# Patient Record
Sex: MALE | Race: WHITE | NOT HISPANIC OR LATINO | Employment: OTHER | URBAN - METROPOLITAN AREA
[De-identification: names, ages, dates, MRNs, and addresses within clinical notes are randomized per-mention and may not be internally consistent; named-entity substitution may affect disease eponyms.]

---

## 2017-01-09 DIAGNOSIS — N32.81 OAB (OVERACTIVE BLADDER): Primary | ICD-10-CM

## 2017-01-09 DIAGNOSIS — N20.0 KIDNEY STONE: ICD-10-CM

## 2017-01-09 DIAGNOSIS — F41.1 GAD (GENERALIZED ANXIETY DISORDER): Primary | ICD-10-CM

## 2017-01-09 RX ORDER — ESZOPICLONE 3 MG/1
3 TABLET, FILM COATED ORAL NIGHTLY
Qty: 30 TABLET | Refills: 4 | Status: SHIPPED | OUTPATIENT
Start: 2017-01-09 | End: 2017-06-05

## 2017-01-09 RX ORDER — TROSPIUM CHLORIDE 20 MG/1
20 TABLET, FILM COATED ORAL 2 TIMES DAILY
Qty: 180 TABLET | Refills: 3 | Status: SHIPPED | OUTPATIENT
Start: 2017-01-09 | End: 2017-04-12

## 2017-01-09 RX ORDER — MIRTAZAPINE 15 MG/1
15 TABLET, FILM COATED ORAL NIGHTLY
Qty: 90 TABLET | Refills: 1 | Status: SHIPPED | OUTPATIENT
Start: 2017-01-09 | End: 2017-04-12 | Stop reason: SDUPTHER

## 2017-01-09 RX ORDER — ALLOPURINOL 300 MG/1
300 TABLET ORAL DAILY
Qty: 90 TABLET | Refills: 1 | Status: SHIPPED | OUTPATIENT
Start: 2017-01-09 | End: 2017-01-13

## 2017-01-09 RX ORDER — FLUOXETINE HYDROCHLORIDE 20 MG/1
20 CAPSULE ORAL DAILY
Qty: 30 CAPSULE | Refills: 11 | Status: SHIPPED | OUTPATIENT
Start: 2017-01-09 | End: 2017-04-12 | Stop reason: SDUPTHER

## 2017-01-09 NOTE — PROGRESS NOTES
OAB (overactive bladder)  -     trospium (SANCTURA) 20 mg Tab tablet; Take 1 tablet (20 mg total) by mouth 2 (two) times daily. Take it in an empty stomach  Dispense: 180 tablet; Refill: 3    Kidney stone  -     allopurinol (ZYLOPRIM) 300 MG tablet; Take 1 tablet (300 mg total) by mouth once daily.  Dispense: 90 tablet; Refill: 1    refills given as requested.  He needs a followup with me within 2 months.  Other refills requested should be done by his PCP.

## 2017-01-13 ENCOUNTER — OFFICE VISIT (OUTPATIENT)
Dept: RHEUMATOLOGY | Facility: CLINIC | Age: 22
End: 2017-01-13
Payer: COMMERCIAL

## 2017-01-13 ENCOUNTER — LAB VISIT (OUTPATIENT)
Dept: LAB | Facility: HOSPITAL | Age: 22
End: 2017-01-13
Attending: INTERNAL MEDICINE
Payer: COMMERCIAL

## 2017-01-13 VITALS
SYSTOLIC BLOOD PRESSURE: 132 MMHG | WEIGHT: 238.13 LBS | DIASTOLIC BLOOD PRESSURE: 92 MMHG | HEART RATE: 86 BPM | BODY MASS INDEX: 32.29 KG/M2

## 2017-01-13 DIAGNOSIS — L40.9 PSORIASIS: ICD-10-CM

## 2017-01-13 DIAGNOSIS — M02.30 REACTIVE ARTHRITIS: Primary | ICD-10-CM

## 2017-01-13 DIAGNOSIS — M02.30 REACTIVE ARTHRITIS: ICD-10-CM

## 2017-01-13 DIAGNOSIS — K58.9 IRRITABLE BOWEL SYNDROME, UNSPECIFIED TYPE: ICD-10-CM

## 2017-01-13 LAB
ALBUMIN SERPL BCP-MCNC: 4.2 G/DL
ALP SERPL-CCNC: 80 U/L
ALT SERPL W/O P-5'-P-CCNC: 86 U/L
ANION GAP SERPL CALC-SCNC: 10 MMOL/L
AST SERPL-CCNC: 49 U/L
BASOPHILS # BLD AUTO: 0.04 K/UL
BASOPHILS NFR BLD: 0.5 %
BILIRUB SERPL-MCNC: 0.3 MG/DL
BUN SERPL-MCNC: 13 MG/DL
CALCIUM SERPL-MCNC: 9.8 MG/DL
CHLORIDE SERPL-SCNC: 104 MMOL/L
CO2 SERPL-SCNC: 26 MMOL/L
CREAT SERPL-MCNC: 1 MG/DL
CRP SERPL-MCNC: 5.2 MG/L
DIFFERENTIAL METHOD: ABNORMAL
EOSINOPHIL # BLD AUTO: 0.3 K/UL
EOSINOPHIL NFR BLD: 3.7 %
ERYTHROCYTE [DISTWIDTH] IN BLOOD BY AUTOMATED COUNT: 13.3 %
ERYTHROCYTE [SEDIMENTATION RATE] IN BLOOD BY WESTERGREN METHOD: 7 MM/HR
EST. GFR  (AFRICAN AMERICAN): >60 ML/MIN/1.73 M^2
EST. GFR  (NON AFRICAN AMERICAN): >60 ML/MIN/1.73 M^2
GLUCOSE SERPL-MCNC: 78 MG/DL
HCT VFR BLD AUTO: 45.7 %
HGB BLD-MCNC: 15.4 G/DL
IGA SERPL-MCNC: 140 MG/DL
IGG SERPL-MCNC: 693 MG/DL
IGM SERPL-MCNC: 133 MG/DL
LYMPHOCYTES # BLD AUTO: 2.8 K/UL
LYMPHOCYTES NFR BLD: 36.2 %
MCH RBC QN AUTO: 32 PG
MCHC RBC AUTO-ENTMCNC: 33.7 %
MCV RBC AUTO: 95 FL
MONOCYTES # BLD AUTO: 0.5 K/UL
MONOCYTES NFR BLD: 6.5 %
NEUTROPHILS # BLD AUTO: 4.1 K/UL
NEUTROPHILS NFR BLD: 52.7 %
PLATELET # BLD AUTO: 254 K/UL
PMV BLD AUTO: 11.7 FL
POTASSIUM SERPL-SCNC: 4.3 MMOL/L
PROT SERPL-MCNC: 7.6 G/DL
RBC # BLD AUTO: 4.82 M/UL
SODIUM SERPL-SCNC: 140 MMOL/L
WBC # BLD AUTO: 7.79 K/UL

## 2017-01-13 PROCEDURE — 86747 PARVOVIRUS ANTIBODY: CPT | Mod: 91

## 2017-01-13 PROCEDURE — 81374 HLA I TYPING 1 ANTIGEN LR: CPT | Mod: PO

## 2017-01-13 PROCEDURE — 80053 COMPREHEN METABOLIC PANEL: CPT

## 2017-01-13 PROCEDURE — 86225 DNA ANTIBODY NATIVE: CPT

## 2017-01-13 PROCEDURE — 86235 NUCLEAR ANTIGEN ANTIBODY: CPT

## 2017-01-13 PROCEDURE — 86235 NUCLEAR ANTIGEN ANTIBODY: CPT | Mod: 59

## 2017-01-13 PROCEDURE — 83516 IMMUNOASSAY NONANTIBODY: CPT | Mod: 59

## 2017-01-13 PROCEDURE — 86038 ANTINUCLEAR ANTIBODIES: CPT

## 2017-01-13 PROCEDURE — 86644 CMV ANTIBODY: CPT

## 2017-01-13 PROCEDURE — 1159F MED LIST DOCD IN RCRD: CPT | Mod: S$GLB,,, | Performed by: INTERNAL MEDICINE

## 2017-01-13 PROCEDURE — 36415 COLL VENOUS BLD VENIPUNCTURE: CPT | Mod: PO

## 2017-01-13 PROCEDURE — 86618 LYME DISEASE ANTIBODY: CPT

## 2017-01-13 PROCEDURE — 82784 ASSAY IGA/IGD/IGG/IGM EACH: CPT | Mod: 59

## 2017-01-13 PROCEDURE — 82784 ASSAY IGA/IGD/IGG/IGM EACH: CPT

## 2017-01-13 PROCEDURE — 85651 RBC SED RATE NONAUTOMATED: CPT | Mod: PO

## 2017-01-13 PROCEDURE — 86665 EPSTEIN-BARR CAPSID VCA: CPT

## 2017-01-13 PROCEDURE — 86140 C-REACTIVE PROTEIN: CPT

## 2017-01-13 PROCEDURE — 82787 IGG 1 2 3 OR 4 EACH: CPT | Mod: 59

## 2017-01-13 PROCEDURE — 99205 OFFICE O/P NEW HI 60 MIN: CPT | Mod: S$GLB,,, | Performed by: INTERNAL MEDICINE

## 2017-01-13 PROCEDURE — 83516 IMMUNOASSAY NONANTIBODY: CPT

## 2017-01-13 PROCEDURE — 86645 CMV ANTIBODY IGM: CPT

## 2017-01-13 PROCEDURE — 85025 COMPLETE CBC W/AUTO DIFF WBC: CPT

## 2017-01-13 PROCEDURE — 99999 PR PBB SHADOW E&M-EST. PATIENT-LVL III: CPT | Mod: PBBFAC,,, | Performed by: INTERNAL MEDICINE

## 2017-01-13 RX ORDER — NEOMYCIN SULFATE, POLYMYXIN B SULFATE AND HYDROCORTISONE 10; 3.5; 1 MG/ML; MG/ML; [USP'U]/ML
SUSPENSION/ DROPS AURICULAR (OTIC)
COMMUNITY
Start: 2017-01-05 | End: 2017-04-12

## 2017-01-13 RX ORDER — FLUCONAZOLE 150 MG/1
150 TABLET ORAL DAILY
Qty: 7 TABLET | Refills: 3 | Status: SHIPPED | OUTPATIENT
Start: 2017-01-13 | End: 2017-01-20

## 2017-01-13 RX ORDER — METOCLOPRAMIDE 10 MG/1
10 TABLET ORAL 4 TIMES DAILY
Qty: 90 TABLET | Refills: 1 | Status: SHIPPED | OUTPATIENT
Start: 2017-01-13 | End: 2017-04-12

## 2017-01-13 ASSESSMENT — ROUTINE ASSESSMENT OF PATIENT INDEX DATA (RAPID3)
FATIGUE SCORE: 6
MDHAQ FUNCTION SCORE: 1.8
PATIENT GLOBAL ASSESSMENT SCORE: 5
TOTAL RAPID3 SCORE: 7
PAIN SCORE: 10
AM STIFFNESS SCORE: 0, NO
PSYCHOLOGICAL DISTRESS SCORE: 5.5

## 2017-01-13 NOTE — MR AVS SNAPSHOT
[place] [facility]  [address] [facility] [address]  [facility] [address]  Phone: [phone]  Fax: [phone]                  Jm Chaparro   2017 9:00 AM   Office Visit    Description:  Male : 1995   Provider:  Dexter Dobbs MD   Department:  [facility]           Reason for Visit     Consult           Diagnoses this Visit        Comments    Reactive arthritis    -  Primary     Irritable bowel syndrome, unspecified type         Psoriasis                To Do List           Goals (5 Years of Data)     None      Follow-Up and Disposition     Return in about 3 months (around 2017).       These Medications        Disp Refills Start End    fluconazole (DIFLUCAN) 150 MG Tab 7 tablet 3 2017    Take 1 tablet (150 mg total) by mouth once daily. - Oral    Pharmacy: [address] Ph #: [phone]       metoclopramide HCl (REGLAN) 10 MG tablet 90 tablet 1 2017     Take 1 tablet (10 mg total) by mouth 4 (four) times daily. Gastric motility - Oral    Pharmacy: [address] [place] Ph #: [phone]         [facility]     [facility] -  Assistance  Registered nurses in the [facility] provide clinical advisement, health education, appointment booking, and other advisory services.  Call for this free service at [phone].             Medications           Message regarding Medications     Verify the changes and/or additions to your medication regime listed below are the same as discussed with your clinician today.  If any of these changes or additions are incorrect, please notify your healthcare provider.        START taking these NEW medications        Refills    fluconazole (DIFLUCAN) 150 MG Tab 3    Sig: Take 1 tablet (150 mg total) by mouth once daily.    Class: Normal    Route: Oral     metoclopramide HCl (REGLAN) 10 MG tablet 1    Sig: Take 1 tablet (10 mg total) by mouth 4 (four) times daily. Gastric motility    Class: Normal    Route: Oral      STOP taking these medications     allopurinol (ZYLOPRIM) 300 MG tablet Take 1 tablet (300 mg total) by mouth once daily.           Verify that the below list of medications is an accurate representation of the medications you are currently taking.  If none reported, the list may be blank. If incorrect, please contact your healthcare provider. Carry this list with you in case of emergency.           Current Medications     amitriptyline (ELAVIL) 10 MG tablet Take 1 tablet (10 mg total) by mouth nightly as needed for Insomnia. Take 1 to 2 tablets at night    calcium glycerophosphate (PRELIEF) 65 mg Tab Take 2 tablets by mouth before meals and at bedtime as needed.    cholecalciferol, vitamin D3, (VITAMIN D3) 5,000 unit Tab Take 5,000 Units by mouth once daily.    cyanocobalamin (VITAMIN B-12) 1000 MCG tablet Take 100 mcg by mouth once daily.    famotidine (PEPCID) 40 MG tablet Take 1 tablet (40 mg total) by mouth nightly as needed for Heartburn.    fluoxetine (PROZAC) 20 MG capsule Take 1 capsule (20 mg total) by mouth once daily.    fluticasone (FLONASE) 50 mcg/actuation nasal spray 2 sprays by Each Nare route once daily.    hydrOXYzine HCl (ATARAX) 25 MG tablet TK 1 T PO  QHS    mirtazapine (REMERON) 15 MG tablet Take 1 tablet (15 mg total) by mouth every evening.    neomycin-polymyxin-hydrocortisone (CORTISPORIN) 3.5-10,000-1 mg/mL-unit/mL-% otic suspension     potassium citrate (UROCIT-K 10) 10 mEq (1,080 mg) TbSR Take 1 tablet (10 mEq total) by mouth 3 (three) times daily with meals.    trospium (SANCTURA) 20 mg Tab tablet Take 1 tablet (20 mg total) by mouth 2 (two) times daily. Take it in an empty stomach    ELMIRON 100 mg Cap TK 2 CS PO BID BEFORE MEALS    eszopiclone 3 mg Tab Take 1 tablet (3 mg total) by mouth every evening.    fluconazole  (DIFLUCAN) 150 MG Tab Take 1 tablet (150 mg total) by mouth once daily.    metoclopramide HCl (REGLAN) 10 MG tablet Take 1 tablet (10 mg total) by mouth 4 (four) times daily. Gastric motility    sumatriptan (IMITREX) 100 MG tablet Take 100 mg by mouth every 2 (two) hours as needed.            Clinical Reference Information           Vital Signs - Last Recorded  Most recent update: 1/13/2017  9:21 AM by Maddy Poole LPN    BP Pulse Wt BMI       (!) 132/92 (BP Location: Right arm, Patient Position: Sitting, BP Method: Automatic) 86 108 kg (238 lb 1.6 oz) 32.29 kg/m2       Blood Pressure          Most Recent Value    BP  (!)  132/92      Allergies as of 1/13/2017     Caffeine      Immunizations Administered on Date of Encounter - 1/13/2017     None      Orders Placed During Today's Visit     Future Labs/Procedures Expected by Expires    RUKHSANA  1/13/2017 3/14/2018    Anti Sm/RNP Antibody  1/13/2017 3/14/2018    Anti-DNA antibody, double-stranded  1/13/2017 3/14/2018    Anti-Histone Antibody  1/13/2017 3/14/2018    Anti-scleroderma antibody  1/13/2017 3/14/2018    Anti-Smith antibody  1/13/2017 3/14/2018    B. burgdorferi Abs (Lyme Disease)  1/13/2017 3/14/2018    C-reactive protein  1/13/2017 3/14/2018    CBC auto differential  1/13/2017 3/14/2018    Celiac Disease Panel  1/13/2017 3/14/2018    Comprehensive metabolic panel  1/13/2017 3/14/2018    Cytomegalovirus (Cmv) Ab, Igm  1/13/2017 3/14/2018    CYTOMEGALOVIRUS ANTIBODY, IGG  1/13/2017 3/14/2018    VERONICA-BARR VIRUS ANTIBODY PANEL  1/13/2017 3/14/2018    HLA B27 Antigen  1/13/2017 3/14/2018    IgA  1/13/2017 3/14/2018    IgG 1, 2, 3, and 4  1/13/2017 3/14/2018    IgG  1/13/2017 3/14/2018    IgM  1/13/2017 3/14/2018    Parvovirus B19 antibody, IgG and IgM  1/13/2017 3/14/2018    Sedimentation rate, manual  1/13/2017 3/14/2018    Sjogrens syndrome-A extractable nuclear antibody  1/13/2017 3/14/2018    Sjogrens syndrome-B extractable nuclear antibody  1/13/2017  3/14/2018      Instructions    Stop allopurinol  Diflucan 150 for yeast add probiotics   reglan for severe distention

## 2017-01-13 NOTE — PROGRESS NOTES
Subjective:       Patient ID: Jm Chaparro is a 21 y.o. male.    Chief Complaint: Consult          Arthritis    The disease course has been fluctuating.     He complains of pain, stiffness, joint swelling and joint warmth. The symptoms have been worsening. Affected locations include the neck, right shoulder, right elbow, right wrist, right MCP, right PIP, right DIP, right hip, right knee, right foot, right toes, left shoulder, left wrist, left MCP, left PIP, left hip, left knee, left ankle and left toes. Associated symptoms include fatigue, pain at night, pain while resting, trouble swallowing, myalgias and headaches. Pertinent negatives include no dysuria, fever, Raynaud's syndrome, uveitis, dry eyes, pleurisy, stress or weight loss. He complains of morning stiffness lasting between 30 and 60 minutes after awakening.The neck, left shoulder, right shoulder, left elbow, right elbow, left wrist, right wrist, left hand, right hand, left hip, right hip, left knee and right knee presents with Arthralgia.    Past treatments include NSAIDs. Factors aggravating his arthritis include activity.     Review of Systems   Constitutional: Positive for fatigue. Negative for activity change, appetite change, chills, diaphoresis, fever, unexpected weight change and weight loss.   HENT: Positive for trouble swallowing. Negative for congestion, ear pain, facial swelling, mouth sores, nosebleeds, postnasal drip, rhinorrhea, sinus pressure, sneezing, sore throat, tinnitus and voice change.    Eyes: Negative for pain, discharge, redness, itching and visual disturbance.   Respiratory: Negative for apnea, cough, chest tightness, shortness of breath and wheezing.    Cardiovascular: Negative for chest pain, palpitations and leg swelling.   Gastrointestinal: Positive for abdominal distention and abdominal pain. Negative for constipation, diarrhea, nausea and vomiting.   Endocrine: Negative for cold intolerance, heat intolerance, polydipsia  and polyuria.   Genitourinary: Negative for decreased urine volume, difficulty urinating, dysuria, flank pain, frequency, hematuria and urgency.   Musculoskeletal: Positive for arthralgias, back pain, joint swelling, myalgias, neck pain, neck stiffness and stiffness. Negative for gait problem.   Skin: Positive for rash. Negative for pallor and wound.   Allergic/Immunologic: Negative for immunocompromised state.   Neurological: Positive for headaches. Negative for dizziness, tremors, seizures, syncope, weakness and numbness.   Hematological: Negative for adenopathy. Does not bruise/bleed easily.   Psychiatric/Behavioral: Negative for sleep disturbance and suicidal ideas. The patient is not nervous/anxious.          Objective:     Visit Vitals    BP (!) 132/92 (BP Location: Right arm, Patient Position: Sitting, BP Method: Automatic)    Pulse 86    Wt 108 kg (238 lb 1.6 oz)    BMI 32.29 kg/m2        Physical Exam   Vitals reviewed.  Constitutional: He is oriented to person, place, and time. He appears distressed.   HENT:   Head: Normocephalic and atraumatic.   Mouth/Throat: Oropharynx is clear and moist.   Eyes: EOM are normal. Pupils are equal, round, and reactive to light.   Neck: Neck supple. No thyromegaly present.   Cardiovascular: Normal rate, regular rhythm and normal heart sounds.  Exam reveals no gallop and no friction rub.    No murmur heard.  Pulmonary/Chest: Breath sounds normal. He has no wheezes. He has no rales. He exhibits no tenderness.   Abdominal: There is no tenderness. There is no rebound and no guarding.       Right Side Rheumatological Exam     Examination finds the 2nd MCP, 3rd MCP, 4th MCP and 5th MCP normal.    The patient is tender to palpation of the shoulder and elbow    He has swelling of the knee    The patient has an enlarged wrist, 1st PIP, 2nd PIP, 3rd PIP, 4th PIP and 5th PIP    Shoulder Exam   Tenderness Location: acromioclavicular joint and posterior shoulder    Range of Motion    Active Abduction: abnormal   Adduction: abnormal  Effusion: negative  Sensation: normal    Knee Exam     Range of Motion   Extension: normal   Flexion: normal   Patellofemoral Crepitus: positive  Effusion: positive  Range of Motion: normal range of motion  Sensation: normal    Hip Exam   Tenderness Location: anterior and posterior    Range of Motion   Extension: abnormal   Flexion: abnormal   Sensation: normal    Elbow/Wrist Exam   Tenderness Location: no tenderness    Range of Motion   Elbow   Flexion: normal   Sensation: normal    Muscle Strength (0-5 scale):  Neck Flexion:  3  Neck Extension: 3  Deltoid:  3  Biceps: 3/5   Triceps:  3  Quadriceps:  3   Distal Lower Extremity: 3    Left Side Rheumatological Exam     Examination finds the elbow, wrist, 1st MCP, 2nd MCP, 3rd MCP, 4th MCP and 5th MCP normal.    The patient is tender to palpation of the shoulder.    He has swelling of the knee    The patient has an enlarged 1st PIP, 2nd PIP, 3rd PIP, 4th PIP and 5th PIP.    Shoulder Exam   Tenderness Location: acromioclavicular joint and posterior shoulder    Range of Motion   Active Abduction: abnormal   Extension: abnormal   Sensation: normal    Knee Exam     Range of Motion   Extension: normal   Flexion: normal     Patellofemoral Crepitus: positive  Effusion: positive  Range of Motion: decreased range of motion  Sensation: normal    Hip Exam   Tenderness Location: anterior and posterior    Range of Motion   Extension: abnormal   Flexion: abnormal   Sensation: normal    Elbow/Wrist Exam     Range of Motion   Elbow   Flexion: normal   Sensation: normal    Muscle Strength (0-5 scale):  Neck Flexion:  3  Neck Extension: 3  Deltoid:  3  Biceps: 3/5   Triceps:  3  Quadriceps:  3   Distal Lower Extremity: 3      Back/Neck Exam   General Inspection   Gait: normal       Tenderness Right paramedian tenderness of the Lower C-Spine, Lower L-Spine, Upper C-Spine, Upper L-Spine and SI Joint.Left paramedian tenderness of the  Lower C-Spine, Lower L-Spine, Upper C-Spine, Upper L-Spine and SI Joint.    Back Range of Motion   Extension: abnormal  Flexion: abnormal  Lateral Bend Right: abnormal  Lateral Bend Left: abnormal  Rotation Right: abnormal  Rotation Left: abnormal    Neck Range of Motion   Flexion: Limited and moderate  Extension: Limited and moderate  Right Lateral Bend: abnormal  Left Lateral Bend: abnormal  Right Rotation: abnormal  Left Rotation: abnormal  Lymphadenopathy:     He has no cervical adenopathy.   Neurological: He is alert and oriented to person, place, and time. He displays weakness. He exhibits abnormal muscle tone.   Reflex Scores:       Patellar reflexes are 2+ on the right side and 2+ on the left side.  Skin: No rash noted. No erythema. No pallor.     Psychiatric: Mood and affect normal.   Musculoskeletal: He exhibits edema and tenderness. He exhibits no deformity.           Results for orders placed or performed in visit on 09/21/16   2D Echo w/ Color Flow Doppler   Result Value Ref Range    EF 55 55 - 65    Diastolic Dysfunction No     Est. PA Systolic Pressure 30     Tricuspid Valve Regurgitation MILD        Assessment:       1. Reactive arthritis    2. Irritable bowel syndrome, unspecified type    3. Psoriasis            Plan:       Jm was seen today for consult.    Diagnoses and all orders for this visit:    Reactive arthritis  -     RUKHSANA; Future  -     Anti Sm/RNP Antibody; Future  -     Anti-DNA antibody, double-stranded; Future  -     Anti-Histone Antibody; Future  -     Anti-scleroderma antibody; Future  -     Anti-Smith antibody; Future  -     Sjogrens syndrome-A extractable nuclear antibody; Future  -     Sjogrens syndrome-B extractable nuclear antibody; Future  -     IgA; Future  -     IgG; Future  -     IgG 1, 2, 3, and 4; Future  -     IgM; Future  -     C-reactive protein; Future  -     Sedimentation rate, manual; Future  -     B. burgdorferi Abs (Lyme Disease); Future  -     HLA B27 Antigen;  Future  -     Parvovirus B19 antibody, IgG and IgM; Future  -     Cytomegalovirus (Cmv) Ab, Igm; Future  -     Celiac Disease Panel; Future  -     VERONICA-BARR VIRUS ANTIBODY PANEL; Future  -     CYTOMEGALOVIRUS ANTIBODY, IGG; Future  -     fluconazole (DIFLUCAN) 150 MG Tab; Take 1 tablet (150 mg total) by mouth once daily.  -     metoclopramide HCl (REGLAN) 10 MG tablet; Take 1 tablet (10 mg total) by mouth 4 (four) times daily. Gastric motility  -     CBC auto differential; Future  -     Comprehensive metabolic panel; Future    Irritable bowel syndrome, unspecified type  -     RUKHSANA; Future  -     Anti Sm/RNP Antibody; Future  -     Anti-DNA antibody, double-stranded; Future  -     Anti-Histone Antibody; Future  -     Anti-scleroderma antibody; Future  -     Anti-Smith antibody; Future  -     Sjogrens syndrome-A extractable nuclear antibody; Future  -     Sjogrens syndrome-B extractable nuclear antibody; Future  -     IgA; Future  -     IgG; Future  -     IgG 1, 2, 3, and 4; Future  -     IgM; Future  -     C-reactive protein; Future  -     Sedimentation rate, manual; Future  -     B. burgdorferi Abs (Lyme Disease); Future  -     HLA B27 Antigen; Future  -     Parvovirus B19 antibody, IgG and IgM; Future  -     Cytomegalovirus (Cmv) Ab, Igm; Future  -     Celiac Disease Panel; Future  -     VERONICA-BARR VIRUS ANTIBODY PANEL; Future  -     CYTOMEGALOVIRUS ANTIBODY, IGG; Future  -     fluconazole (DIFLUCAN) 150 MG Tab; Take 1 tablet (150 mg total) by mouth once daily.  -     metoclopramide HCl (REGLAN) 10 MG tablet; Take 1 tablet (10 mg total) by mouth 4 (four) times daily. Gastric motility  -     CBC auto differential; Future  -     Comprehensive metabolic panel; Future    Psoriasis  -     fluconazole (DIFLUCAN) 150 MG Tab; Take 1 tablet (150 mg total) by mouth once daily.  -     metoclopramide HCl (REGLAN) 10 MG tablet; Take 1 tablet (10 mg total) by mouth 4 (four) times daily. Gastric motility  -     CBC auto  differential; Future  -     Comprehensive metabolic panel; Future

## 2017-01-14 LAB
ANTI SM ANTIBODY: 1.08 EU
ANTI SM/RNP ANTIBODY: 1.44 EU
ANTI-SM INTERPRETATION: NEGATIVE
ANTI-SM/RNP INTERPRETATION: NEGATIVE
ANTI-SSA ANTIBODY: 0.45 EU
ANTI-SSA INTERPRETATION: NEGATIVE
ANTI-SSB ANTIBODY: 0.54 EU
ANTI-SSB INTERPRETATION: NEGATIVE

## 2017-01-16 LAB
ANA SER QL IF: NORMAL
DSDNA AB SER-ACNC: NORMAL [IU]/ML
ENA SCL70 AB SER-ACNC: 2 UNITS
GLIADIN PEPTIDE IGA SER-ACNC: 4 UNITS
GLIADIN PEPTIDE IGG SER-ACNC: 2 UNITS
IGG1 SER-MCNC: 428 MG/DL
IGG2 SER-MCNC: 219 MG/DL
IGG3 SER-MCNC: 21 MG/DL
IGG4 SER-MCNC: 45 MG/DL
TTG IGA SER IA-ACNC: 3 UNITS
TTG IGG SER IA-ACNC: 2 UNITS

## 2017-01-17 LAB
B BURGDOR AB SER QL: 0.09 INDEX VALUE
CMV IGG SERPL QL IA: NORMAL
CMV IGM TITR SERPL: <8 U/ML
PARVOVIRUS B19 ABS IGG & IGM: ABNORMAL
PARVOVIRUS B19 IGG ANTIBODY: 6.17 INDEX
PARVOVIRUS B19 IGM ANTIBODY: 0.12 INDEX

## 2017-01-19 LAB — HISTONE IGG SER IA-ACNC: 0.4 UNITS (ref 0–0.9)

## 2017-01-20 LAB
EBV EA IGG SER QL IF: NORMAL TITER
EBV NA IGG SER IA-ACNC: NORMAL TITER
EBV VCA IGG SER IA-ACNC: NORMAL TITER
EBV VCA IGM SER IA-ACNC: NORMAL TITER

## 2017-02-03 ENCOUNTER — TELEPHONE (OUTPATIENT)
Dept: RHEUMATOLOGY | Facility: CLINIC | Age: 22
End: 2017-02-03

## 2017-02-03 NOTE — TELEPHONE ENCOUNTER
----- Message from Dexter Dobbs MD sent at 1/19/2017  8:10 AM CST -----  Lyme test are negative, a past infection for parvovirus is noted and your immune system is suppressed, the rest of the labs are pendingplease call

## 2017-02-12 LAB
HLA-B27 RELATED AG QL: NEGATIVE
HLA-B27 RELATED AG QL: NORMAL

## 2017-04-12 ENCOUNTER — OFFICE VISIT (OUTPATIENT)
Dept: PSYCHIATRY | Facility: CLINIC | Age: 22
End: 2017-04-12
Payer: COMMERCIAL

## 2017-04-12 VITALS
WEIGHT: 243.5 LBS | DIASTOLIC BLOOD PRESSURE: 86 MMHG | HEART RATE: 80 BPM | HEIGHT: 72 IN | SYSTOLIC BLOOD PRESSURE: 136 MMHG | BODY MASS INDEX: 32.98 KG/M2

## 2017-04-12 DIAGNOSIS — F41.1 GAD (GENERALIZED ANXIETY DISORDER): Primary | ICD-10-CM

## 2017-04-12 DIAGNOSIS — N30.10 IC (INTERSTITIAL CYSTITIS): ICD-10-CM

## 2017-04-12 PROCEDURE — 99999 PR PBB SHADOW E&M-EST. PATIENT-LVL II: CPT | Mod: PBBFAC,,, | Performed by: PSYCHIATRY & NEUROLOGY

## 2017-04-12 PROCEDURE — 99214 OFFICE O/P EST MOD 30 MIN: CPT | Mod: S$GLB,,, | Performed by: PSYCHIATRY & NEUROLOGY

## 2017-04-12 PROCEDURE — 1160F RVW MEDS BY RX/DR IN RCRD: CPT | Mod: S$GLB,,, | Performed by: PSYCHIATRY & NEUROLOGY

## 2017-04-12 PROCEDURE — 90833 PSYTX W PT W E/M 30 MIN: CPT | Mod: S$GLB,,, | Performed by: PSYCHIATRY & NEUROLOGY

## 2017-04-12 RX ORDER — MIRTAZAPINE 7.5 MG/1
7.5 TABLET, FILM COATED ORAL NIGHTLY
Qty: 30 TABLET | Refills: 0 | Status: SHIPPED | OUTPATIENT
Start: 2017-04-12 | End: 2017-05-18

## 2017-04-12 RX ORDER — AMITRIPTYLINE HYDROCHLORIDE 25 MG/1
25 TABLET, FILM COATED ORAL NIGHTLY PRN
Qty: 30 TABLET | Refills: 0 | Status: SHIPPED | OUTPATIENT
Start: 2017-04-12 | End: 2017-05-18 | Stop reason: SDUPTHER

## 2017-04-12 RX ORDER — FLUOXETINE HYDROCHLORIDE 20 MG/1
20 CAPSULE ORAL DAILY
Qty: 30 CAPSULE | Refills: 11 | Status: SHIPPED | OUTPATIENT
Start: 2017-04-12 | End: 2017-05-18 | Stop reason: SDUPTHER

## 2017-04-12 RX ORDER — METHYLPHENIDATE HYDROCHLORIDE 20 MG/1
20 TABLET ORAL 2 TIMES DAILY
Qty: 60 TABLET | Refills: 0 | Status: SHIPPED | OUTPATIENT
Start: 2017-04-12 | End: 2017-05-18

## 2017-04-12 RX ORDER — HYDROXYZINE HYDROCHLORIDE 25 MG/1
25 TABLET, FILM COATED ORAL 3 TIMES DAILY
Qty: 30 TABLET | Refills: 0 | Status: SHIPPED | OUTPATIENT
Start: 2017-04-12 | End: 2017-05-18 | Stop reason: SDUPTHER

## 2017-04-12 RX ORDER — HYDROCODONE BITARTRATE AND ACETAMINOPHEN 5; 325 MG/1; MG/1
TABLET ORAL
Refills: 0 | COMMUNITY
Start: 2017-03-31 | End: 2017-04-12

## 2017-04-16 ENCOUNTER — PATIENT MESSAGE (OUTPATIENT)
Dept: PSYCHIATRY | Facility: CLINIC | Age: 22
End: 2017-04-16

## 2017-04-16 ENCOUNTER — PATIENT MESSAGE (OUTPATIENT)
Dept: FAMILY MEDICINE | Facility: CLINIC | Age: 22
End: 2017-04-16

## 2017-04-17 DIAGNOSIS — F40.10 SOCIAL ANXIETY DISORDER: Primary | ICD-10-CM

## 2017-04-17 DIAGNOSIS — F41.1 GAD (GENERALIZED ANXIETY DISORDER): ICD-10-CM

## 2017-04-17 NOTE — TELEPHONE ENCOUNTER
Spoke with Jm on phone, mother in back ground. Patient stated that yesterday he took ritalin and by mid- afternoon he felt his heart flutter, dizzy, hard to concentrate and sweaty.     Mother checked his BP- 155/125. Pulse racing  Mother gave him HER  lisinopril 10-12.5mg. And rechecked it to monitor. By early evening he was 122/70.    This morning they held medication- he did not take ritalin. Checked BP which was 112/68.     Advised patient to continue holding medication for today   Please advise

## 2017-04-18 ENCOUNTER — PATIENT MESSAGE (OUTPATIENT)
Dept: FAMILY MEDICINE | Facility: CLINIC | Age: 22
End: 2017-04-18

## 2017-04-19 ENCOUNTER — OFFICE VISIT (OUTPATIENT)
Dept: FAMILY MEDICINE | Facility: CLINIC | Age: 22
End: 2017-04-19
Payer: COMMERCIAL

## 2017-04-19 ENCOUNTER — TELEPHONE (OUTPATIENT)
Dept: FAMILY MEDICINE | Facility: CLINIC | Age: 22
End: 2017-04-19

## 2017-04-19 VITALS
SYSTOLIC BLOOD PRESSURE: 130 MMHG | HEART RATE: 84 BPM | TEMPERATURE: 99 F | RESPIRATION RATE: 18 BRPM | OXYGEN SATURATION: 98 % | WEIGHT: 241.88 LBS | DIASTOLIC BLOOD PRESSURE: 84 MMHG | HEIGHT: 73 IN | BODY MASS INDEX: 32.06 KG/M2

## 2017-04-19 DIAGNOSIS — R35.0 URINARY FREQUENCY: ICD-10-CM

## 2017-04-19 DIAGNOSIS — R53.83 FATIGUE, UNSPECIFIED TYPE: ICD-10-CM

## 2017-04-19 DIAGNOSIS — I10 BENIGN ESSENTIAL HTN: Primary | ICD-10-CM

## 2017-04-19 DIAGNOSIS — F84.5 ASPERGER'S DISORDER: ICD-10-CM

## 2017-04-19 DIAGNOSIS — R30.0 DYSURIA: ICD-10-CM

## 2017-04-19 LAB
BILIRUB SERPL-MCNC: ABNORMAL MG/DL
BLOOD URINE, POC: 50
COLOR, POC UA: ABNORMAL
FERRITIN SERPL-MCNC: 211 NG/ML
GLUCOSE UR QL STRIP: ABNORMAL
IRON SERPL-MCNC: 96 UG/DL
IRON SERPL-MCNC: 96 UG/DL
KETONES UR QL STRIP: ABNORMAL
LEUKOCYTE ESTERASE URINE, POC: ABNORMAL
NITRITE, POC UA: ABNORMAL
PH, POC UA: 6
PROTEIN, POC: ABNORMAL
SATURATED IRON: 24 %
SPECIFIC GRAVITY, POC UA: 1.01
T4 FREE SERPL-MCNC: 1.02 NG/DL
TOTAL IRON BINDING CAPACITY: 392 UG/DL
TRANSFERRIN SERPL-MCNC: 265 MG/DL
TSH SERPL DL<=0.005 MIU/L-ACNC: 0.29 UIU/ML
UROBILINOGEN, POC UA: 1
VIT B12 SERPL-MCNC: 584 PG/ML

## 2017-04-19 PROCEDURE — 83036 HEMOGLOBIN GLYCOSYLATED A1C: CPT

## 2017-04-19 PROCEDURE — 3075F SYST BP GE 130 - 139MM HG: CPT | Mod: S$GLB,,, | Performed by: NURSE PRACTITIONER

## 2017-04-19 PROCEDURE — 84443 ASSAY THYROID STIM HORMONE: CPT

## 2017-04-19 PROCEDURE — 82728 ASSAY OF FERRITIN: CPT

## 2017-04-19 PROCEDURE — 82607 VITAMIN B-12: CPT

## 2017-04-19 PROCEDURE — 99214 OFFICE O/P EST MOD 30 MIN: CPT | Mod: S$GLB,,, | Performed by: NURSE PRACTITIONER

## 2017-04-19 PROCEDURE — 1160F RVW MEDS BY RX/DR IN RCRD: CPT | Mod: S$GLB,,, | Performed by: NURSE PRACTITIONER

## 2017-04-19 PROCEDURE — 81002 URINALYSIS NONAUTO W/O SCOPE: CPT | Mod: S$GLB,,, | Performed by: NURSE PRACTITIONER

## 2017-04-19 PROCEDURE — 83540 ASSAY OF IRON: CPT

## 2017-04-19 PROCEDURE — 36415 COLL VENOUS BLD VENIPUNCTURE: CPT | Mod: S$GLB,,, | Performed by: NURSE PRACTITIONER

## 2017-04-19 PROCEDURE — 3079F DIAST BP 80-89 MM HG: CPT | Mod: S$GLB,,, | Performed by: NURSE PRACTITIONER

## 2017-04-19 PROCEDURE — 84439 ASSAY OF FREE THYROXINE: CPT

## 2017-04-19 RX ORDER — LISINOPRIL 5 MG/1
5 TABLET ORAL DAILY
Qty: 30 TABLET | Refills: 0 | Status: SHIPPED | OUTPATIENT
Start: 2017-04-19 | End: 2017-05-15 | Stop reason: SDUPTHER

## 2017-04-19 NOTE — TELEPHONE ENCOUNTER
Pt here today for hospital f/u and high blood pressure.   Recently stayed at [facility] and had procedure performed for kidney stones.   Faxed [facility] request for records of admission.  Maryann notified.

## 2017-04-19 NOTE — PROGRESS NOTES
Venipuncture performed with 23 gauge butterfly, x's 1 attempt,  to L Antecubital vein.  Specimens collected per orders.      Pressure dressing applied to site, instructed patient to remove dressing in 10-15 minutes, OK to re-adjust dressing if pressure causing any discomfort, to observe closely for numbness and/or discoloration to hand or fingers, and to notify provider if bleeding persists after applying constant pressure lasting 30 minutes.

## 2017-04-20 ENCOUNTER — TELEPHONE (OUTPATIENT)
Dept: FAMILY MEDICINE | Facility: CLINIC | Age: 22
End: 2017-04-20

## 2017-04-20 DIAGNOSIS — R79.89 LOW TSH LEVEL: Primary | ICD-10-CM

## 2017-04-20 LAB
ESTIMATED AVG GLUCOSE: 111 MG/DL
HBA1C MFR BLD HPLC: 5.5 %

## 2017-04-20 NOTE — TELEPHONE ENCOUNTER
His labs look good except his thyroid level is a little on the low side. This can make him have palpitations, anxiety, muscles pain. I want to get a thyroid US done. Please schedule this. I also want him to see Endocrinology. I will put in referral. The rest of his labs are good.

## 2017-04-21 NOTE — PROGRESS NOTES
"Subjective:       Patient ID: Jm Chaparro is a 21 y.o. male.    Chief Complaint: Hypertension    HPI Here with his  Mother. They are concerned about his BP. She has his home readings and his pressure runs 140-150/high 80-90's. Most days.  Today reading is adequate. Home BP cuff is comparable to our manual reading. He states that he feels "heavy" at times. Says his muscles hurt and he is tired a lot. He says he is having to go to the bathroom to pee a lot. Sometimes it burns. He says that he drinks soda and eats a lot of salty foods. We talked about proper diet. Avoiding salt. Hydrating well with water. He needs to exercise. He denies any fever or night sweats. No other concerns. See ROS.    The following portion of the patients history was reviewed and updated as appropriate: allergies, current medications, past medical and surgical history. Past social history and problem list reviewed. Family PMH and Past social history reviewed. Tobacco, Illicit drug use reviewed.     Review of Systems  Constitutional:  fatigue   no fever    HENT: no sore throat or nasal congestion. No voice changes    Eyes: No vision changes, blurred vision  Skin: no rashes or lesions  Respiratory:   No SOB, Wheezing, cough  Cardiovascular:   No CP, Palpitations  Gastrointestinal:   No N/V/D. No abdominal pain, weight stable. Appetite good.   Genitourinary:    Dysuria and frequency. No change in bowels.     Musculoskeletal:   No joint pain  No change in gait or coordination. States his body feels "heavy". See HPI.  Neurological:   No dizziness. No headaches  Hematological: No abnormal bruising or bleeding    Psychiatric/Behavioral Negative for suicidal ideas.  Denies feelings of depression. No thoughts of wanting to harm self or others.     Objective:     /84 (BP Location: Left arm, Patient Position: Sitting)  Pulse 84  Temp 98.6 °F (37 °C)  Resp 18  Ht 6' 1" (1.854 m)  Wt 109.7 kg (241 lb 13.5 oz)  SpO2 98%  BMI " 31.91 kg/m2     Physical Exam     Constitutional: oriented to person, place, and time. well-developed and well-nourished.   HENT:  Throat clear. TM with normal light reflex.   Head: Normocephalic.   Eyes: Conjunctivae are normal. Pupils are equal, round, and reactive to light.   Neck: Normal range of motion. Neck supple. No tracheal deviation present. No thyromegaly present.   Cardiovascular: Normal rate, regular rhythm and normal heart sounds.    Pulmonary/Chest: Effort normal and breath sounds normal. No respiratory distress. No wheezes.   Abdominal: Soft. Bowel sounds are normal. No distension. There is no tenderness.   Musculoskeletal: Normal range of motion. Gait and coordination normal.   Neurological: oriented to person, place, and time.   Skin: Skin is warm and dry. No rashes or lesions  Psychiatric: Normal mood and affect.Behavior is normal. Judgment and thought content normal. he answers my questions appropriately. He is cooperative.  Assessment:       1. Benign essential HTN    2. Fatigue, unspecified type    3. Dysuria    4. Urinary frequency    5. Asperger's disorder        Plan:         Jm was seen today for hypertension.    Diagnoses and all orders for this visit:    Benign essential HTN: need to start on low dose BP medication. Take as directed. Continue to monitor BP at home and bring list of readings to follow up in two weeks.    Fatigue, unspecified type: will check labs.   -     Ferritin  -     Iron  -     Iron and TIBC  -     TSH  -     Vitamin B12    Dysuria: no indication of infection.   -     POCT urine dipstick without microscope    Urinary frequency: strong family history of diabetes. Will check labs.  -        Hemoglobin A1c    Asperger's disorder: doing well.     Other orders  -     lisinopril (PRINIVIL,ZESTRIL) 5 MG tablet; Take 1 tablet (5 mg total) by mouth once daily.  -     T4, free    Continue current medication  Take medications only as prescribed  Healthy diet,  exercise  Adequate rest  Adequate hydration  Avoid allergens  Avoid excessive caffeine

## 2017-04-24 ENCOUNTER — HOSPITAL ENCOUNTER (OUTPATIENT)
Dept: RADIOLOGY | Facility: HOSPITAL | Age: 22
Discharge: HOME | End: 2017-04-24
Attending: NURSE PRACTITIONER
Payer: COMMERCIAL

## 2017-04-24 ENCOUNTER — TELEPHONE (OUTPATIENT)
Dept: FAMILY MEDICINE | Facility: CLINIC | Age: 22
End: 2017-04-24

## 2017-04-24 DIAGNOSIS — R79.89 LOW TSH LEVEL: ICD-10-CM

## 2017-04-24 PROCEDURE — 76536 US EXAM OF HEAD AND NECK: CPT | Mod: 26,,, | Performed by: RADIOLOGY

## 2017-04-24 PROCEDURE — 76536 US EXAM OF HEAD AND NECK: CPT | Mod: TC,PO

## 2017-04-25 ENCOUNTER — PATIENT MESSAGE (OUTPATIENT)
Dept: FAMILY MEDICINE | Facility: CLINIC | Age: 22
End: 2017-04-25

## 2017-04-25 DIAGNOSIS — R10.9 FLANK PAIN: Primary | ICD-10-CM

## 2017-04-25 NOTE — TELEPHONE ENCOUNTER
Who cancelled his appointment with Endocrinology, I didn't request that to be done?  His US might have been normal but his TSH is still low. Please find out why it was cancelled. I put in order for CT with renal stone protocal. Please schedule.

## 2017-04-28 ENCOUNTER — OFFICE VISIT (OUTPATIENT)
Dept: RHEUMATOLOGY | Facility: CLINIC | Age: 22
End: 2017-04-28
Payer: COMMERCIAL

## 2017-04-28 ENCOUNTER — LAB VISIT (OUTPATIENT)
Dept: LAB | Facility: HOSPITAL | Age: 22
End: 2017-04-28
Attending: INTERNAL MEDICINE
Payer: COMMERCIAL

## 2017-04-28 VITALS
BODY MASS INDEX: 31.91 KG/M2 | SYSTOLIC BLOOD PRESSURE: 118 MMHG | WEIGHT: 240.75 LBS | HEIGHT: 73 IN | DIASTOLIC BLOOD PRESSURE: 90 MMHG | HEART RATE: 68 BPM

## 2017-04-28 DIAGNOSIS — L40.9 PSORIASIS: ICD-10-CM

## 2017-04-28 DIAGNOSIS — M25.50 ARTHRALGIA, UNSPECIFIED JOINT: ICD-10-CM

## 2017-04-28 DIAGNOSIS — R53.83 FATIGUE, UNSPECIFIED TYPE: ICD-10-CM

## 2017-04-28 DIAGNOSIS — K58.9 IRRITABLE BOWEL SYNDROME, UNSPECIFIED TYPE: ICD-10-CM

## 2017-04-28 DIAGNOSIS — R53.83 FATIGUE, UNSPECIFIED TYPE: Primary | ICD-10-CM

## 2017-04-28 LAB
CRP SERPL-MCNC: 2.2 MG/L
ERYTHROCYTE [SEDIMENTATION RATE] IN BLOOD BY WESTERGREN METHOD: 5 MM/HR
THYROGLOB AB SERPL IA-ACNC: <4 IU/ML
THYROPEROXIDASE IGG SERPL-ACNC: <6 IU/ML

## 2017-04-28 PROCEDURE — 86800 THYROGLOBULIN ANTIBODY: CPT

## 2017-04-28 PROCEDURE — 1160F RVW MEDS BY RX/DR IN RCRD: CPT | Mod: S$GLB,,, | Performed by: INTERNAL MEDICINE

## 2017-04-28 PROCEDURE — 99214 OFFICE O/P EST MOD 30 MIN: CPT | Mod: S$GLB,,, | Performed by: INTERNAL MEDICINE

## 2017-04-28 PROCEDURE — 3080F DIAST BP >= 90 MM HG: CPT | Mod: S$GLB,,, | Performed by: INTERNAL MEDICINE

## 2017-04-28 PROCEDURE — 85651 RBC SED RATE NONAUTOMATED: CPT | Mod: PO

## 2017-04-28 PROCEDURE — 86140 C-REACTIVE PROTEIN: CPT

## 2017-04-28 PROCEDURE — 82530 CORTISOL FREE: CPT

## 2017-04-28 PROCEDURE — 3074F SYST BP LT 130 MM HG: CPT | Mod: S$GLB,,, | Performed by: INTERNAL MEDICINE

## 2017-04-28 PROCEDURE — 86376 MICROSOMAL ANTIBODY EACH: CPT

## 2017-04-28 PROCEDURE — 99999 PR PBB SHADOW E&M-EST. PATIENT-LVL III: CPT | Mod: PBBFAC,,, | Performed by: INTERNAL MEDICINE

## 2017-04-28 RX ORDER — CLOBETASOL PROPIONATE 0.5 MG/G
OINTMENT TOPICAL 2 TIMES DAILY
Qty: 60 G | Refills: 5 | Status: SHIPPED | OUTPATIENT
Start: 2017-04-28 | End: 2019-01-31

## 2017-04-28 RX ORDER — MELOXICAM 15 MG/1
15 TABLET ORAL DAILY PRN
Qty: 30 TABLET | Refills: 3 | Status: SHIPPED | OUTPATIENT
Start: 2017-04-28 | End: 2017-05-26 | Stop reason: SDUPTHER

## 2017-04-28 NOTE — MR AVS SNAPSHOT
[place] [facility]  [address] [facility] [address]  [facility] [address]  Phone: [phone]  Fax: [phone]                  Jm Chaparro   2017 8:00 AM   Office Visit    Description:  Male : 1995   Provider:  Dexter Dobbs MD   Department:  [place] [facility]           Reason for Visit     Disease Management           Diagnoses this Visit        Comments    Fatigue, unspecified type    -  Primary     Arthralgia, unspecified joint         Psoriasis         Irritable bowel syndrome, unspecified type                To Do List           Goals (5 Years of Data)     None      Follow-Up and Disposition     Return in about 4 months (around 2017).       These Medications        Disp Refills Start End    meloxicam (MOBIC) 15 MG tablet 30 tablet 3 2017    Take 1 tablet (15 mg total) by mouth daily as needed for Pain. In am for joint pain - Oral    Pharmacy: [address] [address] Ph #: [phone]       clobetasol 0.05% (TEMOVATE) 0.05 % Oint 60 g 5 2017    Apply topically 2 (two) times daily. scalp - Topical    Pharmacy: [address] [place] - [address] Ph #: [phone]         [facility]     [facility] Nurse Care [facility] - 24/ Assistance  Unless otherwise directed by your provider, please contact [facility], our nurse care line that is available for 24/ assistance.     Registered nurses in the [facility] provide: appointment scheduling, clinical advisement, health education, and other advisory services.  Call: [phone] (toll free)               Medications           Message regarding Medications     Verify the changes and/or additions to your medication regime listed below are the same as discussed with your clinician today.  If any of these changes or additions are incorrect, please notify your healthcare  provider.        START taking these NEW medications        Refills    meloxicam (MOBIC) 15 MG tablet 3    Sig: Take 1 tablet (15 mg total) by mouth daily as needed for Pain. In am for joint pain    Class: Normal    Route: Oral    clobetasol 0.05% (TEMOVATE) 0.05 % Oint 5    Sig: Apply topically 2 (two) times daily. scalp    Class: Normal    Route: Topical           Verify that the below list of medications is an accurate representation of the medications you are currently taking.  If none reported, the list may be blank. If incorrect, please contact your healthcare provider. Carry this list with you in case of emergency.           Current Medications     amitriptyline (ELAVIL) 25 MG tablet Take 1 tablet (25 mg total) by mouth nightly as needed for Insomnia.    calcium glycerophosphate (PRELIEF) 65 mg Tab Take 2 tablets by mouth before meals and at bedtime as needed.    cholecalciferol, vitamin D3, (VITAMIN D3) 5,000 unit Tab Take 5,000 Units by mouth once daily.    cyanocobalamin (VITAMIN B-12) 1000 MCG tablet Take 100 mcg by mouth once daily.    eszopiclone 3 mg Tab Take 1 tablet (3 mg total) by mouth every evening.    famotidine (PEPCID) 40 MG tablet Take 1 tablet (40 mg total) by mouth nightly as needed for Heartburn.    fluoxetine (PROZAC) 20 MG capsule Take 1 capsule (20 mg total) by mouth once daily.    fluticasone (FLONASE) 50 mcg/actuation nasal spray 2 sprays by Each Nare route once daily.    hydrOXYzine HCl (ATARAX) 25 MG tablet Take 1 tablet (25 mg total) by mouth 3 (three) times daily.    lisinopril (PRINIVIL,ZESTRIL) 5 MG tablet Take 1 tablet (5 mg total) by mouth once daily.    methylphenidate (RITALIN) 20 MG tablet Take 1 tablet (20 mg total) by mouth 2 (two) times daily.    mirtazapine (REMERON) 7.5 MG Tab Take 1 tablet (7.5 mg total) by mouth every evening.    potassium citrate (UROCIT-K 10) 10 mEq (1,080 mg) TbSR Take 1 tablet (10 mEq total) by mouth 3 (three) times daily with meals.    clobetasol  "0.05% (TEMOVATE) 0.05 % Oint Apply topically 2 (two) times daily. scalp    meloxicam (MOBIC) 15 MG tablet Take 1 tablet (15 mg total) by mouth daily as needed for Pain. In am for joint pain           Clinical Reference Information           Your Vitals Were     BP Pulse Height Weight BMI    118/90 68 6' 1" (1.854 m) 109.2 kg (240 lb 11.9 oz) 31.76 kg/m2      Blood Pressure          Most Recent Value    BP  (!)  118/90      Allergies as of 4/28/2017     Caffeine      Immunizations Administered on Date of Encounter - 4/28/2017     None      Orders Placed During Today's Visit     Future Labs/Procedures Expected by Expires    Anti-thyroglobulin antibody  4/28/2017 6/27/2018    C-reactive protein  4/28/2017 6/27/2018    Cortisol, free, serum  4/28/2017 6/27/2018    Sedimentation rate, manual  4/28/2017 6/27/2018    Thyroid peroxidase antibody  4/28/2017 6/27/2018      Instructions      Avoid taking atarax causes fatigue   ritalin add 1/2 a tap in am to keep him awake so that  He can work on his sleep patterns   remeron and elavil do help with sleep but will cause weight gain and am fatigue   try to not take day time naps   add probiotics every day to diet " good belly"   we will recheck  Thyroid again       Language Assistance Services     ATTENTION: Language assistance services are available, free of charge. Please call [phone].      ATENCIÓN: Si habla español, tiene a osorio disposición servicios gratuitos de asistencia lingüística. Llame al [phone].     CHÚ Ý: N?u b?n nói Ti?ng Vi?t, có các d?ch v? h? tr? ngôn ng? mi?n phí dành cho b?n. G?i s? [phone].         [facility] complies with applicable Federal civil rights laws and does not discriminate on the basis of race, color, national origin, age, disability, or sex.        "

## 2017-04-28 NOTE — PROGRESS NOTES
Subjective:       Patient ID: Jm Chaparro is a 21 y.o. male.    Chief Complaint: Disease Management    HPI Comments: Follow up: he is fatigue and  Had kidney stones. Patient complains of arthralgias and myalgias for which has been present for a few years. Pain is located in multiple joints, both shoulder(s), both elbow(s), both wrist(s), both MCP(s): 1st, 2nd, 3rd, 4th and 5th, both PIP(s): 1st, 2nd, 3rd, 4th and 5th, both DIP(s): 1st and 2nd, both hip(s), both knee(s) and both MTP(s): 1st, 2nd, 3rd, 4th and 5th, is described as aching, pulsating, shooting and throbbing, and is constant, moderate .  Associated symptoms include: crepitation, decreased range of motion, edema, effusion, tenderness and warmth.              He complains of lasting between 30 and 60 minutes after awakening.    Past treatments include NSAIDs.     Review of Systems   Constitutional: Positive for activity change. Negative for appetite change, chills, diaphoresis and unexpected weight change.   HENT: Negative for congestion, ear pain, facial swelling, mouth sores, nosebleeds, postnasal drip, rhinorrhea, sinus pressure, sneezing, sore throat, tinnitus and voice change.    Eyes: Negative for pain, discharge, redness, itching and visual disturbance.   Respiratory: Negative for apnea, cough, chest tightness, shortness of breath and wheezing.    Cardiovascular: Negative for chest pain, palpitations and leg swelling.   Gastrointestinal: Positive for abdominal distention and abdominal pain. Negative for constipation, diarrhea, nausea and vomiting.   Endocrine: Negative for cold intolerance, heat intolerance, polydipsia and polyuria.   Genitourinary: Negative for decreased urine volume, difficulty urinating, flank pain, frequency, hematuria and urgency.   Musculoskeletal: Positive for arthralgias, back pain, neck pain and neck stiffness. Negative for gait problem.   Skin: Positive for rash. Negative for pallor and wound.  "  Allergic/Immunologic: Negative for immunocompromised state.   Neurological: Negative for dizziness, tremors, seizures, syncope, weakness and numbness.   Hematological: Negative for adenopathy. Does not bruise/bleed easily.   Psychiatric/Behavioral: Negative for sleep disturbance and suicidal ideas. The patient is not nervous/anxious.          Objective:     BP (!) 118/90  Pulse 68  Ht 6' 1" (1.854 m)  Wt 109.2 kg (240 lb 11.9 oz)  BMI 31.76 kg/m2     Physical Exam   Vitals reviewed.  Constitutional: He is oriented to person, place, and time. He appears distressed.   HENT:   Head: Normocephalic and atraumatic.   Mouth/Throat: Oropharynx is clear and moist.   Eyes: EOM are normal. Pupils are equal, round, and reactive to light.   Neck: Neck supple. No thyromegaly present.   Cardiovascular: Normal rate, regular rhythm and normal heart sounds.  Exam reveals no gallop and no friction rub.    No murmur heard.  Pulmonary/Chest: Breath sounds normal. He has no wheezes. He has no rales. He exhibits no tenderness.   Abdominal: There is no tenderness. There is no rebound and no guarding.       Right Side Rheumatological Exam     Examination finds the 2nd MCP, 3rd MCP, 4th MCP and 5th MCP normal.    The patient is tender to palpation of the shoulder and elbow    He has swelling of the knee    The patient has an enlarged wrist, 1st PIP, 2nd PIP, 3rd PIP, 4th PIP and 5th PIP    Shoulder Exam   Tenderness Location: acromioclavicular joint and posterior shoulder    Range of Motion   Active Abduction: abnormal   Adduction: abnormal  Sensation: normal    Knee Exam     Range of Motion   Extension: normal   Flexion: normal   Patellofemoral Crepitus: positive  Effusion: positive  Sensation: normal    Hip Exam   Tenderness Location: anterior and posterior    Range of Motion   Extension: abnormal   Flexion: abnormal   Sensation: normal    Elbow/Wrist Exam   Tenderness Location: no tenderness    Range of Motion   Elbow   Flexion: " normal   Sensation: normal    Muscle Strength (0-5 scale):  Neck Flexion:  3  Neck Extension: 3  Deltoid:  3  Biceps: 3/5   Triceps:  3  Quadriceps:  3   Distal Lower Extremity: 3    Left Side Rheumatological Exam     Examination finds the elbow, wrist, 1st MCP, 2nd MCP, 3rd MCP, 4th MCP and 5th MCP normal.    The patient is tender to palpation of the shoulder.    He has swelling of the knee    The patient has an enlarged 1st PIP, 2nd PIP, 3rd PIP, 4th PIP and 5th PIP.    Shoulder Exam   Tenderness Location: acromioclavicular joint and posterior shoulder    Range of Motion   Active Abduction: abnormal   Extension: abnormal   Sensation: normal    Knee Exam     Range of Motion   Extension: normal   Flexion: normal     Patellofemoral Crepitus: positive  Effusion: positive  Sensation: normal    Hip Exam   Tenderness Location: anterior and posterior    Range of Motion   Extension: abnormal   Flexion: abnormal   Sensation: normal    Elbow/Wrist Exam     Range of Motion   Elbow   Flexion: normal   Sensation: normal    Muscle Strength (0-5 scale):  Neck Flexion:  3  Neck Extension: 3  Deltoid:  3  Biceps: 3/5   Triceps:  3  Quadriceps:  3   Distal Lower Extremity: 3      Back/Neck Exam   General Inspection   Gait: normal       Tenderness Right paramedian tenderness of the Lower C-Spine, Lower L-Spine, Upper C-Spine, Upper L-Spine and SI Joint.Left paramedian tenderness of the Lower C-Spine, Lower L-Spine, Upper C-Spine, Upper L-Spine and SI Joint.    Back Range of Motion   Extension: abnormal  Flexion: abnormal  Lateral Bend Right: abnormal  Lateral Bend Left: abnormal  Rotation Right: abnormal  Rotation Left: abnormal    Neck Range of Motion   Flexion: Limited and moderate  Extension: Limited and moderate  Right Lateral Bend: abnormal  Left Lateral Bend: abnormal  Right Rotation: abnormal  Left Rotation: abnormal  Lymphadenopathy:     He has no cervical adenopathy.   Neurological: He is alert and oriented to person, place,  and time. He displays weakness. He exhibits abnormal muscle tone.   Reflex Scores:       Patellar reflexes are 2+ on the right side and 2+ on the left side.  Skin: No rash noted. No erythema. No pallor.     Psychiatric: Mood and affect normal.   Musculoskeletal: He exhibits edema, tenderness and deformity.           Results for orders placed or performed in visit on 04/19/17   Ferritin   Result Value Ref Range    Ferritin 211 20.0 - 300.0 ng/mL   Iron   Result Value Ref Range    Iron 96 45 - 160 ug/dL   Iron and TIBC   Result Value Ref Range    Iron 96 45 - 160 ug/dL    Transferrin 265 200 - 375 mg/dL    TIBC 392 250 - 450 ug/dL    Saturated Iron 24 20 - 50 %   TSH   Result Value Ref Range    TSH 0.294 (L) 0.400 - 4.000 uIU/mL   Vitamin B12   Result Value Ref Range    Vitamin B-12 584 210 - 950 pg/mL   Hemoglobin A1c   Result Value Ref Range    Hemoglobin A1C 5.5 4.5 - 6.2 %    Estimated Avg Glucose 111 68 - 131 mg/dL   T4, free   Result Value Ref Range    Free T4 1.02 0.71 - 1.51 ng/dL   POCT urine dipstick without microscope   Result Value Ref Range    Color, UA dark yellow     Spec Grav UA 1.010     pH, UA 6     WBC, UA neg     Nitrite, UA neg     Protein trace     Glucose, UA neg     Ketones, UA +     Urobilinogen, UA 1     Bilirubin neg     Blood, UA 50        Assessment:       1. Fatigue, unspecified type    2. Arthralgia, unspecified joint    3. Psoriasis    4. Irritable bowel syndrome, unspecified type            Plan:       Jm was seen today for disease management.    Diagnoses and all orders for this visit:    Fatigue, unspecified type  -     Sedimentation rate, manual; Future  -     C-reactive protein; Future  -     Thyroid peroxidase antibody; Future  -     Anti-thyroglobulin antibody; Future  -     Cortisol, free, serum; Future  -     meloxicam (MOBIC) 15 MG tablet; Take 1 tablet (15 mg total) by mouth daily as needed for Pain. In am for joint pain    Arthralgia, unspecified joint  -      "Sedimentation rate, manual; Future  -     C-reactive protein; Future  -     Thyroid peroxidase antibody; Future  -     Anti-thyroglobulin antibody; Future  -     Cortisol, free, serum; Future  -     meloxicam (MOBIC) 15 MG tablet; Take 1 tablet (15 mg total) by mouth daily as needed for Pain. In am for joint pain    Psoriasis  -     Sedimentation rate, manual; Future  -     C-reactive protein; Future  -     Thyroid peroxidase antibody; Future  -     Anti-thyroglobulin antibody; Future  -     Cortisol, free, serum; Future  -     meloxicam (MOBIC) 15 MG tablet; Take 1 tablet (15 mg total) by mouth daily as needed for Pain. In am for joint pain    Irritable bowel syndrome, unspecified type    Other orders  -     clobetasol 0.05% (TEMOVATE) 0.05 % Oint; Apply topically 2 (two) times daily. scalp    Avoid taking atarax causes fatigue   ritalin add 1/2 a tap in am to keep him awake so that  He can work on his sleep patterns   remeron and elavil do help with sleep but will cause weight gain and am fatigue   try to not take day time naps   add probiotics every day to diet " good belly"   we will recheck  Thyroid again      "

## 2017-04-28 NOTE — PATIENT INSTRUCTIONS
"  Avoid taking atarax causes fatigue   ritalin add 1/2 a tap in am to keep him awake so that  He can work on his sleep patterns   remeron and elavil do help with sleep but will cause weight gain and am fatigue   try to not take day time naps   add probiotics every day to diet " good belly"   we will recheck  Thyroid again  "

## 2017-05-03 ENCOUNTER — TELEPHONE (OUTPATIENT)
Dept: FAMILY MEDICINE | Facility: CLINIC | Age: 22
End: 2017-05-03

## 2017-05-03 NOTE — TELEPHONE ENCOUNTER
----- Message from Skip Blas sent at 4/25/2017 11:17 AM CDT -----  Marisol Rainey,  This patient is Thyroid and needs to be booked that way with either Dr. Higgins or Dr. Davis. I have cancelled her appt. With the Nurse Practitioner who see Diabetes only. Can you please call Mrs. Chaparro back to reschedule an appt.?    Thanks,     Osmar

## 2017-05-08 LAB — CORTIS F SERPL-MCNC: 0.1 MCG/DL

## 2017-05-13 ENCOUNTER — PATIENT MESSAGE (OUTPATIENT)
Dept: FAMILY MEDICINE | Facility: CLINIC | Age: 22
End: 2017-05-13

## 2017-05-13 ENCOUNTER — PATIENT MESSAGE (OUTPATIENT)
Dept: PSYCHIATRY | Facility: CLINIC | Age: 22
End: 2017-05-13

## 2017-05-15 RX ORDER — LISINOPRIL 5 MG/1
5 TABLET ORAL DAILY
Qty: 30 TABLET | Refills: 6 | Status: SHIPPED | OUTPATIENT
Start: 2017-05-15 | End: 2018-02-06 | Stop reason: SDUPTHER

## 2017-05-16 DIAGNOSIS — F41.1 GAD (GENERALIZED ANXIETY DISORDER): ICD-10-CM

## 2017-05-16 RX ORDER — MIRTAZAPINE 15 MG/1
TABLET, FILM COATED ORAL
Qty: 90 TABLET | OUTPATIENT
Start: 2017-05-16

## 2017-05-18 ENCOUNTER — OFFICE VISIT (OUTPATIENT)
Dept: PSYCHIATRY | Facility: CLINIC | Age: 22
End: 2017-05-18
Payer: COMMERCIAL

## 2017-05-18 VITALS
HEART RATE: 93 BPM | BODY MASS INDEX: 31.09 KG/M2 | SYSTOLIC BLOOD PRESSURE: 140 MMHG | DIASTOLIC BLOOD PRESSURE: 85 MMHG | WEIGHT: 235.69 LBS

## 2017-05-18 DIAGNOSIS — F84.5 ASPERGER'S DISORDER: ICD-10-CM

## 2017-05-18 DIAGNOSIS — N30.10 IC (INTERSTITIAL CYSTITIS): ICD-10-CM

## 2017-05-18 DIAGNOSIS — F40.10 SOCIAL ANXIETY DISORDER: Primary | ICD-10-CM

## 2017-05-18 DIAGNOSIS — F41.1 GAD (GENERALIZED ANXIETY DISORDER): ICD-10-CM

## 2017-05-18 PROCEDURE — 90833 PSYTX W PT W E/M 30 MIN: CPT | Mod: S$GLB,,, | Performed by: PSYCHIATRY & NEUROLOGY

## 2017-05-18 PROCEDURE — 1160F RVW MEDS BY RX/DR IN RCRD: CPT | Mod: S$GLB,,, | Performed by: PSYCHIATRY & NEUROLOGY

## 2017-05-18 PROCEDURE — 3077F SYST BP >= 140 MM HG: CPT | Mod: S$GLB,,, | Performed by: PSYCHIATRY & NEUROLOGY

## 2017-05-18 PROCEDURE — 99999 PR PBB SHADOW E&M-EST. PATIENT-LVL III: CPT | Mod: PBBFAC,,, | Performed by: PSYCHIATRY & NEUROLOGY

## 2017-05-18 PROCEDURE — 3079F DIAST BP 80-89 MM HG: CPT | Mod: S$GLB,,, | Performed by: PSYCHIATRY & NEUROLOGY

## 2017-05-18 PROCEDURE — 99214 OFFICE O/P EST MOD 30 MIN: CPT | Mod: S$GLB,,, | Performed by: PSYCHIATRY & NEUROLOGY

## 2017-05-18 RX ORDER — DEXTROAMPHETAMINE SACCHARATE, AMPHETAMINE ASPARTATE, DEXTROAMPHETAMINE SULFATE AND AMPHETAMINE SULFATE 1.25; 1.25; 1.25; 1.25 MG/1; MG/1; MG/1; MG/1
5 TABLET ORAL 2 TIMES DAILY
Qty: 60 TABLET | Refills: 0 | Status: SHIPPED | OUTPATIENT
Start: 2017-05-18 | End: 2017-05-26 | Stop reason: SDUPTHER

## 2017-05-18 RX ORDER — FLUOXETINE HYDROCHLORIDE 20 MG/1
20 CAPSULE ORAL DAILY
Qty: 30 CAPSULE | Refills: 11 | Status: SHIPPED | OUTPATIENT
Start: 2017-05-18 | End: 2017-06-22 | Stop reason: SDUPTHER

## 2017-05-18 RX ORDER — HYDROXYZINE HYDROCHLORIDE 25 MG/1
25 TABLET, FILM COATED ORAL 3 TIMES DAILY
Qty: 30 TABLET | Refills: 0 | Status: SHIPPED | OUTPATIENT
Start: 2017-05-18 | End: 2017-08-22 | Stop reason: ALTCHOICE

## 2017-05-18 RX ORDER — AMITRIPTYLINE HYDROCHLORIDE 25 MG/1
25 TABLET, FILM COATED ORAL NIGHTLY PRN
Qty: 30 TABLET | Refills: 0 | Status: CANCELLED | OUTPATIENT
Start: 2017-05-18 | End: 2018-05-18

## 2017-05-18 RX ORDER — AMITRIPTYLINE HYDROCHLORIDE 25 MG/1
25 TABLET, FILM COATED ORAL NIGHTLY PRN
Qty: 30 TABLET | Refills: 0 | Status: SHIPPED | OUTPATIENT
Start: 2017-05-18 | End: 2017-08-22

## 2017-05-18 NOTE — TELEPHONE ENCOUNTER
Amitriptyline was sent over to pharmacy on file this morning. Patient had an appointment with .  Eufemia

## 2017-05-18 NOTE — PROGRESS NOTES
"ID: 19yo AAF with no prior psych evals in [facility].  Per chart review was referred by PCP  and is currently on atarax and Remeron. We started celexa at initial appt which led to oversedation. Then transitioned to prozac- in the interim the pt reported that he did not believe he has anxiety and wanted to stop all meds. Respected autonomy but now here for f/u?     CC: mood    Interim Hx:  Presents on time with his grandmother- mom out of town for work and notified me she would not be here. We call mom during appt.     Ritalin has affect blood pressure- pt now taking only Ritalin 10mg po qam. Per mom it seemed initially helpful, but now less so. Per pt "it makes me feel drowsy"- he has taken it today and does not appear drowsy. Also reports, "sometimes it makes my heart race." difficult, b/c we can't inc dose in this context and the current dose is not enough to be effective. If "drowsiness" complaint is accurate, alternative class of stimulant may be helpful. Will transition to adderall 5mg po BID and monitor response and vitals.    Taper of remeron to 7.5mg has been well tolerated and without issue. Will now discontinue.     Atarax, remeron and elavil were all ordered by a pain dr. We have started tapering off of Remeron as stated, but I would like to taper/discontinue all of them as per pt and parent there has been no effect. Sleep remains poor. Attention/focus remains poor. Will cont trying to stabilize stimulant treatment.    Regarding neuropsych testing- [facility] dept does not have someone that can do full testing- we have someone to do IQ but not autism spectrum. Discussed with pt's mom and she has found someone to do full testing at [facility]- unclear when appt is, but I cont to really see significant Asperger's D/O in appts.     Pt's last psych testing was in 2012 with a "r/o asperger's" on the diagnosis. Without a clear diagnosis the pt can't get any resources, but he clearly cannot " "manage his own life. Mom increasingly concerned about "what are we going to do?"     On Psychiatric ROS:    Endorses cont'd difficulty with sleep, improving anhedonia- continues to isolate from friends but is engaged with family- mainly plays video games all day, denies feeling helpless/hopeless, dec energy (chronic), dec concentration (chronic), dec appetite- w/ 5lb wgt loss, dec PMA (chronic)    Denies thoughts of SI/intent/plan.     Endorses feeling +easily overwhelmed ("just people"), +ruminative thinking- chronic, but did report some improvement on ssri  Denies feeling tense/"on edge"    PSYCHOTHERAPY ADD-ON   20 (16-37*) minutes    Time: 25 minutes  Participants: Met with patient and grandmother    Therapeutic Intervention Type: insight oriented psychotherapy, behavior modifying psychotherapy, supportive psychotherapy  Why chosen therapy is appropriate versus another modality: relevant to diagnosis    Target symptoms: distractability, lack of focus, anxiety   Primary focus: goals for establishing a baseline for this patient and then working towards some life skills and gains of independence  Psychotherapeutic techniques: clarification, reframing, education    Outcome monitoring methods: self-report, observation, psychological tests, feedback from family    Patient's response to intervention:  The patient's response to intervention is reluctant, minimal understanding of ongoing issue.-- mom is very motivated and expressed understanding    Progress toward goals:  The patient's progress toward goals is not progressing, poor.    PPHx: Denies h/o self injury, inpt psych hospitalization, denies h/o suicide attempt     Current Psych Meds: remeron 7.5mg po qhs and atarax 25mg po qhs, elavil 10mg po qhs started by  ("for pain") - Prozac 20mg po qam, ritalin 10mg po qam  Past Psych Meds: adderall ("robot child"), focalin ("zombie"), strattera (angry), zoloft (as a child- ineffective), lexapro " "(ineffective)  topomax ("migraines"), celexa (overly sedating but effective)    PMHx: IBS-combined type, IC    SubstHx:   T- none  E- none  D- none  Caffeine- 2 sodas/d    FamPHx: none    Musculoskeletal:  Muscle strength/Tone: no dyskinesia/ no tremor  Gait/Station- antalgic, holds onto mom's shoulder as he walks    MSE: appears stated age, casual grooming- oversized tshirt and shorts, engages with examiner, but is a bit awkward. Here with grandmother. Warms through course of appt, but appears blank for most of appt. Good e/c when asked to engage. Speech reg rate and vol, nonpressured. Mood is "kindof dull" Affect congruent, blunted. Smiles at times. Sensorium fully intact. Oriented to date/day/location, current events. Narrative memory intact. Intellectual function is below avg based on vocab and basic fund of knowledge and previous testing. Thought process is at times illogical- tangential. No FOI/ISAURA. Denies SI/HI. Denies A/VH. No evidence of delusions. Insight lacking and Judgment c/w insight.     Suicide Risk Assessment:   Protective- no prior attempts, no prior hospitalizations, no family h/o attempts, no ongoing substance abuse, no psychosis, denies SI/intent/plan, seeking treatment, access to treatment, future oriented, good primary support, no access to firearms  Risk- age, gender, race, ongoing Axis I sxs    **Pt is at LOW imminent and long term risk of suicide given current risk factors.    Assessment: 20yo AAF with no prior psych evals in [facility]. Per chart review was referred by PCP  and presented on atarax and Remeron. On eval the pt seems to have some evidence of aspergers disorder. Social anxiety since childhood c/w this diag. Anxiety now affecting IBS sxs- likely worsening. Pt also with h/o adhd but w/o successful mgmt as a child. No stimulant mgmt in many years. Pt living at home with mother, no college, not working. Mom concerned about "where does this go. We have to do " "something." will try to treat anxiety prior to treating adhd sxs. Start celexa 10>20mg po qhs to minimize s/e profile- on f/u he reported some sx improvement but was overly sedated- transitioned to prozac 10mg po qam- via phone pt reported he no longer wanted to be on medicine and did not believe he had anxiety. At f/u appt he reported anxiety was worse and then agreed to restart prozac. Now on 20mg po qam. Today no closer to goals as stated at first appt. I am concerned that the pt has not had a definitive diagnosis as w/o one he can't get government assistance and resources that he clearly needs. He would likely benefit from OT, life skills training, etc, but without a clear diagnosis he doesn't qualify. Mom increasingly concerned and for good reason. His remeron, atarax and elavil were all started by pain dr- no positive effect per pt/mom so I"d like to taper off of those and start addressing sleep, anxiety and adhd sxs- Remeron taper to 7.5mg has been well tolerated without issue- will now discontine, cont atarax and elavil with plans to change later. Started trial of ritalin 10mg> 20mg po BID- it affected blood pressure so he is now only taking 10mg daily with limited benefit (if any) and we can't inc further due to pt statement of "inc'd heart rate" and some mild elevation of bld press. Will transition to adderall 5mg po BID and reeval. Pt will have an appt at [facility] for full psychological eval (iq testing. Autism spectrum). Without a formal diag pt cannot get resources and i continue to feel his is autism spectrum.     Axis I: Social anxiety disorder, YUAN, ADHD, r/o Autism Spectrum (aspergers)  Axis II: none at this time   Axis III: IBS-combined type; IC  Axis IV: chronic mental health concerns  Axis V: GAF 48    Plan:   1. Cont prozac 20mg po qam   2. discont remeron  3. Cont atarax 25mg po qhs  4. Cont Elavil 25mg po qhs for sleep  5. D/c ritalin>> start trial of adderall 5mg po BID  6. RTC " 4wks    -Spent 30min face to face with the pt; >50% time spent in counseling   -Supportive therapy and psychoeducation provided  -R/B/SE's of medications discussed with the pt who expresses understanding and chooses to take medications as prescribed.   -Pt instructed to call clinic, 911 or go to nearest emergency room if sxs worsen or pt is in   crisis. The pt expresses understanding.

## 2017-05-26 ENCOUNTER — PATIENT MESSAGE (OUTPATIENT)
Dept: PSYCHIATRY | Facility: CLINIC | Age: 22
End: 2017-05-26

## 2017-05-26 DIAGNOSIS — F90.0 ADHD (ATTENTION DEFICIT HYPERACTIVITY DISORDER), INATTENTIVE TYPE: Primary | ICD-10-CM

## 2017-05-26 DIAGNOSIS — L40.9 PSORIASIS: ICD-10-CM

## 2017-05-26 DIAGNOSIS — R53.83 FATIGUE, UNSPECIFIED TYPE: ICD-10-CM

## 2017-05-26 DIAGNOSIS — M25.50 ARTHRALGIA, UNSPECIFIED JOINT: ICD-10-CM

## 2017-05-26 RX ORDER — MELOXICAM 15 MG/1
15 TABLET ORAL DAILY PRN
Qty: 30 TABLET | Refills: 3 | Status: SHIPPED | OUTPATIENT
Start: 2017-05-26 | End: 2017-08-27 | Stop reason: SDUPTHER

## 2017-05-26 RX ORDER — DEXTROAMPHETAMINE SACCHARATE, AMPHETAMINE ASPARTATE, DEXTROAMPHETAMINE SULFATE AND AMPHETAMINE SULFATE 1.25; 1.25; 1.25; 1.25 MG/1; MG/1; MG/1; MG/1
5 TABLET ORAL 2 TIMES DAILY
Qty: 60 TABLET | Refills: 0 | Status: SHIPPED | OUTPATIENT
Start: 2017-05-26 | End: 2017-07-05 | Stop reason: DRUGHIGH

## 2017-06-05 ENCOUNTER — OFFICE VISIT (OUTPATIENT)
Dept: ENDOCRINOLOGY | Facility: CLINIC | Age: 22
End: 2017-06-05
Payer: COMMERCIAL

## 2017-06-05 VITALS
BODY MASS INDEX: 31.36 KG/M2 | HEART RATE: 62 BPM | WEIGHT: 236.63 LBS | DIASTOLIC BLOOD PRESSURE: 88 MMHG | SYSTOLIC BLOOD PRESSURE: 128 MMHG | HEIGHT: 73 IN

## 2017-06-05 DIAGNOSIS — R94.6 ABNORMAL THYROID FUNCTION TEST: Primary | ICD-10-CM

## 2017-06-05 PROCEDURE — 99204 OFFICE O/P NEW MOD 45 MIN: CPT | Mod: S$GLB,,, | Performed by: INTERNAL MEDICINE

## 2017-06-05 PROCEDURE — 99999 PR PBB SHADOW E&M-EST. PATIENT-LVL III: CPT | Mod: PBBFAC,,, | Performed by: INTERNAL MEDICINE

## 2017-06-05 NOTE — PROGRESS NOTES
Subjective:      Patient ID: Jm Flores is a 21 y.o. male.    Chief Complaint:  Hyperthyroidism      History of Present Illness    Mr.Macyn Fuentes Flores is referred to me for abnormal thyroid fucntion test     He has extensive PMH     accompanied by his mother     Denies unintentional weight loss,   Denies palpitations     Has IBS   Has interstitial cystitis       Review of Systems   Constitutional: Positive for fatigue. Negative for unexpected weight change.   Eyes: Negative for visual disturbance (follows with ophthal ).   Cardiovascular: Negative for palpitations.   Gastrointestinal: Positive for diarrhea. Negative for nausea.   Neurological: Positive for headaches.   Psychiatric/Behavioral: Positive for sleep disturbance.       Objective:   Physical Exam   Constitutional: He appears well-developed.   HENT:   Right Ear: External ear normal.   Left Ear: External ear normal.   Nose: Nose normal.   Hearing normal    Neck: No tracheal deviation present. No thyromegaly present.   Cardiovascular: Normal rate.    No murmur heard.  Pulmonary/Chest: Effort normal and breath sounds normal.   Abdominal: Soft. He exhibits no mass.   No hernia noted   Musculoskeletal: He exhibits no edema.   Neurological: He is alert. No cranial nerve deficit or sensory deficit. Coordination and gait normal.        Skin: No rash noted.   No nodules   Psychiatric: He has a normal mood and affect. Judgment normal.   Vitals reviewed.      Lab Review:   Results for JM FLORES (MRN [id number]) as of 6/5/2017 11:59   Ref. Range 8/29/2006 07:54 1/19/2015 08:52 7/2/2015 12:44 7/2/2015 14:59 7/9/2016 09:59 4/19/2017 11:08 4/28/2017 09:20   TSH Latest Ref Range: 0.400 - 4.000 uIU/mL 0.58 0.450 0.464  0.718 0.294 (L)    T3, Free Latest Ref Range: 2.3 - 4.2 pg/mL   2.7       Free T4 Latest Ref Range: 0.71 - 1.51 ng/dL   1.10   1.02    Thyroperoxidase Antibodies Latest Ref Range: <6.0 IU/mL       <6.0     Echo  thyroid    The right lobe of thyroid gland measures 5.2 x 1.2 x 2.3 cm in size.  The left lobe the thyroid gland measures 4.6 x 1.0 x 1.8cm in size.  This gives an approximate volume of on the right of 7.6cc and an approximate volume on the left of 4.1 cc for a total approximate volume of 11.7 cc.    No worrisome nodules are noted   Impression         1.No worrisome thyroid nodules are noted      Electronically signed by: Brian Muse MD  Date: 04/24/17  Time: 10:17     Encounter     View Encounter          Reviewed By     Sandhya Jon NP on 4/24/2017 12:06         Assessment:     1. Abnormal thyroid function test  TSH    T4, free        # abnormal thyroid function test     - no s/s of hyperthyroidism   - we will monitor for now   - RTC in 3 months with labs     Plan:     - RTC in 3 months with labs

## 2017-06-05 NOTE — LETTER
June 5, 2017      Sandhya Jon, OZIEL  [address]  [facility] [address]           [facility]  [address] [facility] [address] [place] [address]  Phone: [phone]  Fax: [phone]          Patient: Jm Chaparro   MR Number: [id number]   YOB: 1995   Date of Visit: 6/5/2017       Dear Sandhya Jon:    Thank you for referring Jm Chaparro to me for evaluation. Attached you will find relevant portions of my assessment and plan of care.    If you have questions, please do not hesitate to call me. I look forward to following Jm Chaparro along with you.    Sincerely,    Braydon Davis MD    Enclosure  CC:  No Recipients    If you would like to receive this communication electronically, please contact [email or web address] or [phone] to request more information on [employer] access.    For providers and/or their staff who would like to refer a patient to [facility], please contact us through our one-stop-shop provider referral line, [facility], at [phone].    If you feel you have received this communication in error or would no longer like to receive these types of communications, please e-mail [email or web address]

## 2017-06-22 ENCOUNTER — OFFICE VISIT (OUTPATIENT)
Dept: PSYCHIATRY | Facility: CLINIC | Age: 22
End: 2017-06-22
Payer: COMMERCIAL

## 2017-06-22 VITALS
HEIGHT: 73 IN | WEIGHT: 235.25 LBS | DIASTOLIC BLOOD PRESSURE: 82 MMHG | SYSTOLIC BLOOD PRESSURE: 137 MMHG | HEART RATE: 75 BPM | BODY MASS INDEX: 31.18 KG/M2

## 2017-06-22 DIAGNOSIS — F41.1 GAD (GENERALIZED ANXIETY DISORDER): ICD-10-CM

## 2017-06-22 DIAGNOSIS — F40.10 SOCIAL ANXIETY DISORDER: Primary | ICD-10-CM

## 2017-06-22 DIAGNOSIS — F84.5 ASPERGER'S DISORDER: ICD-10-CM

## 2017-06-22 PROCEDURE — 99214 OFFICE O/P EST MOD 30 MIN: CPT | Mod: S$GLB,,, | Performed by: PSYCHIATRY & NEUROLOGY

## 2017-06-22 PROCEDURE — 90833 PSYTX W PT W E/M 30 MIN: CPT | Mod: S$GLB,,, | Performed by: PSYCHIATRY & NEUROLOGY

## 2017-06-22 PROCEDURE — 99999 PR PBB SHADOW E&M-EST. PATIENT-LVL II: CPT | Mod: PBBFAC,,, | Performed by: PSYCHIATRY & NEUROLOGY

## 2017-06-22 RX ORDER — FLUOXETINE HYDROCHLORIDE 20 MG/1
20 CAPSULE ORAL DAILY
Qty: 30 CAPSULE | Refills: 2 | Status: SHIPPED | OUTPATIENT
Start: 2017-06-22 | End: 2017-08-22 | Stop reason: SDUPTHER

## 2017-06-22 NOTE — PROGRESS NOTES
"ID: 21yo AAF with no prior psych evals in [facility] system.  Per chart review was referred by PCP  and is currently on atarax and Remeron. We started celexa at initial appt which led to oversedation. Then transitioned to prozac- in the interim the pt reported that he did not believe he has anxiety and wanted to stop all meds. Respected autonomy but now here for f/u?     CC: mood    Interim Hx:  Presents on time with his mom    Pt has been tolerating the adderall trial much better than the ritalin. No blood pressure concerns per mom and per pt no s/e's. "my focus is better but not for long"- only taking 5mg po BID currently.     D/c of remeron has not affected mood/anxiety. Sleep mildly impaired however pt with poor physiologic sleep drive due to lmtd activity throughout the day. Pt's mom agrees that they will work on sleep hygiene throughout the coming month and next appt we will discuss.     Atarax, remeron and elavil were all ordered by a pain dr. We have started tapering off of Remeron as stated, but I would like to taper/discontinue all of them as per pt and parent there has been no effect. Sleep remains poor. Attention/focus remains poor although improved on the adderall. Will cont trying to stabilize stimulant treatment.    Regarding neuropsych testing- [facility] dept does not have someone that can do full testing- we have someone to do IQ but not autism spectrum. Discussed with pt's mom and she has found someone to do full testing- Dr.Bruce Teague- appt scheduled for 7/31.     Pt's last psych testing was in 2012 with a "r/o asperger's" on the diagnosis. Without a clear diagnosis the pt can't get any resources, but he clearly cannot manage his own life. Mom increasingly concerned about "what are we going to do?" cautiously optimistic regarding upcoming appt with [facility].     On Psychiatric ROS:    Endorses cont'd difficulty with sleep- not worse without the remeron, improving anhedonia- continues to " "isolate from friends but is engaged with family- mainly plays video games all day- expresses "excited" about an upcoming trip to the beach, denies feeling helpless/hopeless, dec energy (chronic), dec concentration (chronic), dec appetite- w/ 5lb wgt loss, dec PMA (chronic)    Denies thoughts of SI/intent/plan.     Endorses feeling +easily overwhelmed ("just people" or closed spaces), +ruminative thinking- chronic, but did report some improvement on ssri  Denies feeling tense/"on edge"    PSYCHOTHERAPY ADD-ON   20 (16-37*) minutes    Time: 25 minutes  Participants: Met with patient and mother    Therapeutic Intervention Type: insight oriented psychotherapy, behavior modifying psychotherapy, supportive psychotherapy  Why chosen therapy is appropriate versus another modality: relevant to diagnosis    Target symptoms: distractability, lack of focus, anxiety , sleep hygiene  Primary focus: daily productivity as it affects sleep hygiene  Psychotherapeutic techniques: clarification, reframing, education    Outcome monitoring methods: self-report, observation, psychological tests, feedback from family    Patient's response to intervention:  The patient's response to intervention is reluctant, minimal understanding of ongoing issue.-- mom is very motivated and expressed understanding    Progress toward goals:  The patient's progress toward goals is not progressing, poor.    PPHx: Denies h/o self injury, inpt psych hospitalization, denies h/o suicide attempt     Current Psych Meds:atarax 25mg po qhs, elavil 10mg po qhs started by  ("for pain") - Prozac 20mg po qam, ritalin 10mg po qam  Past Psych Meds: adderall ("robot child"), focalin ("zombie"), strattera (angry), zoloft (as a child- ineffective), lexapro (ineffective)  topomax ("migraines"), celexa (overly sedating but effective), remeron (ineffective- wgt gain)    PMHx: IBS-combined type, IC    SubstHx:   T- none  E- none  D- none  Caffeine- 2 sodas/d    FamPHx: " "none    Musculoskeletal:  Muscle strength/Tone: no dyskinesia/ no tremor  Gait/Station- antalgic, reports kidney stone this am    MSE: appears stated age, casual grooming- oversized tshirt and shorts, engages with examiner, but is a bit awkward. Here with mother. Warms through course of appt, but appears blank for most of appt. Good e/c when asked to engage. Speech reg rate and vol, nonpressured. Mood is "i don't feel good because of the kidney stones" Affect congruent, blunted. Smiles at times. Sensorium fully intact. Oriented to date/day/location, current events. Narrative memory intact. Intellectual function is below avg based on vocab and basic fund of knowledge and previous testing. Thought process is at times illogical- tangential. No FOI/ISAURA. Denies SI/HI. Denies A/VH. No evidence of delusions. Insight lacking and Judgment c/w insight.     Blood pressure 137/82, pulse 75, height 6' 1" (1.854 m), weight 106.7 kg (235 lb 3.7 oz).    Suicide Risk Assessment:   Protective- no prior attempts, no prior hospitalizations, no family h/o attempts, no ongoing substance abuse, no psychosis, denies SI/intent/plan, seeking treatment, access to treatment, future oriented, good primary support, no access to firearms  Risk- age, gender, race, ongoing Axis I sxs    **Pt is at LOW imminent and long term risk of suicide given current risk factors.    Assessment: 20yo AAF with no prior psych evals in [facility] system. Per chart review was referred by PCP  and presented on atarax and Remeron. On eval the pt seems to have some evidence of aspergers disorder. Social anxiety since childhood c/w this diag. Anxiety now affecting IBS sxs- likely worsening. Pt also with h/o adhd but w/o successful mgmt as a child. No stimulant mgmt in many years. Pt living at home with mother, no college, not working. Mom concerned about "where does this go. We have to do something." will try to treat anxiety prior to treating adhd sxs. Start " "celexa 10>20mg po qhs to minimize s/e profile- on f/u he reported some sx improvement but was overly sedated- transitioned to prozac 10mg po qam- via phone pt reported he no longer wanted to be on medicine and did not believe he had anxiety. At f/u appt he reported anxiety was worse and then agreed to restart prozac. Now on 20mg po qam. Today no closer to goals as stated at first appt. I am concerned that the pt has not had a definitive diagnosis as w/o one he can't get government assistance and resources that he clearly needs. He would likely benefit from OT, life skills training, etc, but without a clear diagnosis he doesn't qualify. Mom increasingly concerned and for good reason. His remeron, atarax and elavil were all started by pain dr- no positive effect per pt/mom so I"d like to taper off of those and start addressing sleep, anxiety and adhd sxs- Remeron taper to 7.5mg has been well tolerated without issue- will now discontine, cont atarax and elavil with plans to change later. Started trial of ritalin 10mg> 20mg po BID- it affected blood pressure so he is now only taking 10mg daily with limited benefit (if any) and we can't inc further due to pt statement of "inc'd heart rate" and some mild elevation of bld press. Transitioned to adderall 5mg po BID and today reporting better tolerability and improved focus- will inc dose to 10mg po bid and then likely transition to vyvanse. Has an appt for full psychological eval (iq testing. Autism spectrum) 7/31 with a Dr.Bruce Teague. Without a formal diag pt cannot get resources and i continue to feel he is autism spectrum- cautiously optimistic regarding upcoming appt.     Axis I: Social anxiety disorder, YUAN, ADHD, r/o Autism Spectrum (aspergers)  Axis II: none at this time   Axis III: IBS-combined type; IC  Axis IV: chronic mental health concerns  Axis V: GAF 50    Plan:   1. Cont prozac 20mg po qam   2. Cont atarax 25mg po qhs  3. Cont Elavil 25mg po qhs for " sleep- also taking melatonin 5mg po qhs   5. Try an inc in adderall to 10mg po BID  6. RTC 4wks    -Spent 30min face to face with the pt; >50% time spent in counseling   -Supportive therapy and psychoeducation provided  -R/B/SE's of medications discussed with the pt who expresses understanding and chooses to take medications as prescribed.   -Pt instructed to call clinic, 911 or go to nearest emergency room if sxs worsen or pt is in   crisis. The pt expresses understanding.

## 2017-07-03 ENCOUNTER — PATIENT MESSAGE (OUTPATIENT)
Dept: PSYCHIATRY | Facility: CLINIC | Age: 22
End: 2017-07-03

## 2017-07-05 RX ORDER — DEXTROAMPHETAMINE SACCHARATE, AMPHETAMINE ASPARTATE, DEXTROAMPHETAMINE SULFATE AND AMPHETAMINE SULFATE 1.25; 1.25; 1.25; 1.25 MG/1; MG/1; MG/1; MG/1
5 TABLET ORAL 2 TIMES DAILY
Qty: 60 TABLET | Refills: 0 | OUTPATIENT
Start: 2017-07-05

## 2017-07-05 RX ORDER — DEXTROAMPHETAMINE SACCHARATE, AMPHETAMINE ASPARTATE, DEXTROAMPHETAMINE SULFATE AND AMPHETAMINE SULFATE 2.5; 2.5; 2.5; 2.5 MG/1; MG/1; MG/1; MG/1
10 TABLET ORAL 2 TIMES DAILY
Qty: 60 TABLET | Refills: 0 | Status: SHIPPED | OUTPATIENT
Start: 2017-07-05 | End: 2017-08-22

## 2017-07-05 NOTE — TELEPHONE ENCOUNTER
Called and discussed with the pt's mother-     Will order the 10mg po BID, but if the pt's sleep becomes an issue, she will decrease the afternoon dose to 1/2 tab (ie: 5mg)

## 2017-08-22 ENCOUNTER — OFFICE VISIT (OUTPATIENT)
Dept: PSYCHIATRY | Facility: CLINIC | Age: 22
End: 2017-08-22
Payer: COMMERCIAL

## 2017-08-22 VITALS — DIASTOLIC BLOOD PRESSURE: 80 MMHG | HEIGHT: 73 IN | SYSTOLIC BLOOD PRESSURE: 132 MMHG | HEART RATE: 97 BPM

## 2017-08-22 DIAGNOSIS — F40.10 SOCIAL ANXIETY DISORDER: ICD-10-CM

## 2017-08-22 DIAGNOSIS — F84.5 ASPERGER'S DISORDER: ICD-10-CM

## 2017-08-22 DIAGNOSIS — F41.1 GAD (GENERALIZED ANXIETY DISORDER): Primary | ICD-10-CM

## 2017-08-22 PROCEDURE — 3079F DIAST BP 80-89 MM HG: CPT | Mod: S$GLB,,, | Performed by: PSYCHIATRY & NEUROLOGY

## 2017-08-22 PROCEDURE — 3008F BODY MASS INDEX DOCD: CPT | Mod: S$GLB,,, | Performed by: PSYCHIATRY & NEUROLOGY

## 2017-08-22 PROCEDURE — 99214 OFFICE O/P EST MOD 30 MIN: CPT | Mod: S$GLB,,, | Performed by: PSYCHIATRY & NEUROLOGY

## 2017-08-22 PROCEDURE — 3075F SYST BP GE 130 - 139MM HG: CPT | Mod: S$GLB,,, | Performed by: PSYCHIATRY & NEUROLOGY

## 2017-08-22 PROCEDURE — 99999 PR PBB SHADOW E&M-EST. PATIENT-LVL II: CPT | Mod: PBBFAC,,, | Performed by: PSYCHIATRY & NEUROLOGY

## 2017-08-22 PROCEDURE — 90833 PSYTX W PT W E/M 30 MIN: CPT | Mod: S$GLB,,, | Performed by: PSYCHIATRY & NEUROLOGY

## 2017-08-22 RX ORDER — DEXTROAMPHETAMINE SACCHARATE, AMPHETAMINE ASPARTATE MONOHYDRATE, DEXTROAMPHETAMINE SULFATE AND AMPHETAMINE SULFATE 5; 5; 5; 5 MG/1; MG/1; MG/1; MG/1
20 CAPSULE, EXTENDED RELEASE ORAL EVERY MORNING
Qty: 30 CAPSULE | Refills: 0 | Status: SHIPPED | OUTPATIENT
Start: 2017-08-22 | End: 2020-01-15 | Stop reason: SDUPTHER

## 2017-08-22 RX ORDER — FLUOXETINE HYDROCHLORIDE 20 MG/1
20 CAPSULE ORAL DAILY
Qty: 30 CAPSULE | Refills: 2 | Status: SHIPPED | OUTPATIENT
Start: 2017-08-22 | End: 2020-01-15

## 2017-08-22 NOTE — PROGRESS NOTES
"ID: 21yo AAF with no prior psych evals in [facility] system.  Per chart review was referred by PCP  and is currently on atarax and Remeron. We started celexa at initial appt which led to oversedation. Then transitioned to prozac- in the interim the pt reported that he did not believe he has anxiety and wanted to stop all meds. Respected autonomy but now here for f/u?     CC: mood    Interim Hx:  Presents on time with his mom. They have seen , PhD on the [place] and have now oficially gotten a diag of "Autism Spectrum"- now getting assistance with accessing resources. Huge relief for pt's mother. I have also recommended the asperger's group therapy at [facility] which pt's mother can contact.     Pt has been tolerating the adderall trial much better than the ritalin. No blood pressure concerns per mom and per pt no s/e's. Focus better. Forgetting to take afternoon dose and med "wears off" quickly- will transition to long acting and inc dose to 20mg po qam.    Atarax, remeron and elavil were all ordered by a pain dr. I wanted to taper/discontinue all of them as per pt and parent there has been no effect. Today pt/parent report that Jm has now discontinued all of them. Melatonin 10mg continued. Sleep "kindof hit or miss", but very little physiologic sleep drive with sedentary lifestyle and daytime napping. Per pt/parent, sleep is not worse following d/c of remeron, elavil, vistaril. GI complaints also no worse off of these meds which is why they were started.     Now only on prozac 20mg (which they find helpful and jm recently requested to attend a social function which is a change).     On Psychiatric ROS:    Endorses cont'd variable sleep- not worse without the remeron/elavil, improving anhedonia- continues to isolate from friends but is engaged with family- mainly plays video games all day, denies feeling helpless/hopeless, dec energy (chronic), improved concentration, dec " "appetite- w/ some intended wgt loss, dec PMA (chronic)    Denies thoughts of SI/intent/plan.     Endorses feeling  LESS easily overwhelmed in social or closed spaces, +ruminative thinking- chronic, but did report some improvement on ssri  Denies feeling tense/"on edge"    PSYCHOTHERAPY ADD-ON   20 (16-37*) minutes    Time: 20 minutes  Participants: Met with patient and mother    Therapeutic Intervention Type: insight oriented psychotherapy, behavior modifying psychotherapy, supportive psychotherapy  Why chosen therapy is appropriate versus another modality: relevant to diagnosis    Target symptoms: distractability, lack of focus, anxiety , sleep hygiene  Primary focus: daily productivity as it affects sleep hygiene  Psychotherapeutic techniques: clarification, reframing, education    Outcome monitoring methods: self-report, observation, psychological tests, feedback from family    Patient's response to intervention:  The patient's response to intervention is reluctant, minimal understanding of ongoing issue.-- mom is very motivated and expressed understanding    Progress toward goals:  The patient's progress toward goals is not progressing, poor.    PPHx: Denies h/o self injury, inpt psych hospitalization, denies h/o suicide attempt     Current Psych Meds: Prozac 20mg po qam, ritalin 10mg po bid  Past Psych Meds: adderall ("robot child"), focalin ("zombie"), strattera (angry), zoloft (as a child- ineffective), lexapro (ineffective)  topomax ("migraines"), celexa (overly sedating but effective), remeron (ineffective- wgt gain)    PMHx: IBS-combined type, IC    SubstHx:   T- none  E- none  D- none  Caffeine- 2 sodas/d    FamPHx: none    Musculoskeletal:  Muscle strength/Tone: no dyskinesia/ no tremor  Gait/Station- antalgic, reports kidney stone this am    MSE: appears stated age, casual grooming- oversized tshirt and shorts, engages with examiner, but is a bit awkward. Here with mother. Warms through course of appt, " "more spontaneous speech, more engaged. Good e/c. Speech reg rate and vol, nonpressured. Mood is "I feel ok" Affect congruent, blunted. Smiles at times. Sensorium fully intact. Oriented to date/day/location, current events. Narrative memory intact. Intellectual function is below avg based on vocab and basic fund of knowledge and previous testing. Thought process is at times illogical- tangential. No FOI/ISAURA. Denies SI/HI. Denies A/VH. No evidence of delusions. Insight lacking and Judgment c/w insight.     Blood pressure 132/80, pulse 97, height 6' 1" (1.854 m).    Suicide Risk Assessment:   Protective- no prior attempts, no prior hospitalizations, no family h/o attempts, no ongoing substance abuse, no psychosis, denies SI/intent/plan, seeking treatment, access to treatment, future oriented, good primary support, no access to firearms  Risk- age, gender, race, ongoing Axis I sxs    **Pt is at LOW imminent and long term risk of suicide given current risk factors.    Assessment: 23yo AAF with no prior psych evals in [facility] system. Per chart review was referred by PCP  and presented on atarax and Remeron. On eval the pt seems to have some evidence of aspergers disorder. Social anxiety since childhood c/w this diag. Anxiety now affecting IBS sxs- likely worsening. Pt also with h/o adhd but w/o successful mgmt as a child. No stimulant mgmt in many years. Pt living at home with mother, no college, not working. Mom concerned about "where does this go. We have to do something." will try to treat anxiety prior to treating adhd sxs. Start celexa 10>20mg po qhs to minimize s/e profile- on f/u he reported some sx improvement but was overly sedated- transitioned to prozac 10mg po qam- via phone pt reported he no longer wanted to be on medicine and did not believe he had anxiety. At f/u appt he reported anxiety was worse and then agreed to restart prozac. Now on 20mg po qam. Today no closer to goals as stated at first " "appt. I am concerned that the pt has not had a definitive diagnosis as w/o one he can't get government assistance and resources that he clearly needs. He would likely benefit from OT, life skills training, etc, but without a clear diagnosis he doesn't qualify. Mom increasingly concerned and for good reason. His remeron, atarax and elavil were all started by pain dr- no positive effect per pt/mom so I"d like to taper off of those and start addressing sleep, anxiety and adhd sxs- Remeron taper to 7.5mg has been well tolerated without issue- will now discontine, cont atarax and elavil with plans to change later. Started trial of ritalin 10mg> 20mg po BID- it affected blood pressure so he is now only taking 10mg daily with limited benefit (if any) and we can't inc further due to pt statement of "inc'd heart rate" and some mild elevation of bld press. Transitioned to adderall 5mg po BID and today reporting better tolerability and improved focus- will inc dose to 10mg po bid and then likely transition to vyvanse. Has an appt for full psychological eval (iq testing. Autism spectrum) 7/31 with a Dr.Bruce Teague. Without a formal diag pt cannot get resources and i continue to feel he is autism spectrum- cautiously optimistic regarding upcoming appt. Pt now with formal diag of Aspergers. Parent getting assistance with accessing resources. Huge relief. They have successfully d/c'd remeron, vistaril and elavil. Only taking prozac 20mg po qam which they believe is helpful and adderall 10mg also helpful but wears off within 2-3 hrs and cannot remember 2nd dose. Will transition to higher dose, long acting adderall xr 20mg po qam. Very pleased with progress. RTC 4wks.     Axis I: Social anxiety disorder, YUAN, ADHD, Autism Spectrum (aspergers)  Axis II: none at this time   Axis III: IBS-combined type; IC  Axis IV: chronic mental health concerns  Axis V: GAF 50    Plan:   1. Cont prozac 20mg po qam   2. Taking melatonin 10mg po qhs "   3. Transition to long acting- adderall xr 20mg po qam  4. RTC 4wks    -Spent 30min face to face with the pt; >50% time spent in counseling   -Supportive therapy and psychoeducation provided  -R/B/SE's of medications discussed with the pt who expresses understanding and chooses to take medications as prescribed.   -Pt instructed to call clinic, 911 or go to nearest emergency room if sxs worsen or pt is in   crisis. The pt expresses understanding.

## 2017-08-27 DIAGNOSIS — M25.50 ARTHRALGIA, UNSPECIFIED JOINT: ICD-10-CM

## 2017-08-27 DIAGNOSIS — R53.83 FATIGUE, UNSPECIFIED TYPE: ICD-10-CM

## 2017-08-27 DIAGNOSIS — L40.9 PSORIASIS: ICD-10-CM

## 2017-08-28 ENCOUNTER — PATIENT MESSAGE (OUTPATIENT)
Dept: FAMILY MEDICINE | Facility: CLINIC | Age: 22
End: 2017-08-28

## 2017-08-28 RX ORDER — MELOXICAM 15 MG/1
TABLET ORAL
Qty: 30 TABLET | Refills: 3 | Status: SHIPPED | OUTPATIENT
Start: 2017-08-28 | End: 2018-04-24 | Stop reason: SDUPTHER

## 2017-08-29 RX ORDER — ALLOPURINOL 300 MG/1
TABLET ORAL
COMMUNITY
Start: 2017-07-06 | End: 2017-10-02 | Stop reason: SDUPTHER

## 2017-09-06 ENCOUNTER — LAB VISIT (OUTPATIENT)
Dept: LAB | Facility: HOSPITAL | Age: 22
End: 2017-09-06
Attending: INTERNAL MEDICINE
Payer: COMMERCIAL

## 2017-09-06 DIAGNOSIS — R94.6 ABNORMAL THYROID FUNCTION TEST: ICD-10-CM

## 2017-09-06 LAB
T4 FREE SERPL-MCNC: 1.03 NG/DL
TSH SERPL DL<=0.005 MIU/L-ACNC: 0.48 UIU/ML

## 2017-09-06 PROCEDURE — 84439 ASSAY OF FREE THYROXINE: CPT

## 2017-09-06 PROCEDURE — 36415 COLL VENOUS BLD VENIPUNCTURE: CPT | Mod: PO

## 2017-09-06 PROCEDURE — 84443 ASSAY THYROID STIM HORMONE: CPT

## 2017-09-11 ENCOUNTER — HOSPITAL ENCOUNTER (OUTPATIENT)
Dept: RADIOLOGY | Facility: HOSPITAL | Age: 22
Discharge: HOME | End: 2017-09-11
Attending: UROLOGY
Payer: COMMERCIAL

## 2017-09-11 ENCOUNTER — OFFICE VISIT (OUTPATIENT)
Dept: ENDOCRINOLOGY | Facility: CLINIC | Age: 22
End: 2017-09-11
Payer: COMMERCIAL

## 2017-09-11 VITALS
DIASTOLIC BLOOD PRESSURE: 72 MMHG | WEIGHT: 224.19 LBS | BODY MASS INDEX: 29.71 KG/M2 | SYSTOLIC BLOOD PRESSURE: 108 MMHG | HEART RATE: 110 BPM | HEIGHT: 73 IN

## 2017-09-11 DIAGNOSIS — N20.0 CALCULUS OF KIDNEY: ICD-10-CM

## 2017-09-11 DIAGNOSIS — R94.6 ABNORMAL THYROID FUNCTION TEST: ICD-10-CM

## 2017-09-11 DIAGNOSIS — N20.1 URETER, CALCULUS: ICD-10-CM

## 2017-09-11 PROCEDURE — 3008F BODY MASS INDEX DOCD: CPT | Mod: S$GLB,,, | Performed by: INTERNAL MEDICINE

## 2017-09-11 PROCEDURE — 74000 XR KUB: CPT | Mod: TC,PO

## 2017-09-11 PROCEDURE — 3078F DIAST BP <80 MM HG: CPT | Mod: S$GLB,,, | Performed by: INTERNAL MEDICINE

## 2017-09-11 PROCEDURE — 99999 PR PBB SHADOW E&M-EST. PATIENT-LVL III: CPT | Mod: PBBFAC,,, | Performed by: INTERNAL MEDICINE

## 2017-09-11 PROCEDURE — 74000 XR KUB: CPT | Mod: 26,,, | Performed by: RADIOLOGY

## 2017-09-11 PROCEDURE — 3074F SYST BP LT 130 MM HG: CPT | Mod: S$GLB,,, | Performed by: INTERNAL MEDICINE

## 2017-09-11 PROCEDURE — 99213 OFFICE O/P EST LOW 20 MIN: CPT | Mod: S$GLB,,, | Performed by: INTERNAL MEDICINE

## 2017-09-24 DIAGNOSIS — N20.0 KIDNEY STONE: ICD-10-CM

## 2017-09-25 ENCOUNTER — OFFICE VISIT (OUTPATIENT)
Dept: FAMILY MEDICINE | Facility: CLINIC | Age: 22
End: 2017-09-25
Payer: COMMERCIAL

## 2017-09-25 VITALS
TEMPERATURE: 98 F | WEIGHT: 221.81 LBS | BODY MASS INDEX: 29.4 KG/M2 | DIASTOLIC BLOOD PRESSURE: 80 MMHG | RESPIRATION RATE: 12 BRPM | HEART RATE: 80 BPM | SYSTOLIC BLOOD PRESSURE: 120 MMHG | HEIGHT: 73 IN

## 2017-09-25 DIAGNOSIS — N62 GYNECOMASTIA: Primary | ICD-10-CM

## 2017-09-25 PROCEDURE — 3074F SYST BP LT 130 MM HG: CPT | Mod: S$GLB,,, | Performed by: FAMILY MEDICINE

## 2017-09-25 PROCEDURE — 99213 OFFICE O/P EST LOW 20 MIN: CPT | Mod: 25,S$GLB,, | Performed by: FAMILY MEDICINE

## 2017-09-25 PROCEDURE — 90471 IMMUNIZATION ADMIN: CPT | Mod: S$GLB,,, | Performed by: FAMILY MEDICINE

## 2017-09-25 PROCEDURE — 3008F BODY MASS INDEX DOCD: CPT | Mod: S$GLB,,, | Performed by: FAMILY MEDICINE

## 2017-09-25 PROCEDURE — 3079F DIAST BP 80-89 MM HG: CPT | Mod: S$GLB,,, | Performed by: FAMILY MEDICINE

## 2017-09-25 PROCEDURE — 90686 IIV4 VACC NO PRSV 0.5 ML IM: CPT | Mod: S$GLB,,, | Performed by: FAMILY MEDICINE

## 2017-09-25 RX ORDER — AMITRIPTYLINE HYDROCHLORIDE 25 MG/1
25 TABLET, FILM COATED ORAL DAILY
COMMUNITY
Start: 2017-09-16 | End: 2017-10-23

## 2017-09-25 RX ORDER — OMEPRAZOLE 10 MG/1
10 CAPSULE, DELAYED RELEASE ORAL DAILY
COMMUNITY
End: 2018-04-24

## 2017-09-25 RX ORDER — ALLOPURINOL 300 MG/1
TABLET ORAL
Qty: 90 TABLET | Refills: 1 | Status: SHIPPED | OUTPATIENT
Start: 2017-09-25 | End: 2018-03-09 | Stop reason: SDUPTHER

## 2017-09-25 NOTE — PROGRESS NOTES
Subjective:       Patient ID: Jm Chaparro is a 22 y.o. male.    Chief Complaint: lump in upper righ chest, tender, painful with movement    HPI   The patient is a 22-year-old who is here today with a lump under his right nipple.  This lump initially was small but has gotten bigger and bigger.  This area is tender to the touch.  He denies any nipple discharge.  He denies any involvement on the left breast.  We did review his medications.  We did discuss the small chance that allopurinol, Prilosec, Prozac and Elavil can cause this    Since I've seen him last, he has been dealing with recurrent kidney stones.  He passed 3 stones in one day.  He was hospitalized at [facility] and treated with Dr. Cruz.  He was able to pass 2 stones on his own and had 1 removed by the urologist.  He he did have hydronephrosis and did have a stent which is now removed.  He is scheduled to see Dr. Cruz very soon for follow-up.      Review of Systems   Constitutional: Negative for appetite change, chills, diaphoresis, fatigue, fever and unexpected weight change.   HENT: Negative for congestion, ear pain, postnasal drip, rhinorrhea, sinus pressure, sneezing, sore throat and trouble swallowing.    Eyes: Negative for pain, discharge and visual disturbance.   Respiratory: Negative for cough, chest tightness, shortness of breath and wheezing.    Cardiovascular: Negative for chest pain, palpitations and leg swelling.   Gastrointestinal: Negative for abdominal distention, abdominal pain, blood in stool, constipation, diarrhea, nausea and vomiting.   Skin: Negative for rash.       Objective:      Physical Exam   Constitutional: He is oriented to person, place, and time. He appears well-developed and well-nourished. No distress.   HENT:   Head: Normocephalic and atraumatic.   Right Ear: Hearing, tympanic membrane, external ear and ear canal normal.   Left Ear: Hearing, tympanic membrane, external ear and ear canal normal.   Nose:  "Nose normal.   Mouth/Throat: Oropharynx is clear and moist and mucous membranes are normal. No oral lesions. No oropharyngeal exudate, posterior oropharyngeal edema or posterior oropharyngeal erythema.   Eyes: Conjunctivae, EOM and lids are normal. Pupils are equal, round, and reactive to light. No scleral icterus.   Neck: Normal range of motion. Neck supple. Carotid bruit is not present. No thyroid mass and no thyromegaly present.   Cardiovascular: Normal rate, regular rhythm and normal heart sounds.   No extrasystoles are present. PMI is not displaced.  Exam reveals no gallop.    No murmur heard.  Pulmonary/Chest: Effort normal and breath sounds normal. No accessory muscle usage. No respiratory distress.   Clear to auscultation bilaterally.   Abdominal: Soft. Normal appearance and bowel sounds are normal. He exhibits no abdominal bruit. There is no hepatosplenomegaly. There is no tenderness. There is no rebound.   Lymphadenopathy:        Head (right side): No submental and no submandibular adenopathy present.        Head (left side): No submental and no submandibular adenopathy present.        Right cervical: No superficial cervical, no deep cervical and no posterior cervical adenopathy present.       Left cervical: No superficial cervical, no deep cervical and no posterior cervical adenopathy present.        Right: No supraclavicular adenopathy present.        Left: No supraclavicular adenopathy present.   Neurological: He is alert and oriented to person, place, and time.   Skin: Skin is warm, dry and intact.   Psychiatric: He has a normal mood and affect.   Right chest wall with soft tissue mass below the area looked consistent with gynecomastia.  Left chest wall is normal with no gynecomastia.    Blood pressure 120/80, pulse 80, temperature 98 °F (36.7 °C), temperature source Oral, resp. rate 12, height 6' 1" (1.854 m), weight 100.6 kg (221 lb 12.5 oz).Body mass index is 29.26 kg/m².        A/P:  1)  " right-sided gynecomastia.  New to me.  We will check imaging of the right breast.  We will also check labs.  If these tests are unremarkable, we will need to consider medication elimination

## 2017-09-27 ENCOUNTER — PATIENT MESSAGE (OUTPATIENT)
Dept: FAMILY MEDICINE | Facility: CLINIC | Age: 22
End: 2017-09-27

## 2017-09-27 ENCOUNTER — HOSPITAL ENCOUNTER (OUTPATIENT)
Dept: RADIOLOGY | Facility: HOSPITAL | Age: 22
Discharge: HOME | End: 2017-09-27
Attending: FAMILY MEDICINE
Payer: COMMERCIAL

## 2017-09-27 DIAGNOSIS — N62 GYNECOMASTIA: ICD-10-CM

## 2017-09-27 PROCEDURE — 76642 ULTRASOUND BREAST LIMITED: CPT | Mod: TC,PO,RT

## 2017-09-27 PROCEDURE — 76642 ULTRASOUND BREAST LIMITED: CPT | Mod: 26,RT,, | Performed by: RADIOLOGY

## 2017-09-28 ENCOUNTER — TELEPHONE (OUTPATIENT)
Dept: FAMILY MEDICINE | Facility: CLINIC | Age: 22
End: 2017-09-28

## 2017-09-28 DIAGNOSIS — N62 GYNECOMASTIA: Primary | ICD-10-CM

## 2017-09-29 NOTE — TELEPHONE ENCOUNTER
Spoke with pt. Pt stated to speak to mother about scheduling labs. Mother stated she will be leaving out and won't be able to bring pt until 10/10. Lab scheduled. Time and date confirmed.

## 2017-10-02 ENCOUNTER — OFFICE VISIT (OUTPATIENT)
Dept: RHEUMATOLOGY | Facility: CLINIC | Age: 22
End: 2017-10-02
Payer: COMMERCIAL

## 2017-10-02 VITALS
HEART RATE: 83 BPM | WEIGHT: 221.13 LBS | BODY MASS INDEX: 29.17 KG/M2 | SYSTOLIC BLOOD PRESSURE: 125 MMHG | DIASTOLIC BLOOD PRESSURE: 87 MMHG

## 2017-10-02 DIAGNOSIS — L40.9 PSORIASIS: Primary | ICD-10-CM

## 2017-10-02 DIAGNOSIS — N32.89 BLADDER SPASM: ICD-10-CM

## 2017-10-02 DIAGNOSIS — N30.10 IC (INTERSTITIAL CYSTITIS): ICD-10-CM

## 2017-10-02 DIAGNOSIS — N20.0 KIDNEY STONE: ICD-10-CM

## 2017-10-02 DIAGNOSIS — L40.50 PSORIATIC ARTHRITIS: ICD-10-CM

## 2017-10-02 PROCEDURE — 99214 OFFICE O/P EST MOD 30 MIN: CPT | Mod: S$GLB,,, | Performed by: INTERNAL MEDICINE

## 2017-10-02 PROCEDURE — 99999 PR PBB SHADOW E&M-EST. PATIENT-LVL III: CPT | Mod: PBBFAC,,, | Performed by: INTERNAL MEDICINE

## 2017-10-02 RX ORDER — POTASSIUM CITRATE 10 MEQ/1
10 TABLET, EXTENDED RELEASE ORAL 2 TIMES DAILY WITH MEALS
Qty: 60 TABLET | Refills: 5 | Status: SHIPPED | OUTPATIENT
Start: 2017-10-02 | End: 2018-04-24

## 2017-10-02 RX ORDER — POTASSIUM CITRATE 10 MEQ/1
10 TABLET, EXTENDED RELEASE ORAL 2 TIMES DAILY WITH MEALS
Qty: 60 TABLET | Refills: 5 | Status: SHIPPED | OUTPATIENT
Start: 2017-10-02 | End: 2017-10-02 | Stop reason: SDUPTHER

## 2017-10-02 RX ORDER — CIPROFLOXACIN 500 MG/1
TABLET ORAL
COMMUNITY
Start: 2017-09-28 | End: 2017-10-23 | Stop reason: ALTCHOICE

## 2017-10-02 ASSESSMENT — ROUTINE ASSESSMENT OF PATIENT INDEX DATA (RAPID3)
FATIGUE SCORE: 4
PATIENT GLOBAL ASSESSMENT SCORE: 3
PAIN SCORE: 8
PSYCHOLOGICAL DISTRESS SCORE: 3.3
AM STIFFNESS SCORE: 1, YES
MDHAQ FUNCTION SCORE: 1.5
WHEN YOU AWAKENED IN THE MORNING OVER THE LAST WEEK, PLEASE INDICATE THE AMOUNT OF TIME IT TAKES UNTIL YOU ARE AS LIMBER AS YOU WILL BE FOR THE DAY: ONE HOUR AFTER WAKING
TOTAL RAPID3 SCORE: 5.33

## 2017-10-02 NOTE — PROGRESS NOTES
Subjective:       Patient ID: Jm Chaparro is a 22 y.o. male.    Chief Complaint: Follow-up    Follow up: He had kidney stones, he is fatigue and  Had kidney stones also had psoriasis. Patient complains of arthralgias and myalgias for which has been present for a few years. Pain is located in multiple joints, both shoulder(s), both elbow(s), both wrist(s), both MCP(s): 1st, 2nd, 3rd, 4th and 5th, both PIP(s): 1st, 2nd, 3rd, 4th and 5th, both DIP(s): 1st and 2nd, both hip(s), both knee(s) and both MTP(s): 1st, 2nd, 3rd, 4th and 5th, is described as aching, pulsating, shooting and throbbing, and is constant, moderate .  Associated symptoms include: crepitation, decreased range of motion, edema, effusion, tenderness and warmth.        Review of Systems   Constitutional: Positive for activity change. Negative for appetite change, chills, diaphoresis and unexpected weight change.   HENT: Negative for congestion, ear pain, facial swelling, mouth sores, nosebleeds, postnasal drip, rhinorrhea, sinus pressure, sneezing, sore throat, tinnitus and voice change.    Eyes: Negative for pain, discharge, redness, itching and visual disturbance.   Respiratory: Negative for apnea, cough, chest tightness, shortness of breath and wheezing.    Cardiovascular: Negative for chest pain, palpitations and leg swelling.   Gastrointestinal: Positive for abdominal distention and abdominal pain. Negative for constipation, diarrhea, nausea and vomiting.   Endocrine: Negative for cold intolerance, heat intolerance, polydipsia and polyuria.   Genitourinary: Negative for decreased urine volume, difficulty urinating, flank pain, frequency, hematuria and urgency.   Musculoskeletal: Positive for arthralgias, back pain, neck pain and neck stiffness. Negative for gait problem.   Skin: Positive for rash. Negative for pallor and wound.   Allergic/Immunologic: Negative for immunocompromised state.   Neurological: Negative for dizziness,  tremors, seizures, syncope, weakness and numbness.   Hematological: Negative for adenopathy. Does not bruise/bleed easily.   Psychiatric/Behavioral: Negative for sleep disturbance and suicidal ideas. The patient is not nervous/anxious.          Objective:     /87 (BP Location: Right arm, Patient Position: Sitting, BP Method: Large (Automatic))   Pulse 83   Wt 100.3 kg (221 lb 1.9 oz)   BMI 29.17 kg/m²      Physical Exam   Vitals reviewed.  Constitutional: He is oriented to person, place, and time. No distress.   HENT:   Head: Normocephalic and atraumatic.   Mouth/Throat: Oropharynx is clear and moist.   Eyes: EOM are normal. Pupils are equal, round, and reactive to light.   Neck: Neck supple. No thyromegaly present.   Cardiovascular: Normal rate, regular rhythm and normal heart sounds.  Exam reveals no gallop and no friction rub.    No murmur heard.  Pulmonary/Chest: Breath sounds normal. He has no wheezes. He has no rales. He exhibits no tenderness.   Abdominal: There is no tenderness. There is no rebound and no guarding.       Right Side Rheumatological Exam     Examination finds the 2nd MCP, 3rd MCP, 4th MCP and 5th MCP normal.    The patient is tender to palpation of the shoulder and elbow    He has swelling of the knee    The patient has an enlarged wrist, 1st PIP, 2nd PIP, 3rd PIP, 4th PIP and 5th PIP    Shoulder Exam   Tenderness Location: acromioclavicular joint and posterior shoulder    Range of Motion   Active Abduction: abnormal   Adduction: abnormal  Sensation: normal    Knee Exam     Range of Motion   Extension: normal   Flexion: normal   Patellofemoral Crepitus: positive  Effusion: positive  Sensation: normal    Hip Exam   Tenderness Location: anterior and posterior    Range of Motion   Extension: abnormal   Flexion: abnormal   Sensation: normal    Elbow/Wrist Exam   Tenderness Location: no tenderness    Range of Motion   Elbow   Flexion: normal   Sensation: normal    Muscle Strength (0-5  scale):  Neck Flexion:  3  Neck Extension: 3  Deltoid:  3  Biceps: 3/5   Triceps:  3  Quadriceps:  3   Distal Lower Extremity: 3    Left Side Rheumatological Exam     Examination finds the elbow, wrist, 1st MCP, 2nd MCP, 3rd MCP, 4th MCP and 5th MCP normal.    The patient is tender to palpation of the shoulder.    He has swelling of the knee    The patient has an enlarged 1st PIP, 2nd PIP, 3rd PIP, 4th PIP and 5th PIP.    Shoulder Exam   Tenderness Location: acromioclavicular joint and posterior shoulder    Range of Motion   Active Abduction: abnormal   Extension: abnormal   Sensation: normal    Knee Exam     Range of Motion   Extension: normal   Flexion: normal     Patellofemoral Crepitus: positive  Effusion: positive  Sensation: normal    Hip Exam   Tenderness Location: anterior and posterior    Range of Motion   Extension: abnormal   Flexion: abnormal   Sensation: normal    Elbow/Wrist Exam     Range of Motion   Elbow   Flexion: normal   Sensation: normal    Muscle Strength (0-5 scale):  Neck Flexion:  3  Neck Extension: 3  Deltoid:  3  Biceps: 3/5   Triceps:  3  Quadriceps:  3   Distal Lower Extremity: 3      Back/Neck Exam   General Inspection   Gait: normal       Tenderness Right paramedian tenderness of the Lower C-Spine, Lower L-Spine, Upper C-Spine, Upper L-Spine and SI Joint.Left paramedian tenderness of the Lower C-Spine, Lower L-Spine, Upper C-Spine, Upper L-Spine and SI Joint.    Back Range of Motion   Extension: abnormal  Flexion: abnormal  Lateral Bend Right: abnormal  Lateral Bend Left: abnormal  Rotation Right: abnormal  Rotation Left: abnormal    Neck Range of Motion   Flexion: Limited and moderate  Extension: Limited and moderate  Right Lateral Bend: abnormal  Left Lateral Bend: abnormal  Right Rotation: abnormal  Left Rotation: abnormal  Lymphadenopathy:     He has no cervical adenopathy.   Neurological: He is alert and oriented to person, place, and time. He displays weakness. He exhibits  abnormal muscle tone.   Reflex Scores:       Patellar reflexes are 2+ on the right side and 2+ on the left side.  Skin: No rash noted. No erythema. No pallor.     Psychiatric: Mood and affect normal.   Musculoskeletal: He exhibits edema, tenderness and deformity.           Results for orders placed or performed in visit on 09/06/17   TSH   Result Value Ref Range    TSH 0.477 0.400 - 4.000 uIU/mL   T4, free   Result Value Ref Range    Free T4 1.03 0.71 - 1.51 ng/dL       Assessment:       1. Psoriasis    2. Kidney stone    3. Bladder spasm    4. IC (interstitial cystitis)    5. Psoriatic arthritis            Plan:       Jm was seen today for follow-up.    Diagnoses and all orders for this visit:    Psoriasis  -     Urinalysis; Future  -     Uric acid; Future  -     Comprehensive metabolic panel; Future  -     C-reactive protein; Future  -     Sedimentation rate, manual; Future  -     CBC auto differential; Future  -     potassium citrate (UROCIT-K) 10 mEq (1,080 mg) TbSR; Take 1 tablet (10 mEq total) by mouth 2 (two) times daily with meals.  -     Hepatitis B core antibody, IgM; Future  -     Hepatitis C antibody; Future  -     Quantiferon Gold TB; Future    Kidney stone  -     Discontinue: potassium citrate (UROCIT-K) 10 mEq (1,080 mg) TbSR; Take 1 tablet (10 mEq total) by mouth 2 (two) times daily with meals.  -     Urinalysis; Future  -     Uric acid; Future  -     Comprehensive metabolic panel; Future  -     C-reactive protein; Future  -     Sedimentation rate, manual; Future  -     CBC auto differential; Future  -     potassium citrate (UROCIT-K) 10 mEq (1,080 mg) TbSR; Take 1 tablet (10 mEq total) by mouth 2 (two) times daily with meals.  -     Hepatitis B core antibody, IgM; Future  -     Hepatitis C antibody; Future  -     Quantiferon Gold TB; Future    Bladder spasm  -     Discontinue: potassium citrate (UROCIT-K) 10 mEq (1,080 mg) TbSR; Take 1 tablet (10 mEq total) by mouth 2 (two) times daily with  meals.  -     Urinalysis; Future  -     Uric acid; Future  -     Comprehensive metabolic panel; Future  -     C-reactive protein; Future  -     Sedimentation rate, manual; Future  -     CBC auto differential; Future  -     potassium citrate (UROCIT-K) 10 mEq (1,080 mg) TbSR; Take 1 tablet (10 mEq total) by mouth 2 (two) times daily with meals.  -     Hepatitis B core antibody, IgM; Future  -     Hepatitis C antibody; Future  -     Quantiferon Gold TB; Future    IC (interstitial cystitis)  -     Discontinue: potassium citrate (UROCIT-K) 10 mEq (1,080 mg) TbSR; Take 1 tablet (10 mEq total) by mouth 2 (two) times daily with meals.  -     Urinalysis; Future  -     Uric acid; Future  -     Comprehensive metabolic panel; Future  -     C-reactive protein; Future  -     Sedimentation rate, manual; Future  -     CBC auto differential; Future  -     potassium citrate (UROCIT-K) 10 mEq (1,080 mg) TbSR; Take 1 tablet (10 mEq total) by mouth 2 (two) times daily with meals.  -     Hepatitis B core antibody, IgM; Future  -     Hepatitis C antibody; Future  -     Quantiferon Gold TB; Future    Psoriatic arthritis  -     Urinalysis; Future  -     Uric acid; Future  -     Comprehensive metabolic panel; Future  -     C-reactive protein; Future  -     Sedimentation rate, manual; Future  -     CBC auto differential; Future  -     potassium citrate (UROCIT-K) 10 mEq (1,080 mg) TbSR; Take 1 tablet (10 mEq total) by mouth 2 (two) times daily with meals.  -     Hepatitis B core antibody, IgM; Future  -     Hepatitis C antibody; Future  -     Quantiferon Gold TB; Future    consider TNF inhibitor

## 2017-10-10 ENCOUNTER — LAB VISIT (OUTPATIENT)
Dept: LAB | Facility: HOSPITAL | Age: 22
End: 2017-10-10
Attending: FAMILY MEDICINE
Payer: COMMERCIAL

## 2017-10-10 DIAGNOSIS — N62 GYNECOMASTIA: ICD-10-CM

## 2017-10-10 DIAGNOSIS — L40.9 PSORIASIS: ICD-10-CM

## 2017-10-10 DIAGNOSIS — L40.50 PSORIATIC ARTHRITIS: ICD-10-CM

## 2017-10-10 DIAGNOSIS — N30.10 IC (INTERSTITIAL CYSTITIS): ICD-10-CM

## 2017-10-10 DIAGNOSIS — N32.89 BLADDER SPASM: ICD-10-CM

## 2017-10-10 DIAGNOSIS — N20.0 KIDNEY STONE: ICD-10-CM

## 2017-10-10 LAB
ALBUMIN SERPL BCP-MCNC: 3.9 G/DL
ALP SERPL-CCNC: 84 U/L
ALT SERPL W/O P-5'-P-CCNC: 73 U/L
ANION GAP SERPL CALC-SCNC: 8 MMOL/L
AST SERPL-CCNC: 31 U/L
BASOPHILS # BLD AUTO: 0.04 K/UL
BASOPHILS NFR BLD: 0.5 %
BILIRUB SERPL-MCNC: 0.3 MG/DL
BUN SERPL-MCNC: 9 MG/DL
CALCIUM SERPL-MCNC: 9.7 MG/DL
CHLORIDE SERPL-SCNC: 105 MMOL/L
CO2 SERPL-SCNC: 28 MMOL/L
CREAT SERPL-MCNC: 0.9 MG/DL
CRP SERPL-MCNC: 1.7 MG/L
DIFFERENTIAL METHOD: ABNORMAL
EOSINOPHIL # BLD AUTO: 0.2 K/UL
EOSINOPHIL NFR BLD: 2.4 %
ERYTHROCYTE [DISTWIDTH] IN BLOOD BY AUTOMATED COUNT: 13.5 %
ERYTHROCYTE [SEDIMENTATION RATE] IN BLOOD BY WESTERGREN METHOD: 11 MM/HR
EST. GFR  (AFRICAN AMERICAN): >60 ML/MIN/1.73 M^2
EST. GFR  (NON AFRICAN AMERICAN): >60 ML/MIN/1.73 M^2
ESTRADIOL SERPL-MCNC: 25 PG/ML
GLUCOSE SERPL-MCNC: 99 MG/DL
HCG INTACT+B SERPL-ACNC: <1.2 MIU/ML
HCT VFR BLD AUTO: 45 %
HGB BLD-MCNC: 15.2 G/DL
LH SERPL-ACNC: 6.1 MIU/ML
LYMPHOCYTES # BLD AUTO: 3.3 K/UL
LYMPHOCYTES NFR BLD: 40.8 %
MCH RBC QN AUTO: 31.6 PG
MCHC RBC AUTO-ENTMCNC: 33.8 G/DL
MCV RBC AUTO: 94 FL
MONOCYTES # BLD AUTO: 0.5 K/UL
MONOCYTES NFR BLD: 5.7 %
NEUTROPHILS # BLD AUTO: 4.1 K/UL
NEUTROPHILS NFR BLD: 50.4 %
PLATELET # BLD AUTO: 285 K/UL
PMV BLD AUTO: 12.3 FL
POTASSIUM SERPL-SCNC: 3.8 MMOL/L
PROLACTIN SERPL IA-MCNC: 6.9 NG/ML
PROT SERPL-MCNC: 7.6 G/DL
RBC # BLD AUTO: 4.81 M/UL
SODIUM SERPL-SCNC: 141 MMOL/L
TESTOST SERPL-MCNC: 545 NG/DL
URATE SERPL-MCNC: 5.8 MG/DL
WBC # BLD AUTO: 8.18 K/UL

## 2017-10-10 PROCEDURE — 80053 COMPREHEN METABOLIC PANEL: CPT

## 2017-10-10 PROCEDURE — 85651 RBC SED RATE NONAUTOMATED: CPT | Mod: PO

## 2017-10-10 PROCEDURE — 84550 ASSAY OF BLOOD/URIC ACID: CPT

## 2017-10-10 PROCEDURE — 86803 HEPATITIS C AB TEST: CPT

## 2017-10-10 PROCEDURE — 85025 COMPLETE CBC W/AUTO DIFF WBC: CPT

## 2017-10-10 PROCEDURE — 86140 C-REACTIVE PROTEIN: CPT

## 2017-10-10 PROCEDURE — 84146 ASSAY OF PROLACTIN: CPT

## 2017-10-10 PROCEDURE — 82670 ASSAY OF TOTAL ESTRADIOL: CPT

## 2017-10-10 PROCEDURE — 84403 ASSAY OF TOTAL TESTOSTERONE: CPT

## 2017-10-10 PROCEDURE — 83002 ASSAY OF GONADOTROPIN (LH): CPT

## 2017-10-10 PROCEDURE — 84702 CHORIONIC GONADOTROPIN TEST: CPT

## 2017-10-10 PROCEDURE — 86705 HEP B CORE ANTIBODY IGM: CPT

## 2017-10-11 LAB
HBV CORE IGM SERPL QL IA: NEGATIVE
HCV AB SERPL QL IA: NEGATIVE

## 2017-10-12 ENCOUNTER — PATIENT MESSAGE (OUTPATIENT)
Dept: FAMILY MEDICINE | Facility: CLINIC | Age: 22
End: 2017-10-12

## 2017-10-12 DIAGNOSIS — N62 GYNECOMASTIA: Primary | ICD-10-CM

## 2017-10-13 ENCOUNTER — PATIENT MESSAGE (OUTPATIENT)
Dept: RHEUMATOLOGY | Facility: CLINIC | Age: 22
End: 2017-10-13

## 2017-10-13 DIAGNOSIS — R39.89 BLADDER PAIN: Primary | ICD-10-CM

## 2017-10-13 DIAGNOSIS — R80.9 PROTEINURIA, UNSPECIFIED TYPE: ICD-10-CM

## 2017-10-13 DIAGNOSIS — N30.10 IC (INTERSTITIAL CYSTITIS): ICD-10-CM

## 2017-10-13 DIAGNOSIS — R30.0 DYSURIA: ICD-10-CM

## 2017-10-13 DIAGNOSIS — N36.8 OTHER SPECIFIED DISORDERS OF URETHRA: ICD-10-CM

## 2017-10-13 DIAGNOSIS — N32.89 BLADDER SPASM: ICD-10-CM

## 2017-10-16 NOTE — TELEPHONE ENCOUNTER
Per Bam Lino LPN, Dr. Marte can see this, but if you would like to speak with him, we can arrange this. Let me know.

## 2017-10-17 RX ORDER — ESZOPICLONE 2 MG/1
2 TABLET, FILM COATED ORAL NIGHTLY
Qty: 90 TABLET | Refills: 0 | Status: SHIPPED | OUTPATIENT
Start: 2017-10-17 | End: 2017-12-14 | Stop reason: SDUPTHER

## 2017-10-17 NOTE — TELEPHONE ENCOUNTER
Discuss lft and labs, Jm needs a nephrologist to evalualte the proteinuria and a urologist for cystitis and prostatitis, he has developed breast tissue and his prolactin level was normal. amitriptline may be the cause. His mother will graduately wean it and use lunesta prn, also discussed tnf inhibitors but Jm is not a candidate until his uro issues are corrected.  rec daily milk thistle and recheck lft to see if theyt corrected

## 2017-10-19 ENCOUNTER — TELEPHONE (OUTPATIENT)
Dept: RHEUMATOLOGY | Facility: CLINIC | Age: 22
End: 2017-10-19

## 2017-10-19 NOTE — TELEPHONE ENCOUNTER
Patient has a scheduled appointment with Dr Dickson in November. A message was sent to Dr Enriquez staff regarding referral.

## 2017-10-23 ENCOUNTER — OFFICE VISIT (OUTPATIENT)
Dept: NEPHROLOGY | Facility: CLINIC | Age: 22
End: 2017-10-23
Payer: COMMERCIAL

## 2017-10-23 VITALS
OXYGEN SATURATION: 96 % | HEART RATE: 104 BPM | BODY MASS INDEX: 28.11 KG/M2 | HEIGHT: 73 IN | WEIGHT: 212.06 LBS | SYSTOLIC BLOOD PRESSURE: 122 MMHG | DIASTOLIC BLOOD PRESSURE: 70 MMHG

## 2017-10-23 DIAGNOSIS — N20.0 KIDNEY STONE: Primary | ICD-10-CM

## 2017-10-23 PROCEDURE — 99999 PR PBB SHADOW E&M-EST. PATIENT-LVL II: CPT | Mod: PBBFAC,,, | Performed by: INTERNAL MEDICINE

## 2017-10-23 PROCEDURE — 99244 OFF/OP CNSLTJ NEW/EST MOD 40: CPT | Mod: S$GLB,,, | Performed by: INTERNAL MEDICINE

## 2017-10-23 NOTE — LETTER
October 23, 2017      Dexter Dobbs MD  [address] [facility] [address] [place] [address]           [facility]  [address] [facility] [address] [place] [address]  Phone: [phone]          Patient: Jm Chaparro   MR Number: [id number]   YOB: 1995   Date of Visit: 10/23/2017       Dear Dr. Dexter Dobbs:    Thank you for referring Jm Chaparro to me for evaluation. Attached you will find relevant portions of my assessment and plan of care.    If you have questions, please do not hesitate to call me. I look forward to following Jm Chaparro along with you.    Sincerely,    Jairo Enriquez MD    Enclosure  CC:  No Recipients    If you would like to receive this communication electronically, please contact [email or web address] or [phone] to request more information on [facility] access.    For providers and/or their staff who would like to refer a patient to [facility], please contact us through our one-stop-shop provider referral line, [facility], at [phone].    If you feel you have received this communication in error or would no longer like to receive these types of communications, please e-mail [email or web address]

## 2017-10-23 NOTE — PROGRESS NOTES
Subjective:       Patient ID: Jm Chaparro is a 22 y.o. White male who presents for new patient evaluation for nephrolithiasis.    Jm Chaparro is referred by China Pierre MD to be evaluated for nephrolithiasis.  He has a history of kidney stones and interstitial cystitis since 2004.  He has recently seen Dr. Cruz and had cystoscopy in the past year.  His last urine was collected when he was taking pyridium.  He has also been seen at the [facility] to have his IBS evaluated.  He frequently has bladder pain and has dysuria when he voids.  He also has been found to have proteinuria in the past as well.  He also has abdominal discomfort and has pain when he defecates at times.  He is scheduled to see Dr. Dickson on November 10th.  He reportedly drinks plenty fluids but has had concentrated urine specimens when he voids.      Review of Systems   Constitutional: Negative for appetite change, chills and fever.   Eyes: Negative for visual disturbance.   Respiratory: Negative for cough and shortness of breath.    Cardiovascular: Negative for chest pain and leg swelling.   Gastrointestinal: Positive for abdominal pain and rectal pain. Negative for diarrhea, nausea and vomiting.   Genitourinary: Positive for dysuria and flank pain. Negative for difficulty urinating and hematuria.   Musculoskeletal: Positive for arthralgias, back pain and neck pain. Negative for myalgias.   Skin: Negative for rash.   Neurological: Negative for headaches.   Psychiatric/Behavioral: Negative for sleep disturbance.       The past medical, family and social histories were reviewed for this encounter.     Past Medical History:   Diagnosis Date    Abnormal thyroid function test 9/11/2017    ADD (attention deficit disorder)     Anxiety disorder     Asperger's disorder     Deformity of both feet     Dyscalculia     Dysgraphia     Dyslexia     Eczema     GERD (gastroesophageal reflux disease)     History of  "migraine headaches     Interstitial cystitis (chronic)     Nephrolithiasis     2014    Psoriasis     Refusal of blood transfusion for reasons of conscience     Seasonal allergies      Past Surgical History:   Procedure Laterality Date    ESOPHAGOGASTRODUODENOSCOPY       Social History     Social History    Marital status: Single     Spouse name: N/A    Number of children: N/A    Years of education: N/A     Occupational History    Not on file.     Social History Main Topics    Smoking status: Never Smoker    Smokeless tobacco: Never Used    Alcohol use No    Drug use: No    Sexual activity: No     Other Topics Concern    Not on file     Social History Narrative    No narrative on file     Current Outpatient Prescriptions   Medication Sig    allopurinol (ZYLOPRIM) 300 MG tablet TAKE 1 TABLET BY MOUTH ONCE DAILY    cholecalciferol, vitamin D3, (VITAMIN D3) 5,000 unit Tab Take 5,000 Units by mouth once daily.    clobetasol 0.05% (TEMOVATE) 0.05 % Oint Apply topically 2 (two) times daily. scalp    dextroamphetamine-amphetamine (ADDERALL XR) 20 MG 24 hr capsule Take 1 capsule (20 mg total) by mouth every morning.    eszopiclone (LUNESTA) 2 MG Tab Take 1 tablet (2 mg total) by mouth every evening.    fluoxetine (PROZAC) 20 MG capsule Take 1 capsule (20 mg total) by mouth once daily.    lisinopril (PRINIVIL,ZESTRIL) 5 MG tablet Take 1 tablet (5 mg total) by mouth once daily.    meloxicam (MOBIC) 15 MG tablet TAKE 1 TABLET BY MOUTH  DAILY AS NEEDED FOR JOINT  PAIN IN THE MORNING    omeprazole (PRILOSEC) 10 MG capsule Take 10 mg by mouth once daily. OTC    potassium citrate (UROCIT-K) 10 mEq (1,080 mg) TbSR Take 1 tablet (10 mEq total) by mouth 2 (two) times daily with meals.     No current facility-administered medications for this visit.        Pulse 104   Ht 6' 1" (1.854 m)   Wt 96.2 kg (212 lb 1.3 oz)   SpO2 96%   BMI 27.98 kg/m²     Objective:      Physical Exam   Constitutional: He is " oriented to person, place, and time. He appears well-developed and well-nourished. No distress.   HENT:   Head: Normocephalic and atraumatic.   Eyes: Conjunctivae are normal.   Neck: Neck supple. No JVD present.   Cardiovascular: Normal rate, regular rhythm and normal heart sounds.  Exam reveals no gallop and no friction rub.    No murmur heard.  Pulmonary/Chest: Effort normal and breath sounds normal. No respiratory distress. He has no wheezes. He has no rales.   Abdominal: Soft. Bowel sounds are normal. He exhibits no distension. There is no tenderness.   Musculoskeletal: He exhibits no edema.   Neurological: He is alert and oriented to person, place, and time.   Skin: Skin is warm and dry. No rash noted.   Psychiatric: He has a normal mood and affect.   Vitals reviewed.      Assessment:       1. Kidney stone        Plan:   Return to clinic in 6 months.  Labs for next visit include rp, ua, upc, pth, 24 hour urine of stone monitoring.  Baseline creatinine is 0.9.  Please try to drink more than two liters of water a day to reduce your risk of stone formation.  Uric acid is better controlled now.  I will send this note to Dr. Dickson.  I would like for him to get a renal ultrasound but will not order until I let Dr. Dickson check to see what workup he wishes.    His renal function is preserved and he has no electrolyte derangements.  He does need his IC managed by Urology.

## 2017-10-24 ENCOUNTER — TELEPHONE (OUTPATIENT)
Dept: NEPHROLOGY | Facility: CLINIC | Age: 22
End: 2017-10-24

## 2017-10-24 DIAGNOSIS — N20.0 KIDNEY STONE: Primary | ICD-10-CM

## 2017-10-27 ENCOUNTER — PATIENT MESSAGE (OUTPATIENT)
Dept: NEPHROLOGY | Facility: CLINIC | Age: 22
End: 2017-10-27

## 2017-10-30 ENCOUNTER — OFFICE VISIT (OUTPATIENT)
Dept: SURGERY | Facility: CLINIC | Age: 22
End: 2017-10-30
Payer: COMMERCIAL

## 2017-10-30 VITALS
WEIGHT: 218.06 LBS | BODY MASS INDEX: 28.9 KG/M2 | DIASTOLIC BLOOD PRESSURE: 90 MMHG | SYSTOLIC BLOOD PRESSURE: 131 MMHG | HEIGHT: 73 IN | TEMPERATURE: 98 F | HEART RATE: 83 BPM

## 2017-10-30 DIAGNOSIS — N62 GYNECOMASTIA, MALE: Primary | ICD-10-CM

## 2017-10-30 PROCEDURE — 99999 PR PBB SHADOW E&M-EST. PATIENT-LVL III: CPT | Mod: PBBFAC,,, | Performed by: SURGERY

## 2017-10-30 PROCEDURE — 99244 OFF/OP CNSLTJ NEW/EST MOD 40: CPT | Mod: S$GLB,,, | Performed by: SURGERY

## 2017-10-30 NOTE — PROGRESS NOTES
Subjective:       Patient ID: Jm Chaparro is a 22 y.o. male.    Chief Complaint: Consult (Gynecomastia)      HPI is a 22-year-old male presents for evaluation of right gynecomastia.  Patient states he first noticed this about 4 months ago.  He is on multiple medications.  It is not take any nutritional supplements or other over-the-counter medications.  He has a diagnosis of Asperger's.  He takes hypertension medication including ACE inhibitor.  He says the mass has gotten slightly larger.  It is somewhat tender on manipulation or when his dog brushes up against it.  He has never seen any nipple discharge.  There is no axillary adenopathy.  Family history is negative for breast or ovarian cancer.  Thyroid function is reportedly normal.  He does have recurring kidney stones and interstitial cystitis.  It is mostly his skin color has lightened over the last years.  He is been worked up at [facility] but no definitive diagnosis has been made.  He has had some significant weight gain as well.  Left breast is unremarkable.  He had a right breast ultrasound which showed only finding is consistent with gynecomastia.    Past Medical History:   Diagnosis Date    Abnormal thyroid function test 9/11/2017    ADD (attention deficit disorder)     Anxiety disorder     Asperger's disorder     Deformity of both feet     Dyscalculia     Dysgraphia     Dyslexia     Eczema     GERD (gastroesophageal reflux disease)     History of migraine headaches     Interstitial cystitis (chronic)     Nephrolithiasis     2014    Psoriasis     Refusal of blood transfusion for reasons of conscience     Seasonal allergies      Past Surgical History:   Procedure Laterality Date    ESOPHAGOGASTRODUODENOSCOPY           Current Outpatient Prescriptions:     allopurinol (ZYLOPRIM) 300 MG tablet, TAKE 1 TABLET BY MOUTH ONCE DAILY, Disp: 90 tablet, Rfl: 1    cholecalciferol, vitamin D3, (VITAMIN D3) 5,000 unit Tab, Take 5,000 Units  by mouth once daily., Disp: , Rfl:     dextroamphetamine-amphetamine (ADDERALL XR) 20 MG 24 hr capsule, Take 1 capsule (20 mg total) by mouth every morning., Disp: 30 capsule, Rfl: 0    eszopiclone (LUNESTA) 2 MG Tab, Take 1 tablet (2 mg total) by mouth every evening., Disp: 90 tablet, Rfl: 0    fluoxetine (PROZAC) 20 MG capsule, Take 1 capsule (20 mg total) by mouth once daily., Disp: 30 capsule, Rfl: 2    lisinopril (PRINIVIL,ZESTRIL) 5 MG tablet, Take 1 tablet (5 mg total) by mouth once daily., Disp: 30 tablet, Rfl: 6    meloxicam (MOBIC) 15 MG tablet, TAKE 1 TABLET BY MOUTH  DAILY AS NEEDED FOR JOINT  PAIN IN THE MORNING, Disp: 30 tablet, Rfl: 3    omeprazole (PRILOSEC) 10 MG capsule, Take 10 mg by mouth once daily. OTC, Disp: , Rfl:     potassium citrate (UROCIT-K) 10 mEq (1,080 mg) TbSR, Take 1 tablet (10 mEq total) by mouth 2 (two) times daily with meals., Disp: 60 tablet, Rfl: 5    clobetasol 0.05% (TEMOVATE) 0.05 % Oint, Apply topically 2 (two) times daily. scalp, Disp: 60 g, Rfl: 5    Review of patient's allergies indicates:   Allergen Reactions    Caffeine        Family History   Problem Relation Age of Onset    Lupus Mother     Diabetes Maternal Grandmother     Lupus Maternal Grandmother     Clotting disorder Maternal Grandmother     Diabetes Maternal Grandfather      Social History     Social History    Marital status: Single     Spouse name: N/A    Number of children: N/A    Years of education: N/A     Occupational History    Not on file.     Social History Main Topics    Smoking status: Never Smoker    Smokeless tobacco: Never Used    Alcohol use No    Drug use: No    Sexual activity: No     Other Topics Concern    Not on file     Social History Narrative    No narrative on file       Review of Systems   Constitutional: Positive for activity change, fatigue and unexpected weight change. Negative for chills and fever.   HENT: Negative for congestion, sore throat, trouble  swallowing and voice change.    Eyes: Negative for redness and visual disturbance.   Respiratory: Negative for cough, shortness of breath and wheezing.    Cardiovascular: Negative for chest pain and palpitations.   Gastrointestinal: Positive for abdominal pain. Negative for blood in stool, nausea and vomiting.   Genitourinary: Negative for dysuria, frequency, hematuria and urgency.   Musculoskeletal: Negative for arthralgias, myalgias and neck pain.   Skin: Negative for rash and wound.   Allergic/Immunologic: Negative.    Neurological: Negative for tremors, seizures, weakness and headaches.   Hematological: Does not bruise/bleed easily.   Psychiatric/Behavioral: Negative for confusion and dysphoric mood. The patient is not nervous/anxious.      Objective:     Physical Exam   Constitutional: He is oriented to person, place, and time. He appears well-developed and well-nourished. No distress.   HENT:   Head: Normocephalic and atraumatic.   Mouth/Throat: Oropharynx is clear and moist. No oropharyngeal exudate.   Eyes: Conjunctivae and EOM are normal. Pupils are equal, round, and reactive to light. No scleral icterus.   Neck: Normal range of motion. Neck supple. No thyromegaly present.   Cardiovascular: Normal rate, regular rhythm, normal heart sounds and intact distal pulses.    No murmur heard.  Pulmonary/Chest: Effort normal and breath sounds normal. No respiratory distress. He has no wheezes. He has no rales.   Right Breast Exam:  There is some nodularity to the breast in the periareolar area.  There are no overlying skin changes.  There is no contour change of the breast.  There is no nipple discharge.  There is slight breast tenderness.  There is no palpable axillary or supraclavicular adenopathy.    There is no abnormality detected on physical exam of the contralateral breast.   Abdominal: Soft. Bowel sounds are normal. He exhibits no distension. There is no tenderness. No hernia.   Musculoskeletal: Normal range  of motion. He exhibits no edema or tenderness.   Lymphadenopathy:     He has no cervical adenopathy.   Neurological: He is alert and oriented to person, place, and time. No cranial nerve deficit.   Skin: Skin is warm and dry. No rash noted. No erythema.   Psychiatric: His behavior is normal. Judgment normal.   Affect is abnormal consistent with his Asperger's.     Assessment:     Encounter Diagnosis   Name Primary?    Gynecomastia, male Yes       Plan:      1.  Plan bilateral mammogram.  Ultrasound was unremarkable  2.  If mammogram is not suspicious for malignancy we will continue observation.  3.  Patient will return in 6 months or sooner if this continues to grow.

## 2017-10-30 NOTE — LETTER
October 30, 2017      China Pierre MD  [address] [address] [address]           [facility]  [address] [facility] [address] [place] [address]  Phone: [phone]          Patient: Jm Chaparro   MR Number: [id number]   YOB: 1995   Date of Visit: 10/30/2017       Dear Dr. China Pierre:    Thank you for referring Jm Chaparro to me for evaluation. Attached you will find relevant portions of my assessment and plan of care.    If you have questions, please do not hesitate to call me. I look forward to following Jm Chaparro along with you.    Sincerely,    Pio Marte MD    Enclosure  CC:  No Recipients    If you would like to receive this communication electronically, please contact [email or web address] or [phone] to request more information on [employer] [facility] access.    For providers and/or their staff who would like to refer a patient to [facility], please contact us through our one-stop-shop provider referral line, [facility], at [phone].    If you feel you have received this communication in error or would no longer like to receive these types of communications, please e-mail [email or web address]

## 2017-10-31 NOTE — TELEPHONE ENCOUNTER
----- Message from Margaret Larry LPN sent at 10/23/2017  4:28 PM CDT -----  24 hour urine quest?    uro risk container for stone study.

## 2017-11-03 ENCOUNTER — HOSPITAL ENCOUNTER (OUTPATIENT)
Dept: RADIOLOGY | Facility: HOSPITAL | Age: 22
Discharge: HOME | End: 2017-11-03
Attending: SURGERY
Payer: COMMERCIAL

## 2017-11-03 DIAGNOSIS — N62 GYNECOMASTIA, MALE: ICD-10-CM

## 2017-11-03 PROCEDURE — 77062 BREAST TOMOSYNTHESIS BI: CPT | Mod: TC

## 2017-11-03 PROCEDURE — 77066 DX MAMMO INCL CAD BI: CPT | Mod: 26,,, | Performed by: RADIOLOGY

## 2017-11-03 PROCEDURE — 77062 BREAST TOMOSYNTHESIS BI: CPT | Mod: 26,,, | Performed by: RADIOLOGY

## 2017-11-03 PROCEDURE — 76642 ULTRASOUND BREAST LIMITED: CPT | Mod: 26,RT,, | Performed by: RADIOLOGY

## 2017-11-03 PROCEDURE — 76642 ULTRASOUND BREAST LIMITED: CPT | Mod: TC,PO,RT

## 2017-11-10 ENCOUNTER — OFFICE VISIT (OUTPATIENT)
Dept: UROLOGY | Facility: CLINIC | Age: 22
End: 2017-11-10
Payer: COMMERCIAL

## 2017-11-10 VITALS
BODY MASS INDEX: 28.81 KG/M2 | DIASTOLIC BLOOD PRESSURE: 86 MMHG | WEIGHT: 217.38 LBS | SYSTOLIC BLOOD PRESSURE: 131 MMHG | HEART RATE: 93 BPM | HEIGHT: 73 IN

## 2017-11-10 DIAGNOSIS — R30.0 DYSURIA: Primary | ICD-10-CM

## 2017-11-10 DIAGNOSIS — R35.0 URINARY FREQUENCY: ICD-10-CM

## 2017-11-10 DIAGNOSIS — R39.15 URINARY URGENCY: ICD-10-CM

## 2017-11-10 DIAGNOSIS — N20.0 NEPHROLITHIASIS: ICD-10-CM

## 2017-11-10 LAB
BILIRUB SERPL-MCNC: NORMAL MG/DL
BLOOD URINE, POC: NORMAL
COLOR, POC UA: NORMAL
GLUCOSE UR QL STRIP: NORMAL
KETONES UR QL STRIP: NORMAL
LEUKOCYTE ESTERASE URINE, POC: NORMAL
NITRITE, POC UA: NORMAL
PH, POC UA: 8
PROTEIN, POC: NORMAL
SPECIFIC GRAVITY, POC UA: 1.01
UROBILINOGEN, POC UA: NORMAL

## 2017-11-10 PROCEDURE — 99214 OFFICE O/P EST MOD 30 MIN: CPT | Mod: 25,S$GLB,, | Performed by: UROLOGY

## 2017-11-10 PROCEDURE — 81002 URINALYSIS NONAUTO W/O SCOPE: CPT | Mod: S$GLB,,, | Performed by: UROLOGY

## 2017-11-10 PROCEDURE — 99999 PR PBB SHADOW E&M-EST. PATIENT-LVL III: CPT | Mod: PBBFAC,,, | Performed by: UROLOGY

## 2017-11-10 NOTE — PROGRESS NOTES
UROLOGY [place]  11 10 17    Urinalysis: col yellow, sg 10, pH 8, leuco -, nitrites +, prot -, glucose -, bili +, blood -    C-C bladder problems, hx of stones    Age 22, accompanied by mother. Has had bladder problems, such as urinary frequency and nocturia x 2-4. Was diagnosed interstitial cystitis, took elmiron daily for 3 months and then stopped it.     He also gives a hx of kidney stones, having had a ureteral stone extraction by dr timur callaway, and pt claims he feels very thin sand on his hands when he feels his urine.     Pt has asperger's syndrome. Has also intermittent diarrhea and constipation and was taken to [facility] in [place], and they categorized it as IBS.     He has frequent back pain.    PMH    Surgical:  has a past surgical history that includes Esophagogastroduodenoscopy and ureteral stone extraction.    Medical:  has a past medical history of Abnormal thyroid function test (9/11/2017); ADD (attention deficit disorder); Anxiety disorder; Asperger's disorder; Deformity of both feet; Dyscalculia; Dysgraphia; Dyslexia; Eczema; GERD (gastroesophageal reflux disease); History of migraine headaches; Interstitial cystitis (chronic); Nephrolithiasis; Psoriasis; Refusal of blood transfusion for reasons of conscience; and Seasonal allergies.    Familial: mother with interstitial cystitis    Social: single, lives in [place]    Current Outpatient Prescriptions on File Prior to Visit   Medication Sig Dispense Refill    allopurinol (ZYLOPRIM) 300 MG tablet TAKE 1 TABLET 90 tablet 1    cholecalciferol, vitamin D3, (VITAMIN D3) 5,000 unit Tab Take 5,000 Units by mouth once daily.      clobetasol 0.05% (TEMOVATE) 0.05 % Oint Apply topically 2 (two) times daily. scalp 60 g 5    dextroamphetamine-amphetamine (ADDERALL XR) 20 MG 24 hr capsule Take 1 capsule (20 mg total) by mouth e 30 capsule 0    eszopiclone (LUNESTA) 2 MG Tab Take 1 tablet (2 mg total) b 90 tablet 0    fluoxetine (PROZAC) 20 MG  capsule Take 1 capsule (20 mg total) by mouth once daily. 30 capsule 2    lisinopril (PRINIVIL,ZESTRIL) 5 MG tablet Take 1 tablet (5 mg total) by mouth once daily. 30 tablet 6    meloxicam (MOBIC) 15 MG tablet TAKE 1 TABLET BY 30 tablet 3    omeprazole (PRILOSEC) 10 MG capsule Take 10 mg by mouth once daily. OTC      potassium citrate (UROCIT-K) 10 mEq (1,080 mg) TbSR Take 1 tablet (10 mEq total) by mouth 2 (two) times daily with meals. 60 tablet 5   Pt alert, oriented, no distress  HEENT: wnl.  Neck: supple, no JVD, no lymphadenopathy  Chest: CV NSR  Lungs: normal auscultation  Abdomen flat, nontender, no organomegaly, no masses.  No hernias  Penis nl, meatus nl  Testes nl, epi nl, scrotum nl  JAYASHREE: anus nl, sphincter nl tone, mucosa without lesions, prostate 15 gm, symmetric, no nodules or indurations  Extremities: no edema, peripheral pulses nl  Neuro: preserved    IMP  Autism  Urinary frequency and nocturia, possible interstitial cystitis, currently doing relatively well but not on any medicine. I recommend myrbetriq 50 qd  Hx of nephrolithiasis    Had discussion with pt and mother about findings. His back pain is probably musculoskeletal.

## 2017-11-10 NOTE — LETTER
November 10, 2017      Dexter Dobbs MD  [address] [facility] [address] [place] [address]           [facility]  [address] [facility] [address] [place] [address]  Phone: [phone]          Patient: Jm Chaparro   MR Number: [id number]   YOB: 1995   Date of Visit: 11/10/2017       Dear Dr. Dexter Dobbs:    Thank you for referring Jm Chaparro to me for evaluation. Attached you will find relevant portions of my assessment and plan of care.    If you have questions, please do not hesitate to call me. I look forward to following Jm Chaparro along with you.    Sincerely,    Derek Dickson MD    Enclosure  CC:  No Recipients    If you would like to receive this communication electronically, please contact [email or web address] or [phone] to request more information on [employer] [facility] access.    For providers and/or their staff who would like to refer a patient to [facility], please contact us through our one-stop-shop provider referral line, [facility], at [phone].    If you feel you have received this communication in error or would no longer like to receive these types of communications, please e-mail [email or web address]

## 2017-11-15 ENCOUNTER — PATIENT MESSAGE (OUTPATIENT)
Dept: UROLOGY | Facility: CLINIC | Age: 22
End: 2017-11-15

## 2017-11-16 DIAGNOSIS — R35.0 URINARY FREQUENCY: ICD-10-CM

## 2017-11-16 DIAGNOSIS — R30.0 DYSURIA: ICD-10-CM

## 2017-11-16 DIAGNOSIS — R39.15 URINARY URGENCY: Primary | ICD-10-CM

## 2017-11-16 RX ORDER — OXYBUTYNIN CHLORIDE 5 MG/1
5 TABLET ORAL 3 TIMES DAILY
Qty: 90 TABLET | Refills: 11 | Status: CANCELLED | OUTPATIENT
Start: 2017-11-16 | End: 2018-11-16

## 2017-11-17 ENCOUNTER — PATIENT MESSAGE (OUTPATIENT)
Dept: NEPHROLOGY | Facility: CLINIC | Age: 22
End: 2017-11-17

## 2017-12-19 RX ORDER — ESZOPICLONE 2 MG/1
TABLET, FILM COATED ORAL
Qty: 90 TABLET | Refills: 0 | Status: SHIPPED | OUTPATIENT
Start: 2017-12-19 | End: 2018-04-24 | Stop reason: SDUPTHER

## 2018-02-06 RX ORDER — LISINOPRIL 5 MG/1
5 TABLET ORAL DAILY
Qty: 30 TABLET | Refills: 1 | Status: SHIPPED | OUTPATIENT
Start: 2018-02-06 | End: 2018-04-24 | Stop reason: SDUPTHER

## 2018-02-27 ENCOUNTER — PATIENT MESSAGE (OUTPATIENT)
Dept: FAMILY MEDICINE | Facility: CLINIC | Age: 23
End: 2018-02-27

## 2018-03-09 DIAGNOSIS — N20.0 KIDNEY STONE: ICD-10-CM

## 2018-03-10 RX ORDER — ALLOPURINOL 300 MG/1
TABLET ORAL
Qty: 90 TABLET | Refills: 0 | Status: SHIPPED | OUTPATIENT
Start: 2018-03-10 | End: 2018-04-24

## 2018-04-17 ENCOUNTER — LAB VISIT (OUTPATIENT)
Dept: LAB | Facility: HOSPITAL | Age: 23
End: 2018-04-17
Attending: INTERNAL MEDICINE
Payer: COMMERCIAL

## 2018-04-17 DIAGNOSIS — N20.0 KIDNEY STONE: ICD-10-CM

## 2018-04-17 LAB
ALBUMIN SERPL BCP-MCNC: 4.1 G/DL
ANION GAP SERPL CALC-SCNC: 9 MMOL/L
BUN SERPL-MCNC: 14 MG/DL
CALCIUM SERPL-MCNC: 10 MG/DL
CHLORIDE SERPL-SCNC: 105 MMOL/L
CO2 SERPL-SCNC: 28 MMOL/L
CREAT SERPL-MCNC: 0.9 MG/DL
EST. GFR  (AFRICAN AMERICAN): >60 ML/MIN/1.73 M^2
EST. GFR  (NON AFRICAN AMERICAN): >60 ML/MIN/1.73 M^2
GLUCOSE SERPL-MCNC: 86 MG/DL
PHOSPHATE SERPL-MCNC: 3.5 MG/DL
POTASSIUM SERPL-SCNC: 3.9 MMOL/L
PTH-INTACT SERPL-MCNC: 71 PG/ML
SODIUM SERPL-SCNC: 142 MMOL/L

## 2018-04-17 PROCEDURE — 83970 ASSAY OF PARATHORMONE: CPT

## 2018-04-17 PROCEDURE — 82507 ASSAY OF CITRATE: CPT

## 2018-04-17 PROCEDURE — 36415 COLL VENOUS BLD VENIPUNCTURE: CPT | Mod: PO

## 2018-04-17 PROCEDURE — 80069 RENAL FUNCTION PANEL: CPT

## 2018-04-24 ENCOUNTER — OFFICE VISIT (OUTPATIENT)
Dept: NEPHROLOGY | Facility: CLINIC | Age: 23
End: 2018-04-24
Payer: COMMERCIAL

## 2018-04-24 ENCOUNTER — OFFICE VISIT (OUTPATIENT)
Dept: UROLOGY | Facility: CLINIC | Age: 23
End: 2018-04-24
Payer: COMMERCIAL

## 2018-04-24 VITALS
DIASTOLIC BLOOD PRESSURE: 78 MMHG | SYSTOLIC BLOOD PRESSURE: 116 MMHG | HEIGHT: 73 IN | HEART RATE: 80 BPM | WEIGHT: 209.88 LBS | BODY MASS INDEX: 27.82 KG/M2

## 2018-04-24 VITALS
BODY MASS INDEX: 28.75 KG/M2 | HEIGHT: 73 IN | WEIGHT: 216.88 LBS | DIASTOLIC BLOOD PRESSURE: 70 MMHG | SYSTOLIC BLOOD PRESSURE: 124 MMHG | OXYGEN SATURATION: 97 % | HEART RATE: 88 BPM

## 2018-04-24 DIAGNOSIS — R39.9 LOWER URINARY TRACT SYMPTOMS: ICD-10-CM

## 2018-04-24 DIAGNOSIS — N20.0 KIDNEY STONE: Primary | ICD-10-CM

## 2018-04-24 DIAGNOSIS — M25.50 ARTHRALGIA, UNSPECIFIED JOINT: ICD-10-CM

## 2018-04-24 DIAGNOSIS — N30.10 IC (INTERSTITIAL CYSTITIS): ICD-10-CM

## 2018-04-24 DIAGNOSIS — L40.9 PSORIASIS: ICD-10-CM

## 2018-04-24 DIAGNOSIS — R53.83 FATIGUE, UNSPECIFIED TYPE: ICD-10-CM

## 2018-04-24 LAB
BILIRUB SERPL-MCNC: ABNORMAL MG/DL
BLOOD URINE, POC: ABNORMAL
COLOR, POC UA: YELLOW
GLUCOSE UR QL STRIP: ABNORMAL
KETONES UR QL STRIP: ABNORMAL
LEUKOCYTE ESTERASE URINE, POC: ABNORMAL
NITRITE, POC UA: ABNORMAL
PH, POC UA: 5
PROTEIN, POC: ABNORMAL
SPECIFIC GRAVITY, POC UA: 1.02
UROBILINOGEN, POC UA: ABNORMAL

## 2018-04-24 PROCEDURE — 81002 URINALYSIS NONAUTO W/O SCOPE: CPT | Mod: S$GLB,,, | Performed by: UROLOGY

## 2018-04-24 PROCEDURE — 3078F DIAST BP <80 MM HG: CPT | Mod: CPTII,S$GLB,, | Performed by: INTERNAL MEDICINE

## 2018-04-24 PROCEDURE — 3074F SYST BP LT 130 MM HG: CPT | Mod: CPTII,S$GLB,, | Performed by: INTERNAL MEDICINE

## 2018-04-24 PROCEDURE — 3078F DIAST BP <80 MM HG: CPT | Mod: CPTII,S$GLB,, | Performed by: UROLOGY

## 2018-04-24 PROCEDURE — 3074F SYST BP LT 130 MM HG: CPT | Mod: CPTII,S$GLB,, | Performed by: UROLOGY

## 2018-04-24 PROCEDURE — 99999 PR PBB SHADOW E&M-EST. PATIENT-LVL III: CPT | Mod: PBBFAC,,, | Performed by: INTERNAL MEDICINE

## 2018-04-24 PROCEDURE — 99214 OFFICE O/P EST MOD 30 MIN: CPT | Mod: S$GLB,,, | Performed by: INTERNAL MEDICINE

## 2018-04-24 PROCEDURE — 99999 PR PBB SHADOW E&M-EST. PATIENT-LVL III: CPT | Mod: PBBFAC,,, | Performed by: UROLOGY

## 2018-04-24 PROCEDURE — 99214 OFFICE O/P EST MOD 30 MIN: CPT | Mod: 25,S$GLB,, | Performed by: UROLOGY

## 2018-04-24 RX ORDER — HYDROXYZINE HYDROCHLORIDE 25 MG/1
TABLET, FILM COATED ORAL
Qty: 30 TABLET | Refills: 11 | Status: SHIPPED | OUTPATIENT
Start: 2018-04-24 | End: 2018-04-26 | Stop reason: CLARIF

## 2018-04-24 RX ORDER — HYDROXYZINE HYDROCHLORIDE 50 MG/1
50 TABLET, FILM COATED ORAL 2 TIMES DAILY
Qty: 180 TABLET | Refills: 3 | Status: SHIPPED | OUTPATIENT
Start: 2018-04-24 | End: 2019-06-24 | Stop reason: SDUPTHER

## 2018-04-24 RX ORDER — DOXYCYCLINE 100 MG/1
CAPSULE ORAL
COMMUNITY
Start: 2018-02-12 | End: 2018-04-24

## 2018-04-24 RX ORDER — ESZOPICLONE 2 MG/1
TABLET, FILM COATED ORAL
Qty: 90 TABLET | Refills: 3 | Status: SHIPPED | OUTPATIENT
Start: 2018-04-24 | End: 2019-01-16 | Stop reason: SDUPTHER

## 2018-04-24 RX ORDER — DOXYCYCLINE HYCLATE 100 MG
TABLET ORAL
Refills: 0 | COMMUNITY
Start: 2018-01-24 | End: 2018-04-24

## 2018-04-24 RX ORDER — MELOXICAM 15 MG/1
TABLET ORAL
Qty: 30 TABLET | Refills: 3 | Status: SHIPPED | OUTPATIENT
Start: 2018-04-24 | End: 2018-10-22 | Stop reason: SDUPTHER

## 2018-04-24 RX ORDER — CLARITHROMYCIN 500 MG/1
TABLET, FILM COATED ORAL
COMMUNITY
Start: 2018-02-23 | End: 2018-04-24

## 2018-04-24 RX ORDER — PROMETHAZINE HYDROCHLORIDE AND CODEINE PHOSPHATE 6.25; 1 MG/5ML; MG/5ML
SOLUTION ORAL
COMMUNITY
Start: 2018-03-30 | End: 2018-04-24

## 2018-04-24 RX ORDER — AMOXICILLIN AND CLAVULANATE POTASSIUM 875; 125 MG/1; MG/1
TABLET, FILM COATED ORAL
COMMUNITY
Start: 2018-02-20 | End: 2018-04-24

## 2018-04-24 RX ORDER — AMITRIPTYLINE HYDROCHLORIDE 50 MG/1
50 TABLET, FILM COATED ORAL NIGHTLY
Qty: 90 TABLET | Refills: 3 | Status: SHIPPED | OUTPATIENT
Start: 2018-04-24 | End: 2018-12-21 | Stop reason: SDUPTHER

## 2018-04-24 RX ORDER — LISINOPRIL 5 MG/1
TABLET ORAL
Qty: 90 TABLET | Refills: 0 | Status: SHIPPED | OUTPATIENT
Start: 2018-04-24 | End: 2018-06-04 | Stop reason: SDUPTHER

## 2018-04-24 RX ORDER — METHYLPREDNISOLONE 4 MG/1
TABLET ORAL
Refills: 0 | COMMUNITY
Start: 2018-01-24 | End: 2018-04-24

## 2018-04-24 RX ORDER — HYDROGEN PEROXIDE 3 %
20 SOLUTION, NON-ORAL MISCELLANEOUS DAILY PRN
COMMUNITY
End: 2019-11-25

## 2018-04-24 NOTE — PROGRESS NOTES
"Subjective:       Patient ID: Jm Chaparro is a 22 y.o. White male who presents for return patient evaluation for chronic renal failure.    He reportedly is still having B flank pain at times along with frequency and dysuria.  He is unable to move around well due to his symptoms and cannot sit through a movie.  The myrbetreq prescribed in November had no effect.      Review of Systems   Constitutional: Negative for appetite change, chills and fever.   Eyes: Negative for visual disturbance.   Respiratory: Negative for cough and shortness of breath.    Cardiovascular: Negative for chest pain and leg swelling.   Gastrointestinal: Positive for abdominal pain. Negative for diarrhea, nausea and vomiting.   Genitourinary: Positive for dysuria, flank pain and frequency. Negative for difficulty urinating and hematuria.   Musculoskeletal: Positive for arthralgias, back pain, gait problem and neck pain. Negative for myalgias.   Skin: Negative for rash.   Neurological: Negative for headaches.   Psychiatric/Behavioral: Negative for sleep disturbance.       The past medical, family and social histories were reviewed for this encounter.     /70 (BP Location: Right arm, Patient Position: Sitting)   Pulse 88   Ht 6' 1" (1.854 m)   Wt 98.4 kg (216 lb 14.4 oz)   SpO2 97%   BMI 28.62 kg/m²     Objective:      Physical Exam   Constitutional: He is oriented to person, place, and time. He appears well-developed and well-nourished. No distress.   HENT:   Head: Normocephalic and atraumatic.   Eyes: Conjunctivae are normal.   Neck: Neck supple. No JVD present.   Cardiovascular: Normal rate, regular rhythm and normal heart sounds.  Exam reveals no gallop and no friction rub.    No murmur heard.  Pulmonary/Chest: Effort normal and breath sounds normal. No respiratory distress. He has no wheezes. He has no rales.   Abdominal: Soft. Bowel sounds are normal. He exhibits no distension. There is no tenderness. "   Musculoskeletal: He exhibits no edema.   Neurological: He is alert and oriented to person, place, and time.   Skin: Skin is warm and dry. No rash noted.   Psychiatric: He has a normal mood and affect.   Vitals reviewed.      Assessment:       1. Kidney stone        Plan:   Return to clinic in 6 months.  Renal US today.  Baseline creatinine is 0.9.  Please try to drink more than two liters of water a day to reduce your risk of stone formation.  Uric acid is controlled.  I sent a note to Dr. Dickson.  I would like for him to see him as I do not see any renal pathology but suspect his symptoms are all related to his bladder..    His renal function is preserved and he has no electrolyte derangements.

## 2018-04-24 NOTE — TELEPHONE ENCOUNTER
He has not been seen by me in over a year. Will give one refill of his medication to hold him until he can come in for visit.

## 2018-04-24 NOTE — PROGRESS NOTES
UROLOGY [place]  4 24 18        C-C bladder problems, hx of stones     Age 22, here with mother. Pt has asperger syndrome (autism) and is not easily communicative. Has had bladder problems, especially bladder pain (ie, suprapubic pain of an intermittent, colicky nature; pt needs to stop what he is doing when it happens. It occurs several times a day). The bladder pain has been happening x 3 years. Mother says his quality of life has gone way down since the onset of this chronic pain.     Also had urinary urgency, but typically no incontinence. Has nocturia x 4-5. Says his voiding is difficult, and has to push to get the urine out.     Pt says he has been diagnosed with interstitial cystitis, and mother agrees. Mother says that she herself has the disease, as well as a sister of hers and their mother.     When pt was diagnosed interstitial cystitis, he took elmiron daily for 3 months and then stopped it.      He also gives a hx of kidney stones, having had a ureteral stone extraction by dr timur callaway     In the past he has had intermittent diarrhea and constipation and was taken to [facility] in [place], and they categorized it as IBS.      Also has frequent back pain.     PMH     Surgical:  has a past surgical history that includes Esophagogastroduodenoscopy and ureteral stone extraction.     Medical:  has a past medical history of Abnormal thyroid function test (9/11/2017); ADD (attention deficit disorder); Anxiety disorder; Asperger's disorder; Deformity of both feet; Dyscalculia; Dysgraphia; Dyslexia; Eczema; GERD (gastroesophageal reflux disease); History of migraine headaches; Interstitial cystitis (chronic); Nephrolithiasis; Psoriasis; Refusal of blood transfusion for reasons of conscience; and Seasonal allergies.     Familial: mother with interstitial cystitis     Social: single, lives in [place]            Current Outpatient Prescriptions on File Prior to Visit   Medication Sig Dispense Refill     allopurinol (ZYLOPRIM) 300 MG tablet TAKE 1 TABLET 90 tablet 1    cholecalciferol, vitamin D3, (VITAMIN D3) 5,000 unit Tab Take 5,000 Units by mouth once daily.        clobetasol 0.05% (TEMOVATE) 0.05 % Oint Apply topically 2 (t 60 g 5    dextroamphetamine-amphetamine (ADDERALL XR) 20 MG 2 Take 1 capsule (20 mg total) by mouth e 30 capsule 0    eszopiclone (LUNESTA) 2 MG Tab Take 1 tablet (2  90 tablet 0    fluoxetine (PROZAC) 20 MG capsule Take 1 capsule (20 mg total) by  30 capsule 2    lisinopril (PRINIVIL,ZESTRIL) 5 MG tablet Take 1 tablet (5 mg total) by mouth  30 tablet 6    meloxicam (MOBIC) 15 MG tablet TAKE 1 TABLET BY 30 tablet 3    omeprazole (PRILOSEC) 10 MG capsule Take 10 mg by mouth once da        potassium citrate (UROCIT-K) 10 mEq (1,080 mg) TbSR Take 1 tablet (10 mEq total) by mouth 2 (two) times d 60 tablet 5   Pt alert, no distress  HEENT: wnl.  Neck: supple, no JVD, no lymphadenopathy  Chest: CV NSR  Lungs: normal chest expansion  Abdomen prominent, obese, nontender, no organomegaly, no masses.  Penis nl, meatus nl  Testes nl, epi nl, scrotum nl  Extremities: no edema, peripheral pulses nl  Neuro: preserved     IMP  Autism  Urinary frequency and nocturia, recurrent bladder pain. Possible interstitial cystitis, currently with frequent bladder pain. In the past he used myrbetriq but it has not helped.     Hx of nephrolithiasis     Had discussion with pt and mother about findings.     I recommend starting pt on the classic triad of elmiron 100 mg po tid, antihistaminic atarax 50 bid, and antidepressant elavil 50 qhs.   If this plan does not help him I will be doing a cystoscopy under anesthesia to make sure we are missing anything. We can also do hydrodistension of the bladder at that time.   Eventually pt may need referral

## 2018-04-25 ENCOUNTER — PATIENT MESSAGE (OUTPATIENT)
Dept: NEPHROLOGY | Facility: CLINIC | Age: 23
End: 2018-04-25

## 2018-04-26 ENCOUNTER — TELEPHONE (OUTPATIENT)
Dept: UROLOGY | Facility: CLINIC | Age: 23
End: 2018-04-26

## 2018-04-26 ENCOUNTER — PATIENT MESSAGE (OUTPATIENT)
Dept: NEPHROLOGY | Facility: CLINIC | Age: 23
End: 2018-04-26

## 2018-04-26 NOTE — TELEPHONE ENCOUNTER
Spoke with patient's mother and explained to her that patient does NOT have a stone on U/S.  I reviewed the report with her and explained that no stone is visible.  Mother will start patient on the medications recommended by Dr. Dickson and f/u in a month if symptoms persist.

## 2018-04-26 NOTE — TELEPHONE ENCOUNTER
----- Message from Marina Poole sent at 4/26/2018  1:56 PM CDT -----  Contact: mother-  Type:  Patient Returning Call    Who Called:  motherWho Left Message for Patient:  Nurse Does the patient know what this is regarding?:  Yes, message sent earlier regarding the patient  Best Call Back Number:[phone]- Nancie   Additional Information:  The mother returning the nurse phone call regarding the patient.

## 2018-04-26 NOTE — TELEPHONE ENCOUNTER
----- Message from Jairo Enriquez MD sent at 4/25/2018  5:21 PM CDT -----  Please contact his mother as she seems confused as to what to do from a Urologic standpoint

## 2018-04-26 NOTE — TELEPHONE ENCOUNTER
Patient last seen per you 8.5.2016, an rx for hyroxyzine 25mg hs was approved on 4.24.2018,however patient is currently seeing dr. mcintyre and is on 50mg hs. [facility] is questioning the dosage of both prescriptions. Do you want to cancel your refill?  I edited it in the med card, but not sure if you needed to do anything

## 2018-05-01 ENCOUNTER — TELEPHONE (OUTPATIENT)
Dept: FAMILY MEDICINE | Facility: CLINIC | Age: 23
End: 2018-05-01

## 2018-05-02 ENCOUNTER — OFFICE VISIT (OUTPATIENT)
Dept: FAMILY MEDICINE | Facility: CLINIC | Age: 23
End: 2018-05-02
Payer: COMMERCIAL

## 2018-05-02 VITALS
SYSTOLIC BLOOD PRESSURE: 110 MMHG | TEMPERATURE: 99 F | WEIGHT: 198.44 LBS | BODY MASS INDEX: 26.3 KG/M2 | HEIGHT: 73 IN | HEART RATE: 64 BPM | DIASTOLIC BLOOD PRESSURE: 70 MMHG

## 2018-05-02 DIAGNOSIS — L25.9 CONTACT DERMATITIS, UNSPECIFIED CONTACT DERMATITIS TYPE, UNSPECIFIED TRIGGER: Primary | ICD-10-CM

## 2018-05-02 LAB — STONE ANALYSIS-IMP: NORMAL

## 2018-05-02 PROCEDURE — 96372 THER/PROPH/DIAG INJ SC/IM: CPT | Mod: S$GLB,,, | Performed by: FAMILY MEDICINE

## 2018-05-02 PROCEDURE — 99214 OFFICE O/P EST MOD 30 MIN: CPT | Mod: SA,25,S$GLB, | Performed by: NURSE PRACTITIONER

## 2018-05-02 PROCEDURE — 3078F DIAST BP <80 MM HG: CPT | Mod: CPTII,S$GLB,, | Performed by: NURSE PRACTITIONER

## 2018-05-02 PROCEDURE — 3008F BODY MASS INDEX DOCD: CPT | Mod: CPTII,S$GLB,, | Performed by: NURSE PRACTITIONER

## 2018-05-02 PROCEDURE — 3074F SYST BP LT 130 MM HG: CPT | Mod: CPTII,S$GLB,, | Performed by: NURSE PRACTITIONER

## 2018-05-02 RX ORDER — TRIAMCINOLONE ACETONIDE 5 MG/G
CREAM TOPICAL 2 TIMES DAILY
Qty: 15 G | Refills: 0 | Status: SHIPPED | OUTPATIENT
Start: 2018-05-02 | End: 2018-10-11

## 2018-05-02 RX ORDER — DEXAMETHASONE SODIUM PHOSPHATE 4 MG/ML
8 INJECTION, SOLUTION INTRA-ARTICULAR; INTRALESIONAL; INTRAMUSCULAR; INTRAVENOUS; SOFT TISSUE
Status: COMPLETED | OUTPATIENT
Start: 2018-05-02 | End: 2018-05-02

## 2018-05-02 RX ADMIN — DEXAMETHASONE SODIUM PHOSPHATE 8 MG: 4 INJECTION, SOLUTION INTRA-ARTICULAR; INTRALESIONAL; INTRAMUSCULAR; INTRAVENOUS; SOFT TISSUE at 04:05

## 2018-05-03 NOTE — PROGRESS NOTES
"Subjective:       Patient ID: Jm Chaparro is a 22 y.o. male.    Chief Complaint: Rash/bumps (On arms and feet, started yesterday. They burn and itch)    Rash   This is a new problem. The current episode started yesterday. The affected locations include the right arm, left arm, left lower leg and right lower leg. The rash is characterized by blistering, dryness, itchiness and redness. It is unknown if there was an exposure to a precipitant. Pertinent negatives include no anorexia, congestion, cough, diarrhea, eye pain, facial edema, fatigue, fever, joint pain, nail changes, rhinorrhea, shortness of breath, sore throat or vomiting. Past treatments include anti-itch cream. The treatment provided mild relief.     Review of Systems   Constitutional: Negative for fatigue and fever.   HENT: Negative for congestion, rhinorrhea and sore throat.    Eyes: Negative for pain.   Respiratory: Negative for cough and shortness of breath.    Gastrointestinal: Negative for anorexia, diarrhea and vomiting.   Musculoskeletal: Negative for joint pain.   Skin: Positive for rash. Negative for nail changes.       Objective:       Vitals:    05/02/18 1549   BP: 110/70   Pulse: 64   Temp: 99 °F (37.2 °C)   TempSrc: Oral   Weight: 90 kg (198 lb 6.6 oz)   Height: 6' 1" (1.854 m)   PainSc: 0-No pain       Physical Exam   Constitutional: He is oriented to person, place, and time. He appears well-developed and well-nourished.   HENT:   Head: Normocephalic and atraumatic.   Right Ear: External ear normal.   Left Ear: External ear normal.   Nose: Nose normal.   Mouth/Throat: Oropharynx is clear and moist.   Eyes: Conjunctivae are normal. Right eye exhibits no discharge. Left eye exhibits no discharge. No scleral icterus.   Neck: Normal range of motion. Neck supple. No tracheal deviation present.   Cardiovascular: Normal rate, regular rhythm and normal heart sounds.  Exam reveals no friction rub.    No murmur heard.  Pulmonary/Chest: " Effort normal and breath sounds normal. No stridor. No respiratory distress. He has no wheezes. He has no rales. He exhibits no tenderness.   Musculoskeletal: Normal range of motion.   Lymphadenopathy:     He has no cervical adenopathy.   Neurological: He is alert and oriented to person, place, and time.   Skin: Skin is warm and dry.   There are multiple scattered papules to the left arms as well as the lower extremities bilaterally. There is some urticaria surrounding the papules on his left arm.   Psychiatric: He has a normal mood and affect.       Assessment:       1. Contact dermatitis, unspecified contact dermatitis type, unspecified trigger        Plan:       Jm was seen today for rash/bumps.    Diagnoses and all orders for this visit:    Contact dermatitis, unspecified contact dermatitis type, unspecified trigger  -     dexamethasone injection 8 mg; Inject 2 mLs (8 mg total) into the muscle one time.    Other orders  -     triamcinolone acetonide 0.5% (KENALOG) 0.5 % Crea; Apply topically 2 (two) times daily.

## 2018-05-16 ENCOUNTER — OFFICE VISIT (OUTPATIENT)
Dept: CARDIOLOGY | Facility: CLINIC | Age: 23
End: 2018-05-16
Payer: COMMERCIAL

## 2018-05-16 VITALS
WEIGHT: 217.63 LBS | SYSTOLIC BLOOD PRESSURE: 123 MMHG | HEART RATE: 79 BPM | HEIGHT: 73 IN | BODY MASS INDEX: 28.84 KG/M2 | DIASTOLIC BLOOD PRESSURE: 82 MMHG

## 2018-05-16 DIAGNOSIS — R00.2 PALPITATIONS: ICD-10-CM

## 2018-05-16 DIAGNOSIS — R07.9 CHEST PAIN SYNDROME: Primary | ICD-10-CM

## 2018-05-16 PROCEDURE — 3079F DIAST BP 80-89 MM HG: CPT | Mod: CPTII,S$GLB,, | Performed by: INTERNAL MEDICINE

## 2018-05-16 PROCEDURE — 3074F SYST BP LT 130 MM HG: CPT | Mod: CPTII,S$GLB,, | Performed by: INTERNAL MEDICINE

## 2018-05-16 PROCEDURE — 3008F BODY MASS INDEX DOCD: CPT | Mod: CPTII,S$GLB,, | Performed by: INTERNAL MEDICINE

## 2018-05-16 PROCEDURE — 99999 PR PBB SHADOW E&M-EST. PATIENT-LVL III: CPT | Mod: PBBFAC,,, | Performed by: INTERNAL MEDICINE

## 2018-05-16 PROCEDURE — 99214 OFFICE O/P EST MOD 30 MIN: CPT | Mod: S$GLB,,, | Performed by: INTERNAL MEDICINE

## 2018-05-16 RX ORDER — OXYBUTYNIN CHLORIDE 5 MG/1
TABLET ORAL
COMMUNITY
End: 2018-10-11

## 2018-05-16 RX ORDER — LORAZEPAM 0.5 MG/1
TABLET ORAL
COMMUNITY
End: 2018-10-11

## 2018-05-16 NOTE — LETTER
May 16, 2018      China Pierre MD  [id number] [address] [address]           [facility]  [address] [facility] [address] [place] [address]  Phone: [phone]          Patient: Jm Chaparro   MR Number: [id number]   YOB: 1995   Date of Visit: 5/16/2018       Dear Dr. China Pierre:    Thank you for referring Jm Chaparro to me for evaluation. Attached you will find relevant portions of my assessment and plan of care.    If you have questions, please do not hesitate to call me. I look forward to following Jm Chaparro along with you.    Sincerely,    Matthew Ortiz Jr., MD    Enclosure  CC:  No Recipients    If you would like to receive this communication electronically, please contact [email or web address] or [phone] to request more information on [employer] access.    For providers and/or their staff who would like to refer a patient to [facility], please contact us through our one-stop-shop provider referral line, Dominic Tate, at [phone].    If you feel you have received this communication in error or would no longer like to receive these types of communications, please e-mail [email or web address]

## 2018-05-16 NOTE — PROGRESS NOTES
Subjective:    Patient ID:  Jm Chaparro is a 22 y.o. male who presents for evaluation of new pt (new pt); Dizziness; and Shortness of Breath      HPI22 yo male with multiple subjective complaints of palpitations, CP, syncope , SOB and fatigue. This has been going on for several years and mother states they have gone to the [facility] and have not received and answer for his problems. Was seen here in 2016 and had sana stress test, echo and holter.     Review of Systems   Constitution: Positive for malaise/fatigue. Negative for decreased appetite, fever, weakness, weight gain and weight loss.   HENT: Negative for hearing loss and nosebleeds.    Eyes: Negative for visual disturbance.   Cardiovascular: Positive for chest pain, dyspnea on exertion and near-syncope. Negative for claudication, cyanosis, irregular heartbeat, leg swelling, orthopnea, palpitations, paroxysmal nocturnal dyspnea and syncope.   Respiratory: Positive for shortness of breath. Negative for cough, hemoptysis, sleep disturbances due to breathing, snoring and wheezing.    Endocrine: Negative for cold intolerance, heat intolerance, polydipsia and polyuria.   Hematologic/Lymphatic: Negative for adenopathy and bleeding problem. Does not bruise/bleed easily.   Skin: Negative for color change, itching, poor wound healing, rash and suspicious lesions.   Musculoskeletal: Negative for arthritis, back pain, falls, joint pain, joint swelling, muscle cramps, muscle weakness and myalgias.   Gastrointestinal: Negative for bloating, abdominal pain, change in bowel habit, constipation, flatus, heartburn, hematemesis, hematochezia, hemorrhoids, jaundice, melena, nausea and vomiting.   Genitourinary: Negative for bladder incontinence, decreased libido, frequency, hematuria, hesitancy and urgency.   Neurological: Positive for numbness. Negative for brief paralysis, difficulty with concentration, excessive daytime sleepiness, dizziness, focal weakness,  "headaches, light-headedness, loss of balance and vertigo.   Psychiatric/Behavioral: Negative for altered mental status, depression and memory loss. The patient does not have insomnia and is not nervous/anxious.    Allergic/Immunologic: Negative for environmental allergies, hives and persistent infections.        Objective:    Physical Exam   Constitutional: He is oriented to person, place, and time. He appears well-developed and well-nourished. No distress.   /82 (BP Location: Right arm, Patient Position: Sitting, BP Method: Large (Automatic))   Pulse 79   Ht 6' 1" (1.854 m)   Wt 98.7 kg (217 lb 9.5 oz)   BMI 28.71 kg/m²      HENT:   Head: Normocephalic and atraumatic.   Eyes: Conjunctivae and lids are normal. Pupils are equal, round, and reactive to light. Right eye exhibits no discharge. No scleral icterus.   Neck: Normal range of motion. Neck supple. No JVD present. No tracheal deviation present. No thyromegaly present.   Cardiovascular: Normal rate, regular rhythm, S1 normal, S2 normal, normal heart sounds and intact distal pulses.  Exam reveals no gallop and no friction rub.    No murmur heard.  Pulses:       Carotid pulses are 2+ on the right side, and 2+ on the left side.       Radial pulses are 2+ on the right side, and 2+ on the left side.        Femoral pulses are 2+ on the right side, and 2+ on the left side.       Popliteal pulses are 2+ on the right side, and 2+ on the left side.        Dorsalis pedis pulses are 2+ on the right side, and 2+ on the left side.        Posterior tibial pulses are 2+ on the right side, and 2+ on the left side.   Pulmonary/Chest: Effort normal and breath sounds normal. No respiratory distress. He has no wheezes. He has no rales. He exhibits no tenderness.   Abdominal: Soft. Bowel sounds are normal. He exhibits no distension and no mass. There is no hepatosplenomegaly or hepatomegaly. There is no tenderness. There is no rebound and no guarding.   Musculoskeletal: " Normal range of motion. He exhibits no edema or tenderness.   Lymphadenopathy:     He has no cervical adenopathy.   Neurological: He is alert and oriented to person, place, and time. He has normal reflexes. No cranial nerve deficit. Coordination normal.   Skin: Skin is warm and dry. No rash noted. He is not diaphoretic. No erythema. No pallor.   Psychiatric: He has a normal mood and affect. His speech is normal and behavior is normal. Judgment and thought content normal. Cognition and memory are normal.         Assessment:       1. Chest pain syndrome    2. Palpitations         Plan:     Unlikely symptoms have anything to do with the heart but mother concerned      Orders Placed This Encounter   Procedures    Cardiac event monitor    Exercise stress echo     Follow-up if symptoms worsen or fail to improve.

## 2018-05-25 ENCOUNTER — CLINICAL SUPPORT (OUTPATIENT)
Dept: CARDIOLOGY | Facility: CLINIC | Age: 23
End: 2018-05-25
Attending: INTERNAL MEDICINE
Payer: COMMERCIAL

## 2018-05-25 ENCOUNTER — TELEPHONE (OUTPATIENT)
Dept: CARDIOLOGY | Facility: CLINIC | Age: 23
End: 2018-05-25

## 2018-05-25 DIAGNOSIS — R07.9 CHEST PAIN SYNDROME: ICD-10-CM

## 2018-05-25 LAB
ASCENDING AORTA: 0.02 CM
CV ECHO LV RWT: 0.43 CM
CV STRESS PEAK BP: NORMAL MMHG
DOP CALC LVOT AREA: 3.59 CM2
DOP CALC LVOT DIAMETER: 2.14 CM
DOP CALC LVOT STROKE VOLUME: 0.5 CM3
DOP CALCLVOT PEAK VEL VTI: 0.14 CM
E WAVE DECELERATION TIME: 172.11 MSEC
E/A RATIO: 1.07
E/E' RATIO: -4.84
ECHO EF ESTIMATED: 60 %
ECHO LV POSTERIOR WALL: 1.03 CM (ref 0.6–1.1)
FRACTIONAL SHORTENING: 35 % (ref 28–44)
INTERVENTRICULAR SEPTUM: 0.91 CM (ref 0.6–1.1)
IVRT: 0.11 MSEC
LA MAJOR: 4.72 CM
LA MINOR: 3.67 CM
LA WIDTH: 3.09
LEFT ATRIUM SIZE: 3.49 CM
LEFT INTERNAL DIMENSION IN SYSTOLE: 2.92 CM (ref 2.1–4)
LEFT VENTRICULAR INTERNAL DIMENSION IN DIASTOLE: 4.49 CM (ref 3.5–6)
LEFT VENTRICULAR MASS: 146.47 G
LV LATERAL E/E' RATIO: -3.83
LV SEPTAL E/E' RATIO: -6.57
MV PEAK A VEL: 0.43 M/S
MV PEAK E VEL: 0.46 M/S
MV STENOSIS PRESSURE HALF TIME: 49.91 MS
MV VALVE AREA P 1/2 METHOD: 4.41 CM2
PULM VEIN S/D RATIO: 1.02
PV PEAK D VEL: 0.46 M/S
PV PEAK S VEL: 0.47 M/S
RA MAJOR: 4.12 CM
RA WIDTH: 2.94 CM
SINUS: 0.03 CM
STJ: 0.02 CM
STRESS ECHO POST ESTIMATED WORKLOAD: 7 METS
STRESS ECHO POST EXERCISE DUR MIN: 4 MIN
STRESS ECHO POST EXERCISE DUR SEC: 18
TDI LATERAL: -0.12
TDI SEPTAL: -0.07
TDI: -0.1

## 2018-05-25 PROCEDURE — 93351 STRESS TTE COMPLETE: CPT | Mod: S$GLB,,, | Performed by: INTERNAL MEDICINE

## 2018-05-30 RX ORDER — ALLOPURINOL 300 MG/1
TABLET ORAL
Qty: 90 TABLET | Refills: 1 | Status: SHIPPED | OUTPATIENT
Start: 2018-05-30 | End: 2018-07-30

## 2018-06-04 ENCOUNTER — PATIENT MESSAGE (OUTPATIENT)
Dept: UROLOGY | Facility: CLINIC | Age: 23
End: 2018-06-04

## 2018-06-04 DIAGNOSIS — R35.81 NOCTURNAL POLYURIA: Primary | ICD-10-CM

## 2018-06-04 NOTE — TELEPHONE ENCOUNTER
I called pt and talked to mother about the medication she was interested in. She is worried about his nocturnal polyuria and is interested in finding out about desmopressin. I told her that the medication has been around for a long time (ddavp), but lately they have a newer form of it (noctiva 1.66) and we can use it and see if it helps. Will check lab in 7 d and then in 30 d, according to protocol. Sent to local pharmacy to see if it works for him.

## 2018-06-05 RX ORDER — LISINOPRIL 5 MG/1
TABLET ORAL
Qty: 90 TABLET | Refills: 1 | Status: SHIPPED | OUTPATIENT
Start: 2018-06-05 | End: 2018-12-15 | Stop reason: SDUPTHER

## 2018-06-06 ENCOUNTER — CLINICAL SUPPORT (OUTPATIENT)
Dept: CARDIOLOGY | Facility: CLINIC | Age: 23
End: 2018-06-06
Attending: INTERNAL MEDICINE
Payer: COMMERCIAL

## 2018-06-06 ENCOUNTER — CLINICAL SUPPORT (OUTPATIENT)
Dept: ELECTROPHYSIOLOGY | Facility: CLINIC | Age: 23
End: 2018-06-06
Attending: INTERNAL MEDICINE
Payer: COMMERCIAL

## 2018-06-06 DIAGNOSIS — R00.2 PALPITATIONS: ICD-10-CM

## 2018-06-06 DIAGNOSIS — R07.9 CHEST PAIN SYNDROME: ICD-10-CM

## 2018-06-06 PROCEDURE — 93268 ECG RECORD/REVIEW: CPT | Mod: S$GLB,,, | Performed by: INTERNAL MEDICINE

## 2018-06-12 ENCOUNTER — LAB VISIT (OUTPATIENT)
Dept: LAB | Facility: HOSPITAL | Age: 23
End: 2018-06-12
Attending: UROLOGY
Payer: COMMERCIAL

## 2018-06-12 DIAGNOSIS — R35.81 NOCTURNAL POLYURIA: ICD-10-CM

## 2018-06-12 LAB
ANION GAP SERPL CALC-SCNC: 6 MMOL/L
BUN SERPL-MCNC: 15 MG/DL
CALCIUM SERPL-MCNC: 9.8 MG/DL
CHLORIDE SERPL-SCNC: 102 MMOL/L
CO2 SERPL-SCNC: 31 MMOL/L
CREAT SERPL-MCNC: 1.1 MG/DL
EST. GFR  (AFRICAN AMERICAN): >60 ML/MIN/1.73 M^2
EST. GFR  (NON AFRICAN AMERICAN): >60 ML/MIN/1.73 M^2
GLUCOSE SERPL-MCNC: 114 MG/DL
POTASSIUM SERPL-SCNC: 4.2 MMOL/L
SODIUM SERPL-SCNC: 139 MMOL/L

## 2018-06-12 PROCEDURE — 80048 BASIC METABOLIC PNL TOTAL CA: CPT

## 2018-06-12 PROCEDURE — 36415 COLL VENOUS BLD VENIPUNCTURE: CPT | Mod: PO

## 2018-07-06 ENCOUNTER — LAB VISIT (OUTPATIENT)
Dept: LAB | Facility: HOSPITAL | Age: 23
End: 2018-07-06
Attending: UROLOGY
Payer: COMMERCIAL

## 2018-07-06 DIAGNOSIS — R35.81 NOCTURNAL POLYURIA: ICD-10-CM

## 2018-07-06 LAB
ANION GAP SERPL CALC-SCNC: 8 MMOL/L
BUN SERPL-MCNC: 17 MG/DL
CALCIUM SERPL-MCNC: 9.8 MG/DL
CHLORIDE SERPL-SCNC: 101 MMOL/L
CO2 SERPL-SCNC: 29 MMOL/L
CREAT SERPL-MCNC: 1.1 MG/DL
EST. GFR  (AFRICAN AMERICAN): >60 ML/MIN/1.73 M^2
EST. GFR  (NON AFRICAN AMERICAN): >60 ML/MIN/1.73 M^2
GLUCOSE SERPL-MCNC: 120 MG/DL
POTASSIUM SERPL-SCNC: 4 MMOL/L
SODIUM SERPL-SCNC: 138 MMOL/L

## 2018-07-06 PROCEDURE — 36415 COLL VENOUS BLD VENIPUNCTURE: CPT | Mod: PO

## 2018-07-06 PROCEDURE — 80048 BASIC METABOLIC PNL TOTAL CA: CPT

## 2018-07-13 DIAGNOSIS — R00.2 PALPITATIONS: Primary | ICD-10-CM

## 2018-07-15 ENCOUNTER — PATIENT MESSAGE (OUTPATIENT)
Dept: CARDIOLOGY | Facility: CLINIC | Age: 23
End: 2018-07-15

## 2018-07-20 ENCOUNTER — TELEPHONE (OUTPATIENT)
Dept: CARDIOLOGY | Facility: CLINIC | Age: 23
End: 2018-07-20

## 2018-07-30 ENCOUNTER — OFFICE VISIT (OUTPATIENT)
Dept: PRIMARY CARE CLINIC | Facility: CLINIC | Age: 23
End: 2018-07-30
Payer: COMMERCIAL

## 2018-07-30 VITALS
WEIGHT: 221.44 LBS | HEIGHT: 73 IN | SYSTOLIC BLOOD PRESSURE: 116 MMHG | OXYGEN SATURATION: 95 % | HEART RATE: 84 BPM | TEMPERATURE: 98 F | DIASTOLIC BLOOD PRESSURE: 86 MMHG | BODY MASS INDEX: 29.35 KG/M2

## 2018-07-30 DIAGNOSIS — J06.9 VIRAL URI: Primary | ICD-10-CM

## 2018-07-30 DIAGNOSIS — H61.23 BILATERAL IMPACTED CERUMEN: ICD-10-CM

## 2018-07-30 DIAGNOSIS — R09.81 NASAL CONGESTION: ICD-10-CM

## 2018-07-30 DIAGNOSIS — R05.9 COUGH: ICD-10-CM

## 2018-07-30 PROCEDURE — 3079F DIAST BP 80-89 MM HG: CPT | Mod: CPTII,S$GLB,, | Performed by: NURSE PRACTITIONER

## 2018-07-30 PROCEDURE — 3074F SYST BP LT 130 MM HG: CPT | Mod: CPTII,S$GLB,, | Performed by: NURSE PRACTITIONER

## 2018-07-30 PROCEDURE — 99999 PR PBB SHADOW E&M-EST. PATIENT-LVL V: CPT | Mod: PBBFAC,,, | Performed by: NURSE PRACTITIONER

## 2018-07-30 PROCEDURE — 3008F BODY MASS INDEX DOCD: CPT | Mod: CPTII,S$GLB,, | Performed by: NURSE PRACTITIONER

## 2018-07-30 PROCEDURE — 99214 OFFICE O/P EST MOD 30 MIN: CPT | Mod: S$GLB,,, | Performed by: NURSE PRACTITIONER

## 2018-07-30 RX ORDER — ONDANSETRON HYDROCHLORIDE 8 MG/1
8 TABLET, FILM COATED ORAL EVERY 8 HOURS PRN
COMMUNITY
End: 2022-03-07

## 2018-07-30 RX ORDER — AZELASTINE 1 MG/ML
1 SPRAY, METERED NASAL NIGHTLY PRN
Qty: 30 ML | Refills: 0 | Status: SHIPPED | OUTPATIENT
Start: 2018-07-30 | End: 2019-01-31

## 2018-07-30 RX ORDER — BENZONATATE 200 MG/1
200 CAPSULE ORAL 3 TIMES DAILY PRN
Qty: 30 CAPSULE | Refills: 0 | Status: SHIPPED | OUTPATIENT
Start: 2018-07-30 | End: 2018-08-09

## 2018-07-30 NOTE — PROGRESS NOTES
Jm Chaparro is a 22 y.o. male patient of China Pierre MD who presents to the clinic today for   Chief Complaint   Patient presents with    Sinus Problem    Cough    Nasal Congestion   .    HPI    Pt, who is not known to me, reports a new problem to me: temp on his forehead 97.7 and ear temp was 100.9, blowing nose clogs up the ear, nose plugged, coughing a lot ann marie at night, hard to rest, post nasal drip, ST, earache.  Dimetapp not helping.  Very stuffed up.  Not much nasal d/c.  + phlegm production.    These symptoms began 3 days ago and status is no better.     Symptoms are + acute.    Pt denies the following symptoms:  Nasal d/c    Aggravating factors include lying down increases drip, congestion, coughing .    Relieving factors include nothing .    OTC Medications tried are dimetapp, hydroxyzine.    Prescription medications taken for symptoms are none.    Pertinent medical history:  No h/o persistent allergies.  Does have nasal congestion when the grass is cut..  Takes hydroxyzine for IC so may be masking allergy sxs.    Smoking status:  nonsmoker    ROS    Constitutional:   No high  fever, + fatigue, + decrease in appetite.    Head:   + bi temporal and upper bridge of the nose as well as occip headache  Ears:   + pain.  Eyes:  occ watery sxs with pressure behind the eyes  Nose:   + sinus pain, + congestion, not really having runny nose, ++ post nasal drip.  Throat:  + ST pain.    Heart:  No palpitations, + chest pain with severe coughing+.    Lungs:  + difficulty breathing, + coughing, + phlegm sputum production, + wheezing when coughing.              Symptoms are community acquired, no known sick contact.    GI/:  No GI sxs, no change in urine sxs    MS:  No new bone, joint or muscle problems.    Skin:  No rashes, itching.      PAST MEDICAL HISTORY:  Past Medical History:   Diagnosis Date    Abnormal thyroid function test 9/11/2017    ADD (attention deficit disorder)     Anxiety  disorder     Asperger's disorder     (AUTISM)    Deformity of both feet     Dyscalculia     Dysgraphia     Dyslexia     Eczema     GERD (gastroesophageal reflux disease)     History of migraine headaches     Interstitial cystitis (chronic)     seen by dr aguilar, later dr timur callaway    Nephrolithiasis     2014    Psoriasis     Refusal of blood transfusion for reasons of conscience     Seasonal allergies        PAST SURGICAL HISTORY:  Past Surgical History:   Procedure Laterality Date    ESOPHAGOGASTRODUODENOSCOPY      ureteral stone extraction      basket extraction       SOCIAL HISTORY:  Social History     Social History    Marital status: Single     Spouse name: N/A    Number of children: N/A    Years of education: N/A     Occupational History    Not on file.     Social History Main Topics    Smoking status: Never Smoker    Smokeless tobacco: Never Used    Alcohol use No    Drug use: No    Sexual activity: No     Other Topics Concern    Not on file     Social History Narrative    No narrative on file       FAMILY HISTORY:  Family History   Problem Relation Age of Onset    Lupus Mother     Interstitial cystitis Mother     Diabetes Maternal Grandmother     Lupus Maternal Grandmother     Clotting disorder Maternal Grandmother     Interstitial cystitis Maternal Grandmother     Diabetes Maternal Grandfather     Nephrolithiasis Maternal Grandfather         staghorn    Interstitial cystitis Maternal Aunt        ALLERGIES AND MEDICATIONS: updated and reviewed.  Review of patient's allergies indicates:   Allergen Reactions    Caffeine     Penicillins      Current Outpatient Prescriptions   Medication Sig Dispense Refill    amitriptyline (ELAVIL) 50 MG tablet Take 1 tablet (50 mg total) by mouth every evening. 90 tablet 3    cholecalciferol, vitamin D3, (VITAMIN D3) 5,000 unit Tab Take 5,000 Units by mouth once daily.      clobetasol 0.05% (TEMOVATE) 0.05 % Oint Apply topically 2 (two)  times daily. scalp (Patient taking differently: Apply topically 2 (two) times daily as needed. scalp) 60 g 5    desmopressin (NOCTIVA) 1.66 mcg/spray (0.1 mL) Spry 1.66 mcg by Nasal route every evening. One spray in one nostril every night (30 min prior to going to sleep) 3.8 g 5    dextroamphetamine-amphetamine (ADDERALL XR) 20 MG 24 hr capsule Take 1 capsule (20 mg total) by mouth every morning. 30 capsule 0    esomeprazole (NEXIUM) 20 MG capsule Take 20 mg by mouth once daily.      eszopiclone (LUNESTA) 2 MG Tab TAKE 1 TABLET BY MOUTH  EVERY EVENING 90 tablet 3    fluoxetine (PROZAC) 20 MG capsule Take 1 capsule (20 mg total) by mouth once daily. 30 capsule 2    hydrOXYzine (ATARAX) 50 MG tablet Take 1 tablet (50 mg total) by mouth 2 (two) times daily. 180 tablet 3    lisinopril (PRINIVIL,ZESTRIL) 5 MG tablet TAKE 1 TABLET BY MOUTH ONCE DAILY 90 tablet 1    LORazepam (ATIVAN) 0.5 MG tablet Ativan 0.5 mg tablet   Take 2 tablets twice a day by oral route.      meloxicam (MOBIC) 15 MG tablet TAKE 1 TABLET BY MOUTH  DAILY AS NEEDED FOR JOINT  PAIN IN THE MORNING 30 tablet 3    ondansetron (ZOFRAN) 8 MG tablet Take 8 mg by mouth every 8 (eight) hours as needed.      oxybutynin (DITROPAN) 5 MG Tab Ditropan 5 mg tablet    2 tablets every day by oral route.      pentosan polysulfate (ELMIRON) 100 mg Cap Take 1 capsule (100 mg total) by mouth 3 (three) times daily. 270 capsule 3    triamcinolone acetonide 0.5% (KENALOG) 0.5 % Crea Apply topically 2 (two) times daily. 15 g 0     No current facility-administered medications for this visit.              PHYSICAL EXAM    Alert, coop 22 y.o. male patient in no acute distress.    Vitals:    07/30/18 0803   BP: 116/86   Pulse: 84   Temp: 97.9 °F (36.6 °C)     VS reviewed.  VS stable.  CC, nursing note, medications & PMH all reviewed today.    Head:  Normocephalic, atraumatic.    EENT:  EACs cerumen present bilat.  TMs not visible.                              Eye  lids normal, no discharge present.       Sinus tenderness to palp--none.               Nares--left with considerable edema.    Pharynx not injected.                Tonsils not erythematous , not enlarged, no exudate present.    small NT anterior, no posterior cervical lymph nodes palpable.    No submental, submandibular or supraclavicular lymph nodes palp.             Resp:  Respirations even, unlabored   Lungs CTA bilat.  No wheezing.  No crackles.  Moves air to bases bilat.    Heart:  RRR.    MS:  Ambulates normally.             NEURO:  Alert and oriented x 4.  Responds appropriately during interaction.    Skin:  Warm, dry, color good.    Viral URI    Nasal congestion  -     azelastine (ASTELIN) 137 mcg (0.1 %) nasal spray; 1 spray (137 mcg total) by Nasal route nightly as needed for Rhinitis. Around dinner time  Dispense: 30 mL; Refill: 0    Cough  -     benzonatate (TESSALON) 200 MG capsule; Take 1 capsule (200 mg total) by mouth 3 (three) times daily as needed.  Dispense: 30 capsule; Refill: 0    Bilateral impacted cerumen      Pt today presents with nasal congestion, drip, cough, ST, difficulty sleeping r/t sxs.  On exam there is nasal congestion and bilat ears with cerumen impaction.  Lungs CTA.    This is a new problem to me.  No work up is planned.       Pt advised to perform comfort measures recommended on patient instruction sheet .    If not better in 3-5 days, the patient is advised to call us.  If worse or concerns, the patient is advised to call us.  Explained exam findings, diagnosis and treatment plan to patient and his mother.  Questions answered and patient states understanding.

## 2018-07-30 NOTE — PATIENT INSTRUCTIONS
"Your symptoms today are consistent with viral respiratory infection ("cold")..  This is likely a viral illness that needs to run its course.    Comfort measures:  Increase fluids  Get plenty of rest  Vaporizer or frequent showers may be helpful.  Take tylenol of ibuprofen as needed for pain or fever  For cough and thick mucus secretions, you can take over the counter guaifenesin (mucinex), drink plenty of water while taking this to help loosen mucus.  To suppress the cough you may use a cough product with dextromethorphan (delsym or a product with DM designation).  The following is also prescribed for the cough benzonatate    Salt water gargles.  flonase nasal spray in the morning and asteline (azelastine) around dinner time.  Wash cloth with warm water placed over the sinus area can lessen discomfort and pressure.  Sleep with head of bed elevated to decrease drainage, cough and congestion.    I recommend frequent handwashing to limit spread of infection to others     If you are not better in 3-5 days, if worse or you have concerns or questions, please do not hesitate to call.  You can reach us at [phone] Monday through Thursday (except holidays) 8 a.m. to 6 p.m.  Or you can follow up with your primary care provider/NP.    Thank you for using the [facility]!    RICHIE Piper, CNP, FNP-BC  [facility]    "

## 2018-08-01 ENCOUNTER — HOSPITAL ENCOUNTER (OUTPATIENT)
Dept: RADIOLOGY | Facility: HOSPITAL | Age: 23
Discharge: HOME | End: 2018-08-01
Attending: UROLOGY
Payer: COMMERCIAL

## 2018-08-01 ENCOUNTER — TELEPHONE (OUTPATIENT)
Dept: UROLOGY | Facility: CLINIC | Age: 23
End: 2018-08-01

## 2018-08-01 ENCOUNTER — PATIENT MESSAGE (OUTPATIENT)
Dept: UROLOGY | Facility: CLINIC | Age: 23
End: 2018-08-01

## 2018-08-01 ENCOUNTER — OFFICE VISIT (OUTPATIENT)
Dept: UROLOGY | Facility: CLINIC | Age: 23
End: 2018-08-01
Payer: COMMERCIAL

## 2018-08-01 VITALS
WEIGHT: 198.44 LBS | DIASTOLIC BLOOD PRESSURE: 80 MMHG | SYSTOLIC BLOOD PRESSURE: 122 MMHG | TEMPERATURE: 98 F | BODY MASS INDEX: 26.3 KG/M2 | HEART RATE: 90 BPM | HEIGHT: 73 IN

## 2018-08-01 DIAGNOSIS — N20.0 KIDNEY STONE: ICD-10-CM

## 2018-08-01 DIAGNOSIS — R10.9 ACUTE RIGHT FLANK PAIN: Primary | ICD-10-CM

## 2018-08-01 DIAGNOSIS — R10.9 ACUTE RIGHT FLANK PAIN: ICD-10-CM

## 2018-08-01 DIAGNOSIS — R10.9 FLANK PAIN: ICD-10-CM

## 2018-08-01 PROCEDURE — 3079F DIAST BP 80-89 MM HG: CPT | Mod: CPTII,S$GLB,, | Performed by: UROLOGY

## 2018-08-01 PROCEDURE — 74176 CT ABD & PELVIS W/O CONTRAST: CPT | Mod: TC,PO

## 2018-08-01 PROCEDURE — 3074F SYST BP LT 130 MM HG: CPT | Mod: CPTII,S$GLB,, | Performed by: UROLOGY

## 2018-08-01 PROCEDURE — 3008F BODY MASS INDEX DOCD: CPT | Mod: CPTII,S$GLB,, | Performed by: UROLOGY

## 2018-08-01 PROCEDURE — 74176 CT ABD & PELVIS W/O CONTRAST: CPT | Mod: 26,,, | Performed by: RADIOLOGY

## 2018-08-01 PROCEDURE — 99214 OFFICE O/P EST MOD 30 MIN: CPT | Mod: S$GLB,,, | Performed by: UROLOGY

## 2018-08-01 PROCEDURE — 99999 PR PBB SHADOW E&M-EST. PATIENT-LVL III: CPT | Mod: PBBFAC,,, | Performed by: UROLOGY

## 2018-08-01 RX ORDER — HYDROCODONE BITARTRATE AND ACETAMINOPHEN 5; 325 MG/1; MG/1
1 TABLET ORAL EVERY 6 HOURS PRN
Qty: 20 TABLET | Refills: 0 | Status: SHIPPED | OUTPATIENT
Start: 2018-08-01 | End: 2019-01-31

## 2018-08-01 NOTE — PROGRESS NOTES
"UROLOGY [facility]  8 1 18    Cc R flank pain    Age 23, accompanied by mother. Pt complains of a R flank pain that started yesterday and is present off and on, with radiation to the RLQ. Is nauseated and came very close to vomiting. Also when voiding he feels "like he is peeing broken glass".     Pt believes he is passing a kidney stone. He has a hx of kidney stones, having had a ureteral stone extraction by dr timur callaway    Pt was seen before for nocturnal polyuria and we started him on low dose desmopressin (noctiva 1.66) and it has helped that problem.    Pt has asperger syndrome (autism) and is not easily communicative. Has had bladder problems, with chronic bladder pain for over 3 years. Had urinary urgency, but typically no incontinence, and has nocturia x 4-5. Says his voiding is difficult, and has to push to get the urine out. Has also been diagnosed with interstitial cystitis, and mother also has the disease and several in her family.     In the past he has had intermittent diarrhea and constipation and was taken to [facility] in [place], and they categorized it as IBS.      Also has frequent back pain.     PMH     Surgical:  has a past surgical history that includes Esophagogastroduodenoscopy and ureteral stone extraction.     Medical:  has a past medical history of Abnormal thyroid function test (9/11/2017); ADD (attention deficit disorder); Anxiety disorder; Asperger's disorder; Deformity of both feet; Dyscalculia; Dysgraphia; Dyslexia; Eczema; GERD (gastroesophageal reflux disease); History of migraine headaches; Interstitial cystitis (chronic); Nephrolithiasis; Psoriasis; Refusal of blood transfusion for reasons of conscience; and Seasonal allergies.     Familial: mother with interstitial cystitis     Social: single, lives in [place]                 Current Outpatient Prescriptions on File Prior to Visit   Medication Sig Dispense Refill    allopurinol (ZYLOPRIM) 300 MG tablet TAKE 1 TABLET 90 " tablet 1    cholecalciferol, vitamin D3, (VITAMIN D3)  Take 5,00.        clobetasol 0.05% (TEMOVATE) 0.05  Apply topically 2  60 g 5    dextroamphetamine-amphetamine (ADDERALL XR) 20  Take 1 capsule (20  30 capsule 0    eszopiclone (LUNESTA) 2 MG Tab Take 1 tablet (2  90 tablet 0    fluoxetine (PROZAC) 20 MG capsule Take 1 cap 30 capsule 2    lisinopril (PRINIVIL,ZESTRIL) 5 MG tablet Take 1 tablet ( 30 tablet 6    meloxicam (MOBIC) 15 MG tablet TAKE 1 TA 30 tablet 3    omeprazole (PRILOSEC) 10 MG capsule Take 10         potassium citrate (UROCIT-K) 10 mEq (1,080 mg) TbSR Take 1 tablet (10 m 60 tablet 5   Pt alert, no distress  HEENT: wnl.  Neck: supple, no JVD, no lymphadenopathy  Chest: CV NSR  Lungs: normal chest expansion  Abdomen prominent, obese, nontender, no organomegaly, no masses.  Extremities: no edema, peripheral pulses nl  Neuro: preserved     IMP  Autism  R flank pain with RLQ radiation.  Hx of nephrolithiasis  Urinary frequency and nocturia, recurrent bladder pain. Possible interstitial cystitis, currently with frequent bladder pain. In the past he used myrbetriq but it has not helped. Currently on ditropan 10 xl     I am ordering a CT scan stone protocol

## 2018-08-02 NOTE — TELEPHONE ENCOUNTER
I called pt's mother and discussed the results of CT scan. The scan did not show any kidney stones and no blockage of the ureter to explain flank pain.   There are some increased numbers of lymph nodes noticeable in the images but they dont appear pathologic and they were seen before in previous study and they have not changed.    If the pain could be intestinal (intestinal colic, for example), or other sources of the pain, we may need to consult PCP.    Mother will let us know if we can be of service.

## 2018-08-06 ENCOUNTER — PATIENT MESSAGE (OUTPATIENT)
Dept: FAMILY MEDICINE | Facility: CLINIC | Age: 23
End: 2018-08-06

## 2018-10-11 ENCOUNTER — OFFICE VISIT (OUTPATIENT)
Dept: NEPHROLOGY | Facility: CLINIC | Age: 23
End: 2018-10-11
Payer: COMMERCIAL

## 2018-10-11 VITALS
SYSTOLIC BLOOD PRESSURE: 118 MMHG | HEART RATE: 79 BPM | WEIGHT: 229.38 LBS | BODY MASS INDEX: 30.4 KG/M2 | HEIGHT: 73 IN | DIASTOLIC BLOOD PRESSURE: 70 MMHG | OXYGEN SATURATION: 96 %

## 2018-10-11 DIAGNOSIS — N20.0 KIDNEY STONE: Primary | ICD-10-CM

## 2018-10-11 PROCEDURE — 3074F SYST BP LT 130 MM HG: CPT | Mod: CPTII,S$GLB,, | Performed by: INTERNAL MEDICINE

## 2018-10-11 PROCEDURE — 3078F DIAST BP <80 MM HG: CPT | Mod: CPTII,S$GLB,, | Performed by: INTERNAL MEDICINE

## 2018-10-11 PROCEDURE — 99999 PR PBB SHADOW E&M-EST. PATIENT-LVL III: CPT | Mod: PBBFAC,,, | Performed by: INTERNAL MEDICINE

## 2018-10-11 PROCEDURE — 99214 OFFICE O/P EST MOD 30 MIN: CPT | Mod: S$GLB,,, | Performed by: INTERNAL MEDICINE

## 2018-10-11 PROCEDURE — 3008F BODY MASS INDEX DOCD: CPT | Mod: CPTII,S$GLB,, | Performed by: INTERNAL MEDICINE

## 2018-10-11 NOTE — PROGRESS NOTES
"Subjective:       Patient ID: Jm Chaparro is a 23 y.o. White male who presents for return patient evaluation for chronic renal failure.    He saw Dr. Dickson in August and still has his chronic symptoms.  He is seeing Rheumatology at [facility] at the end of this month.      Review of Systems   Constitutional: Negative for appetite change, chills and fever.   Eyes: Negative for visual disturbance.   Respiratory: Negative for cough and shortness of breath.    Cardiovascular: Negative for chest pain and leg swelling.   Gastrointestinal: Positive for abdominal pain (cramps). Negative for diarrhea, nausea and vomiting.   Genitourinary: Positive for dysuria, flank pain and frequency. Negative for difficulty urinating and hematuria.   Musculoskeletal: Positive for arthralgias, back pain, gait problem and neck pain. Negative for myalgias.   Skin: Negative for rash.   Neurological: Negative for headaches.   Psychiatric/Behavioral: Negative for sleep disturbance.       The past medical, family and social histories were reviewed for this encounter.     /70 (BP Location: Right arm, Patient Position: Sitting)   Pulse 79   Ht 6' 1" (1.854 m)   Wt 104.1 kg (229 lb 6.4 oz)   SpO2 96%   BMI 30.27 kg/m²     Objective:      Physical Exam   Constitutional: He is oriented to person, place, and time. He appears well-developed and well-nourished. No distress.   HENT:   Head: Normocephalic and atraumatic.   Eyes: Conjunctivae are normal.   Neck: Neck supple. No JVD present.   Cardiovascular: Normal rate, regular rhythm and normal heart sounds. Exam reveals no gallop and no friction rub.   No murmur heard.  Pulmonary/Chest: Effort normal and breath sounds normal. No respiratory distress. He has no wheezes. He has no rales.   Abdominal: Soft. Bowel sounds are normal. He exhibits no distension. There is no tenderness.   Musculoskeletal: He exhibits no edema.   Neurological: He is alert and oriented to person, place, and " time.   Skin: Skin is warm and dry. No rash noted.   Psychiatric: He has a normal mood and affect.   Vitals reviewed.      Assessment:       1. Kidney stone        Plan:   Return to clinic in 12 months.  Baseline creatinine is 0.9.  Labs for next visit include rp, ua.  Please try to drink more than two liters of water a day to reduce your risk of stone formation.  Uric acid is controlled.  I sent a note to Dr. Dickson.  I would like for him to see him as I do not see any renal pathology but suspect his symptoms are all related to his bladder..    His renal function is preserved and he has no electrolyte derangements.

## 2018-10-22 DIAGNOSIS — M25.50 ARTHRALGIA, UNSPECIFIED JOINT: ICD-10-CM

## 2018-10-22 DIAGNOSIS — L40.9 PSORIASIS: ICD-10-CM

## 2018-10-22 DIAGNOSIS — R53.83 FATIGUE, UNSPECIFIED TYPE: ICD-10-CM

## 2018-10-23 RX ORDER — MELOXICAM 15 MG/1
TABLET ORAL
Qty: 30 TABLET | Refills: 3 | Status: SHIPPED | OUTPATIENT
Start: 2018-10-23 | End: 2020-12-15

## 2018-11-12 RX ORDER — ALLOPURINOL 300 MG/1
TABLET ORAL
Qty: 90 TABLET | Refills: 3 | Status: SHIPPED | OUTPATIENT
Start: 2018-11-12 | End: 2019-01-31

## 2018-12-09 ENCOUNTER — PATIENT MESSAGE (OUTPATIENT)
Dept: UROLOGY | Facility: CLINIC | Age: 23
End: 2018-12-09

## 2018-12-17 RX ORDER — LISINOPRIL 5 MG/1
TABLET ORAL
Qty: 90 TABLET | Refills: 1 | Status: SHIPPED | OUTPATIENT
Start: 2018-12-17 | End: 2020-04-14

## 2018-12-20 DIAGNOSIS — N32.89 BLADDER SPASMS: Primary | ICD-10-CM

## 2018-12-20 RX ORDER — OXYBUTYNIN CHLORIDE 10 MG/1
10 TABLET, EXTENDED RELEASE ORAL DAILY
Qty: 30 TABLET | Refills: 11 | Status: SHIPPED | OUTPATIENT
Start: 2018-12-20 | End: 2019-12-12 | Stop reason: SDUPTHER

## 2018-12-21 ENCOUNTER — PATIENT MESSAGE (OUTPATIENT)
Dept: UROLOGY | Facility: CLINIC | Age: 23
End: 2018-12-21

## 2018-12-21 DIAGNOSIS — N30.10 IC (INTERSTITIAL CYSTITIS): ICD-10-CM

## 2018-12-21 NOTE — TELEPHONE ENCOUNTER
Advised patient's mother of the drug interaction warning and the inability to prescribe the xanax. Pt's mother did not agree with the warning, but expressed understanding.

## 2019-01-02 RX ORDER — AMITRIPTYLINE HYDROCHLORIDE 50 MG/1
50 TABLET, FILM COATED ORAL NIGHTLY
Qty: 90 TABLET | Refills: 3 | Status: SHIPPED | OUTPATIENT
Start: 2019-01-02 | End: 2019-11-25

## 2019-01-23 RX ORDER — ESZOPICLONE 2 MG/1
TABLET, FILM COATED ORAL
Qty: 90 TABLET | Refills: 3 | Status: SHIPPED | OUTPATIENT
Start: 2019-01-23 | End: 2020-01-15 | Stop reason: SDUPTHER

## 2019-01-31 ENCOUNTER — PATIENT MESSAGE (OUTPATIENT)
Dept: FAMILY MEDICINE | Facility: CLINIC | Age: 24
End: 2019-01-31

## 2019-01-31 ENCOUNTER — HOSPITAL ENCOUNTER (OUTPATIENT)
Dept: RADIOLOGY | Facility: HOSPITAL | Age: 24
Discharge: HOME | End: 2019-01-31
Attending: NURSE PRACTITIONER
Payer: COMMERCIAL

## 2019-01-31 ENCOUNTER — OFFICE VISIT (OUTPATIENT)
Dept: FAMILY MEDICINE | Facility: CLINIC | Age: 24
End: 2019-01-31
Payer: COMMERCIAL

## 2019-01-31 VITALS
HEART RATE: 84 BPM | OXYGEN SATURATION: 96 % | SYSTOLIC BLOOD PRESSURE: 108 MMHG | WEIGHT: 227.63 LBS | HEIGHT: 73 IN | BODY MASS INDEX: 30.17 KG/M2 | TEMPERATURE: 98 F | DIASTOLIC BLOOD PRESSURE: 82 MMHG

## 2019-01-31 DIAGNOSIS — R10.84 GENERALIZED ABDOMINAL PAIN: ICD-10-CM

## 2019-01-31 DIAGNOSIS — R10.31 RLQ ABDOMINAL PAIN: ICD-10-CM

## 2019-01-31 DIAGNOSIS — R93.5 ABNORMAL CT OF THE ABDOMEN: ICD-10-CM

## 2019-01-31 DIAGNOSIS — R11.2 NAUSEA AND VOMITING, INTRACTABILITY OF VOMITING NOT SPECIFIED, UNSPECIFIED VOMITING TYPE: ICD-10-CM

## 2019-01-31 DIAGNOSIS — R10.31 RLQ ABDOMINAL PAIN: Primary | ICD-10-CM

## 2019-01-31 DIAGNOSIS — K52.9 BOWEL DISEASE, INFLAMMATORY: ICD-10-CM

## 2019-01-31 DIAGNOSIS — I88.9 ADENITIS: ICD-10-CM

## 2019-01-31 LAB
BILIRUB SERPL-MCNC: NORMAL MG/DL
BLOOD URINE, POC: NORMAL
COLOR, POC UA: NORMAL
GLUCOSE UR QL STRIP: NORMAL
KETONES UR QL STRIP: NORMAL
LEUKOCYTE ESTERASE URINE, POC: NORMAL
NITRITE, POC UA: NORMAL
PH, POC UA: NORMAL
PROTEIN, POC: NORMAL
SPECIFIC GRAVITY, POC UA: NORMAL
UROBILINOGEN, POC UA: NORMAL

## 2019-01-31 PROCEDURE — 3079F PR MOST RECENT DIASTOLIC BLOOD PRESSURE 80-89 MM HG: ICD-10-PCS | Mod: CPTII,S$GLB,, | Performed by: NURSE PRACTITIONER

## 2019-01-31 PROCEDURE — 99214 OFFICE O/P EST MOD 30 MIN: CPT | Mod: 25,S$GLB,, | Performed by: NURSE PRACTITIONER

## 2019-01-31 PROCEDURE — 25500020 PHARM REV CODE 255: Mod: PO | Performed by: NURSE PRACTITIONER

## 2019-01-31 PROCEDURE — 3008F PR BODY MASS INDEX (BMI) DOCUMENTED: ICD-10-PCS | Mod: CPTII,S$GLB,, | Performed by: NURSE PRACTITIONER

## 2019-01-31 PROCEDURE — 3074F SYST BP LT 130 MM HG: CPT | Mod: CPTII,S$GLB,, | Performed by: NURSE PRACTITIONER

## 2019-01-31 PROCEDURE — 99214 PR OFFICE/OUTPT VISIT, EST, LEVL IV, 30-39 MIN: ICD-10-PCS | Mod: 25,S$GLB,, | Performed by: NURSE PRACTITIONER

## 2019-01-31 PROCEDURE — 81002 POCT URINE DIPSTICK WITHOUT MICROSCOPE: ICD-10-PCS | Mod: S$GLB,,, | Performed by: NURSE PRACTITIONER

## 2019-01-31 PROCEDURE — 96372 THER/PROPH/DIAG INJ SC/IM: CPT | Mod: S$GLB,,, | Performed by: NURSE PRACTITIONER

## 2019-01-31 PROCEDURE — 96372 PR INJECTION,THERAP/PROPH/DIAG2ST, IM OR SUBCUT: ICD-10-PCS | Mod: S$GLB,,, | Performed by: NURSE PRACTITIONER

## 2019-01-31 PROCEDURE — 81002 URINALYSIS NONAUTO W/O SCOPE: CPT | Mod: S$GLB,,, | Performed by: NURSE PRACTITIONER

## 2019-01-31 PROCEDURE — 74178 CT ABD&PLV WO CNTR FLWD CNTR: CPT | Mod: TC,PO

## 2019-01-31 PROCEDURE — 99999 PR PBB SHADOW E&M-EST. PATIENT-LVL V: CPT | Mod: PBBFAC,,, | Performed by: NURSE PRACTITIONER

## 2019-01-31 PROCEDURE — 74178 CT ABDOMEN PELVIS W WO CONTRAST: ICD-10-PCS | Mod: 26,,, | Performed by: RADIOLOGY

## 2019-01-31 PROCEDURE — 99999 PR PBB SHADOW E&M-EST. PATIENT-LVL V: ICD-10-PCS | Mod: PBBFAC,,, | Performed by: NURSE PRACTITIONER

## 2019-01-31 PROCEDURE — 3079F DIAST BP 80-89 MM HG: CPT | Mod: CPTII,S$GLB,, | Performed by: NURSE PRACTITIONER

## 2019-01-31 PROCEDURE — 3008F BODY MASS INDEX DOCD: CPT | Mod: CPTII,S$GLB,, | Performed by: NURSE PRACTITIONER

## 2019-01-31 PROCEDURE — 74178 CT ABD&PLV WO CNTR FLWD CNTR: CPT | Mod: 26,,, | Performed by: RADIOLOGY

## 2019-01-31 PROCEDURE — 3074F PR MOST RECENT SYSTOLIC BLOOD PRESSURE < 130 MM HG: ICD-10-PCS | Mod: CPTII,S$GLB,, | Performed by: NURSE PRACTITIONER

## 2019-01-31 RX ORDER — DOXYCYCLINE 100 MG/1
CAPSULE ORAL
Refills: 0 | COMMUNITY
Start: 2019-01-17 | End: 2019-02-15

## 2019-01-31 RX ORDER — METHOTREXATE 2.5 MG/1
2.5 TABLET ORAL
Refills: 1 | COMMUNITY
Start: 2019-01-17 | End: 2020-10-20

## 2019-01-31 RX ORDER — FOLIC ACID 1 MG/1
TABLET ORAL
Refills: 1 | COMMUNITY
Start: 2019-01-17 | End: 2021-02-10

## 2019-01-31 RX ORDER — ONDANSETRON 2 MG/ML
4 INJECTION INTRAMUSCULAR; INTRAVENOUS
Status: COMPLETED | OUTPATIENT
Start: 2019-01-31 | End: 2019-01-31

## 2019-01-31 RX ORDER — ERGOCALCIFEROL 1.25 MG/1
CAPSULE ORAL
Refills: 0 | COMMUNITY
Start: 2019-01-17 | End: 2019-01-31

## 2019-01-31 RX ADMIN — IOHEXOL 30 ML: 350 INJECTION, SOLUTION INTRAVENOUS at 10:01

## 2019-01-31 RX ADMIN — ONDANSETRON 4 MG: 2 INJECTION INTRAMUSCULAR; INTRAVENOUS at 07:01

## 2019-01-31 NOTE — PROGRESS NOTES
"This dictation has been generated using Modal Fluency Dictation some phonetic errors may occur. Please contact author for clarification if needed.     Problem List Items Addressed This Visit     None      Visit Diagnoses     RLQ abdominal pain    -  Primary    Relevant Orders    Prior Authorization Order    POCT urine dipstick without microscope    Urine culture    CBC auto differential    Comprehensive metabolic panel    CT Abdomen Pelvis W Wo Contrast    Nausea and vomiting, intractability of vomiting not specified, unspecified vomiting type        Relevant Orders    Prior Authorization Order    POCT urine dipstick without microscope    CBC auto differential    Comprehensive metabolic panel    Generalized abdominal pain        Relevant Orders    CT Abdomen Pelvis W Wo Contrast        Orders Placed This Encounter    Urine culture    CT Abdomen Pelvis W Wo Contrast    CBC auto differential    Comprehensive metabolic panel    Prior Authorization Order    POCT urine dipstick without microscope    ondansetron injection 4 mg     RLQ abd pain. Labs as above stat. Consider appendicitis with tenderness and positive "heel jar."  CT stat.  Ruling out uti.  Doubt diverticulitis none present on prior scans.  Also considered colitis or inflammatory bowel(doubtful with no diarrhea or blood, but is on abx,doxy). Consider GI referral Next    No Follow-up on file.    ________________________________________________________________  ________________________________________________________________      Chief Complaint   Patient presents with    Abdominal Pain     nausea     History of present illness  This 23 y.o. presents today for complaint of RLQ abd pain.  Pt notes symptoms started this AM about 3.  It is associated with nausea and a dry heave.  The pain does not radiate.  He has had kidney stones but states this pain is different. No diarrhea and no stool changes.  He is passing gas.  No constipation states BM yesterday. "    ros  No fever or chills  No  symptoms. No change in dysuria but has problem routinely.  Took AZO yesterday. No blood in urine  No rash  No blood or mucous in the stool.     Past medical and social history reviewed.     Past Medical History:   Diagnosis Date    Abnormal thyroid function test 9/11/2017    ADD (attention deficit disorder)     Anxiety disorder     Asperger's disorder     (AUTISM)    Deformity of both feet     Dyscalculia     Dysgraphia     Dyslexia     Eczema     GERD (gastroesophageal reflux disease)     History of migraine headaches     Interstitial cystitis (chronic)     seen by dr aguilar, later dr timur callaway    Nephrolithiasis     2014    Psoriasis     Refusal of blood transfusion for reasons of conscience     Seasonal allergies     Special needs assessment        Past Surgical History:   Procedure Laterality Date    CYSTOGRAM N/A 5/28/2015    [facility]    CYSTOSCOPY WITH HYDRODISTENSION N/A 5/28/2015    [facility]    EGD (ESOPHAGOGASTRODUODENOSCOPY) N/A 5/14/2012    [facility] Puma Preston Jr[facility] ENDO    ESOPHAGOGASTRODUODENOSCOPY      ureteral stone extraction      basket extraction       Family History   Problem Relation Age of Onset    Lupus Mother     Interstitial cystitis Mother     Diabetes Maternal Grandmother     Lupus Maternal Grandmother     Clotting disorder Maternal Grandmother     Interstitial cystitis Maternal Grandmother     Diabetes Maternal Grandfather     Nephrolithiasis Maternal Grandfather         staghorn    Interstitial cystitis Maternal Aunt        Social History     Socioeconomic History    Marital status: Single     Spouse name: None    Number of children: None    Years of education: None    Highest education level: None   Social Needs    Financial resource strain: None    Food insecurity - worry: None    Food insecurity - inability: None    Transportation  needs - medical: None    Transportation needs - non-medical: None   Occupational History    None   Tobacco Use    Smoking status: Never Smoker    Smokeless tobacco: Never Used   Substance and Sexual Activity    Alcohol use: No    Drug use: No    Sexual activity: No   Other Topics Concern    None   Social History Narrative    None       Current Outpatient Medications   Medication Sig Dispense Refill    amitriptyline (ELAVIL) 50 MG tablet Take 1 tablet (50 mg total) by mouth every evening. 90 tablet 3    cholecalciferol, vitamin D3, (VITAMIN D3) 5,000 unit Tab Take 5,000 Units by mouth once daily.      dextroamphetamine-amphetamine (ADDERALL XR) 20 MG 24 hr capsule Take 1 capsule (20 mg total) by mouth every morning. 30 capsule 0    doxycycline (VIBRAMYCIN) 100 MG Cap   0    esomeprazole (NEXIUM) 20 MG capsule Take 20 mg by mouth once daily.      eszopiclone (LUNESTA) 2 MG Tab TAKE 1 TABLET BY MOUTH  EVERY EVENING 90 tablet 3    fluoxetine (PROZAC) 20 MG capsule Take 1 capsule (20 mg total) by mouth once daily. 30 capsule 2    folic acid (FOLVITE) 1 MG tablet TK 1 T PO QD  1    hydrOXYzine (ATARAX) 50 MG tablet Take 1 tablet (50 mg total) by mouth 2 (two) times daily. 180 tablet 3    lisinopril (PRINIVIL,ZESTRIL) 5 MG tablet TAKE 1 TABLET BY MOUTH ONCE DAILY 90 tablet 1    meloxicam (MOBIC) 15 MG tablet TAKE 1 TABLET BY MOUTH  DAILY AS NEEDED FOR JOINT  PAIN IN THE MORNING 30 tablet 3    methotrexate 2.5 MG Tab TK 6 TABS PO ONCE WEEKLY  1    oxybutynin (DITROPAN-XL) 10 MG 24 hr tablet Take 1 tablet (10 mg total) by mouth once daily. 30 tablet 11    pentosan polysulfate (ELMIRON) 100 mg Cap Take 1 capsule (100 mg total) by mouth 3 (three) times daily. 270 capsule 3    ondansetron (ZOFRAN) 8 MG tablet Take 8 mg by mouth every 8 (eight) hours as needed.       No current facility-administered medications for this visit.        Review of patient's allergies indicates:   Allergen Reactions     Caffeine     Penicillins        Physical examination  Vitals Reviewed  Gen. Well-dressed well-nourished   Skin warm dry and intact.  No rashes noted.  Neck is supple without adenopathy  Chest.  Respirations are even unlabored.  Lungs are clear to auscultation.     Abdomen is soft and not distended.  Bowel sounds are present.  + tenderness during palpation of the RLQ of the abdomen.  No rebound tenderness.  Minimal tenderness at McBurney's point.  Psoas and obturator negative.  Positive heel jar.   No Hepatosplenomegaly noted.  No hernia noted.  No CVA tenderness to percussion.    Neuro. Awake alert oriented x4.  Normal judgment and cognition noted.  Extremities no clubbing cyanosis or edema noted.     Prior ct noted. Likely inflammatory issues leading to gland swelling.     Call or return to clinic prn if these symptoms worsen or fail to improve as anticipated.

## 2019-02-04 ENCOUNTER — PATIENT MESSAGE (OUTPATIENT)
Dept: FAMILY MEDICINE | Facility: CLINIC | Age: 24
End: 2019-02-04

## 2019-02-15 ENCOUNTER — OFFICE VISIT (OUTPATIENT)
Dept: GASTROENTEROLOGY | Facility: CLINIC | Age: 24
End: 2019-02-15
Payer: COMMERCIAL

## 2019-02-15 VITALS
SYSTOLIC BLOOD PRESSURE: 119 MMHG | BODY MASS INDEX: 30.36 KG/M2 | WEIGHT: 229.06 LBS | HEART RATE: 78 BPM | HEIGHT: 73 IN | DIASTOLIC BLOOD PRESSURE: 87 MMHG | RESPIRATION RATE: 18 BRPM

## 2019-02-15 DIAGNOSIS — R19.7 INTERMITTENT DIARRHEA: ICD-10-CM

## 2019-02-15 DIAGNOSIS — R10.31 RLQ ABDOMINAL PAIN: Primary | ICD-10-CM

## 2019-02-15 DIAGNOSIS — R39.9 URINARY SYMPTOM OR SIGN: ICD-10-CM

## 2019-02-15 DIAGNOSIS — R93.3 ABNORMAL CT SCAN, GASTROINTESTINAL TRACT: ICD-10-CM

## 2019-02-15 DIAGNOSIS — Z87.898 HISTORY OF DIZZINESS: ICD-10-CM

## 2019-02-15 DIAGNOSIS — R74.01 ELEVATED ALT MEASUREMENT: ICD-10-CM

## 2019-02-15 DIAGNOSIS — I88.0 MESENTERIC ADENITIS: ICD-10-CM

## 2019-02-15 DIAGNOSIS — Z87.898 HISTORY OF HEADACHE: ICD-10-CM

## 2019-02-15 PROCEDURE — 3074F PR MOST RECENT SYSTOLIC BLOOD PRESSURE < 130 MM HG: ICD-10-PCS | Mod: CPTII,S$GLB,, | Performed by: NURSE PRACTITIONER

## 2019-02-15 PROCEDURE — 3079F DIAST BP 80-89 MM HG: CPT | Mod: CPTII,S$GLB,, | Performed by: NURSE PRACTITIONER

## 2019-02-15 PROCEDURE — 99999 PR PBB SHADOW E&M-EST. PATIENT-LVL V: ICD-10-PCS | Mod: PBBFAC,,, | Performed by: NURSE PRACTITIONER

## 2019-02-15 PROCEDURE — 3074F SYST BP LT 130 MM HG: CPT | Mod: CPTII,S$GLB,, | Performed by: NURSE PRACTITIONER

## 2019-02-15 PROCEDURE — 3008F BODY MASS INDEX DOCD: CPT | Mod: CPTII,S$GLB,, | Performed by: NURSE PRACTITIONER

## 2019-02-15 PROCEDURE — 99203 PR OFFICE/OUTPT VISIT, NEW, LEVL III, 30-44 MIN: ICD-10-PCS | Mod: S$GLB,,, | Performed by: NURSE PRACTITIONER

## 2019-02-15 PROCEDURE — 99999 PR PBB SHADOW E&M-EST. PATIENT-LVL V: CPT | Mod: PBBFAC,,, | Performed by: NURSE PRACTITIONER

## 2019-02-15 PROCEDURE — 3008F PR BODY MASS INDEX (BMI) DOCUMENTED: ICD-10-PCS | Mod: CPTII,S$GLB,, | Performed by: NURSE PRACTITIONER

## 2019-02-15 PROCEDURE — 99203 OFFICE O/P NEW LOW 30 MIN: CPT | Mod: S$GLB,,, | Performed by: NURSE PRACTITIONER

## 2019-02-15 PROCEDURE — 3079F PR MOST RECENT DIASTOLIC BLOOD PRESSURE 80-89 MM HG: ICD-10-PCS | Mod: CPTII,S$GLB,, | Performed by: NURSE PRACTITIONER

## 2019-02-15 RX ORDER — IBUPROFEN 100 MG
TABLET ORAL DAILY PRN
COMMUNITY
End: 2019-05-23

## 2019-02-15 NOTE — PROGRESS NOTES
"Subjective:       Patient ID: Jm Chaparro is a 23 y.o. male Body mass index is 30.22 kg/m².    Chief Complaint: Establish Care (review results, abd pain, headache, dizziness)    This patient is new to me.  Established with Dr. Preston (last in 2012)  Referring Provider: NIRU Tanner NP for RLQ abdominal pain, Abnormal CT of the abdomen, Adenitis, & Bowel disease, inflammatory    Abdominal Pain   This is a new problem. Episode onset: started 1/31/19. The problem occurs intermittently. Duration: was severe when it started and lasted a few hours. The problem has been rapidly improving. The pain is located in the RLQ. The pain is at a severity of 0/10 (current). Quality: when felt when palpating area described as soreness; when ti first started it was sharp. The abdominal pain does not radiate. Associated symptoms include belching, diarrhea (chronic intermittent, not daily, a few times a week, when it happens it is all day long of loose stool or pellet-like stool; recent antibiotic doxcycyline for URI started on 1/31/19), dysuria, flatus, headaches and nausea (often; zofran prn). Pertinent negatives include no anorexia, constipation, fever, frequency, hematochezia, melena, vomiting or weight loss. Nothing aggravates the pain. He has tried proton pump inhibitors (nexium 20 mg daily PRN- taken about once a week; mobic daily) for the symptoms. Prior diagnostic workup includes CT scan (seen in the past by Dr. Shaver (2015)). His past medical history is significant for GERD and irritable bowel syndrome. There is no history of Crohn's disease or ulcerative colitis. Patient's medical history includes kidney stones.     Review of Systems   Constitutional: Negative for appetite change, chills, fatigue, fever and weight loss.   HENT: Positive for sore throat (occasional feels like "throat is swollen in the inside" and pain with swallowing, releived with benadryl). Negative for trouble swallowing.    Respiratory: " "Negative for cough, choking and shortness of breath.    Cardiovascular: Negative for chest pain.   Gastrointestinal: Positive for abdominal pain, diarrhea (chronic intermittent, not daily, a few times a week, when it happens it is all day long of loose stool or pellet-like stool; recent antibiotic doxcycyline for URI started on 1/31/19), flatus and nausea (often; zofran prn). Negative for anal bleeding, anorexia, blood in stool, constipation, hematochezia, melena, rectal pain and vomiting.        Reports methotrexate dosage was decreased recently due to elevated LFTs   Genitourinary: Positive for difficulty urinating ("feels like something is plugged up") and dysuria. Negative for flank pain and frequency.        Seeing urology, Dr. Dickson   Neurological: Positive for dizziness and headaches. Negative for weakness.       Past Medical History:   Diagnosis Date    Abnormal thyroid function test 9/11/2017    ADD (attention deficit disorder)     Anxiety disorder     Asperger's disorder     (AUTISM)    Deformity of both feet     Dyscalculia     Dysgraphia     Dyslexia     Eczema     GERD (gastroesophageal reflux disease)     History of migraine headaches     Hypertension     Interstitial cystitis (chronic)     seen by dr aguilar, later dr timur callaway    Irritable bowel syndrome     Nephrolithiasis     2014    PONV (postoperative nausea and vomiting)     Psoriasis     Refusal of blood transfusion for reasons of conscience     Seasonal allergies     Special needs assessment      Past Surgical History:   Procedure Laterality Date    [facility]COPY  ~2015    [facility]; told IBS    COLONOSCOPY  ~2015    Dr. Shaver; told IBS    CYSTOGRAM N/A 5/28/2015    [facility]    CYSTOSCOPY WITH HYDRODISTENSION N/A 5/28/2015    [facility] OR    EGD (ESOPHAGOGASTRODUODENOSCOPY) N/A 5/14/2012    [facility] P[facility] Jr[facility] ENDO    UPPER " GASTROINTESTINAL ENDOSCOPY  05/14/2012    Dr. Preston    ureteral stone extraction      basket extraction     Family History   Problem Relation Age of Onset    Lupus Mother     Interstitial cystitis Mother     Diabetes Maternal Grandmother     Lupus Maternal Grandmother     Clotting disorder Maternal Grandmother     Interstitial cystitis Maternal Grandmother     Diabetes Maternal Grandfather     Nephrolithiasis Maternal Grandfather         staghorn    Interstitial cystitis Maternal Aunt     Colon cancer Neg Hx     Crohn's disease Neg Hx     Ulcerative colitis Neg Hx     Celiac disease Neg Hx     Esophageal cancer Neg Hx     Stomach cancer Neg Hx      Wt Readings from Last 10 Encounters:   02/15/19 103.9 kg (229 lb 0.9 oz)   01/31/19 103.3 kg (227 lb 10 oz)   10/11/18 104.1 kg (229 lb 6.4 oz)   08/01/18 90 kg (198 lb 6.6 oz)   07/30/18 100.4 kg (221 lb 7.2 oz)   05/16/18 98.7 kg (217 lb 9.5 oz)   05/02/18 90 kg (198 lb 6.6 oz)   04/24/18 95.2 kg (209 lb 14.1 oz)   04/24/18 98.4 kg (216 lb 14.4 oz)   11/10/17 98.6 kg (217 lb 6 oz)     Lab Results   Component Value Date    WBC 10.22 01/31/2019    HGB 14.9 01/31/2019    HCT 43.2 01/31/2019    MCV 93 01/31/2019     01/31/2019     CMP  Sodium   Date Value Ref Range Status   01/31/2019 139 136 - 145 mmol/L Final     Potassium   Date Value Ref Range Status   01/31/2019 4.1 3.5 - 5.1 mmol/L Final     Chloride   Date Value Ref Range Status   01/31/2019 102 95 - 110 mmol/L Final     CO2   Date Value Ref Range Status   01/31/2019 27 23 - 29 mmol/L Final     Glucose   Date Value Ref Range Status   01/31/2019 83 70 - 110 mg/dL Final     BUN, Bld   Date Value Ref Range Status   01/31/2019 16 6 - 20 mg/dL Final     Creatinine   Date Value Ref Range Status   01/31/2019 1.1 0.5 - 1.4 mg/dL Final     Calcium   Date Value Ref Range Status   01/31/2019 9.7 8.7 - 10.5 mg/dL Final     Total Protein   Date Value Ref Range Status   01/31/2019 7.2 6.0 - 8.4 g/dL Final  "    Albumin   Date Value Ref Range Status   01/31/2019 4.2 3.5 - 5.2 g/dL Final     Total Bilirubin   Date Value Ref Range Status   01/31/2019 0.2 0.1 - 1.0 mg/dL Final     Comment:     For infants and newborns, interpretation of results should be based  on gestational age, weight and in agreement with clinical  observations.  Premature Infant recommended reference ranges:  Up to 24 hours.............<8.0 mg/dL  Up to 48 hours............<12.0 mg/dL  3-5 days..................<15.0 mg/dL  6-29 days.................<15.0 mg/dL       Alkaline Phosphatase   Date Value Ref Range Status   01/31/2019 76 55 - 135 U/L Final     AST   Date Value Ref Range Status   01/31/2019 32 10 - 40 U/L Final     ALT   Date Value Ref Range Status   01/31/2019 89 (H) 10 - 44 U/L Final     Anion Gap   Date Value Ref Range Status   01/31/2019 10 8 - 16 mmol/L Final     eGFR if    Date Value Ref Range Status   01/31/2019 >60 >60 mL/min/1.73 m^2 Final     eGFR if non    Date Value Ref Range Status   01/31/2019 >60 >60 mL/min/1.73 m^2 Final     Comment:     Calculation used to obtain the estimated glomerular filtration  rate (eGFR) is the CKD-EPI equation.        Lab Results   Component Value Date    AMYLASE 53 04/20/2012     Lab Results   Component Value Date    LIPASE 18 04/20/2012     Lab Results   Component Value Date    TSH 0.477 09/06/2017     Reviewed prior medical records including radiology report of 1/31/19 CT abdomen pelvis; 8/1/18 CT renal stone abdomen pelvis; 8/7/15 HIDA scan; 7/2/15 ultrasound of abdomen and limited pelvis; & endoscopy history (see surgical history).    5/14/12 EGD was reviewed and procedure report states:   "Findings:       The oropharynx was normal. The examined esophagus was essentially        normal. There was perhaps slight erythema in the distal esophagus,        compatible with reflux esophagitis. The Z-line was regular and was        found 39 cm from the incisors. The " "cardia was normal (no definite        hiatal hernia). The entire examined stomach was normal. Biopsies        were taken from the antrum with a cold forceps for Helicobacter        pylori testing using CLOtest. The examined duodenum was normal.  Impression:          - Normal oropharynx.                       - Normal esophagus.                       - Slight reflux esophagitis?.                       - Non-erosive esophageal reflux (NERD) disease?.                       - Z-line regular, 39 cm from the incisors.                       - Normal cardia.                       - Normal stomach. This was biopsied.                       - Normal examined duodenum.  Recommendation:      - Discharge patient to home.                       - Await CLOtest results.                       - Follow an antireflux regimen.                       - Use Prilosec (omeprazole) at 40 mg by mouth once a                        day for at least 2-3 months.                       - Return to primary care physician in 6-8 weeks.                       - Return to GI clinic PRN.                       - Call the G.I. clinic in 2 weeks for reports (if                        you haven't heard from us sooner) [phone].".  Biopsy results:   Varsha test negative  Objective:      Physical Exam   Constitutional: He is oriented to person, place, and time. He appears well-developed and well-nourished. No distress.   HENT:   Mouth/Throat: Oropharynx is clear and moist and mucous membranes are normal. No oral lesions. No oropharyngeal exudate.   Eyes: Conjunctivae are normal. Pupils are equal, round, and reactive to light. No scleral icterus.   Cardiovascular: Normal rate.   Pulmonary/Chest: Effort normal and breath sounds normal. No respiratory distress. He has no wheezes.   Abdominal: Soft. Normal appearance and bowel sounds are normal. He exhibits no distension, no abdominal bruit and no mass. There is no tenderness. There is no rigidity, no rebound, no " guarding, no tenderness at McBurney's point and negative Lomax's sign.   Neurological: He is alert and oriented to person, place, and time.   Skin: Skin is warm and dry. No rash noted. He is not diaphoretic. No erythema. No pallor.   Non-jaundiced   Psychiatric: He has a normal mood and affect. His behavior is normal. Judgment and thought content normal.   Nursing note and vitals reviewed.      Assessment:       1. RLQ abdominal pain    2. Abnormal CT scan, gastrointestinal tract    3. Mesenteric adenitis    4. Elevated ALT measurement    5. History of headache    6. History of dizziness    7. Urinary symptom or sign    8. Intermittent diarrhea        Plan:     Patient agreed to sign medical records release consent for us to obtain records from Dr. Shaver    RLMICHELLE abdominal pain  - schedule Colonoscopy, discussed procedure with the patient, patient verbalized understanding    Abnormal CT scan, gastrointestinal tract & Mesenteric adenitis  - schedule Colonoscopy, discussed procedure with the patient, patient verbalized understanding    Elevated ALT measurement  -     US Abdomen Limited (LIVER); Future; Expected date: 02/15/2019  -     Hepatic function panel; Future; Expected date: 03/01/2019  -     Hepatitis panel, acute; Future; Expected date: 03/01/2019  - minimize/avoid alcohol and tylenol products, & follow-up with PCP for continued evaluation and management; if specialist is needed, recommend seeing hepatology.    History of headache & History of dizziness   Recommend follow-up with Primary Care Provider for continued evaluation and management.    Urinary symptom or sign  Recommend follow-up with Urology for continued evaluation and management.    Intermittent diarrhea  -     Stool Exam-Ova,Cysts,Parasites; Future; Expected date: 02/15/2019  -     Fecal fat, qualitative; Future; Expected date: 02/15/2019  -     Giardia / Cryptosporidum, EIA; Future; Expected date: 02/15/2019  -     Occult blood x 1, stool;  Future; Expected date: 02/15/2019  -     pH, stool; Future; Expected date: 02/15/2019  -     Rotavirus antigen, stool; Future; Expected date: 02/15/2019  -     WBC, Stool; Future; Expected date: 02/15/2019  -     Clostridium difficile EIA; Future; Expected date: 02/15/2019  -     Adenovirus Molecular Detection, PCR, Non-Blood Stool; Future; Expected date: 02/15/2019  -     IgA; Future; Expected date: 02/15/2019  -     Tissue transglutaminase, IgA; Future; Expected date: 02/15/2019  - schedule Colonoscopy, discussed procedure with the patient, patient verbalized understanding  - recommended OTC probiotic, such as Align or Culturelle, as directed  - avoid lactose    Heartburn  - CONTINUE NEXIUM 40 MG ONCE DAILY AS DIRECTED (recommend daily rather than PRN), take in the morning 30-60 minutes before breakfast, discussed about possible long term use of medication (prefer to use lowest effective dose or discontinuing if possible), pt verbalized understanding  -Educated patient on lifestyle modifications to help control/reduce reflux/abdominal pain including: avoid large meals, avoid eating within 2-3 hours of bedtime (avoid late night eating & lying down soon after eating), elevate head of bed if nocturnal symptoms are present, smoking cessation (if current smoker), & weight loss (if overweight).   -Educated to avoid known foods which trigger reflux symptoms & to minimize/avoid high-fat foods, chocolate, caffeine, citrus, alcohol, & tomato products.  -Advised to avoid/limit use of NSAID's, since they can cause GI upset, bleeding, and/or ulcers. If needed, take with food.   - Possible EGD pending results of testing and if symptoms persist    Odynophagia  - educated patient to eat smaller more frequent meals and to eat slowly and advised to eat a soft diet.  - possible EGD/UGI/esophagram/esophageal manometry if symptoms persist    Follow-up in about 1 month (around 3/15/2019), or if symptoms worsen or fail to improve.       If no improvement in symptoms or symptoms worsen, call/follow-up at clinic or go to ER.

## 2019-02-15 NOTE — LETTER
February 26, 2019        Skip Tanner, OZIEL  [address]  [place] [address]             [facility]  [address]  [facility] [address]  Phone: [phone]   Patient: Jm Chaparro   MR Number: [id number]   YOB: 1995   Date of Visit: 2/15/2019       Dear Dr. Tanner:    Thank you for referring Jm Chaparro to me for evaluation. Below are the relevant portions of my assessment and plan of care.        1. RLQ abdominal pain    2. Abnormal CT scan, gastrointestinal tract    3. Mesenteric adenitis    4. Elevated ALT measurement    5. History of headache    6. History of dizziness    7. Urinary symptom or sign    8. Intermittent diarrhea        Patient agreed to sign medical records release consent for us to obtain records from Dr. Shaver    RLQ abdominal pain  - schedule Colonoscopy, discussed procedure with the patient, patient verbalized understanding    Abnormal CT scan, gastrointestinal tract & Mesenteric adenitis  - schedule Colonoscopy, discussed procedure with the patient, patient verbalized understanding    Elevated ALT measurement  -     US Abdomen Limited (LIVER); Future; Expected date: 02/15/2019  -     Hepatic function panel; Future; Expected date: 03/01/2019  -     Hepatitis panel, acute; Future; Expected date: 03/01/2019  - minimize/avoid alcohol and tylenol products, & follow-up with PCP for continued evaluation and management; if specialist is needed, recommend seeing hepatology.    History of headache & History of dizziness   Recommend follow-up with Primary Care Provider for continued evaluation and management.    Urinary symptom or sign  Recommend follow-up with Urology for continued evaluation and management.    Intermittent diarrhea  -     Stool Exam-Ova,Cysts,Parasites; Future; Expected date: 02/15/2019  -     Fecal fat, qualitative; Future; Expected date: 02/15/2019  -     Giardia / Cryptosporidum, EIA; Future; Expected date:  02/15/2019  -     Occult blood x 1, stool; Future; Expected date: 02/15/2019  -     pH, stool; Future; Expected date: 02/15/2019  -     Rotavirus antigen, stool; Future; Expected date: 02/15/2019  -     WBC, Stool; Future; Expected date: 02/15/2019  -     Clostridium difficile EIA; Future; Expected date: 02/15/2019  -     Adenovirus Molecular Detection, PCR, Non-Blood Stool; Future; Expected date: 02/15/2019  -     IgA; Future; Expected date: 02/15/2019  -     Tissue transglutaminase, IgA; Future; Expected date: 02/15/2019  - schedule Colonoscopy, discussed procedure with the patient, patient verbalized understanding  - recommended OTC probiotic, such as Align or Culturelle, as directed  - avoid lactose    Heartburn  - CONTINUE NEXIUM 40 MG ONCE DAILY AS DIRECTED (recommend daily rather than PRN), take in the morning 30-60 minutes before breakfast, discussed about possible long term use of medication (prefer to use lowest effective dose or discontinuing if possible), pt verbalized understanding  -Educated patient on lifestyle modifications to help control/reduce reflux/abdominal pain including: avoid large meals, avoid eating within 2-3 hours of bedtime (avoid late night eating & lying down soon after eating), elevate head of bed if nocturnal symptoms are present, smoking cessation (if current smoker), & weight loss (if overweight).   -Educated to avoid known foods which trigger reflux symptoms & to minimize/avoid high-fat foods, chocolate, caffeine, citrus, alcohol, & tomato products.  -Advised to avoid/limit use of NSAID's, since they can cause GI upset, bleeding, and/or ulcers. If needed, take with food.    If you have questions, please do not hesitate to call me. I look forward to following Jm along with you.    Sincerely,      Jane Hartmann, RITA           CC  No Recipients

## 2019-02-15 NOTE — PATIENT INSTRUCTIONS
Abdominal Pain    Abdominal pain is pain in the stomach or belly area. Everyone has this pain from time to time. In many cases it goes away on its own. But abdominal pain can sometimes be due to a serious problem, such as appendicitis. So its important to know when to seek help.  Causes of abdominal pain  There are many possible causes of abdominal pain. Common causes in adults include:  · Constipation, diarrhea, or gas  · Stomach acid flowing back up into the esophagus (acid reflux or heartburn)  · Severe acid reflux, called GERD (gastroesophageal reflux disease)  · A sore in the lining of the stomach or small intestine (peptic ulcer)  · Inflammation of the gallbladder, liver, or pancreas  · Gallstones or kidney stones  · Appendicitis   · Intestinal blockage   · An internal organ pushing through a muscle or other tissue (hernia)  · Urinary tract infections  · In women, menstrual cramps, fibroids, or endometriosis  · Inflammation or infection of the intestines  Diagnosing the cause of abdominal pain  Your healthcare provider will do a physical exam help find the cause of your pain. If needed, tests will be ordered. Belly pain has many possible causes. So it can be hard to find the reason for your pain. Giving details about your pain can help. Tell your provider where and when you feel the pain, and what makes it better or worse. Also let your provider know if you have other symptoms such as:  · Fever  · Tiredness  · Upset stomach (nausea)  · Vomiting  · Changes in bathroom habits  Treating abdominal pain  Some causes of pain need emergency medical treatment right away. These include appendicitis or a bowel blockage. Other problems can be treated with rest, fluids, or medicines. Your healthcare provider can give you specific instructions for treatment or self-care based on what is causing your pain.  If you have vomiting or diarrhea, sip water or other clear fluids. When you are ready to eat solid foods again,  start with small amounts of easy-to-digest, low-fat foods. These include apple sauce, toast, or crackers.   When to seek medical care  Call 911 or go to the hospital right away if you:  · Cant pass stool and are vomiting  · Are vomiting blood or have bloody diarrhea or black, tarry diarrhea  · Have chest, neck, or shoulder pain  · Feel like you might pass out  · Have pain in your shoulder blades with nausea  · Have sudden, severe belly pain  · Have new, severe pain unlike any you have felt before  · Have a belly that is rigid, hard, and tender to touch  Call your healthcare provider if you have:  · Pain for more than 5 days  · Bloating for more than 2 days  · Diarrhea for more than 5 days  · A fever of 100.4°F (38.0°C) or higher, or as directed by your provider  · Pain that gets worse  · Weight loss for no reason  · Continued lack of appetite  · Blood in your stool  How to prevent abdominal pain  Here are some tips to help prevent abdominal pain:  · Eat smaller amounts of food at one time.  · Avoid greasy, fried, or other high-fat foods.  · Avoid foods that give you gas.  · Exercise regularly.  · Drink plenty of fluids.  To help prevent GERD symptoms:  · Quit smoking.  · Reduce alcohol and certain foods that increase stomach acid.  · Avoid aspirin and over-the-counter pain and fever medicines (NSAIDS or nonsteroidal anti-inflammatory drugs), if possible  · Lose extra weight.  · Finish eating at least 2 hours before you go to bed or lie down.  · Raise the head of your bed.  Date Last Reviewed: 7/1/2016  © 3481-1956 [employer]. [address] [place] [address], [place] [address]. All rights reserved. This information is not intended as a substitute for professional medical care. Always follow your healthcare professional's instructions.        Mesenteric Adenitis  The mesentery is a sheet of tissue that attaches the intestines to the abdominal wall. Lymph nodes are small glands throughout the body. They are part  of the system that fights infection. Mesenteric adenitis is swelling of the lymph nodes in the mesentery. The problem is caused by an infection, or an inflammatory condition, often of the intestines.  Mesenteric adenitis can cause these symptoms:  · Severe pain in the abdomen, which can be all over  · Pain can be in the lower right side, sometimes mimicking appendicitis  · Nausea and vomiting  · Diarrhea  · Fever  · Loss of appetite  · Malaise  This condition can be difficult to diagnose because the pain is usually not just in one spot. You may need tests for this reason. Occasionally, the pain shifts to the lower right part of your abdomen. When this happens, it may seem like appendicitis. This is another reason for testing.  The problem most often goes away in a few days. If you have a bacterial infection, you may need to take antibiotics. Medicines may also be given to help relieve pain until the problem subsides.    Home care  · Your healthcare provider may prescribe medicines for pain, nausea, or infection. Follow the healthcare provider's instructions when using these medicines. If you are given medicine for infection, take all of it as directed until it is gone, even if you feel better.  · Rest until you feel better.  · To help relieve abdominal pain, soak a towel in warm water and place it on your belly.  · If you have had diarrhea or vomiting, follow the guidelines you are given for what to eat and drink and what to avoid.  · Drink plenty of fluids.  · Dont smoke or drink alcohol.  Follow-up care  Follow up with your healthcare provider, or as advised. It is often very hard to tell mesenteric adenitis apart from appendicitis. So close follow-up is needed.  If X-rays were done, a radiologist will look at them. You will be told if there are changes.  Call 911  Call 911 if any of these occur:  · Trouble breathing  · Confusion  · Very drowsy or trouble awakening  · Fainting or loss of consciousness  · Rapid  heart rate   · Chest pain  When to seek medical advice  Call your healthcare provider right away if any of these occur:  · Fever of 100.4°F (38°C) or higher  · Pain not relieved with medicine, or pain that goes away and returns  · Pain that is getting worse over time or changing in location  · Pain that localizes to the right lower abdomen, and not improving or is worsening  · Severe diarrhea or vomiting  · Severe headache  · Few or no stools or gas  · Little or no urine  · Leg or foot cramps  · Small dark red dots on the skin  · Swelling in the abdomen  · Bloody stools   Date Last Reviewed: 12/30/2015  © 0858-8851 [employer]. [address] [place] [address], [place] [address]. All rights reserved. This information is not intended as a substitute for professional medical care. Always follow your healthcare professional's instructions.

## 2019-02-18 ENCOUNTER — ANESTHESIA (OUTPATIENT)
Dept: ENDOSCOPY | Facility: HOSPITAL | Age: 24
End: 2019-02-18
Payer: COMMERCIAL

## 2019-02-18 ENCOUNTER — ANESTHESIA EVENT (OUTPATIENT)
Dept: ENDOSCOPY | Facility: HOSPITAL | Age: 24
End: 2019-02-18
Payer: COMMERCIAL

## 2019-02-18 ENCOUNTER — HOSPITAL ENCOUNTER (OUTPATIENT)
Facility: HOSPITAL | Age: 24
Discharge: HOME | End: 2019-02-18
Attending: INTERNAL MEDICINE | Admitting: INTERNAL MEDICINE
Payer: COMMERCIAL

## 2019-02-18 VITALS
WEIGHT: 218 LBS | TEMPERATURE: 98 F | RESPIRATION RATE: 18 BRPM | OXYGEN SATURATION: 98 % | SYSTOLIC BLOOD PRESSURE: 125 MMHG | BODY MASS INDEX: 28.89 KG/M2 | HEIGHT: 73 IN | HEART RATE: 76 BPM | DIASTOLIC BLOOD PRESSURE: 90 MMHG

## 2019-02-18 DIAGNOSIS — R10.31 RLQ ABDOMINAL PAIN: ICD-10-CM

## 2019-02-18 LAB
C DIFF GDH STL QL: NEGATIVE
C DIFF TOX A+B STL QL IA: NEGATIVE

## 2019-02-18 PROCEDURE — 87449 NOS EACH ORGANISM AG IA: CPT

## 2019-02-18 PROCEDURE — 45380 PR COLONOSCOPY,BIOPSY: ICD-10-PCS | Mod: ,,, | Performed by: INTERNAL MEDICINE

## 2019-02-18 PROCEDURE — 88305 TISSUE SPECIMEN TO PATHOLOGY - SURGERY: ICD-10-PCS | Mod: 26,,, | Performed by: PATHOLOGY

## 2019-02-18 PROCEDURE — 87046 STOOL CULTR AEROBIC BACT EA: CPT | Mod: 59

## 2019-02-18 PROCEDURE — D9220A PRA ANESTHESIA: Mod: ANES,,, | Performed by: ANESTHESIOLOGY

## 2019-02-18 PROCEDURE — 45380 COLONOSCOPY AND BIOPSY: CPT | Mod: ,,, | Performed by: INTERNAL MEDICINE

## 2019-02-18 PROCEDURE — D9220A PRA ANESTHESIA: Mod: CRNA,,, | Performed by: NURSE ANESTHETIST, CERTIFIED REGISTERED

## 2019-02-18 PROCEDURE — 25000003 PHARM REV CODE 250: Mod: PO | Performed by: INTERNAL MEDICINE

## 2019-02-18 PROCEDURE — 63600175 PHARM REV CODE 636 W HCPCS: Mod: PO | Performed by: NURSE ANESTHETIST, CERTIFIED REGISTERED

## 2019-02-18 PROCEDURE — D9220A PRA ANESTHESIA: ICD-10-PCS | Mod: CRNA,,, | Performed by: NURSE ANESTHETIST, CERTIFIED REGISTERED

## 2019-02-18 PROCEDURE — 88305 TISSUE EXAM BY PATHOLOGIST: CPT | Mod: 26,,, | Performed by: PATHOLOGY

## 2019-02-18 PROCEDURE — 37000008 HC ANESTHESIA 1ST 15 MINUTES: Mod: PO | Performed by: INTERNAL MEDICINE

## 2019-02-18 PROCEDURE — 27201012 HC FORCEPS, HOT/COLD, DISP: Mod: PO | Performed by: INTERNAL MEDICINE

## 2019-02-18 PROCEDURE — 87045 FECES CULTURE AEROBIC BACT: CPT

## 2019-02-18 PROCEDURE — 87209 SMEAR COMPLEX STAIN: CPT

## 2019-02-18 PROCEDURE — 87427 SHIGA-LIKE TOXIN AG IA: CPT

## 2019-02-18 PROCEDURE — D9220A PRA ANESTHESIA: ICD-10-PCS | Mod: ANES,,, | Performed by: ANESTHESIOLOGY

## 2019-02-18 PROCEDURE — 37000009 HC ANESTHESIA EA ADD 15 MINS: Mod: PO | Performed by: INTERNAL MEDICINE

## 2019-02-18 PROCEDURE — 45380 COLONOSCOPY AND BIOPSY: CPT | Mod: PO | Performed by: INTERNAL MEDICINE

## 2019-02-18 PROCEDURE — 88305 TISSUE EXAM BY PATHOLOGIST: CPT | Performed by: PATHOLOGY

## 2019-02-18 RX ORDER — PROPOFOL 10 MG/ML
VIAL (ML) INTRAVENOUS
Status: DISCONTINUED | OUTPATIENT
Start: 2019-02-18 | End: 2019-02-18

## 2019-02-18 RX ORDER — SODIUM CHLORIDE, SODIUM LACTATE, POTASSIUM CHLORIDE, CALCIUM CHLORIDE 600; 310; 30; 20 MG/100ML; MG/100ML; MG/100ML; MG/100ML
INJECTION, SOLUTION INTRAVENOUS CONTINUOUS
Status: DISCONTINUED | OUTPATIENT
Start: 2019-02-18 | End: 2019-02-18 | Stop reason: HOSPADM

## 2019-02-18 RX ORDER — LIDOCAINE HCL/PF 100 MG/5ML
SYRINGE (ML) INTRAVENOUS
Status: DISCONTINUED | OUTPATIENT
Start: 2019-02-18 | End: 2019-02-18

## 2019-02-18 RX ORDER — SODIUM CHLORIDE 0.9 % (FLUSH) 0.9 %
3 SYRINGE (ML) INJECTION
Status: DISCONTINUED | OUTPATIENT
Start: 2019-02-18 | End: 2019-02-18 | Stop reason: HOSPADM

## 2019-02-18 RX ADMIN — PROPOFOL 40 MG: 10 INJECTION, EMULSION INTRAVENOUS at 11:02

## 2019-02-18 RX ADMIN — LIDOCAINE HYDROCHLORIDE 100 MG: 20 INJECTION, SOLUTION INTRAVENOUS at 10:02

## 2019-02-18 RX ADMIN — PROPOFOL 120 MG: 10 INJECTION, EMULSION INTRAVENOUS at 10:02

## 2019-02-18 RX ADMIN — SODIUM CHLORIDE, SODIUM LACTATE, POTASSIUM CHLORIDE, AND CALCIUM CHLORIDE: .6; .31; .03; .02 INJECTION, SOLUTION INTRAVENOUS at 10:02

## 2019-02-18 NOTE — [employer] PATIENT INSTRUCTIONS
Discharge Summary/Instructions for after Colonoscopy with   Biopsy/Polypectomy  Jm Chaparro    Monday, February 18, 2019  Puma Preston MD  RESTRICTIONS ON ACTIVITY:  - Do not drive a car or operate machinery until the day after the procedure.      - The following day: return to full activity including work.  - For  3 days: No heavy lifting, straining or running.  - Diet: You can have solid foods, but no gassy foods (i.e. beans, broccoli,   cabbage, etc).  TREATMENT FOR COMMON SIDE EFFECTS:  - Mild abdominal pain and bloating or excessive gas: rest, eat lightly and   use a heating pad.  SYMPTOMS TO WATCH FOR AND REPORT TO YOUR PHYSICIAN:  1. Severe abdominal pain.  2. Fever within 24 hours after a procedure.  3. A large amount of rectal bleeding. (A small amount of blood from the   rectum is not serious, especially if hemorrhoids are present.  3.  Because air was put into your colon during the procedure, expelling   large amounts of air from your rectum is normal.  4.  You may not have a bowel movement for 1-3 days because of the   colonoscopy prep.  This is normal.  5.  Call immediately if you notice any of the following:   Chills and/or fever over 101   Persistent vomiting   Severe abdominal pain, other than gas cramps   Severe chest pain   Black, tarry stools   Any bleeding - exceeding one tablespoon  Your doctor recommends these additional instructions:  We are waiting for your pathology and culture results.   Eat a high fiber diet.   Take a fiber supplement, for example Citrucel, Fibercon, Konsyl or   Metamucil.   Take a PROBIOTIC, such as a carton of GREEK YOGURT (Chobani or Oikos,  or   Activia or Dannon);  or tablets of ALIGN or CULTURELLE or SETH-Q (all   non-prescription), every day for a month.   And repeat this 3-4 times a   year.  Continue your present medications.   Call the [facility] clinic in 2 weeks for reports (if you haven't heard from us   sooner)  [phone].  Your physician has recommended  a repeat colonoscopy at age 48 for screening   purposes.  None  If you have any questions or problems, please call your physician.  EMERGENCY PHONE NUMBER: [phone]  LAB RESULTS: Call in two (2) weeks for lab results, [phone]  ___________________________________________  Nurse Signature  ___________________________________________  Patient/Designated Responsible Party Signature  Puma Preston MD  2/18/2019 11:53:35 AM  This report has been verified and signed electronically.  [employer]

## 2019-02-18 NOTE — TRANSFER OF CARE
"Anesthesia Transfer of Care Note    Patient: Jm Chaparro    Procedure(s) Performed: Procedure(s) (LRB):  COLONOSCOPY (N/A)    Patient location: PACU    Anesthesia Type: general    Transport from OR: Transported from OR on 2-3 L/min O2 by NC with adequate spontaneous ventilation    Post pain: adequate analgesia    Post assessment: no apparent anesthetic complications and tolerated procedure well    Post vital signs: stable    Level of consciousness: awake    Nausea/Vomiting: no nausea/vomiting    Complications: none    Transfer of care protocol was followed      Last vitals:   Visit Vitals  /88 (BP Location: Right arm, Patient Position: Sitting)   Pulse 80   Resp 20   Ht 6' 1" (1.854 m)   Wt 98.9 kg (218 lb)   BMI 28.76 kg/m²     "

## 2019-02-18 NOTE — BRIEF OP NOTE
Discharge Note  Short Stay      SUMMARY     Admit Date: 2/18/2019    Attending Physician: Puma Preston Jr., MD     Discharge Physician: Puma Preston Jr., MD    Discharge Date: 2/18/2019 11:56 AM    Final Diagnosis: RLQ abdominal pain [R10.31]  Intermittent diarrhea [R19.7]  Abnormal abdominal CT scan [R93.5]    Impression:          - The rectum and recto-sigmoid colon are normal.                        Fluid aspiration performed.                       - The entire examined colon is normal. Biopsied.                       - The cecum, appendiceal orifice and ileocecal valve                        are normal.                       - The examined portion of the ileum was normal.                        Biopsied.                       - IBS?  Recommendation:      - Discharge patient to home.                       - Await pathology and culture results.                       - High fiber diet.                       - Use fiber, for example Citrucel, Fibercon, Konsyl                        or Metamucil.                       - Continue present medications.                       - Take a PROBIOTIC, such as a carton of GREEK YOGURT                        (Chobani or Oikos, or Activia or Dannon); or tablets                        of ALIGN or CULTURELLE or SETH-Q (all                        non-prescription), every day for a month.                       - Call the G.I[facility] clinic in 2 weeks for reports (if                        you haven't heard from us sooner) [phone].                       - Repeat colonoscopy at age 48 for screening                        purposes.                       - Patient has a contact number available for                        emergencies. The signs and symptoms of potential                        delayed complications were discussed with the                        patient. Return to normal activities tomorrow.                        Written discharge instructions were provided to the                         patient.                       - Return to normal activities tomorrow.  Puma Preston MD  2/18/2019    Disposition: HOME OR SELF CARE    Patient Instructions:   Current Discharge Medication List      CONTINUE these medications which have NOT CHANGED    Details   amitriptyline (ELAVIL) 50 MG tablet Take 1 tablet (50 mg total) by mouth every evening.  Qty: 90 tablet, Refills: 3    Associated Diagnoses: IC (interstitial cystitis)      dextroamphetamine-amphetamine (ADDERALL XR) 20 MG 24 hr capsule Take 1 capsule (20 mg total) by mouth every morning.  Qty: 30 capsule, Refills: 0      esomeprazole (NEXIUM) 20 MG capsule Take 20 mg by mouth daily as needed.       eszopiclone (LUNESTA) 2 MG Tab TAKE 1 TABLET BY MOUTH  EVERY EVENING  Qty: 90 tablet, Refills: 3      folic acid (FOLVITE) 1 MG tablet TK 1 T PO QD  Refills: 1      hydrOXYzine (ATARAX) 50 MG tablet Take 1 tablet (50 mg total) by mouth 2 (two) times daily.  Qty: 180 tablet, Refills: 3    Associated Diagnoses: IC (interstitial cystitis)      lisinopril (PRINIVIL,ZESTRIL) 5 MG tablet TAKE 1 TABLET BY MOUTH ONCE DAILY  Qty: 90 tablet, Refills: 1      meloxicam (MOBIC) 15 MG tablet TAKE 1 TABLET BY MOUTH  DAILY AS NEEDED FOR JOINT  PAIN IN THE MORNING  Qty: 30 tablet, Refills: 3    Associated Diagnoses: Fatigue, unspecified type; Arthralgia, unspecified joint; Psoriasis      methotrexate 2.5 MG Tab TK 6 TABS PO ONCE WEEKLY  Refills: 1      ondansetron (ZOFRAN) 8 MG tablet Take 8 mg by mouth every 8 (eight) hours as needed.      oxybutynin (DITROPAN-XL) 10 MG 24 hr tablet Take 1 tablet (10 mg total) by mouth once daily.  Qty: 30 tablet, Refills: 11    Associated Diagnoses: Bladder spasms      pentosan polysulfate (ELMIRON) 100 mg Cap Take 1 capsule (100 mg total) by mouth 3 (three) times daily.  Qty: 270 capsule, Refills: 3    Associated Diagnoses: IC (interstitial cystitis)      calcium carbonate (CALCIUM ANTACID) 300 mg (750 mg) Chew Take by  mouth daily as needed.      cholecalciferol, vitamin D3, (VITAMIN D3) 5,000 unit Tab Take 5,000 Units by mouth once daily.      fluoxetine (PROZAC) 20 MG capsule Take 1 capsule (20 mg total) by mouth once daily.  Qty: 30 capsule, Refills: 2    Associated Diagnoses: YUAN (generalized anxiety disorder)             Discharge Procedure Orders (must include Diet, Follow-up, Activity)    Follow Up:  Follow up with PCP as per your routine.  Please follow a high fiber diet.  Activity as tolerated.    No driving day of procedure.    PROBIOTICS:  Now that your colon is so cleaned out, now is a good time for a round of PROBIOTICS.  Eat a container of Greek Yogurt, such as OIKOS or CHOBANI,  Or Activia or Dannon    Greek Yogurt.    Or Take a similar Probiotic product such as Align or Culturelle or Trudi-Q, every day for a month.                  (The products listed are non-prescription, but you may need to ask the pharmacist for their location.)  Repeat this at least 5-6  times a year.

## 2019-02-18 NOTE — H&P
History & Physical - Short Stay  Gastroenterology      SUBJECTIVE:     Procedure: Colonoscopy    Chief Complaint/Indication for Procedure: RLQ pain. Abnl CT scan.    History of Present Illness:  Office Visit  Open      2/15/2019  [place] - G[facility]      RITA Smith   Gastroenterology   RLQ abdominal pain +7 more   Dx   Establish Care   ; Referred by Aaareferral Self   Reason for Visit    Progress Notes   Unsigned      Subjective:       Patient ID: Jm Chaparro is a 23 y.o. male Body mass index is 30.22 kg/m².     Chief Complaint: Establish Care (review results, abd pain, headache, dizziness)     This patient is new to me.  Established with Dr. Preston (last in 2012)  Referring Provider: NIRU Tanner NP for RLQ abdominal pain, Abnormal CT of the abdomen, Adenitis, & Bowel disease, inflammatory     Abdominal Pain   This is a new problem. Episode onset: started 1/31/19. The problem occurs intermittently. Duration: was severe when it started and lasted a few hours. The problem has been rapidly improving. The pain is located in the RLQ. The pain is at a severity of 0/10 (current). Quality: when felt when palpating area described as soreness; when ti first started it was sharp. The abdominal pain does not radiate. Associated symptoms include belching, diarrhea (chronic intermittent, not daily, a few times a week, when it happens it is all day long of loose stool or pellet-like stool; recent antibiotic doxcycyline for URI started on 1/31/19), dysuria, flatus, headaches and nausea (often; zofran prn). Pertinent negatives include no anorexia, constipation, fever, frequency, hematochezia, melena, vomiting or weight loss. Nothing aggravates the pain. He has tried proton pump inhibitors (nexium 20 mg daily PRN- taken about once a week; mobic daily) for the symptoms. Prior diagnostic workup includes CT scan (seen in the past by Dr. Shaver (2015)). His past medical history is significant for GERD  and irritable bowel syndrome. There is no history of Crohn's disease or ulcerative colitis. Patient's medical history includes kidney stones.     Assessment:       1. RLQ abdominal pain    2. Abnormal CT scan, gastrointestinal tract    3. Mesenteric adenitis    4. Elevated ALT measurement    5. History of headache    6. History of dizziness    7. Urinary symptom or sign    8. Intermittent diarrhea        Plan:       RLQ abdominal pain     Abnormal CT scan, gastrointestinal tract     Mesenteric adenitis     Elevated ALT measurement  -     US Abdomen Limited (LIVER); Future; Expected date: 02/15/2019  -     Hepatic function panel; Future; Expected date: 03/01/2019  -     Hepatitis panel, acute; Future; Expected date: 03/01/2019     History of headache     History of dizziness     Urinary symptom or sign     Intermittent diarrhea        Follow-up in about 1 month (around 3/15/2019), or if symptoms worsen or fail to improve.                CT Scan 1/31/2019  Impression       1. Mild fat haziness along the cecum which could relate to a mild inflammatory process.  No obvious focal wall thickening is noted.  Please correlate for a colitis or history of enteritis.  2. Multiple normal size nodes are noted in the right lower quadrant slightly more numerous than expected as can be seen with a mesenteric adenitis but without obvious detrimental change since 07/20/2015  3. Splenic hypodensity not obviously seen on 07/20/2015 most likely related to a cyst.  Ultrasound may be of use for confirmation to exclude solid splenic lesions.    Electronically signed by: Brian Muse MD  Date: 01/31/2019       CT Scan 8/1/2018  Multiple lymph nodes are noted in the right lower quadrant and along the right psoas muscle more numerous than expected with one of the largest of 1.2 by 0.8 cm in size without obvious pathologic enlargement in size.  This could relate to a history of enteritis or mesenteric adenitis, no detrimental changes are  noted when comparison is made to nodes in this region on 07/20/2015 suggesting a chronic or remote process.    A moderate quantity of mottled material is noted in the region of the stomach suggesting a recently ingested meal.      Impression       1. Neither kidney demonstrates evidence of obstruction.  Increased density is noted within the medullary pyramids bilaterally diffusely as can be seen with dehydration or early medullary nephrocalcinosis.  Please clinically correlate.  2. Multiple normal size lymph nodes in the right mid to lower abdomen in particular without detrimental change since 07/20/2015 suggesting a benign etiology or remote process.  Please correlate for a history of mesenteric adenitis or possible enteritis    Electronically signed by: Brian Muse MD  Date: 08/01/2018       CT Scan 7/17/2015  1.  Apparent colonic wall thickening is noted involving the proximal transverse colon from the hepatic flexure to the mid transverse colon.  This could relate to lack of distention or to an inflammatory process such as colitis or inflammatory bowel   disease.  Clinical correlation and follow-up for resolution may be of use.      2.  Lymph nodes are noted within the mesentery slightly more numerous than expected but not obviously pathologically enlarged in size.  Please correlate for inflammatory changes that can cause lymph nodes increased in size and/or number    3.  Nonobstructive right renal stone    Electronically signed by: Brian Muse MD  Date: 07/20/15          PTA Medications   Medication Sig    amitriptyline (ELAVIL) 50 MG tablet Take 1 tablet (50 mg total) by mouth every evening.    calcium carbonate (CALCIUM ANTACID) 300 mg (750 mg) Chew Take by mouth daily as needed.    cholecalciferol, vitamin D3, (VITAMIN D3) 5,000 unit Tab Take 5,000 Units by mouth once daily.    dextroamphetamine-amphetamine (ADDERALL XR) 20 MG 24 hr capsule Take 1 capsule (20 mg total) by mouth every morning.     esomeprazole (NEXIUM) 20 MG capsule Take 20 mg by mouth daily as needed.     eszopiclone (LUNESTA) 2 MG Tab TAKE 1 TABLET BY MOUTH  EVERY EVENING    fluoxetine (PROZAC) 20 MG capsule Take 1 capsule (20 mg total) by mouth once daily.    folic acid (FOLVITE) 1 MG tablet TK 1 T PO QD    hydrOXYzine (ATARAX) 50 MG tablet Take 1 tablet (50 mg total) by mouth 2 (two) times daily.    lisinopril (PRINIVIL,ZESTRIL) 5 MG tablet TAKE 1 TABLET BY MOUTH ONCE DAILY    meloxicam (MOBIC) 15 MG tablet TAKE 1 TABLET BY MOUTH  DAILY AS NEEDED FOR JOINT  PAIN IN THE MORNING    methotrexate 2.5 MG Tab TK 6 TABS PO ONCE WEEKLY    ondansetron (ZOFRAN) 8 MG tablet Take 8 mg by mouth every 8 (eight) hours as needed.    oxybutynin (DITROPAN-XL) 10 MG 24 hr tablet Take 1 tablet (10 mg total) by mouth once daily.    pentosan polysulfate (ELMIRON) 100 mg Cap Take 1 capsule (100 mg total) by mouth 3 (three) times daily.       Review of patient's allergies indicates:   Allergen Reactions    Caffeine     Penicillins         Past Medical History:   Diagnosis Date    Abnormal thyroid function test 9/11/2017    ADD (attention deficit disorder)     Anxiety disorder     Asperger's disorder     (AUTISM)    Deformity of both feet     Dyscalculia     Dysgraphia     Dyslexia     Eczema     GERD (gastroesophageal reflux disease)     History of migraine headaches     Hypertension     Interstitial cystitis (chronic)     seen by dr aguilar, later dr tiumr callaway    Irritable bowel syndrome     Nephrolithiasis     2014    PONV (postoperative nausea and vomiting)     Psoriasis     Refusal of blood transfusion for reasons of conscience     Seasonal allergies     Special needs assessment      Past Surgical History:   Procedure Laterality Date    COLONOSCOPY  ~2015    [facility]; told IBS    COLONOSCOPY  08/2015    Dr. Shaver; told IBS      CYSTOGRAM N/A 5/28/2015    [facility]    CYSTOSCOPY WITH  "HYDRODISTENSION N/A 5/28/2015    [facility]    EGD (ESOPHAGOGASTRODUODENOSCOPY) N/A 5/14/2012    [facility] Puma Preston Jr[facility] ENDO    UPPER GASTROINTESTINAL ENDOSCOPY  05/14/2012    Dr. Preston    UPPER GASTROINTESTINAL ENDOSCOPY  07/2015        "Normal"    ureteral stone extraction      basket extraction     Family History   Problem Relation Age of Onset    Lupus Mother     Interstitial cystitis Mother     Diabetes Maternal Grandmother     Lupus Maternal Grandmother     Clotting disorder Maternal Grandmother     Interstitial cystitis Maternal Grandmother     Diabetes Maternal Grandfather     Nephrolithiasis Maternal Grandfather         staghorn    Interstitial cystitis Maternal Aunt     Colon cancer Neg Hx     Crohn's disease Neg Hx     Ulcerative colitis Neg Hx     Celiac disease Neg Hx     Esophageal cancer Neg Hx     Stomach cancer Neg Hx      Social History     Tobacco Use    Smoking status: Never Smoker    Smokeless tobacco: Never Used   Substance Use Topics    Alcohol use: No    Drug use: No         OBJECTIVE:     Vital Signs (Most Recent)       Physical Exam:  :Ht 6' 1" (1.854 m)   Wt 103.9 kg (229 lb 0.9 oz)   BMI 30.22 kg/m² GENERAL:  Comfortable, in no acute distress.                            HEENT EXAM:  Nonicteric.  No adenopathy.  Oropharynx is clear.  NECK:  Supple.                                                               LUNGS:  Clear.                                                               CARDIAC:  Regular rate and rhythm.  S1, S2.  No murmur.        ABDOMEN:  Soft, positive bowel sounds, nontender.  No hepatosplenomegaly or masses.  No rebound or guarding.         EXTREMITIES:  No edema.     MENTAL STATUS:  Alert and oriented.    ASSESSMENT/PLAN:     Assessment: Colorectal cancer screening    Plan: Colonoscopy    Anesthesia Plan:   MAC / General Anaesthesia    ASA Grade: ASA 2 - Patient with mild systemic disease with " no functional limitations    MALLAMPATI SCORE: II (hard and soft palate, upper portion of tonsils anduvula visible)

## 2019-02-18 NOTE — ANESTHESIA POSTPROCEDURE EVALUATION
"Anesthesia Post Evaluation    Patient: Jm Chaparro    Procedure(s) Performed: Procedure(s) (LRB):  COLONOSCOPY (N/A)    Final Anesthesia Type: general  Patient location during evaluation: PACU  Patient participation: Yes- Able to Participate  Level of consciousness: awake and alert  Post-procedure vital signs: reviewed and stable  Pain management: adequate  Airway patency: patent  PONV status at discharge: No PONV  Anesthetic complications: no      Cardiovascular status: hemodynamically stable  Respiratory status: unassisted and room air  Hydration status: euvolemic  Follow-up not needed.        Visit Vitals  /70 (BP Location: Left arm, Patient Position: Lying)   Pulse 86   Temp 36.6 °C (97.9 °F) (Skin)   Resp 18   Ht 6' 1" (1.854 m)   Wt 98.9 kg (218 lb)   SpO2 95%   BMI 28.76 kg/m²       Pain/Antonio Score: Antonio Score: 6 (2/18/2019 11:35 AM)        "

## 2019-02-18 NOTE — ANESTHESIA PREPROCEDURE EVALUATION
02/18/2019  Jm Chaparro is a 23 y.o., male.    Anesthesia Evaluation    I have reviewed the Patient Summary Reports.    I have reviewed the Nursing Notes.   I have reviewed the Medications.     Review of Systems  Anesthesia Hx:  Hx of Anesthetic complications    Social:  Non-Smoker    Hematology/Oncology:  Hematology Normal   Oncology Normal     EENT/Dental:EENT/Dental Normal   Cardiovascular:   Hypertension    Pulmonary:  Pulmonary Normal    Renal/:   Chronic Renal Disease renal calculi    Hepatic/GI:   Bowel Prep. GERD    Neurological:   Headaches    Endocrine:  Endocrine Normal    Psych:   Psychiatric History          Physical Exam  General:  Well nourished    Airway/Jaw/Neck:  Airway Findings: Mouth Opening: Normal Tongue: Normal  General Airway Assessment: Adult  Mallampati: I  TM Distance: Normal, at least 6 cm        Eyes/Ears/Nose:  EYES/EARS/NOSE FINDINGS: Normal   Dental:  DENTAL FINDINGS: Normal   Chest/Lungs:  Chest/Lungs Findings: Clear to auscultation, Normal Respiratory Rate     Heart/Vascular:  Heart Findings: Rate: Normal  Rhythm: Regular Rhythm        Mental Status:  Mental Status Findings:  Cooperative, Alert and Oriented         Anesthesia Plan  Type of Anesthesia, risks & benefits discussed:  Anesthesia Type:  general  Patient's Preference:   Intra-op Monitoring Plan: standard ASA monitors  Intra-op Monitoring Plan Comments:   Post Op Pain Control Plan:   Post Op Pain Control Plan Comments:   Induction:   IV  Beta Blocker:  Patient is not currently on a Beta-Blocker (No further documentation required).       Informed Consent: Patient understands risks and agrees with Anesthesia plan.  Questions answered. Anesthesia consent signed with patient.  ASA Score: 2     Day of Surgery Review of History & Physical: I have interviewed and examined the patient. I have reviewed the  patient's H&P dated:  There are no significant changes.  H&P update referred to the surgeon.         Ready For Surgery From Anesthesia Perspective.

## 2019-02-18 NOTE — DISCHARGE INSTRUCTIONS
Recovery After Procedural Sedation (Adult)  You have been given medicine by vein to make you sleep during your surgery. This may have included both a pain medicine and sleeping medicine. Most of the effects have worn off. But you may still have some drowsiness for the next 6 to 8 hours.  Home care  Follow these guidelines when you get home:  · For the next 8 hours, you should be watched by a responsible adult. This person should make sure your condition is not getting worse.  · Don't drink any alcohol for the next 24 hours.  · Don't drive, operate dangerous machinery, or make important business or personal decisions during the next 24 hours.  Note: Your healthcare provider may tell you not to take any medicine by mouth for pain or sleep in the next 4 hours. These medicines may react with the medicines you were given in the hospital. This could cause a much stronger response than usual.  Follow-up care  Follow up with your healthcare provider if you are not alert and back to your usual level of activity within 12 hours.  When to seek medical advice  Call your healthcare provider right away if any of these occur:  · Drowsiness gets worse  · Weakness or dizziness gets worse  · Repeated vomiting  · You can't be awakened   Date Last Reviewed: 10/18/2016  © 2073-7384 [employer]. [address] [place] [address], [place] [address]. All rights reserved. This information is not intended as a substitute for professional medical care. Always follow your healthcare professional's instructions.      PROBIOTICS:  Now that your colon is so cleaned out, now is a good time for a round of PROBIOTICS.  Eat a container of Greek Yogurt, such as OIKOS or CHOBANI,  Or Activia or Dannon    Greek Yogurt.    Or Take a similar Probiotic product such as Align or Culturelle or Trudi-Q, every day for a month.                  (The products listed are non-prescription, but you may need to ask the pharmacist for their location.)  Repeat  this at least 5-6  times a year.        High-Fiber Diet  Fiber is in fruits, vegetables, cereals, and grains. Fiber passes through your body undigested. A high-fiber diet helps food move through your intestinal tract. The added bulk is helpful in preventing constipation. In people with diverticulosis, fiber helps clean out the pouches along the colon wall. It also prevents new pouches from forming. A high-fiber diet reduces the risk of colon cancer. It also lowers blood cholesterol and prevents high blood sugar in people with diabetes.    The fiber-rich foods listed below should be part of your diet. If you are not used to high-fiber foods, start with 1 or 2 foods from this list. Every 3 to 4 days add a new one to your diet. Do this until you are eating 4 high-fiber foods per day. This should give you 20 to 35 grams of fiber a day. It is also important to drink a lot of water when you are on this diet. You should have 6 to 8 glasses of water a day. Water makes the fiber swell and increases the benefit.  Foods high in dietary fiber  The following foods are high in dietary fiber:  · Breads. Breads made with 100% whole-wheat flour; gianfranco, wheat, or rye crackers; whole-grain tortillas, bran muffins.  · Cereals. Whole-grain and bran cereals with bran (shredded wheat, wheat flakes, raisin bran, corn bran); oatmeal, rolled oats, granola, and brown rice.  · Fruits. Fresh fruits and their edible skins (pears, prunes, raisins, berries, apples, and apricots); bananas, citrus fruit, mangoes, pineapple; and prune juice.  · Nuts. Any nuts and seeds.  · Vegetables. Best served raw or lightly cooked. All types, especially: green peas, celery, eggplant, potatoes, spinach, broccoli, [place] sprouts, winter squash, carrots, cauliflower, soybeans, lentils, and fresh and dried beans of all kinds.  · Other. Popcorn, any spices.  Date Last Reviewed: 8/1/2016  © 0048-3443 [employer]. [address] [place] [address], [place]  [address]. All rights reserved. This information is not intended as a substitute for professional medical care. Always follow your healthcare professional's instructions.

## 2019-02-19 LAB
E COLI SXT1 STL QL IA: NEGATIVE
E COLI SXT2 STL QL IA: NEGATIVE

## 2019-02-20 ENCOUNTER — PATIENT MESSAGE (OUTPATIENT)
Dept: GASTROENTEROLOGY | Facility: CLINIC | Age: 24
End: 2019-02-20

## 2019-02-20 LAB — O+P STL TRI STN: NORMAL

## 2019-02-21 LAB — BACTERIA STL CULT: NORMAL

## 2019-02-27 ENCOUNTER — PATIENT MESSAGE (OUTPATIENT)
Dept: GASTROENTEROLOGY | Facility: CLINIC | Age: 24
End: 2019-02-27

## 2019-03-18 ENCOUNTER — TREATMENT PLANNING (OUTPATIENT)
Dept: GASTROENTEROLOGY | Facility: CLINIC | Age: 24
End: 2019-03-18

## 2019-03-18 NOTE — PROGRESS NOTES
Reviewed medical records received from Dr. Shaver, summarized below and in medical & surgical history (endoscopies, etc), copies made & given to nurse to be scanned into system:   8/10/15 colonoscopy  07/17/2015 EGD  Recommendations:  Continue with previous recommendations.  BECKA

## 2019-03-20 ENCOUNTER — OFFICE VISIT (OUTPATIENT)
Dept: FAMILY MEDICINE | Facility: CLINIC | Age: 24
End: 2019-03-20
Payer: COMMERCIAL

## 2019-03-20 ENCOUNTER — HOSPITAL ENCOUNTER (OUTPATIENT)
Dept: RADIOLOGY | Facility: HOSPITAL | Age: 24
Discharge: HOME | End: 2019-03-20
Attending: NURSE PRACTITIONER
Payer: COMMERCIAL

## 2019-03-20 VITALS
HEART RATE: 98 BPM | WEIGHT: 227.75 LBS | DIASTOLIC BLOOD PRESSURE: 86 MMHG | BODY MASS INDEX: 30.19 KG/M2 | TEMPERATURE: 98 F | OXYGEN SATURATION: 98 % | SYSTOLIC BLOOD PRESSURE: 118 MMHG | HEIGHT: 73 IN

## 2019-03-20 DIAGNOSIS — K21.9 GASTROESOPHAGEAL REFLUX DISEASE, ESOPHAGITIS PRESENCE NOT SPECIFIED: ICD-10-CM

## 2019-03-20 DIAGNOSIS — R05.9 COUGH: ICD-10-CM

## 2019-03-20 DIAGNOSIS — J02.9 PHARYNGITIS, UNSPECIFIED ETIOLOGY: ICD-10-CM

## 2019-03-20 DIAGNOSIS — R05.9 COUGH: Primary | ICD-10-CM

## 2019-03-20 PROCEDURE — 3008F PR BODY MASS INDEX (BMI) DOCUMENTED: ICD-10-PCS | Mod: CPTII,S$GLB,, | Performed by: NURSE PRACTITIONER

## 2019-03-20 PROCEDURE — 3079F DIAST BP 80-89 MM HG: CPT | Mod: CPTII,S$GLB,, | Performed by: NURSE PRACTITIONER

## 2019-03-20 PROCEDURE — 3074F SYST BP LT 130 MM HG: CPT | Mod: CPTII,S$GLB,, | Performed by: NURSE PRACTITIONER

## 2019-03-20 PROCEDURE — 99214 OFFICE O/P EST MOD 30 MIN: CPT | Mod: S$GLB,,, | Performed by: NURSE PRACTITIONER

## 2019-03-20 PROCEDURE — 99999 PR PBB SHADOW E&M-EST. PATIENT-LVL IV: CPT | Mod: PBBFAC,,, | Performed by: NURSE PRACTITIONER

## 2019-03-20 PROCEDURE — 3074F PR MOST RECENT SYSTOLIC BLOOD PRESSURE < 130 MM HG: ICD-10-PCS | Mod: CPTII,S$GLB,, | Performed by: NURSE PRACTITIONER

## 2019-03-20 PROCEDURE — 99214 PR OFFICE/OUTPT VISIT, EST, LEVL IV, 30-39 MIN: ICD-10-PCS | Mod: S$GLB,,, | Performed by: NURSE PRACTITIONER

## 2019-03-20 PROCEDURE — 71046 XR CHEST PA AND LATERAL: ICD-10-PCS | Mod: 26,,, | Performed by: RADIOLOGY

## 2019-03-20 PROCEDURE — 99999 PR PBB SHADOW E&M-EST. PATIENT-LVL IV: ICD-10-PCS | Mod: PBBFAC,,, | Performed by: NURSE PRACTITIONER

## 2019-03-20 PROCEDURE — 3079F PR MOST RECENT DIASTOLIC BLOOD PRESSURE 80-89 MM HG: ICD-10-PCS | Mod: CPTII,S$GLB,, | Performed by: NURSE PRACTITIONER

## 2019-03-20 PROCEDURE — 3008F BODY MASS INDEX DOCD: CPT | Mod: CPTII,S$GLB,, | Performed by: NURSE PRACTITIONER

## 2019-03-20 PROCEDURE — 71046 X-RAY EXAM CHEST 2 VIEWS: CPT | Mod: 26,,, | Performed by: RADIOLOGY

## 2019-03-20 PROCEDURE — 71046 X-RAY EXAM CHEST 2 VIEWS: CPT | Mod: TC,FY,PO

## 2019-03-20 RX ORDER — BENZONATATE 200 MG/1
200 CAPSULE ORAL 3 TIMES DAILY PRN
Qty: 30 CAPSULE | Refills: 0 | Status: SHIPPED | OUTPATIENT
Start: 2019-03-20 | End: 2019-03-30

## 2019-03-20 RX ORDER — ALLOPURINOL 300 MG/1
TABLET ORAL
COMMUNITY
Start: 2019-02-05 | End: 2019-05-23

## 2019-03-20 RX ORDER — AZELASTINE 1 MG/ML
1 SPRAY, METERED NASAL 2 TIMES DAILY
Qty: 30 ML | Refills: 0 | Status: SHIPPED | OUTPATIENT
Start: 2019-03-20 | End: 2019-05-23

## 2019-03-20 RX ORDER — CLINDAMYCIN HYDROCHLORIDE 150 MG/1
CAPSULE ORAL
Refills: 0 | COMMUNITY
Start: 2019-03-14 | End: 2019-05-23 | Stop reason: ALTCHOICE

## 2019-03-20 RX ORDER — ALBUTEROL SULFATE 90 UG/1
2 AEROSOL, METERED RESPIRATORY (INHALATION) EVERY 6 HOURS PRN
Qty: 18 G | Refills: 0 | Status: SHIPPED | OUTPATIENT
Start: 2019-03-20 | End: 2020-06-17

## 2019-03-20 RX ORDER — FLUTICASONE PROPIONATE 50 MCG
1 SPRAY, SUSPENSION (ML) NASAL DAILY
Qty: 16 G | Refills: 0 | Status: SHIPPED | OUTPATIENT
Start: 2019-03-20 | End: 2020-06-17

## 2019-03-20 NOTE — PROGRESS NOTES
Subjective:       Patient ID: Jm Chaparro is a 23 y.o. male.    Chief Complaint: Chest Congestion; Fatigue; and Cough    Patient has had chest congestion, cough, and drainage since last week, has been on clindamycin since 3/14/19 for a root canal. Cough and congestion continuing, nasal drainage and sore throat. Today started to complain of pain with deep breathing, feels like his chest is tight. He is taking OTc sinus and cough medication without much relief.     TETANUS VACCINE due on 10/30/2017  Influenza Vaccine due on 08/01/2018    Past Medical History:  Past Medical History:  9/11/2017: Abnormal thyroid function test  No date: ADD (attention deficit disorder)  No date: Anxiety disorder  No date: Asperger's disorder      Comment:  (AUTISM)  No date: Deformity of both feet  No date: Dyscalculia  No date: Dysgraphia  No date: Dyslexia  No date: Eczema  No date: GERD (gastroesophageal reflux disease)  No date: History of migraine headaches  No date: Hypertension  No date: Interstitial cystitis (chronic)      Comment:  seen by dr aguilar, later dr timur callaway  No date: Irritable bowel syndrome  No date: Nephrolithiasis      Comment:  2014  No date: PONV (postoperative nausea and vomiting)  No date: Psoriasis  No date: Refusal of blood transfusion for reasons of conscience  No date: Seasonal allergies  No date: Special needs assessment  Past Surgical History:  ~2015: COLONOSCOPY      Comment:  [facility]; told IBS  08/10/2015: COLONOSCOPY      Comment:  Dr. Shaver; sent for scanning: unremarkable findings, no               specimens collected  2/18/2019: COLONOSCOPY; N/A      Comment:  Performed by Puma Preston Jr., MD at [facility] ENDO  5/28/2015: CYSTOGRAM; N/A      Comment:  Performed by Brian Ayala MD at [facility] OR  5/28/2015: CYSTOSCOPY WITH HYDRODISTENSION; N/A      Comment:  Performed by Brian Ayala MD at [facility] OR  5/14/2012: EGD (ESOPHAGOGASTRODUODENOSCOPY); N/A      Comment:  Performed by Puma  NEEMA Preston Jr., MD at [facility] ENDO  05/14/2012: UPPER GASTROINTESTINAL ENDOSCOPY      Comment:  Dr. Preston  07/17/2015: UPPER GASTROINTESTINAL ENDOSCOPY      Comment:  Dr. Shaver, sent for scanning: normal esophagus- dilated               & biopsied, findings WNL, biopsies taken from z-line,                stomach and duodenum; biopsy: duodenum WNL, antrum                reactive gastropathy, negative for h pylori or int.                metaplasia, GEJ WNL, negative for EOE & cotton's                esophagus  No date: ureteral stone extraction      Comment:  basket extraction  Review of patient's allergies indicates:   -- Caffeine    -- Penicillins   Current Outpatient Medications on File Prior to Visit:  allopurinol (ZYLOPRIM) 300 MG tablet, , Disp: , Rfl:   amitriptyline (ELAVIL) 50 MG tablet, Take 1 tablet (50 mg total) by mouth every evening., Disp: 90 tablet, Rfl: 3  calcium carbonate (CALCIUM ANTACID) 300 mg (750 mg) Chew, Take by mouth daily as needed., Disp: , Rfl:   cholecalciferol, vitamin D3, (VITAMIN D3) 5,000 unit Tab, Take 5,000 Units by mouth once daily., Disp: , Rfl:   clindamycin (CLEOCIN) 150 MG capsule, TK ONE C PO QID, Disp: , Rfl: 0  dextroamphetamine-amphetamine (ADDERALL XR) 20 MG 24 hr capsule, Take 1 capsule (20 mg total) by mouth every morning., Disp: 30 capsule, Rfl: 0  esomeprazole (NEXIUM) 20 MG capsule, Take 20 mg by mouth daily as needed. , Disp: , Rfl:   eszopiclone (LUNESTA) 2 MG Tab, TAKE 1 TABLET BY MOUTH  EVERY EVENING, Disp: 90 tablet, Rfl: 3  fluoxetine (PROZAC) 20 MG capsule, Take 1 capsule (20 mg total) by mouth once daily., Disp: 30 capsule, Rfl: 2  folic acid (FOLVITE) 1 MG tablet, TK 1 T PO QD, Disp: , Rfl: 1  hydrOXYzine (ATARAX) 50 MG tablet, Take 1 tablet (50 mg total) by mouth 2 (two) times daily., Disp: 180 tablet, Rfl: 3  lisinopril (PRINIVIL,ZESTRIL) 5 MG tablet, TAKE 1 TABLET BY MOUTH ONCE DAILY, Disp: 90 tablet, Rfl: 1  meloxicam (MOBIC) 15 MG tablet, TAKE 1 TABLET BY  MOUTH  DAILY AS NEEDED FOR JOINT  PAIN IN THE MORNING, Disp: 30 tablet, Rfl: 3  methotrexate 2.5 MG Tab, TK 6 TABS PO ONCE WEEKLY, Disp: , Rfl: 1  ondansetron (ZOFRAN) 8 MG tablet, Take 8 mg by mouth every 8 (eight) hours as needed., Disp: , Rfl:   oxybutynin (DITROPAN-XL) 10 MG 24 hr tablet, Take 1 tablet (10 mg total) by mouth once daily., Disp: 30 tablet, Rfl: 11  pentosan polysulfate (ELMIRON) 100 mg Cap, Take 1 capsule (100 mg total) by mouth 3 (three) times daily., Disp: 270 capsule, Rfl: 3    No current facility-administered medications on file prior to visit.     Social History    Socioeconomic History      Marital status: Single      Spouse name: Not on file      Number of children: Not on file      Years of education: Not on file      Highest education level: Not on file    Social Needs      Financial resource strain: Not on file      Food insecurity - worry: Not on file      Food insecurity - inability: Not on file      Transportation needs - medical: Not on file      Transportation needs - non-medical: Not on file    Occupational History      Not on file    Tobacco Use      Smoking status: Never Smoker      Smokeless tobacco: Never Used    Substance and Sexual Activity      Alcohol use: No      Drug use: No      Sexual activity: No    Other Topics      Concerns:        Not on file    Social History Narrative      Not on file    Review of patient's family history indicates:  Problem: Lupus      Relation: Mother          Age of Onset: (Not Specified)  Problem: Interstitial cystitis      Relation: Mother          Age of Onset: (Not Specified)  Problem: Diabetes      Relation: Maternal Grandmother          Age of Onset: (Not Specified)  Problem: Lupus      Relation: Maternal Grandmother          Age of Onset: (Not Specified)  Problem: Clotting disorder      Relation: Maternal Grandmother          Age of Onset: (Not Specified)  Problem: Interstitial cystitis      Relation: Maternal Grandmother          Age of  Onset: (Not Specified)  Problem: Diabetes      Relation: Maternal Grandfather          Age of Onset: (Not Specified)  Problem: Nephrolithiasis      Relation: Maternal Grandfather          Age of Onset: (Not Specified)          Comment: staghorn  Problem: Interstitial cystitis      Relation: Maternal Aunt          Age of Onset: (Not Specified)  Problem: Colon cancer      Relation: Neg Hx          Age of Onset: (Not Specified)  Problem: Crohn's disease      Relation: Neg Hx          Age of Onset: (Not Specified)  Problem: Ulcerative colitis      Relation: Neg Hx          Age of Onset: (Not Specified)  Problem: Celiac disease      Relation: Neg Hx          Age of Onset: (Not Specified)  Problem: Esophageal cancer      Relation: Neg Hx          Age of Onset: (Not Specified)  Problem: Stomach cancer      Relation: Neg Hx          Age of Onset: (Not Specified)            Review of Systems   Constitutional: Positive for fatigue. Negative for fever.   HENT: Positive for postnasal drip, rhinorrhea, sinus pressure, sinus pain and sore throat. Negative for ear pain.    Respiratory: Positive for cough, chest tightness, shortness of breath and wheezing.    Cardiovascular: Negative.    Gastrointestinal: Positive for nausea. Negative for constipation, diarrhea and vomiting.        Reflux is bad   Genitourinary: Negative.    Skin: Negative for rash.   Neurological: Positive for headaches.       Objective:      Physical Exam   Constitutional: He is oriented to person, place, and time. No distress.   HENT:   Head: Normocephalic and atraumatic. Head is without raccoon's eyes and without Esposito's sign.   Right Ear: A middle ear effusion is present.   Left Ear: A middle ear effusion is present.   Nose: Mucosal edema and rhinorrhea present. Right sinus exhibits maxillary sinus tenderness. Right sinus exhibits no frontal sinus tenderness. Left sinus exhibits maxillary sinus tenderness. Left sinus exhibits no frontal sinus tenderness.    Mouth/Throat: Uvula is midline. Posterior oropharyngeal erythema present.   Eyes: Pupils are equal, round, and reactive to light.   Cardiovascular: Normal rate and regular rhythm. Exam reveals no friction rub.   No murmur heard.  Pulmonary/Chest: Effort normal.   Abdominal: Soft. Bowel sounds are normal.   Musculoskeletal: Normal range of motion. He exhibits no edema.   Neurological: He is alert and oriented to person, place, and time.   Skin: He is not diaphoretic.   Psychiatric: He has a normal mood and affect. His behavior is normal.   Vitals reviewed.      Assessment:       1. Cough    2. Pharyngitis, unspecified etiology    3. Gastroesophageal reflux disease, esophagitis presence not specified        Plan:     Continue and complete clindamycin. Add flonase, astelin, tessalon, and albuterol for sinus congestion and cough. Zantac x 7 days in addition to nexum for increased GERD symptoms. CXR to rule out pneumonia. Follow up if not resolving, immediately for worsening.   1. Cough    - X-Ray Chest PA And Lateral; Future  - fluticasone (FLONASE) 50 mcg/actuation nasal spray; 1 spray (50 mcg total) by Each Nare route once daily.  Dispense: 16 g; Refill: 0  - azelastine (ASTELIN) 137 mcg (0.1 %) nasal spray; 1 spray (137 mcg total) by Nasal route 2 (two) times daily.  Dispense: 30 mL; Refill: 0  - benzonatate (TESSALON) 200 MG capsule; Take 1 capsule (200 mg total) by mouth 3 (three) times daily as needed.  Dispense: 30 capsule; Refill: 0  - albuterol (VENTOLIN HFA) 90 mcg/actuation inhaler; Inhale 2 puffs into the lungs every 6 (six) hours as needed for Wheezing. Rescue  Dispense: 18 g; Refill: 0  - ranitidine (ZANTAC) 150 MG tablet; Take 1 tablet (150 mg total) by mouth 2 (two) times daily. for 7 days  Dispense: 14 tablet; Refill: 0    2. Pharyngitis, unspecified etiology    - fluticasone (FLONASE) 50 mcg/actuation nasal spray; 1 spray (50 mcg total) by Each Nare route once daily.  Dispense: 16 g; Refill: 0  -  azelastine (ASTELIN) 137 mcg (0.1 %) nasal spray; 1 spray (137 mcg total) by Nasal route 2 (two) times daily.  Dispense: 30 mL; Refill: 0  - benzonatate (TESSALON) 200 MG capsule; Take 1 capsule (200 mg total) by mouth 3 (three) times daily as needed.  Dispense: 30 capsule; Refill: 0  - albuterol (VENTOLIN HFA) 90 mcg/actuation inhaler; Inhale 2 puffs into the lungs every 6 (six) hours as needed for Wheezing. Rescue  Dispense: 18 g; Refill: 0  - ranitidine (ZANTAC) 150 MG tablet; Take 1 tablet (150 mg total) by mouth 2 (two) times daily. for 7 days  Dispense: 14 tablet; Refill: 0    3. Gastroesophageal reflux disease, esophagitis presence not specified    - fluticasone (FLONASE) 50 mcg/actuation nasal spray; 1 spray (50 mcg total) by Each Nare route once daily.  Dispense: 16 g; Refill: 0  - azelastine (ASTELIN) 137 mcg (0.1 %) nasal spray; 1 spray (137 mcg total) by Nasal route 2 (two) times daily.  Dispense: 30 mL; Refill: 0  - benzonatate (TESSALON) 200 MG capsule; Take 1 capsule (200 mg total) by mouth 3 (three) times daily as needed.  Dispense: 30 capsule; Refill: 0  - albuterol (VENTOLIN HFA) 90 mcg/actuation inhaler; Inhale 2 puffs into the lungs every 6 (six) hours as needed for Wheezing. Rescue  Dispense: 18 g; Refill: 0  - ranitidine (ZANTAC) 150 MG tablet; Take 1 tablet (150 mg total) by mouth 2 (two) times daily. for 7 days  Dispense: 14 tablet; Refill: 0

## 2019-04-15 DIAGNOSIS — R53.83 FATIGUE, UNSPECIFIED TYPE: ICD-10-CM

## 2019-04-15 DIAGNOSIS — L40.9 PSORIASIS: ICD-10-CM

## 2019-04-15 DIAGNOSIS — M25.50 ARTHRALGIA, UNSPECIFIED JOINT: ICD-10-CM

## 2019-04-17 ENCOUNTER — PATIENT MESSAGE (OUTPATIENT)
Dept: RHEUMATOLOGY | Facility: CLINIC | Age: 24
End: 2019-04-17

## 2019-04-17 RX ORDER — MELOXICAM 15 MG/1
TABLET ORAL
Qty: 30 TABLET | Refills: 3 | OUTPATIENT
Start: 2019-04-17

## 2019-04-29 DIAGNOSIS — L40.9 PSORIASIS: ICD-10-CM

## 2019-04-29 DIAGNOSIS — R53.83 FATIGUE, UNSPECIFIED TYPE: ICD-10-CM

## 2019-04-29 DIAGNOSIS — M25.50 ARTHRALGIA, UNSPECIFIED JOINT: ICD-10-CM

## 2019-04-29 RX ORDER — MELOXICAM 15 MG/1
TABLET ORAL
Qty: 30 TABLET | Refills: 3 | OUTPATIENT
Start: 2019-04-29

## 2019-04-29 RX ORDER — ESZOPICLONE 2 MG/1
TABLET, FILM COATED ORAL
Qty: 90 TABLET | OUTPATIENT
Start: 2019-04-29

## 2019-04-30 ENCOUNTER — PATIENT MESSAGE (OUTPATIENT)
Dept: RHEUMATOLOGY | Facility: CLINIC | Age: 24
End: 2019-04-30

## 2019-05-23 ENCOUNTER — OFFICE VISIT (OUTPATIENT)
Dept: FAMILY MEDICINE | Facility: CLINIC | Age: 24
End: 2019-05-23
Payer: COMMERCIAL

## 2019-05-23 VITALS
OXYGEN SATURATION: 96 % | SYSTOLIC BLOOD PRESSURE: 112 MMHG | RESPIRATION RATE: 20 BRPM | HEIGHT: 73 IN | HEART RATE: 86 BPM | WEIGHT: 230.38 LBS | TEMPERATURE: 98 F | DIASTOLIC BLOOD PRESSURE: 64 MMHG | BODY MASS INDEX: 30.53 KG/M2

## 2019-05-23 DIAGNOSIS — R30.0 DYSURIA: ICD-10-CM

## 2019-05-23 DIAGNOSIS — J32.9 SINUSITIS, UNSPECIFIED CHRONICITY, UNSPECIFIED LOCATION: Primary | ICD-10-CM

## 2019-05-23 DIAGNOSIS — J98.9 REACTIVE AIRWAY DISEASE THAT IS NOT ASTHMA: ICD-10-CM

## 2019-05-23 DIAGNOSIS — N30.10 INTERSTITIAL CYSTITIS: ICD-10-CM

## 2019-05-23 LAB
BILIRUB SERPL-MCNC: ABNORMAL MG/DL
BLOOD URINE, POC: ABNORMAL
COLOR, POC UA: ABNORMAL
GLUCOSE UR QL STRIP: NORMAL
KETONES UR QL STRIP: ABNORMAL
LEUKOCYTE ESTERASE URINE, POC: ABNORMAL
NITRITE, POC UA: ABNORMAL
PH, POC UA: 8
PROTEIN, POC: ABNORMAL
SPECIFIC GRAVITY, POC UA: 1
UROBILINOGEN, POC UA: NORMAL

## 2019-05-23 PROCEDURE — 3074F PR MOST RECENT SYSTOLIC BLOOD PRESSURE < 130 MM HG: ICD-10-PCS | Mod: CPTII,S$GLB,, | Performed by: NURSE PRACTITIONER

## 2019-05-23 PROCEDURE — 81002 URINALYSIS NONAUTO W/O SCOPE: CPT | Mod: S$GLB,,, | Performed by: NURSE PRACTITIONER

## 2019-05-23 PROCEDURE — 99214 OFFICE O/P EST MOD 30 MIN: CPT | Mod: 25,S$GLB,, | Performed by: NURSE PRACTITIONER

## 2019-05-23 PROCEDURE — 81002 POCT URINE DIPSTICK WITHOUT MICROSCOPE: ICD-10-PCS | Mod: S$GLB,,, | Performed by: NURSE PRACTITIONER

## 2019-05-23 PROCEDURE — 3078F PR MOST RECENT DIASTOLIC BLOOD PRESSURE < 80 MM HG: ICD-10-PCS | Mod: CPTII,S$GLB,, | Performed by: NURSE PRACTITIONER

## 2019-05-23 PROCEDURE — 99214 PR OFFICE/OUTPT VISIT, EST, LEVL IV, 30-39 MIN: ICD-10-PCS | Mod: 25,S$GLB,, | Performed by: NURSE PRACTITIONER

## 2019-05-23 PROCEDURE — 87086 URINE CULTURE/COLONY COUNT: CPT

## 2019-05-23 PROCEDURE — 3078F DIAST BP <80 MM HG: CPT | Mod: CPTII,S$GLB,, | Performed by: NURSE PRACTITIONER

## 2019-05-23 PROCEDURE — 3008F BODY MASS INDEX DOCD: CPT | Mod: CPTII,S$GLB,, | Performed by: NURSE PRACTITIONER

## 2019-05-23 PROCEDURE — 3074F SYST BP LT 130 MM HG: CPT | Mod: CPTII,S$GLB,, | Performed by: NURSE PRACTITIONER

## 2019-05-23 PROCEDURE — 3008F PR BODY MASS INDEX (BMI) DOCUMENTED: ICD-10-PCS | Mod: CPTII,S$GLB,, | Performed by: NURSE PRACTITIONER

## 2019-05-23 RX ORDER — DOXYCYCLINE 100 MG/1
100 CAPSULE ORAL 2 TIMES DAILY
Qty: 14 CAPSULE | Refills: 0 | Status: SHIPPED | OUTPATIENT
Start: 2019-05-23 | End: 2019-05-30

## 2019-05-23 RX ORDER — METHYLPREDNISOLONE 4 MG/1
TABLET ORAL
Qty: 1 PACKAGE | Refills: 0 | Status: SHIPPED | OUTPATIENT
Start: 2019-05-23 | End: 2019-06-13

## 2019-05-23 NOTE — PROGRESS NOTES
Subjective:       Patient ID: Jm Chaparro is a 23 y.o. male.    Chief Complaint: burning on urinating; Headache; Sore Throat; Otalgia (bilateral); urinary frequency; dry mouth; and nauseated    The patient is here with his mother.  He has been having URI symptoms over the past couple weeks.  See URI ROS.     The patient does have a long history of interstitial cystitis any tells me over the past couple of days he has been having burning upon urination and urinary frequency.  He  is followed by Dr. mcintyre in Urology.     Dysuria    This is a recurrent problem. The current episode started gradual onset. The problem occurs every urination. The problem has been gradually worsening. The quality of the pain is described as burning. The pain is at a severity of 8/10. The pain is severe. The maximum temperature recorded prior to his arrival was 102 - 102.9 F. The fever has been present for less than 1 day. He is not sexually active. There is a history of pyelonephritis. Associated symptoms include frequency. Pertinent negatives include no behavior changes, chills, discharge, flank pain, hematuria, hesitancy, nausea, possible pregnancy, sweats, urgency, vomiting, weight loss, constipation, rash or withholding. He has tried acetaminophen, NSAIDs and increased fluids for the symptoms. The treatment provided mild relief. His past medical history is significant for catheterization, hypertension, kidney stones and a urological procedure. There is no history of diabetes insipidus, diabetes mellitus, genitourinary reflux, recurrent UTIs, a single kidney, STD or urinary stasis.   URI    This is a new problem. The current episode started 1 to 4 weeks ago. The problem has been gradually worsening. Associated symptoms include congestion, coughing, dysuria, ear pain, a plugged ear sensation, rhinorrhea, sinus pain, sneezing, a sore throat, swollen glands and wheezing. Pertinent negatives include no abdominal pain, chest  "pain, diarrhea, headaches, joint pain, joint swelling, nausea, neck pain, rash or vomiting. He has tried acetaminophen, decongestant, antihistamine and increased fluids for the symptoms. The treatment provided no relief.     Review of Systems   Constitutional: Negative for chills and weight loss.   HENT: Positive for congestion, ear pain, rhinorrhea, sinus pain, sneezing and sore throat.    Respiratory: Positive for cough and wheezing.    Cardiovascular: Negative for chest pain.   Gastrointestinal: Negative for abdominal pain, constipation, diarrhea, nausea and vomiting.   Genitourinary: Positive for dysuria and frequency. Negative for flank pain, hematuria, hesitancy and urgency.   Musculoskeletal: Negative for joint pain and neck pain.   Skin: Negative for rash.   Neurological: Negative for headaches.       Past medical, surgical, family and social history reviewed.  Objective:     Vitals:    05/23/19 1016   BP: 112/64   Pulse: 86   Resp: 20   Temp: 98.1 °F (36.7 °C)   TempSrc: Oral   SpO2: 96%   Weight: 104.5 kg (230 lb 6.1 oz)   Height: 6' 1" (1.854 m)   PainSc:   7   PainLoc: Head     Body mass index is 30.4 kg/m².     Physical Exam   Constitutional: He is oriented to person, place, and time. He appears well-developed.   Obese male.  Patient is autistic with minimal communication skills.   HENT:   Head: Normocephalic and atraumatic.   Right Ear: Hearing, tympanic membrane, external ear and ear canal normal.   Left Ear: Hearing, tympanic membrane, external ear and ear canal normal.   Nose: Mucosal edema and rhinorrhea present. No nose lacerations or sinus tenderness. Right sinus exhibits maxillary sinus tenderness and frontal sinus tenderness. Left sinus exhibits maxillary sinus tenderness and frontal sinus tenderness.   Mouth/Throat: Uvula is midline and mucous membranes are normal. Posterior oropharyngeal edema and posterior oropharyngeal erythema present.   Eyes: Conjunctivae are normal. Right eye exhibits no " discharge. Left eye exhibits no discharge. No scleral icterus.   Neck: Normal range of motion. Neck supple. No tracheal deviation present.   Cardiovascular: Normal rate and regular rhythm.   No murmur heard.  Pulmonary/Chest: Effort normal and breath sounds normal. No stridor. No respiratory distress. He has no wheezes. He has no rales. He exhibits no tenderness.   Musculoskeletal: Normal range of motion.   Lymphadenopathy:     He has cervical adenopathy.   Neurological: He is alert and oriented to person, place, and time.   Skin: Skin is warm and dry.   Psychiatric: He has a normal mood and affect. His behavior is normal. Judgment and thought content normal.       Assessment:       1. Sinusitis, unspecified chronicity, unspecified location    2. Reactive airway disease that is not asthma    3. Dysuria    4. Interstitial cystitis        Plan:       Jm was seen today for burning on urinating, headache, sore throat, otalgia, urinary frequency, dry mouth and nauseated.    Diagnoses and all orders for this visit:    Sinusitis, unspecified chronicity, unspecified location  -     doxycycline (VIBRAMYCIN) 100 MG Cap; Take 1 capsule (100 mg total) by mouth 2 (two) times daily. for 7 days      Reactive airway disease that is not asthma  -     methylPREDNISolone (MEDROL DOSEPACK) 4 mg tablet; use as directed    Dysuria  -     POCT urine dipstick without microscope  -     Urine culture  Results for orders placed or performed in visit on 05/23/19   POCT urine dipstick without microscope   Result Value Ref Range    Color, UA dark yellow     Spec Grav UA 1.000     pH, UA 8     WBC, UA Trace     Nitrite, UA -     Protein Trace     Glucose, UA Normal     Ketones, UA -     Urobilinogen, UA Normal     Bilirubin -     Blood, UA -          Interstitial cystitis  I will send urine for culture.  If this is negative and he is still having discomfort I recommend to follow up with Urology.

## 2019-05-25 LAB — BACTERIA UR CULT: NORMAL

## 2019-06-24 DIAGNOSIS — N30.10 IC (INTERSTITIAL CYSTITIS): ICD-10-CM

## 2019-06-25 RX ORDER — HYDROXYZINE HYDROCHLORIDE 50 MG/1
TABLET, FILM COATED ORAL
Qty: 180 TABLET | Refills: 3 | Status: SHIPPED | OUTPATIENT
Start: 2019-06-25 | End: 2020-07-24 | Stop reason: SDUPTHER

## 2019-06-25 RX ORDER — PENTOSAN POLYSULFATE SODIUM 100 MG/1
CAPSULE, GELATIN COATED ORAL
Qty: 270 CAPSULE | Refills: 3 | Status: SHIPPED | OUTPATIENT
Start: 2019-06-25 | End: 2020-06-15

## 2019-06-27 RX ORDER — LISINOPRIL 5 MG/1
TABLET ORAL
Qty: 90 TABLET | Refills: 1 | OUTPATIENT
Start: 2019-06-27

## 2019-06-28 RX ORDER — ESZOPICLONE 2 MG/1
TABLET, FILM COATED ORAL
Qty: 90 TABLET | OUTPATIENT
Start: 2019-06-28

## 2019-06-28 NOTE — TELEPHONE ENCOUNTER
Left message on both recorders for patient to call regarding his medication refill request for Lisinopril.  He needs a appointment.

## 2019-06-28 NOTE — TELEPHONE ENCOUNTER
Pt has not had HTN follow up in a long time  Needs chronic fu appt (ext) soon - me or NP  Once this is made, will refill med for 30 days

## 2019-07-05 ENCOUNTER — PATIENT MESSAGE (OUTPATIENT)
Dept: FAMILY MEDICINE | Facility: CLINIC | Age: 24
End: 2019-07-05

## 2019-07-29 RX ORDER — LISINOPRIL 5 MG/1
TABLET ORAL
Qty: 90 TABLET | Refills: 1 | OUTPATIENT
Start: 2019-07-29

## 2019-10-14 ENCOUNTER — LAB VISIT (OUTPATIENT)
Dept: LAB | Facility: HOSPITAL | Age: 24
End: 2019-10-14
Attending: INTERNAL MEDICINE
Payer: COMMERCIAL

## 2019-10-14 DIAGNOSIS — N20.0 KIDNEY STONE: ICD-10-CM

## 2019-10-14 LAB
ALBUMIN SERPL BCP-MCNC: 4.2 G/DL (ref 3.5–5.2)
ANION GAP SERPL CALC-SCNC: 9 MMOL/L (ref 8–16)
BUN SERPL-MCNC: 14 MG/DL (ref 6–20)
CALCIUM SERPL-MCNC: 9.5 MG/DL (ref 8.7–10.5)
CHLORIDE SERPL-SCNC: 104 MMOL/L (ref 95–110)
CO2 SERPL-SCNC: 27 MMOL/L (ref 23–29)
CREAT SERPL-MCNC: 1 MG/DL (ref 0.5–1.4)
EST. GFR  (AFRICAN AMERICAN): >60 ML/MIN/1.73 M^2
EST. GFR  (NON AFRICAN AMERICAN): >60 ML/MIN/1.73 M^2
GLUCOSE SERPL-MCNC: 91 MG/DL (ref 70–110)
PHOSPHATE SERPL-MCNC: 2.6 MG/DL (ref 2.7–4.5)
POTASSIUM SERPL-SCNC: 4.1 MMOL/L (ref 3.5–5.1)
SODIUM SERPL-SCNC: 140 MMOL/L (ref 136–145)

## 2019-10-14 PROCEDURE — 36415 COLL VENOUS BLD VENIPUNCTURE: CPT | Mod: PO

## 2019-10-14 PROCEDURE — 80069 RENAL FUNCTION PANEL: CPT

## 2019-10-23 ENCOUNTER — TELEPHONE (OUTPATIENT)
Dept: NEPHROLOGY | Facility: CLINIC | Age: 24
End: 2019-10-23

## 2019-10-24 ENCOUNTER — TELEPHONE (OUTPATIENT)
Dept: NEPHROLOGY | Facility: CLINIC | Age: 24
End: 2019-10-24

## 2019-10-24 NOTE — TELEPHONE ENCOUNTER
----- Message from Alexis Ralph sent at 10/24/2019 12:53 PM CDT -----  Contact: Mother Nancie   Type:  Sooner Apoointment Request    Caller is requesting a sooner appointment.  Caller declined first available appointment listed below.  Caller will not accept being placed on the waitlist and is requesting a message be sent to doctor.    Name of Caller: Mother Nancie   When is the first available appointment? January   Symptoms: 1 yr f/u   Best Call Back Number: [phone]  Additional Information:  Mother is requesting patient be seen before January due to insurance

## 2019-11-19 RX ORDER — ALLOPURINOL 300 MG/1
TABLET ORAL
Qty: 90 TABLET | OUTPATIENT
Start: 2019-11-19

## 2019-11-25 ENCOUNTER — OFFICE VISIT (OUTPATIENT)
Dept: NEPHROLOGY | Facility: CLINIC | Age: 24
End: 2019-11-25
Payer: COMMERCIAL

## 2019-11-25 VITALS
OXYGEN SATURATION: 96 % | SYSTOLIC BLOOD PRESSURE: 104 MMHG | WEIGHT: 234.38 LBS | HEART RATE: 90 BPM | BODY MASS INDEX: 31.06 KG/M2 | HEIGHT: 73 IN | DIASTOLIC BLOOD PRESSURE: 76 MMHG

## 2019-11-25 DIAGNOSIS — N20.0 KIDNEY STONE: Primary | ICD-10-CM

## 2019-11-25 DIAGNOSIS — N30.10 IC (INTERSTITIAL CYSTITIS): ICD-10-CM

## 2019-11-25 PROCEDURE — 3074F PR MOST RECENT SYSTOLIC BLOOD PRESSURE < 130 MM HG: ICD-10-PCS | Mod: CPTII,S$GLB,, | Performed by: INTERNAL MEDICINE

## 2019-11-25 PROCEDURE — 99213 OFFICE O/P EST LOW 20 MIN: CPT | Mod: S$GLB,,, | Performed by: INTERNAL MEDICINE

## 2019-11-25 PROCEDURE — 99999 PR PBB SHADOW E&M-EST. PATIENT-LVL III: ICD-10-PCS | Mod: PBBFAC,,, | Performed by: INTERNAL MEDICINE

## 2019-11-25 PROCEDURE — 3078F PR MOST RECENT DIASTOLIC BLOOD PRESSURE < 80 MM HG: ICD-10-PCS | Mod: CPTII,S$GLB,, | Performed by: INTERNAL MEDICINE

## 2019-11-25 PROCEDURE — 99999 PR PBB SHADOW E&M-EST. PATIENT-LVL III: CPT | Mod: PBBFAC,,, | Performed by: INTERNAL MEDICINE

## 2019-11-25 PROCEDURE — 3008F BODY MASS INDEX DOCD: CPT | Mod: CPTII,S$GLB,, | Performed by: INTERNAL MEDICINE

## 2019-11-25 PROCEDURE — 3074F SYST BP LT 130 MM HG: CPT | Mod: CPTII,S$GLB,, | Performed by: INTERNAL MEDICINE

## 2019-11-25 PROCEDURE — 99213 PR OFFICE/OUTPT VISIT, EST, LEVL III, 20-29 MIN: ICD-10-PCS | Mod: S$GLB,,, | Performed by: INTERNAL MEDICINE

## 2019-11-25 PROCEDURE — 3008F PR BODY MASS INDEX (BMI) DOCUMENTED: ICD-10-PCS | Mod: CPTII,S$GLB,, | Performed by: INTERNAL MEDICINE

## 2019-11-25 PROCEDURE — 3078F DIAST BP <80 MM HG: CPT | Mod: CPTII,S$GLB,, | Performed by: INTERNAL MEDICINE

## 2019-11-25 NOTE — PROGRESS NOTES
"Subjective:       Patient ID: Jm Chaparro is a 24 y.o. White male who presents for return patient evaluation for chronic renal failure.    He sees Dr. Dickson for his interstitial cystitis chronic symptoms.  He is follows with Rheumatology at [facility] as well.      Review of Systems   Constitutional: Negative for appetite change, chills and fever.   Eyes: Negative for visual disturbance.   Respiratory: Negative for cough and shortness of breath.    Cardiovascular: Negative for chest pain and leg swelling.   Gastrointestinal: Positive for abdominal pain (cramps). Negative for diarrhea, nausea and vomiting.   Genitourinary: Positive for dysuria, flank pain and frequency. Negative for difficulty urinating and hematuria.   Musculoskeletal: Positive for arthralgias, back pain, gait problem and neck pain. Negative for myalgias.   Skin: Negative for rash.   Neurological: Negative for headaches.   Psychiatric/Behavioral: Negative for sleep disturbance.       The past medical, family and social histories were reviewed for this encounter.     /76 (BP Location: Left arm, Patient Position: Sitting)   Pulse 90   Ht 6' 1" (1.854 m)   Wt 106.3 kg (234 lb 5.6 oz)   SpO2 96%   BMI 30.92 kg/m²     Objective:      Physical Exam   Constitutional: He is oriented to person, place, and time. He appears well-developed and well-nourished. No distress.   HENT:   Head: Normocephalic and atraumatic.   Eyes: Conjunctivae are normal.   Neck: Neck supple. No JVD present.   Cardiovascular: Normal rate, regular rhythm and normal heart sounds. Exam reveals no gallop and no friction rub.   No murmur heard.  Pulmonary/Chest: Effort normal and breath sounds normal. No respiratory distress. He has no wheezes. He has no rales.   Abdominal: Soft. Bowel sounds are normal. He exhibits no distension. There is no tenderness.   Musculoskeletal: He exhibits no edema.   Neurological: He is alert and oriented to person, place, and time. "   Skin: Skin is warm and dry. No rash noted.   Psychiatric: He has a normal mood and affect.   Vitals reviewed.      Assessment:       1. Kidney stone    2. IC (interstitial cystitis)        Plan:   Return to clinic in 12 months.  Baseline creatinine is 0.9.  Labs for next visit include rp.  Please try to drink more than two liters of water a day to reduce your risk of stone formation.  Uric acid is controlled.  His renal function is preserved and he has no electrolyte derangements.

## 2019-11-26 RX ORDER — ALLOPURINOL 300 MG/1
TABLET ORAL
Qty: 90 TABLET | Refills: 0 | Status: SHIPPED | OUTPATIENT
Start: 2019-11-26 | End: 2020-02-17

## 2019-11-26 NOTE — TELEPHONE ENCOUNTER
I have not seen him since 2017.  Please have him follow up with me or Dr. Dickson for hx of kidney stone.  I gave him a refill of allopurinol for 90 days.

## 2019-12-12 DIAGNOSIS — N32.89 BLADDER SPASMS: ICD-10-CM

## 2019-12-12 RX ORDER — OXYBUTYNIN CHLORIDE 10 MG/1
10 TABLET, EXTENDED RELEASE ORAL DAILY
Qty: 30 TABLET | Refills: 11 | Status: SHIPPED | OUTPATIENT
Start: 2019-12-12 | End: 2020-09-04

## 2020-01-15 ENCOUNTER — OFFICE VISIT (OUTPATIENT)
Dept: PSYCHIATRY | Facility: CLINIC | Age: 25
End: 2020-01-15
Payer: COMMERCIAL

## 2020-01-15 ENCOUNTER — PATIENT MESSAGE (OUTPATIENT)
Dept: PSYCHIATRY | Facility: CLINIC | Age: 25
End: 2020-01-15

## 2020-01-15 VITALS
HEART RATE: 73 BPM | DIASTOLIC BLOOD PRESSURE: 83 MMHG | WEIGHT: 235.69 LBS | HEIGHT: 73 IN | SYSTOLIC BLOOD PRESSURE: 133 MMHG | BODY MASS INDEX: 31.24 KG/M2

## 2020-01-15 DIAGNOSIS — F84.5 ASPERGER'S DISORDER: ICD-10-CM

## 2020-01-15 DIAGNOSIS — F41.1 GAD (GENERALIZED ANXIETY DISORDER): ICD-10-CM

## 2020-01-15 DIAGNOSIS — Z87.19 HISTORY OF IBS: ICD-10-CM

## 2020-01-15 DIAGNOSIS — F40.10 SOCIAL ANXIETY DISORDER: Primary | ICD-10-CM

## 2020-01-15 DIAGNOSIS — N30.10 IC (INTERSTITIAL CYSTITIS): ICD-10-CM

## 2020-01-15 PROCEDURE — 99999 PR PBB SHADOW E&M-EST. PATIENT-LVL III: CPT | Mod: PBBFAC,,, | Performed by: PSYCHIATRY & NEUROLOGY

## 2020-01-15 PROCEDURE — 99999 PR PBB SHADOW E&M-EST. PATIENT-LVL III: ICD-10-PCS | Mod: PBBFAC,,, | Performed by: PSYCHIATRY & NEUROLOGY

## 2020-01-15 PROCEDURE — 90792 PR PSYCHIATRIC DIAGNOSTIC EVALUATION W/MEDICAL SERVICES: ICD-10-PCS | Mod: S$GLB,,, | Performed by: PSYCHIATRY & NEUROLOGY

## 2020-01-15 PROCEDURE — 90792 PSYCH DIAG EVAL W/MED SRVCS: CPT | Mod: S$GLB,,, | Performed by: PSYCHIATRY & NEUROLOGY

## 2020-01-15 RX ORDER — FLUOXETINE HYDROCHLORIDE 40 MG/1
40 CAPSULE ORAL DAILY
Qty: 30 CAPSULE | Refills: 2 | Status: SHIPPED | OUTPATIENT
Start: 2020-01-15 | End: 2020-04-13

## 2020-01-15 RX ORDER — DEXTROAMPHETAMINE SACCHARATE, AMPHETAMINE ASPARTATE MONOHYDRATE, DEXTROAMPHETAMINE SULFATE AND AMPHETAMINE SULFATE 5; 5; 5; 5 MG/1; MG/1; MG/1; MG/1
20 CAPSULE, EXTENDED RELEASE ORAL EVERY MORNING
Qty: 30 CAPSULE | Refills: 0 | Status: SHIPPED | OUTPATIENT
Start: 2020-01-15 | End: 2020-03-06 | Stop reason: SDUPTHER

## 2020-01-15 RX ORDER — FLUORIDE (SODIUM) 1.1 %
PASTE (ML) DENTAL
COMMUNITY
Start: 2019-12-11

## 2020-01-15 RX ORDER — ESZOPICLONE 2 MG/1
2 TABLET, FILM COATED ORAL NIGHTLY
Qty: 30 TABLET | Refills: 0 | Status: SHIPPED | OUTPATIENT
Start: 2020-01-15 | End: 2020-03-02

## 2020-01-15 RX ORDER — ERGOCALCIFEROL 1.25 MG/1
50000 CAPSULE ORAL
COMMUNITY
Start: 2019-12-09 | End: 2023-10-02
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
## 2020-01-31 ENCOUNTER — OFFICE VISIT (OUTPATIENT)
Dept: FAMILY MEDICINE | Facility: CLINIC | Age: 25
End: 2020-01-31
Payer: COMMERCIAL

## 2020-01-31 ENCOUNTER — TELEPHONE (OUTPATIENT)
Dept: FAMILY MEDICINE | Facility: CLINIC | Age: 25
End: 2020-01-31

## 2020-01-31 VITALS
HEIGHT: 73 IN | DIASTOLIC BLOOD PRESSURE: 80 MMHG | OXYGEN SATURATION: 96 % | TEMPERATURE: 98 F | SYSTOLIC BLOOD PRESSURE: 120 MMHG | WEIGHT: 231.56 LBS | BODY MASS INDEX: 30.69 KG/M2 | HEART RATE: 85 BPM

## 2020-01-31 DIAGNOSIS — K58.9 IRRITABLE BOWEL SYNDROME, UNSPECIFIED TYPE: Primary | ICD-10-CM

## 2020-01-31 DIAGNOSIS — Z79.620 ADALIMUMAB (HUMIRA) LONG-TERM USE: ICD-10-CM

## 2020-01-31 DIAGNOSIS — L40.50 PSORIATIC ARTHRITIS: ICD-10-CM

## 2020-01-31 DIAGNOSIS — J02.9 PHARYNGITIS, UNSPECIFIED ETIOLOGY: Primary | ICD-10-CM

## 2020-01-31 DIAGNOSIS — N30.10 IC (INTERSTITIAL CYSTITIS): ICD-10-CM

## 2020-01-31 LAB
CTP QC/QA: YES
CTP QC/QA: YES
FLUAV AG NPH QL: NEGATIVE
FLUBV AG NPH QL: NEGATIVE
S PYO RRNA THROAT QL PROBE: NEGATIVE

## 2020-01-31 PROCEDURE — 3074F SYST BP LT 130 MM HG: CPT | Mod: CPTII,S$GLB,, | Performed by: FAMILY MEDICINE

## 2020-01-31 PROCEDURE — 99214 PR OFFICE/OUTPT VISIT, EST, LEVL IV, 30-39 MIN: ICD-10-PCS | Mod: 25,S$GLB,, | Performed by: FAMILY MEDICINE

## 2020-01-31 PROCEDURE — 96372 PR INJECTION,THERAP/PROPH/DIAG2ST, IM OR SUBCUT: ICD-10-PCS | Mod: S$GLB,,, | Performed by: FAMILY MEDICINE

## 2020-01-31 PROCEDURE — 99999 PR PBB SHADOW E&M-EST. PATIENT-LVL III: ICD-10-PCS | Mod: PBBFAC,,, | Performed by: FAMILY MEDICINE

## 2020-01-31 PROCEDURE — 96372 THER/PROPH/DIAG INJ SC/IM: CPT | Mod: S$GLB,,, | Performed by: FAMILY MEDICINE

## 2020-01-31 PROCEDURE — 3008F PR BODY MASS INDEX (BMI) DOCUMENTED: ICD-10-PCS | Mod: CPTII,S$GLB,, | Performed by: FAMILY MEDICINE

## 2020-01-31 PROCEDURE — 87804 INFLUENZA ASSAY W/OPTIC: CPT | Mod: QW,S$GLB,, | Performed by: FAMILY MEDICINE

## 2020-01-31 PROCEDURE — 3079F PR MOST RECENT DIASTOLIC BLOOD PRESSURE 80-89 MM HG: ICD-10-PCS | Mod: CPTII,S$GLB,, | Performed by: FAMILY MEDICINE

## 2020-01-31 PROCEDURE — 87880 POCT RAPID STREP A: ICD-10-PCS | Mod: QW,S$GLB,, | Performed by: FAMILY MEDICINE

## 2020-01-31 PROCEDURE — 99999 PR PBB SHADOW E&M-EST. PATIENT-LVL III: CPT | Mod: PBBFAC,,, | Performed by: FAMILY MEDICINE

## 2020-01-31 PROCEDURE — 3008F BODY MASS INDEX DOCD: CPT | Mod: CPTII,S$GLB,, | Performed by: FAMILY MEDICINE

## 2020-01-31 PROCEDURE — 3074F PR MOST RECENT SYSTOLIC BLOOD PRESSURE < 130 MM HG: ICD-10-PCS | Mod: CPTII,S$GLB,, | Performed by: FAMILY MEDICINE

## 2020-01-31 PROCEDURE — 3079F DIAST BP 80-89 MM HG: CPT | Mod: CPTII,S$GLB,, | Performed by: FAMILY MEDICINE

## 2020-01-31 PROCEDURE — 99214 OFFICE O/P EST MOD 30 MIN: CPT | Mod: 25,S$GLB,, | Performed by: FAMILY MEDICINE

## 2020-01-31 PROCEDURE — 87880 STREP A ASSAY W/OPTIC: CPT | Mod: QW,S$GLB,, | Performed by: FAMILY MEDICINE

## 2020-01-31 PROCEDURE — 87804 POCT INFLUENZA A/B: ICD-10-PCS | Mod: 59,QW,S$GLB, | Performed by: FAMILY MEDICINE

## 2020-01-31 RX ORDER — AZITHROMYCIN 250 MG/1
TABLET, FILM COATED ORAL
Qty: 6 TABLET | Refills: 0 | Status: SHIPPED | OUTPATIENT
Start: 2020-01-31 | End: 2020-02-05

## 2020-01-31 RX ORDER — GABAPENTIN 300 MG/1
300 CAPSULE ORAL 3 TIMES DAILY
COMMUNITY
Start: 2020-01-17

## 2020-01-31 RX ORDER — BETAMETHASONE SODIUM PHOSPHATE AND BETAMETHASONE ACETATE 3; 3 MG/ML; MG/ML
6 INJECTION, SUSPENSION INTRA-ARTICULAR; INTRALESIONAL; INTRAMUSCULAR; SOFT TISSUE
Status: DISCONTINUED | OUTPATIENT
Start: 2020-01-31 | End: 2020-01-31

## 2020-01-31 RX ORDER — ACETAMINOPHEN AND CODEINE PHOSPHATE 300; 30 MG/1; MG/1
1 TABLET ORAL EVERY 8 HOURS PRN
Qty: 15 TABLET | Refills: 0 | Status: SHIPPED | OUTPATIENT
Start: 2020-01-31 | End: 2020-02-10

## 2020-01-31 RX ORDER — BETAMETHASONE SODIUM PHOSPHATE AND BETAMETHASONE ACETATE 3; 3 MG/ML; MG/ML
9 INJECTION, SUSPENSION INTRA-ARTICULAR; INTRALESIONAL; INTRAMUSCULAR; SOFT TISSUE
Status: COMPLETED | OUTPATIENT
Start: 2020-01-31 | End: 2020-01-31

## 2020-01-31 RX ADMIN — BETAMETHASONE SODIUM PHOSPHATE AND BETAMETHASONE ACETATE 9 MG: 3; 3 INJECTION, SUSPENSION INTRA-ARTICULAR; INTRALESIONAL; INTRAMUSCULAR; SOFT TISSUE at 02:01

## 2020-01-31 NOTE — PROGRESS NOTES
THIS DOCUMENT WAS MADE IN PART WITH VOICE RECOGNITION SOFTWARE.  OCCASIONALLY THIS SOFTWARE WILL MISINTERPRET WORDS OR PHRASES.    Assessment and Plan:    1. Pharyngitis, unspecified etiology  - POCT Influenza A/B  - POCT rapid strep A  Likely strep pharyngitis, treat with azithromycin secondary to penicillin allergy  Hold Humira until next Monday  Tylenol 3 for pain  Rapid strep and flu test were negative.    2. Psoriatic arthritis  Hold Humira as noted  3. Adalimumab (Humira) long-term use  Hold until Monday         ______________________________________________________________________  Subjective:    Chief Complaint:  Chief Complaint   Patient presents with    Sore Throat     ocurring since 1/24/2020, sore throat with white spots on throat, ear pain, headache, and chills. Pt recently started Humira and just got back from [place]         HPI:  Jm is a 24 y.o. year old      Pharyngitis  Duration 7 days, associated with white spots on throat.  Also complains of ear pain, headaches and chills  Patient recently started Humira for psoriatic arthritis    Past Medical History:  Past Medical History:   Diagnosis Date    Abnormal thyroid function test 9/11/2017    ADD (attention deficit disorder)     Anxiety disorder     Asperger's disorder     (AUTISM)    Deformity of both feet     Dyscalculia     Dysgraphia     Dyslexia     Eczema     GERD (gastroesophageal reflux disease)     History of migraine headaches     Hypertension     Interstitial cystitis (chronic)     seen by dr aguilar, later dr timur callaway    Irritable bowel syndrome     Nephrolithiasis     2014    PONV (postoperative nausea and vomiting)     Psoriasis     Refusal of blood transfusion for reasons of conscience     Seasonal allergies     Special needs assessment        Past Surgical History:  Past Surgical History:   Procedure Laterality Date    [facility]; told IBS    COLONOSCOPY  08/10/2015    Dr. Shaver; sent  for scanning: unremarkable findings, no specimens collected    COLONOSCOPY N/A 2/18/2019    Procedure: COLONOSCOPY;  Surgeon: Puma Preston Jr., MD;  Location[facility];  Service: Endoscopy;  Laterality: N/A;    UPPER GASTROINTESTINAL ENDOSCOPY  05/14/2012    Dr. Preston    UPPER GASTROINTESTINAL ENDOSCOPY  07/17/2015    Dr. Shaver, sent for scanning: normal esophagus- dilated & biopsied, findings WNL, biopsies taken from z-line, stomach and duodenum; biopsy: duodenum WNL, antrum reactive gastropathy, negative for h pylori or int. metaplasia, GEJ WNL, negative for EOE & cotton's esophagus    ureteral stone extraction      basket extraction       Family History:  Family History   Problem Relation Age of Onset    Lupus Mother     Interstitial cystitis Mother     Diabetes Maternal Grandmother     Lupus Maternal Grandmother     Clotting disorder Maternal Grandmother     Interstitial cystitis Maternal Grandmother     Diabetes Maternal Grandfather     Nephrolithiasis Maternal Grandfather         st[place]    Interstitial cystitis Maternal Aunt     Colon cancer Neg Hx     Crohn's disease Neg Hx     Ulcerative colitis Neg Hx     Celiac disease Neg Hx     Esophageal cancer Neg Hx     Stomach cancer Neg Hx        Social History:  Social History     Socioeconomic History    Marital status: Single     Spouse name: Not on file    Number of children: Not on file    Years of education: Not on file    Highest education level: Not on file   Occupational History    Not on file   Social Needs    Financial resource strain: Very hard    Food insecurity:     Worry: Not on file     Inability: Not on file    Transportation needs:     Medical: No     Non-medical: No   Tobacco Use    Smoking status: Never Smoker    Smokeless tobacco: Never Used   Substance and Sexual Activity    Alcohol use: No     Frequency: Never     Drinks per session: Patient refused     Binge frequency: Never    Drug use: No    Sexual  activity: Never   Lifestyle    Physical activity:     Days per week: 0 days     Minutes per session: Not on file    Stress: Not on file   Relationships    Social connections:     Talks on phone: Not on file     Gets together: Twice a week     Attends [employer] service: Not on file     Active member of club or organization: No     Attends meetings of clubs or organizations: Patient refused     Relationship status: Never    Other Topics Concern    Not on file   Social History Narrative    Not on file       Medications:  Current Outpatient Medications on File Prior to Visit   Medication Sig Dispense Refill    albuterol (VENTOLIN HFA) 90 mcg/actuation inhaler Inhale 2 puffs into the lungs every 6 (six) hours as needed for Wheezing. Rescue 18 g 0    allopurinol (ZYLOPRIM) 300 MG tablet TAKE 1 TABLET BY MOUTH ONCE DAILY 90 tablet 0    cholecalciferol, vitamin D3, (VITAMIN D3) 5,000 unit Tab Take 5,000 Units by mouth once a week. On Tuesdays      dextroamphetamine-amphetamine (ADDERALL XR) 20 MG 24 hr capsule Take 1 capsule (20 mg total) by mouth every morning. 30 capsule 0    ELMIRON 100 mg Cap TAKE 1 CAPSULE BY MOUTH 3  TIMES DAILY 270 capsule 3    eszopiclone (LUNESTA) 2 MG Tab Take 1 tablet (2 mg total) by mouth every evening. 30 tablet 0    FLUoxetine 40 MG capsule Take 1 capsule (40 mg total) by mouth once daily. 30 capsule 2    fluticasone (FLONASE) 50 mcg/actuation nasal spray 1 spray (50 mcg total) by Each Nare route once daily. 16 g 0    folic acid (FOLVITE) 1 MG tablet TK 1 T PO QD  1    hydrOXYzine (ATARAX) 50 MG tablet TAKE 1 TABLET BY MOUTH TWO  TIMES DAILY 180 tablet 3    lisinopril (PRINIVIL,ZESTRIL) 5 MG tablet TAKE 1 TABLET BY MOUTH ONCE DAILY 90 tablet 1    meloxicam (MOBIC) 15 MG tablet TAKE 1 TABLET BY MOUTH  DAILY AS NEEDED FOR JOINT  PAIN IN THE MORNING 30 tablet 3    methotrexate 2.5 MG Tab 4- 2.5 mg tables only on Saturdays  1    omeprazole magnesium (PRILOSEC OTC ORAL)  "Take 40 mg by mouth once daily.      ondansetron (ZOFRAN) 8 MG tablet Take 8 mg by mouth every 8 (eight) hours as needed.      oxybutynin (DITROPAN-XL) 10 MG 24 hr tablet Take 1 tablet (10 mg total) by mouth once daily. 30 tablet 11    PREVIDENT 5000 BOOSTER PLUS 1.1 % Pste USE AS DIRECTED      VITAMIN D2 50,000 unit capsule        No current facility-administered medications on file prior to visit.        Allergies:  Caffeine and Penicillins    Immunizations:  Immunization History   Administered Date(s) Administered    DTaP 1993, 1993, 04/07/1994, 01/09/1995, 03/09/1998    HIB 1993, 1993, 04/07/1994, 08/10/1994, 03/09/1998    Hepatitis A, Pediatric/Adolescent, 2 Dose 09/11/2009, 08/31/2011    Hepatitis B, Pediatric/Adolescent 1993, 1993, 04/07/1994    IPV 1993, 1993, 04/20/1994, 03/09/1998    Influenza 10/25/2010    Influenza - Quadrivalent - PF (6 months and older) 09/25/2017    Influenza - Trivalent (ADULT) 10/25/2010    MMR 08/10/1994, 03/09/1998, 06/01/1998    Meningococcal Conjugate (MCV4P) 09/11/2009    Tdap 10/30/2007       Review of Systems:  Review of Systems   Constitutional: Positive for chills.   HENT: Positive for congestion and sore throat.    Neurological: Positive for headaches.   All other systems reviewed and are negative.      Objective:    Vitals:  Vitals:    01/31/20 1423   Pulse: 85   Temp: 98.1 °F (36.7 °C)   TempSrc: Oral   SpO2: 96%   Weight: 105 kg (231 lb 9.5 oz)   Height: 6' 1" (1.854 m)   PainSc:   8   PainLoc: Throat       Physical Exam   Constitutional: He appears well-developed. No distress.   HENT:   Head: Normocephalic and atraumatic.   Nose: Mucosal edema and rhinorrhea present.   Mouth/Throat: Oropharyngeal exudate, posterior oropharyngeal edema and posterior oropharyngeal erythema present. No tonsillar abscesses. Tonsils are 3+ on the right. Tonsils are 3+ on the left. Tonsillar exudate.   Eyes: Pupils are equal, " round, and reactive to light. EOM are normal.   Neck: Normal range of motion. Neck supple.   Cardiovascular: Normal rate and regular rhythm. Exam reveals no friction rub.   No murmur heard.  Pulmonary/Chest: Effort normal and breath sounds normal.   Abdominal: Soft. Bowel sounds are normal. He exhibits no distension. There is no tenderness.   Lymphadenopathy:     He has cervical adenopathy.        Right cervical: Superficial cervical adenopathy present.        Left cervical: Superficial cervical adenopathy present.   Skin: Skin is warm and dry. No rash noted.   Psychiatric: He has a normal mood and affect. His behavior is normal.       Data:  No previous labs, imaging, or notes available.        Sal Worthington MD  Family Medicine

## 2020-01-31 NOTE — TELEPHONE ENCOUNTER
Dr Vyas has suggested an referral to an allergist- wondering if something in his daily diet is contributig to his many psych and physical sxs    See ref

## 2020-02-03 ENCOUNTER — PATIENT OUTREACH (OUTPATIENT)
Dept: ADMINISTRATIVE | Facility: HOSPITAL | Age: 25
End: 2020-02-03

## 2020-02-03 NOTE — PROGRESS NOTES
Health Maintenance Due   Topic Date Due    HIV Screening  08/16/2008    TETANUS VACCINE  10/30/2017    Influenza Vaccine (1) 09/01/2019     Future Appointments   Date Time Provider Department Center   2/17/2020  7:30 AM China Pierre MD [facility] MED [place]   2/20/2020  1:00 PM Luna Vyas MD [facility] PSYCH [place]   11/16/2020 10:00 AM LAB, [facility] LAB [place]   11/23/2020  9:20 AM Jairo Enriquez MD [facility] NEPHRO [place]

## 2020-02-06 ENCOUNTER — PATIENT OUTREACH (OUTPATIENT)
Dept: ADMINISTRATIVE | Facility: OTHER | Age: 25
End: 2020-02-06

## 2020-02-06 NOTE — PROGRESS NOTES
Chart reviewed.   Immunizations: Triggered Imm Registry     Orders placed: n/a  Upcoming appts to satisfy RAUL topics: n/a

## 2020-02-08 ENCOUNTER — OFFICE VISIT (OUTPATIENT)
Dept: URGENT CARE | Facility: CLINIC | Age: 25
End: 2020-02-08
Payer: COMMERCIAL

## 2020-02-08 ENCOUNTER — PATIENT MESSAGE (OUTPATIENT)
Dept: FAMILY MEDICINE | Facility: CLINIC | Age: 25
End: 2020-02-08

## 2020-02-08 VITALS
TEMPERATURE: 99 F | HEIGHT: 73 IN | OXYGEN SATURATION: 96 % | SYSTOLIC BLOOD PRESSURE: 124 MMHG | WEIGHT: 231 LBS | DIASTOLIC BLOOD PRESSURE: 84 MMHG | BODY MASS INDEX: 30.62 KG/M2 | HEART RATE: 96 BPM

## 2020-02-08 DIAGNOSIS — R51.9 NONINTRACTABLE HEADACHE, UNSPECIFIED CHRONICITY PATTERN, UNSPECIFIED HEADACHE TYPE: Primary | ICD-10-CM

## 2020-02-08 PROCEDURE — 99214 PR OFFICE/OUTPT VISIT, EST, LEVL IV, 30-39 MIN: ICD-10-PCS | Mod: S$GLB,,, | Performed by: PHYSICIAN ASSISTANT

## 2020-02-08 PROCEDURE — 99214 OFFICE O/P EST MOD 30 MIN: CPT | Mod: S$GLB,,, | Performed by: PHYSICIAN ASSISTANT

## 2020-02-08 NOTE — PATIENT INSTRUCTIONS
"You must understand that you've received an Urgent Care treatment only and that you may be released before all your medical problems are known or treated.   You, the patient, will arrange for follow up care as instructed.  Follow up with your Pediatrician or specialty clinic as directed if not improved or as needed.   You can call [phone] to schedule an appointment with the appropriate provider.  If your condition worsens we recommend that you receive another evaluation at the Emergency Department for any concerns or worsening of condition.  Parent/patient aware and verbalized understanding.    Encouraged patient to get lots of rest and hydrate with plenty of fluids.  Recommended very bland diet for the next 24-48 hours.   Avoid spicy foods and fast food/greasy food until symptoms have resolved.  Advised patient to apply ice pack on the forehead, base of the skull and/or over the eye to help with pain relief.  Advised patient to avoid "screen time" for the next 24 hours because rest is most important without light or sound.  When you get home, advised patient to get a dark and quiet room and rest. Bright glaring light and loud noise can worsen headaches.  OTC NSAIDs every 6 hours as needed for headache, fever or pain.  Advised patient to follow-up appointment with Pediatrician in the next 24 hours for further evaluation as needed.  Advised patient to go to the nearest ER for further evaluation if any of the following occurs:   - Worsening of your head pain or no improvement within 12 hours  - Repeated vomiting (unable to keep liquids down)  - Fever over 100.0ºF (37.8ºC)  - Stiff neck  - Extreme drowsiness, confusion or fainting  - Dizziness, vertigo (dizziness with spinning sensation)  - Weakness of an arm or leg or one side of the face  - Difficulty with speech or vision  Parent/Patient aware, verbalized understanding and agreed with patient's plan of care.      Self-Care for Headaches  Most headaches aren't " "serious and can be relieved with self-care. But some headaches may be a sign of another health problem like eye trouble or high blood pressure. To find the best treatment, learn what kind of headaches you get. For tension headaches, self-care will usually help. To treat migraines, ask your healthcare provider for advice. It is also possible to get both tension and migraine headaches. Self-care involves relieving the pain and avoiding headache triggers if you can.    Ways to reduce pain and tension  Try these steps:  · Apply a cold compress or ice pack to the pain site.  · Drink fluids. If nausea makes it hard to drink, try sucking on ice.  · Rest. Protect yourself from bright light and loud noises.  · Calm your emotions by imagining a peaceful scene.  · Massage tight neck, shoulder, and head muscles.  · To relax muscles, soak in a hot bath or use a hot shower.  Use medicines  Aspirin or aspirin substitutes, such as ibuprofen and acetaminophen, can relieve headache. Remember: Never give aspirin to anyone 18 years old or younger because of the risk of developing Reye syndrome. Use pain medicines only when necessary.  Track your headaches  Keeping a headache diary can help you and your healthcare provider identify what's causing your headaches:  · Note when each headache happens.  · Identify your activities and the foods you've eaten 6 to 8 hours before the headache began.  · Look for any trends or "triggers."  Signs of tension headache  Any of the following can be signs:  · Dull pain or feeling of pressure in a tight band around your head  · Pain in your neck or shoulders  · Headache without a definite beginning or end  · Headache after an activity such as driving or working on a computer  Signs of migraine  Any of the following can be signs:  · Throbbing pain on one or both sides of your head  · Nausea or vomiting  · Extreme sensitivity to light, sound, and smells  · Bright spots, flashes, or other visual " "changes  · Pain or nausea so severe that you can't continue your daily activities  Call your healthcare provider   If you have any of the following symptoms, contact your healthcare provider:  · A headache that lingers after a recent injury or bump to the head.  · A fever with a stiff neck or pain when you bend your head toward your chest.  · A headache along with slurred speech, changes in your vision, or numbness or weakness in your arms or legs.  · A headache for longer than 3 days.  · Frequent headaches, especially in the morning.  · Headaches with seizures   · Seek immediate medical attention if you have a headache that you would call "the worst headache you have ever had."   Date Last Reviewed: 10/4/2015  © 2856-4222 [employer]. [address] [place] [address], [place] [address]. All rights reserved. This information is not intended as a substitute for professional medical care. Always follow your healthcare professional's instructions.        "

## 2020-02-08 NOTE — PROGRESS NOTES
"Subjective:       Patient ID: Jm Chaparro is a 24 y.o. male.    Vitals:  height is 6' 1" (1.854 m) and weight is 104.8 kg (231 lb). His tympanic temperature is 98.6 °F (37 °C). His blood pressure is 124/84 and his pulse is 96. His oxygen saturation is 96%.     Chief Complaint: Headache    Patient is with mom on exam. Patient reports headache and high BP readings for the past few days. Patient reports that he has been keeping track on an bienvenido on the phone. Patient currently denies fever, active CP, SOB, wheezing, abdominal pain, N/V/D/C, blurry vision, dizziness, syncope or weakness. Mom reports that she gave patient Imitrex for headache with relief.    Headache    This is a new problem. The current episode started in the past 7 days. The problem occurs daily. The problem has been waxing and waning. The pain is located in the frontal region. The pain does not radiate. The pain quality is similar to prior headaches. The quality of the pain is described as aching. The pain is at a severity of 3/10. The pain is mild. Pertinent negatives include no abdominal pain, abnormal behavior, anorexia, back pain, blurred vision, coughing, dizziness, drainage, ear pain, eye pain, eye redness, eye watering, facial sweating, fever, insomnia, loss of balance, muscle aches, nausea, neck pain, numbness, phonophobia, photophobia, rhinorrhea, scalp tenderness, seizures, sore throat, swollen glands, tingling, visual change, vomiting, weakness or weight loss. Nothing aggravates the symptoms. Treatments tried: IMITREX. The treatment provided mild relief. His past medical history is significant for migraine headaches.       Constitution: Negative for chills, sweating, fatigue and fever.   HENT: Negative for ear pain, drooling, congestion, sore throat, trouble swallowing and voice change.    Neck: Negative for neck pain, neck stiffness and painful lymph nodes.   Cardiovascular: Negative for chest pain, leg swelling, palpitations, " sob on exertion and passing out.   Eyes: Negative for eye trauma, foreign body in eye, eye discharge, eye itching, eye pain, eye redness, photophobia, vision loss, double vision, blurred vision and eyelid swelling.   Respiratory: Negative for chest tightness, cough, sputum production, bloody sputum, shortness of breath, stridor and wheezing.    Gastrointestinal: Negative for abdominal pain, abdominal bloating, nausea, vomiting, constipation, diarrhea and heartburn.   Genitourinary: Negative for dysuria.   Musculoskeletal: Negative for trauma, joint pain, joint swelling, abnormal ROM of joint, back pain, pain with walking, muscle cramps and muscle ache.   Skin: Negative for rash and hives.   Allergic/Immunologic: Negative for hives, itching and sneezing.   Neurological: Positive for headaches and history of migraines. Negative for dizziness, history of vertigo, light-headedness, passing out, facial drooping, speech difficulty, coordination disturbances, loss of balance, disorientation, altered mental status, loss of consciousness, numbness, tingling, seizures and tremors.   Hematologic/Lymphatic: Negative for swollen lymph nodes.   Psychiatric/Behavioral: Negative for altered mental status, disorientation, confusion, nervous/anxious, sleep disturbance and depression. The patient is not nervous/anxious and does not have insomnia.        Objective:      Physical Exam   Constitutional: He is oriented to person, place, and time. He appears well-developed and well-nourished. He is cooperative.  Non-toxic appearance. He does not have a sickly appearance. He does not appear ill. No distress.   HENT:   Head: Normocephalic and atraumatic.   Right Ear: Hearing, tympanic membrane, external ear and ear canal normal.   Left Ear: Hearing, tympanic membrane, external ear and ear canal normal.   Nose: Nose normal. No mucosal edema, rhinorrhea or nasal deformity. No epistaxis. Right sinus exhibits no maxillary sinus tenderness and no  frontal sinus tenderness. Left sinus exhibits no maxillary sinus tenderness and no frontal sinus tenderness.   Mouth/Throat: Uvula is midline, oropharynx is clear and moist and mucous membranes are normal. No trismus in the jaw. Normal dentition. No uvula swelling. No oropharyngeal exudate, posterior oropharyngeal edema or posterior oropharyngeal erythema.   Eyes: Pupils are equal, round, and reactive to light. Conjunctivae, EOM and lids are normal. Right eye exhibits no discharge. Left eye exhibits no discharge. No scleral icterus.   Neck: Trachea normal, normal range of motion, full passive range of motion without pain and phonation normal. Neck supple. No neck rigidity. No edema and no erythema present.   Cardiovascular: Normal rate, regular rhythm, normal heart sounds, intact distal pulses and normal pulses.   Pulmonary/Chest: Effort normal and breath sounds normal. No accessory muscle usage or stridor. No respiratory distress. He has no decreased breath sounds. He has no wheezes. He has no rhonchi. He has no rales.   Abdominal: Soft. Normal appearance and bowel sounds are normal. He exhibits no distension, no pulsatile midline mass and no mass. There is no tenderness.   Musculoskeletal: Normal range of motion. He exhibits no edema or deformity.   FROM to upper and lower extremities bilateral. 5/5 strength and full sensation bilateral. 2+ pulses bilateral. No numbness or tingling. Able to ambulate without difficulty.   Lymphadenopathy:     He has no cervical adenopathy.   Neurological: He is alert and oriented to person, place, and time. No cranial nerve deficit or sensory deficit. He exhibits normal muscle tone. Coordination and gait normal.   Skin: Skin is warm, dry, intact, not diaphoretic, not pale and no rash. Capillary refill takes less than 2 seconds.   Psychiatric: He has a normal mood and affect. His speech is normal and behavior is normal. Judgment and thought content normal. Cognition and memory are  "normal.   Nursing note and vitals reviewed.        Assessment:       1. Nonintractable headache, unspecified chronicity pattern, unspecified headache type        Plan:         Nonintractable headache, unspecified chronicity pattern, unspecified headache type      Patient Instructions     You must understand that you've received an Urgent Care treatment only and that you may be released before all your medical problems are known or treated.   You, the patient, will arrange for follow up care as instructed.  Follow up with your Pediatrician or specialty clinic as directed if not improved or as needed.   You can call [phone] to schedule an appointment with the appropriate provider.  If your condition worsens we recommend that you receive another evaluation at the Emergency Department for any concerns or worsening of condition.  Parent/patient aware and verbalized understanding.    Encouraged patient to get lots of rest and hydrate with plenty of fluids.  Recommended very bland diet for the next 24-48 hours.   Avoid spicy foods and fast food/greasy food until symptoms have resolved.  Advised patient to apply ice pack on the forehead, base of the skull and/or over the eye to help with pain relief.  Advised patient to avoid "screen time" for the next 24 hours because rest is most important without light or sound.  When you get home, advised patient to get a dark and quiet room and rest. Bright glaring light and loud noise can worsen headaches.  OTC NSAIDs every 6 hours as needed for headache, fever or pain.  Advised patient to follow-up appointment with Pediatrician in the next 24 hours for further evaluation as needed.  Advised patient to go to the nearest ER for further evaluation if any of the following occurs:   - Worsening of your head pain or no improvement within 12 hours  - Repeated vomiting (unable to keep liquids down)  - Fever over 100.0ºF (37.8ºC)  - Stiff neck  - Extreme drowsiness, confusion or " "fainting  - Dizziness, vertigo (dizziness with spinning sensation)  - Weakness of an arm or leg or one side of the face  - Difficulty with speech or vision  Parent/Patient aware, verbalized understanding and agreed with patient's plan of care.      Self-Care for Headaches  Most headaches aren't serious and can be relieved with self-care. But some headaches may be a sign of another health problem like eye trouble or high blood pressure. To find the best treatment, learn what kind of headaches you get. For tension headaches, self-care will usually help. To treat migraines, ask your healthcare provider for advice. It is also possible to get both tension and migraine headaches. Self-care involves relieving the pain and avoiding headache triggers if you can.    Ways to reduce pain and tension  Try these steps:  · Apply a cold compress or ice pack to the pain site.  · Drink fluids. If nausea makes it hard to drink, try sucking on ice.  · Rest. Protect yourself from bright light and loud noises.  · Calm your emotions by imagining a peaceful scene.  · Massage tight neck, shoulder, and head muscles.  · To relax muscles, soak in a hot bath or use a hot shower.  Use medicines  Aspirin or aspirin substitutes, such as ibuprofen and acetaminophen, can relieve headache. Remember: Never give aspirin to anyone 18 years old or younger because of the risk of developing Reye syndrome. Use pain medicines only when necessary.  Track your headaches  Keeping a headache diary can help you and your healthcare provider identify what's causing your headaches:  · Note when each headache happens.  · Identify your activities and the foods you've eaten 6 to 8 hours before the headache began.  · Look for any trends or "triggers."  Signs of tension headache  Any of the following can be signs:  · Dull pain or feeling of pressure in a tight band around your head  · Pain in your neck or shoulders  · Headache without a definite beginning or " "end  · Headache after an activity such as driving or working on a computer  Signs of migraine  Any of the following can be signs:  · Throbbing pain on one or both sides of your head  · Nausea or vomiting  · Extreme sensitivity to light, sound, and smells  · Bright spots, flashes, or other visual changes  · Pain or nausea so severe that you can't continue your daily activities  Call your healthcare provider   If you have any of the following symptoms, contact your healthcare provider:  · A headache that lingers after a recent injury or bump to the head.  · A fever with a stiff neck or pain when you bend your head toward your chest.  · A headache along with slurred speech, changes in your vision, or numbness or weakness in your arms or legs.  · A headache for longer than 3 days.  · Frequent headaches, especially in the morning.  · Headaches with seizures   · Seek immediate medical attention if you have a headache that you would call "the worst headache you have ever had."   Date Last Reviewed: 10/4/2015  © 0570-6782 [employer]. [address] [place] [id number]. All rights reserved. This information is not intended as a substitute for professional medical care. Always follow your healthcare professional's instructions.                 "

## 2020-02-10 ENCOUNTER — OFFICE VISIT (OUTPATIENT)
Dept: ALLERGY | Facility: CLINIC | Age: 25
End: 2020-02-10
Payer: COMMERCIAL

## 2020-02-10 ENCOUNTER — HOSPITAL ENCOUNTER (OUTPATIENT)
Dept: RADIOLOGY | Facility: HOSPITAL | Age: 25
Discharge: HOME | End: 2020-02-10
Attending: NURSE PRACTITIONER
Payer: COMMERCIAL

## 2020-02-10 VITALS — WEIGHT: 231.06 LBS | OXYGEN SATURATION: 97 % | HEIGHT: 73 IN | BODY MASS INDEX: 30.62 KG/M2 | HEART RATE: 110 BPM

## 2020-02-10 DIAGNOSIS — R14.0 ABDOMINAL BLOATING: ICD-10-CM

## 2020-02-10 DIAGNOSIS — R10.84 GENERALIZED ABDOMINAL PAIN: Primary | ICD-10-CM

## 2020-02-10 DIAGNOSIS — R74.01 ELEVATED ALT MEASUREMENT: ICD-10-CM

## 2020-02-10 DIAGNOSIS — R19.7 DIARRHEA, UNSPECIFIED TYPE: ICD-10-CM

## 2020-02-10 PROCEDURE — 99204 PR OFFICE/OUTPT VISIT, NEW, LEVL IV, 45-59 MIN: ICD-10-PCS | Mod: S$GLB,,, | Performed by: ALLERGY & IMMUNOLOGY

## 2020-02-10 PROCEDURE — 76700 US EXAM ABDOM COMPLETE: CPT | Mod: TC,PO

## 2020-02-10 PROCEDURE — 99999 PR PBB SHADOW E&M-EST. PATIENT-LVL III: CPT | Mod: PBBFAC,,, | Performed by: ALLERGY & IMMUNOLOGY

## 2020-02-10 PROCEDURE — 99204 OFFICE O/P NEW MOD 45 MIN: CPT | Mod: S$GLB,,, | Performed by: ALLERGY & IMMUNOLOGY

## 2020-02-10 PROCEDURE — 76700 US ABDOMEN COMPLETE: ICD-10-PCS | Mod: 26,,, | Performed by: RADIOLOGY

## 2020-02-10 PROCEDURE — 76700 US EXAM ABDOM COMPLETE: CPT | Mod: 26,,, | Performed by: RADIOLOGY

## 2020-02-10 PROCEDURE — 99999 PR PBB SHADOW E&M-EST. PATIENT-LVL III: ICD-10-PCS | Mod: PBBFAC,,, | Performed by: ALLERGY & IMMUNOLOGY

## 2020-02-10 NOTE — LETTER
February 10, 2020      China Pierre MD  [id number] [address] [address]           [facility]  [address] [place] [address]  Phone: [phone]          Patient: Jm Chaparro   MR Number: [id number]   YOB: 1995   Date of Visit: 2/10/2020       Dear Dr. China Pierre:    Thank you for referring Jm Chaparro to me for evaluation. Attached you will find relevant portions of my assessment and plan of care.    If you have questions, please do not hesitate to call me. I look forward to following Jm Chaparro along with you.    Sincerely,    Pippa Medrano MD    Enclosure  CC:  No Recipients    If you would like to receive this communication electronically, please contact [email or web address] or [phone] to request more information on [employer] Link access.    For providers and/or their staff who would like to refer a patient to [facility], please contact us through our one-stop-shop provider referral line, Dominic Tate, at [phone].    If you feel you have received this communication in error or would no longer like to receive these types of communications, please e-mail [email or web address]

## 2020-02-10 NOTE — PROGRESS NOTES
Subjective:       Patient ID: Jm Chaparro is a 24 y.o. male.    Chief Complaint:  Allergies (food allergy)      23 yo man presents for consult from [facility] for possible food allergy. He is accompanied by mom. He has Asperger's so mom aids with history. He sees therapist who suggested he get food allergy testing. He has GI issues since 2015. He c/o abdominal pain. Has abdominal swelling to point has stretch marks. He has some diarrhea and sometimes constipation where hurts to go to bathroom. No triggers he can identify. Has tied gluten free and not much difference. He does flare pain and interstitial cystitis with spicy food but he loves it so eats any way. He has had random hives but not associated with any food can tell. No face swelling or SOB. He did go to [facility] and was told is IBS C&D forms. Tried on old antipsychotic med and no help. Therapist now has him on Prozac and works best but advised on food allergy test. He has no rhinitis, no asthma or eczema. Had tightness in chest with PCN in past. Gets anxious on caffeine so avoids. He burris take hydroxyzine at night for his IC.       Environmental History: see history section for home environment  Review of Systems   Constitutional: Negative for activity change, appetite change, chills, fatigue, fever and unexpected weight change.   HENT: Negative for congestion, ear discharge, ear pain, facial swelling, hearing loss, mouth sores, nosebleeds, postnasal drip, rhinorrhea, sinus pressure, sneezing, sore throat, tinnitus, trouble swallowing and voice change.    Eyes: Negative for discharge, redness, itching and visual disturbance.   Respiratory: Negative for cough, chest tightness, shortness of breath and wheezing.    Cardiovascular: Negative for chest pain, palpitations and leg swelling.   Gastrointestinal: Positive for abdominal distention, abdominal pain, constipation and diarrhea. Negative for nausea and vomiting.   Genitourinary:  Negative for difficulty urinating.   Musculoskeletal: Negative for arthralgias, back pain, joint swelling and myalgias.   Skin: Positive for rash. Negative for color change and pallor.   Neurological: Negative for dizziness, tremors, speech difficulty, weakness, light-headedness and headaches.   Hematological: Negative for adenopathy. Does not bruise/bleed easily.   Psychiatric/Behavioral: Negative for agitation, confusion, decreased concentration and sleep disturbance. The patient is not nervous/anxious.         Objective:      Physical Exam   Constitutional: He is oriented to person, place, and time. He appears well-developed and well-nourished. No distress.   HENT:   Head: Normocephalic and atraumatic.   Right Ear: Hearing, tympanic membrane, external ear and ear canal normal.   Left Ear: Hearing, tympanic membrane, external ear and ear canal normal.   Nose: No mucosal edema (pink turbinates), rhinorrhea, sinus tenderness or septal deviation. No epistaxis.   Mouth/Throat: Oropharynx is clear and moist and mucous membranes are normal. No uvula swelling.   Eyes: Conjunctivae are normal. Right eye exhibits no discharge. Left eye exhibits no discharge.   Neck: Normal range of motion. No thyromegaly present.   Cardiovascular: Normal rate, regular rhythm and normal heart sounds.   No murmur heard.  Pulmonary/Chest: Effort normal and breath sounds normal. No respiratory distress. He has no wheezes.   Abdominal: Soft. He exhibits no distension. There is no tenderness.   Musculoskeletal: Normal range of motion. He exhibits no edema.   Lymphadenopathy:     He has no cervical adenopathy.   Neurological: He is alert and oriented to person, place, and time. Coordination normal.   Skin: Skin is warm and dry. No rash noted. No erythema.   Psychiatric: He has a normal mood and affect. His behavior is normal. Judgment and thought content normal.   Nursing note and vitals reviewed.      Laboratory:   none performed   Assessment:        1. Generalized abdominal pain    2. Abdominal bloating    3. Diarrhea, unspecified type         Plan:       1. Advised pt and mom that food allergy is mediated by IgE and typical reactions are urticaria angioedema and SOB. GI issues are not common IgE mediated and more likely food intolerance but given hives will send immunocaps to further eval  2. Phone review  3.Dr Pierre notified of completed consult via [employer]

## 2020-02-12 ENCOUNTER — TELEPHONE (OUTPATIENT)
Dept: GASTROENTEROLOGY | Facility: CLINIC | Age: 25
End: 2020-02-12

## 2020-02-12 NOTE — TELEPHONE ENCOUNTER
"Please call to inform & review the results with the patient- radiology report of the abdominal ultrasound showed fatty liver: For fatty liver recommend: low fat, low cholesterol diet, maintain good control of blood sugars and cholesterol levels, exercise, weight loss (if overweight), minimize/avoid alcohol and tylenol products, & follow-up with PCP for continued evaluation and management; if specialist is needed, recommend seeing hepatology.    "A splenic hypodensity was noted on 1/31/19 [ct scan] that was not visualized or able to be further evaluated on this examination": Recommend follow-up with Primary Care Provider for continued evaluation and management of splenic finding on prior ct scan (not seen on this ultrasound).  Otherwise, unremarkable findings.    Continue with previous recommendations. If no improvement in symptoms or symptoms worsen, call/follow-up at clinic or go to ER.  Please release results to patient's mychart once you have discussed results and recommendations with patient.  Thanks,        "

## 2020-02-17 ENCOUNTER — OFFICE VISIT (OUTPATIENT)
Dept: FAMILY MEDICINE | Facility: CLINIC | Age: 25
End: 2020-02-17
Payer: COMMERCIAL

## 2020-02-17 VITALS
OXYGEN SATURATION: 96 % | HEART RATE: 77 BPM | RESPIRATION RATE: 18 BRPM | SYSTOLIC BLOOD PRESSURE: 110 MMHG | TEMPERATURE: 98 F | BODY MASS INDEX: 30.76 KG/M2 | DIASTOLIC BLOOD PRESSURE: 70 MMHG | HEIGHT: 73 IN | WEIGHT: 232.06 LBS

## 2020-02-17 DIAGNOSIS — R06.83 SNORING: ICD-10-CM

## 2020-02-17 DIAGNOSIS — R53.83 FATIGUE, UNSPECIFIED TYPE: ICD-10-CM

## 2020-02-17 DIAGNOSIS — R03.0 ELEVATED BLOOD PRESSURE READING WITHOUT DIAGNOSIS OF HYPERTENSION: Primary | ICD-10-CM

## 2020-02-17 DIAGNOSIS — R51.9 FREQUENT HEADACHES: ICD-10-CM

## 2020-02-17 DIAGNOSIS — Z11.4 SCREENING FOR HIV WITHOUT PRESENCE OF RISK FACTORS: ICD-10-CM

## 2020-02-17 PROCEDURE — 90686 IIV4 VACC NO PRSV 0.5 ML IM: CPT | Mod: S$GLB,,, | Performed by: FAMILY MEDICINE

## 2020-02-17 PROCEDURE — 90471 FLU VACCINE (QUAD) GREATER THAN OR EQUAL TO 3YO PRESERVATIVE FREE IM: ICD-10-PCS | Mod: S$GLB,,, | Performed by: FAMILY MEDICINE

## 2020-02-17 PROCEDURE — 3008F PR BODY MASS INDEX (BMI) DOCUMENTED: ICD-10-PCS | Mod: CPTII,S$GLB,, | Performed by: FAMILY MEDICINE

## 2020-02-17 PROCEDURE — 99214 OFFICE O/P EST MOD 30 MIN: CPT | Mod: 25,S$GLB,, | Performed by: FAMILY MEDICINE

## 2020-02-17 PROCEDURE — 99214 PR OFFICE/OUTPT VISIT, EST, LEVL IV, 30-39 MIN: ICD-10-PCS | Mod: 25,S$GLB,, | Performed by: FAMILY MEDICINE

## 2020-02-17 PROCEDURE — 90472 IMMUNIZATION ADMIN EACH ADD: CPT | Mod: S$GLB,,, | Performed by: FAMILY MEDICINE

## 2020-02-17 PROCEDURE — 90686 FLU VACCINE (QUAD) GREATER THAN OR EQUAL TO 3YO PRESERVATIVE FREE IM: ICD-10-PCS | Mod: S$GLB,,, | Performed by: FAMILY MEDICINE

## 2020-02-17 PROCEDURE — 3078F DIAST BP <80 MM HG: CPT | Mod: CPTII,S$GLB,, | Performed by: FAMILY MEDICINE

## 2020-02-17 PROCEDURE — 3074F SYST BP LT 130 MM HG: CPT | Mod: CPTII,S$GLB,, | Performed by: FAMILY MEDICINE

## 2020-02-17 PROCEDURE — 90472 TDAP VACCINE GREATER THAN OR EQUAL TO 7YO IM: ICD-10-PCS | Mod: S$GLB,,, | Performed by: FAMILY MEDICINE

## 2020-02-17 PROCEDURE — 90471 IMMUNIZATION ADMIN: CPT | Mod: S$GLB,,, | Performed by: FAMILY MEDICINE

## 2020-02-17 PROCEDURE — 3074F PR MOST RECENT SYSTOLIC BLOOD PRESSURE < 130 MM HG: ICD-10-PCS | Mod: CPTII,S$GLB,, | Performed by: FAMILY MEDICINE

## 2020-02-17 PROCEDURE — 90715 TDAP VACCINE 7 YRS/> IM: CPT | Mod: S$GLB,,, | Performed by: FAMILY MEDICINE

## 2020-02-17 PROCEDURE — 3008F BODY MASS INDEX DOCD: CPT | Mod: CPTII,S$GLB,, | Performed by: FAMILY MEDICINE

## 2020-02-17 PROCEDURE — 3078F PR MOST RECENT DIASTOLIC BLOOD PRESSURE < 80 MM HG: ICD-10-PCS | Mod: CPTII,S$GLB,, | Performed by: FAMILY MEDICINE

## 2020-02-17 PROCEDURE — 90715 TDAP VACCINE GREATER THAN OR EQUAL TO 7YO IM: ICD-10-PCS | Mod: S$GLB,,, | Performed by: FAMILY MEDICINE

## 2020-02-17 RX ORDER — METOPROLOL TARTRATE 25 MG/1
25 TABLET, FILM COATED ORAL 2 TIMES DAILY
Qty: 60 TABLET | Refills: 1 | Status: SHIPPED | OUTPATIENT
Start: 2020-02-17 | End: 2020-04-14 | Stop reason: SDUPTHER

## 2020-02-17 NOTE — PATIENT INSTRUCTIONS
The actual sleep study is performed by [facility].    If you have any questions please call [phone].

## 2020-02-18 ENCOUNTER — PATIENT MESSAGE (OUTPATIENT)
Dept: ALLERGY | Facility: CLINIC | Age: 25
End: 2020-02-18

## 2020-02-19 ENCOUNTER — LAB VISIT (OUTPATIENT)
Dept: LAB | Facility: HOSPITAL | Age: 25
End: 2020-02-19
Attending: FAMILY MEDICINE
Payer: COMMERCIAL

## 2020-02-19 DIAGNOSIS — R06.83 SNORING: ICD-10-CM

## 2020-02-19 DIAGNOSIS — R53.83 FATIGUE, UNSPECIFIED TYPE: ICD-10-CM

## 2020-02-19 DIAGNOSIS — Z11.4 SCREENING FOR HIV WITHOUT PRESENCE OF RISK FACTORS: ICD-10-CM

## 2020-02-19 LAB
ALBUMIN SERPL BCP-MCNC: 4 G/DL (ref 3.5–5.2)
ALP SERPL-CCNC: 57 U/L (ref 55–135)
ALT SERPL W/O P-5'-P-CCNC: 64 U/L (ref 10–44)
ANION GAP SERPL CALC-SCNC: 8 MMOL/L (ref 8–16)
AST SERPL-CCNC: 29 U/L (ref 10–40)
BASOPHILS # BLD AUTO: 0.06 K/UL (ref 0–0.2)
BASOPHILS NFR BLD: 0.9 % (ref 0–1.9)
BILIRUB SERPL-MCNC: 0.4 MG/DL (ref 0.1–1)
BUN SERPL-MCNC: 18 MG/DL (ref 6–20)
CALCIUM SERPL-MCNC: 9.2 MG/DL (ref 8.7–10.5)
CHLORIDE SERPL-SCNC: 103 MMOL/L (ref 95–110)
CO2 SERPL-SCNC: 28 MMOL/L (ref 23–29)
CREAT SERPL-MCNC: 1 MG/DL (ref 0.5–1.4)
DIFFERENTIAL METHOD: ABNORMAL
EOSINOPHIL # BLD AUTO: 0.1 K/UL (ref 0–0.5)
EOSINOPHIL NFR BLD: 1.5 % (ref 0–8)
ERYTHROCYTE [DISTWIDTH] IN BLOOD BY AUTOMATED COUNT: 13.2 % (ref 11.5–14.5)
EST. GFR  (AFRICAN AMERICAN): >60 ML/MIN/1.73 M^2
EST. GFR  (NON AFRICAN AMERICAN): >60 ML/MIN/1.73 M^2
ESTIMATED AVG GLUCOSE: 105 MG/DL (ref 68–131)
GLUCOSE SERPL-MCNC: 82 MG/DL (ref 70–110)
HBA1C MFR BLD HPLC: 5.3 % (ref 4–5.6)
HCT VFR BLD AUTO: 44.2 % (ref 40–54)
HGB BLD-MCNC: 13.8 G/DL (ref 14–18)
IMM GRANULOCYTES # BLD AUTO: 0.02 K/UL (ref 0–0.04)
IMM GRANULOCYTES NFR BLD AUTO: 0.3 % (ref 0–0.5)
LYMPHOCYTES # BLD AUTO: 3 K/UL (ref 1–4.8)
LYMPHOCYTES NFR BLD: 44.7 % (ref 18–48)
MCH RBC QN AUTO: 31.4 PG (ref 27–31)
MCHC RBC AUTO-ENTMCNC: 31.2 G/DL (ref 32–36)
MCV RBC AUTO: 101 FL (ref 82–98)
MONOCYTES # BLD AUTO: 0.5 K/UL (ref 0.3–1)
MONOCYTES NFR BLD: 7.1 % (ref 4–15)
NEUTROPHILS # BLD AUTO: 3 K/UL (ref 1.8–7.7)
NEUTROPHILS NFR BLD: 45.5 % (ref 38–73)
NRBC BLD-RTO: 0 /100 WBC
PLATELET # BLD AUTO: 321 K/UL (ref 150–350)
PMV BLD AUTO: 11.2 FL (ref 9.2–12.9)
POTASSIUM SERPL-SCNC: 4.1 MMOL/L (ref 3.5–5.1)
PROT SERPL-MCNC: 6.9 G/DL (ref 6–8.4)
RBC # BLD AUTO: 4.39 M/UL (ref 4.6–6.2)
SODIUM SERPL-SCNC: 139 MMOL/L (ref 136–145)
TSH SERPL DL<=0.005 MIU/L-ACNC: 0.7 UIU/ML (ref 0.4–4)
WBC # BLD AUTO: 6.64 K/UL (ref 3.9–12.7)

## 2020-02-19 PROCEDURE — 83036 HEMOGLOBIN GLYCOSYLATED A1C: CPT

## 2020-02-19 PROCEDURE — 85025 COMPLETE CBC W/AUTO DIFF WBC: CPT

## 2020-02-19 PROCEDURE — 84443 ASSAY THYROID STIM HORMONE: CPT

## 2020-02-19 PROCEDURE — 80053 COMPREHEN METABOLIC PANEL: CPT

## 2020-02-19 PROCEDURE — 86703 HIV-1/HIV-2 1 RESULT ANTBDY: CPT

## 2020-02-19 PROCEDURE — 36415 COLL VENOUS BLD VENIPUNCTURE: CPT | Mod: PN

## 2020-02-20 ENCOUNTER — OFFICE VISIT (OUTPATIENT)
Dept: PSYCHIATRY | Facility: CLINIC | Age: 25
End: 2020-02-20
Payer: COMMERCIAL

## 2020-02-20 VITALS
DIASTOLIC BLOOD PRESSURE: 76 MMHG | BODY MASS INDEX: 30.69 KG/M2 | HEIGHT: 73 IN | HEART RATE: 87 BPM | WEIGHT: 231.56 LBS | SYSTOLIC BLOOD PRESSURE: 117 MMHG

## 2020-02-20 DIAGNOSIS — R94.6 ABNORMAL THYROID FUNCTION TEST: ICD-10-CM

## 2020-02-20 DIAGNOSIS — Z87.19 HISTORY OF IBS: ICD-10-CM

## 2020-02-20 DIAGNOSIS — F41.1 GAD (GENERALIZED ANXIETY DISORDER): Primary | ICD-10-CM

## 2020-02-20 DIAGNOSIS — F84.0 AUTISM SPECTRUM DISORDER REQUIRING SUBSTANTIAL SUPPORT (LEVEL 2): ICD-10-CM

## 2020-02-20 DIAGNOSIS — F40.10 SOCIAL ANXIETY DISORDER: ICD-10-CM

## 2020-02-20 DIAGNOSIS — N30.10 IC (INTERSTITIAL CYSTITIS): ICD-10-CM

## 2020-02-20 LAB — HIV 1+2 AB+HIV1 P24 AG SERPL QL IA: NEGATIVE

## 2020-02-20 PROCEDURE — 99999 PR PBB SHADOW E&M-EST. PATIENT-LVL III: CPT | Mod: PBBFAC,,, | Performed by: PSYCHIATRY & NEUROLOGY

## 2020-02-20 PROCEDURE — 3008F PR BODY MASS INDEX (BMI) DOCUMENTED: ICD-10-PCS | Mod: CPTII,S$GLB,, | Performed by: PSYCHIATRY & NEUROLOGY

## 2020-02-20 PROCEDURE — 3078F DIAST BP <80 MM HG: CPT | Mod: CPTII,S$GLB,, | Performed by: PSYCHIATRY & NEUROLOGY

## 2020-02-20 PROCEDURE — 3074F PR MOST RECENT SYSTOLIC BLOOD PRESSURE < 130 MM HG: ICD-10-PCS | Mod: CPTII,S$GLB,, | Performed by: PSYCHIATRY & NEUROLOGY

## 2020-02-20 PROCEDURE — 99999 PR PBB SHADOW E&M-EST. PATIENT-LVL III: ICD-10-PCS | Mod: PBBFAC,,, | Performed by: PSYCHIATRY & NEUROLOGY

## 2020-02-20 PROCEDURE — 99214 PR OFFICE/OUTPT VISIT, EST, LEVL IV, 30-39 MIN: ICD-10-PCS | Mod: S$GLB,,, | Performed by: PSYCHIATRY & NEUROLOGY

## 2020-02-20 PROCEDURE — 90833 PR PSYCHOTHERAPY W/PATIENT W/E&M, 30 MIN (ADD ON): ICD-10-PCS | Mod: S$GLB,,, | Performed by: PSYCHIATRY & NEUROLOGY

## 2020-02-20 PROCEDURE — 99214 OFFICE O/P EST MOD 30 MIN: CPT | Mod: S$GLB,,, | Performed by: PSYCHIATRY & NEUROLOGY

## 2020-02-20 PROCEDURE — 3078F PR MOST RECENT DIASTOLIC BLOOD PRESSURE < 80 MM HG: ICD-10-PCS | Mod: CPTII,S$GLB,, | Performed by: PSYCHIATRY & NEUROLOGY

## 2020-02-20 PROCEDURE — 90833 PSYTX W PT W E/M 30 MIN: CPT | Mod: S$GLB,,, | Performed by: PSYCHIATRY & NEUROLOGY

## 2020-02-20 PROCEDURE — 3008F BODY MASS INDEX DOCD: CPT | Mod: CPTII,S$GLB,, | Performed by: PSYCHIATRY & NEUROLOGY

## 2020-02-20 PROCEDURE — 3074F SYST BP LT 130 MM HG: CPT | Mod: CPTII,S$GLB,, | Performed by: PSYCHIATRY & NEUROLOGY

## 2020-02-20 NOTE — PROGRESS NOTES
Subjective:       Patient ID: Jm Chaparro is a 24 y.o. male.    Chief Complaint: Has had elevated blood pressure (Flu shot today)    HPI   The patient is a 24-year-old with autism who is here today with his mom.  I have not seen him since 2017.  He has been seeing several specialist.  He sees a psychiatrist for his ADD, anxiety, depression and autism and he is taking Prozac and Adderall.  He sees a rheumatologist for his psoriatic arthritis and is taking Humira, methotrexate and Mobic for this.  He sees a urologist for interstitial cystitis and takes Atarax, elmiron and ditropan for this.  He does see a gastroenterologist for his chronic abdominal pain attributed to IBS.  He recently saw an allergist for an allergy workup    He has recently had some elevated blood pressure readings.  His blood pressures have recently been ranging from 115-150 over 90-100s with an average in the 120s over 90s.  He does note that he gets a splitting headache on the top of his head a couple of times a week and, while his headaches have been present forever, they have ramped up over the last month making them wonder if the headaches might be related to his blood pressure.  Today, his blood pressure is 110/70    We did discuss his headaches.  As noted above, he describes these as a splitting headache.  He does occasionally have nausea and vomiting with them.  He does occasionally have light and sound sensitivity with them     His mom notes that he has been snoring quite a bit and sometimes is gasping when he is sleeping.  He is fatigued to frequently throughout the day.  They are interested in pursuing a sleep evaluation    Review of Systems   Constitutional: Negative for appetite change, chills, diaphoresis, fatigue, fever and unexpected weight change.   HENT: Negative for congestion, ear pain, postnasal drip, rhinorrhea, sinus pressure, sneezing, sore throat and trouble swallowing.    Eyes: Negative for pain,  discharge and visual disturbance.   Respiratory: Negative for cough, chest tightness, shortness of breath and wheezing.    Cardiovascular: Negative for chest pain, palpitations and leg swelling.   Gastrointestinal: Positive for abdominal pain, constipation and diarrhea. Negative for abdominal distention, blood in stool, nausea and vomiting.   Musculoskeletal: Positive for arthralgias.   Skin: Positive for rash.   Neurological: Positive for headaches.   Psychiatric/Behavioral: Positive for behavioral problems, dysphoric mood and sleep disturbance. Negative for self-injury and suicidal ideas. The patient is nervous/anxious.        Objective:      Physical Exam   Constitutional: He is oriented to person, place, and time. He appears well-developed and well-nourished. No distress.   HENT:   Head: Normocephalic and atraumatic.   Right Ear: Hearing, tympanic membrane, external ear and ear canal normal.   Left Ear: Hearing, tympanic membrane, external ear and ear canal normal.   Nose: Nose normal.   Mouth/Throat: Oropharynx is clear and moist and mucous membranes are normal. No oral lesions. No oropharyngeal exudate, posterior oropharyngeal edema or posterior oropharyngeal erythema.   Eyes: Pupils are equal, round, and reactive to light. Conjunctivae, EOM and lids are normal. No scleral icterus.   Neck: Normal range of motion. Neck supple. Carotid bruit is not present. No thyroid mass and no thyromegaly present.   Cardiovascular: Normal rate, regular rhythm and normal heart sounds.  No extrasystoles are present. PMI is not displaced. Exam reveals no gallop.   No murmur heard.  Pulmonary/Chest: Effort normal and breath sounds normal. No accessory muscle usage. No respiratory distress.   Clear to auscultation bilaterally.   Abdominal: Soft. Normal appearance and bowel sounds are normal. He exhibits no abdominal bruit. There is no hepatosplenomegaly. There is no tenderness. There is no rebound.   Lymphadenopathy:        Head  "(right side): No submental and no submandibular adenopathy present.        Head (left side): No submental and no submandibular adenopathy present.        Right cervical: No superficial cervical, no deep cervical and no posterior cervical adenopathy present.       Left cervical: No superficial cervical, no deep cervical and no posterior cervical adenopathy present.        Right: No supraclavicular adenopathy present.        Left: No supraclavicular adenopathy present.   Neurological: He is alert and oriented to person, place, and time.   Skin: Skin is warm, dry and intact.   Psychiatric: His affect is blunt. He is slowed.     Blood pressure 110/70, pulse 77, temperature 98.1 °F (36.7 °C), temperature source Oral, resp. rate 18, height 6' 1" (1.854 m), weight 105.3 kg (232 lb 0.6 oz), SpO2 96 %.Body mass index is 30.61 kg/m².          A/P:  1) elevated blood pressure without the diagnosis of hypertension.  Persistent but new to me We are going to start  metoprolol 25 mg twice a day.  We will see how this manages his blood pressure and it may help his anxiety as well.  2) fatigue with snoring with sleep apnea.  New to me.  We will refer him for a sleep evaluation.  We will check labs  3) headaches.  Persistent but new to me.  We are going to be addressing the blood pressure as noted above.  They will start keep a headache diary.  We will re-evaluate his headaches at his next visit  4) ADD, anxiety, depression and autism.  Stable.  Follow up with Psychiatry and continue medication or their direction  5) IBS.  Stable.  Follow up with GI  6) interstitial cystitis.  Chronic.  Follow up with Urology and continue with medication or their direction      5)  Health maintenance issues.  We will draw fasting labs.  He will get his flu shot and Tdap shot today    I will see him back in 2 months or sooner if needed    "
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
## 2020-02-21 ENCOUNTER — PATIENT MESSAGE (OUTPATIENT)
Dept: FAMILY MEDICINE | Facility: CLINIC | Age: 25
End: 2020-02-21

## 2020-02-29 RX ORDER — ESZOPICLONE 2 MG/1
TABLET, FILM COATED ORAL
Qty: 90 TABLET | OUTPATIENT
Start: 2020-02-29

## 2020-03-01 ENCOUNTER — TELEPHONE (OUTPATIENT)
Dept: PSYCHIATRY | Facility: CLINIC | Age: 25
End: 2020-03-01

## 2020-03-02 RX ORDER — ESZOPICLONE 2 MG/1
TABLET, FILM COATED ORAL
Qty: 30 TABLET | Refills: 0 | Status: SHIPPED | OUTPATIENT
Start: 2020-03-02 | End: 2020-04-07

## 2020-03-02 NOTE — TELEPHONE ENCOUNTER
Lunesta refilled in coverage for Dr Vyas.   Pt's chart and [place]  reviewed - note: rx for Acetaminophen-Cod #3 for # 15 tabs, Dr Worthington. 1/31/20    Will confirm pt no longer taking this medication as it can interact with Lunesta.  Please confirm with pt's caregiver/mother

## 2020-03-06 ENCOUNTER — PATIENT MESSAGE (OUTPATIENT)
Dept: FAMILY MEDICINE | Facility: CLINIC | Age: 25
End: 2020-03-06

## 2020-03-06 ENCOUNTER — PATIENT MESSAGE (OUTPATIENT)
Dept: PSYCHIATRY | Facility: CLINIC | Age: 25
End: 2020-03-06

## 2020-03-06 RX ORDER — DEXTROAMPHETAMINE SACCHARATE, AMPHETAMINE ASPARTATE MONOHYDRATE, DEXTROAMPHETAMINE SULFATE AND AMPHETAMINE SULFATE 5; 5; 5; 5 MG/1; MG/1; MG/1; MG/1
20 CAPSULE, EXTENDED RELEASE ORAL EVERY MORNING
Qty: 30 CAPSULE | Refills: 0 | Status: SHIPPED | OUTPATIENT
Start: 2020-03-06 | End: 2020-04-13 | Stop reason: SDUPTHER

## 2020-03-10 ENCOUNTER — PATIENT MESSAGE (OUTPATIENT)
Dept: FAMILY MEDICINE | Facility: CLINIC | Age: 25
End: 2020-03-10

## 2020-03-27 ENCOUNTER — PATIENT MESSAGE (OUTPATIENT)
Dept: NEPHROLOGY | Facility: CLINIC | Age: 25
End: 2020-03-27

## 2020-03-27 ENCOUNTER — PATIENT MESSAGE (OUTPATIENT)
Dept: UROLOGY | Facility: CLINIC | Age: 25
End: 2020-03-27

## 2020-03-27 ENCOUNTER — PATIENT MESSAGE (OUTPATIENT)
Dept: PSYCHIATRY | Facility: CLINIC | Age: 25
End: 2020-03-27

## 2020-03-30 ENCOUNTER — PATIENT MESSAGE (OUTPATIENT)
Dept: FAMILY MEDICINE | Facility: CLINIC | Age: 25
End: 2020-03-30

## 2020-04-07 ENCOUNTER — PATIENT MESSAGE (OUTPATIENT)
Dept: FAMILY MEDICINE | Facility: CLINIC | Age: 25
End: 2020-04-07

## 2020-04-07 RX ORDER — ESZOPICLONE 2 MG/1
TABLET, FILM COATED ORAL
Qty: 30 TABLET | Refills: 0 | Status: SHIPPED | OUTPATIENT
Start: 2020-04-07 | End: 2020-06-15 | Stop reason: SDUPTHER

## 2020-04-11 DIAGNOSIS — F41.1 GAD (GENERALIZED ANXIETY DISORDER): ICD-10-CM

## 2020-04-13 RX ORDER — FLUOXETINE HYDROCHLORIDE 40 MG/1
CAPSULE ORAL
Qty: 30 CAPSULE | Refills: 2 | Status: SHIPPED | OUTPATIENT
Start: 2020-04-13 | End: 2020-07-14

## 2020-04-13 RX ORDER — DEXTROAMPHETAMINE SACCHARATE, AMPHETAMINE ASPARTATE MONOHYDRATE, DEXTROAMPHETAMINE SULFATE AND AMPHETAMINE SULFATE 5; 5; 5; 5 MG/1; MG/1; MG/1; MG/1
20 CAPSULE, EXTENDED RELEASE ORAL EVERY MORNING
Qty: 30 CAPSULE | Refills: 0 | Status: SHIPPED | OUTPATIENT
Start: 2020-04-13 | End: 2020-06-14 | Stop reason: SDUPTHER

## 2020-04-13 NOTE — TELEPHONE ENCOUNTER
Pt is requesting medication refill on Adderall XR 20 mg   Last refill: 3/6/20  Last visit: 2/20/20  Follow Up: 4/28/20

## 2020-04-14 ENCOUNTER — OFFICE VISIT (OUTPATIENT)
Dept: FAMILY MEDICINE | Facility: CLINIC | Age: 25
End: 2020-04-14
Payer: COMMERCIAL

## 2020-04-14 DIAGNOSIS — B96.89 ACUTE BACTERIAL RHINOSINUSITIS: ICD-10-CM

## 2020-04-14 DIAGNOSIS — I10 HYPERTENSION, ESSENTIAL: Primary | ICD-10-CM

## 2020-04-14 DIAGNOSIS — R53.83 FATIGUE, UNSPECIFIED TYPE: ICD-10-CM

## 2020-04-14 DIAGNOSIS — J01.90 ACUTE BACTERIAL RHINOSINUSITIS: ICD-10-CM

## 2020-04-14 PROCEDURE — 99213 PR OFFICE/OUTPT VISIT, EST, LEVL III, 20-29 MIN: ICD-10-PCS | Mod: GT,,, | Performed by: FAMILY MEDICINE

## 2020-04-14 PROCEDURE — 99213 OFFICE O/P EST LOW 20 MIN: CPT | Mod: GT,,, | Performed by: FAMILY MEDICINE

## 2020-04-14 RX ORDER — CEFDINIR 300 MG/1
300 CAPSULE ORAL 2 TIMES DAILY
Qty: 20 CAPSULE | Refills: 0 | Status: SHIPPED | OUTPATIENT
Start: 2020-04-14 | End: 2020-04-24

## 2020-04-14 RX ORDER — METOPROLOL TARTRATE 25 MG/1
25 TABLET, FILM COATED ORAL 2 TIMES DAILY
Qty: 180 TABLET | Refills: 1 | Status: SHIPPED | OUTPATIENT
Start: 2020-04-14 | End: 2020-07-14

## 2020-04-15 NOTE — PROGRESS NOTES
Subjective:       Patient ID: Jm Chaparro is a 24 y.o. male.    Chief Complaint: Follow-up    HPI     The patient is a 24-year-old who is here today for chronic follow-up.  He has been isolating at home with the pandemic.  Today we discussed the followin)  Hypertension.  His blood pressure has been much better with the metoprolol.  His readings have been consistently less than 135/85.  He is no longer having headaches  2) psoriasis and psoriatic arthritis.  He continues to follow with his rheumatologist.  He is currently on Humira.  They have an upcoming appointment with the rheumatology team in May.  They are concerned that he is having some side effects with the Humira.  They are concerned that the Humira might be causing doretha debris in his urine and they might be adjusting his medications.  Mom also plans to discuss this further with his urologist  3) fatigue.  He is still having a lot of fatigue.  He sleeps most the day way.  He did have mild MARIJA on a recent sleep study.  He was supposed to the going to discuss CPAP titration but that is on hold with a covid 19 currently  4) elevated LFTs and fatty liver.  We did discuss his elevated ALT and fatty liver and the fact that his liver tests have been elevated since 2016.  He understands that he needs to work on diet exercise and weight loss.  He feels that he has a healthy diet but does not do much physical activity during the day  5) odor from the nose.  He is complaining of a strong rotten odor being present in his nose.  This has been present for the past 2 days.  On questioning, he does yellow sinus drainage, ear congestion and a sore throat.  He denies any chest congestion, cough, shortness of breath, fevers or chills.    Review of Systems   Constitutional: Positive for fatigue. Negative for appetite change, chills, diaphoresis, fever and unexpected weight change.   HENT: Negative for congestion, ear pain, postnasal drip, rhinorrhea, sinus  pressure, sneezing, sore throat and trouble swallowing.    Eyes: Negative for pain, discharge and visual disturbance.   Respiratory: Negative for cough, chest tightness, shortness of breath and wheezing.    Cardiovascular: Negative for chest pain, palpitations and leg swelling.   Gastrointestinal: Negative for abdominal distention, abdominal pain, blood in stool, constipation, diarrhea, nausea and vomiting.   Musculoskeletal: Positive for arthralgias.   Skin: Negative for rash.   Neurological: Negative for headaches.       Objective:      Physical Exam   Constitutional: He appears well-developed and well-nourished.     There were no vitals taken for this visit.There is no height or weight on file to calculate BMI.            A/P:  1) hypertension.  Well controlled.  Continue with metoprolol  2) psoriasis and psoriatic arthritis.  Chronic.  Follow up with rheumatology and continue with medication under their direction.  Mom will reach out to Urology regarding the urinary symptoms he is experiencing as it seems unlikely in my opinion to be related to the Humira  3) fatigue.  Persistent.  [facility] as soon as possible   4)  Elevated LFTs fatty liver.  Stable.  He will work on diet exercise weight loss with a goal of 10-15 lb of weight loss in the next 6 months.  His hepatitis immunizations are up-to-date  5) rhinitis suspicious for bacterial infection.  Acute.  I am going to treat him with a course of omnicef.  If his symptoms do not improve or if they worsen, he will let me know                The patient location is: home  The chief complaint leading to consultation is: follow up  Visit type: Virtual visit with synchronous audio and video  Total time spent with patient: 15 min    Each patient to whom he or she provides medical services by telemedicine is:  (1) informed of the relationship between the physician and patient and the respective role of any other health care provider  with respect to management of the patient; and (2) notified that he or she may decline to receive medical services by telemedicine and may withdraw from such care at any time.

## 2020-04-27 ENCOUNTER — PATIENT MESSAGE (OUTPATIENT)
Dept: PSYCHIATRY | Facility: CLINIC | Age: 25
End: 2020-04-27

## 2020-04-28 ENCOUNTER — OFFICE VISIT (OUTPATIENT)
Dept: PSYCHIATRY | Facility: CLINIC | Age: 25
End: 2020-04-28
Payer: COMMERCIAL

## 2020-04-28 DIAGNOSIS — F84.0 AUTISM SPECTRUM DISORDER REQUIRING SUBSTANTIAL SUPPORT (LEVEL 2): ICD-10-CM

## 2020-04-28 DIAGNOSIS — N20.0 KIDNEY STONE: ICD-10-CM

## 2020-04-28 DIAGNOSIS — F40.10 SOCIAL ANXIETY DISORDER: ICD-10-CM

## 2020-04-28 DIAGNOSIS — N30.10 IC (INTERSTITIAL CYSTITIS): ICD-10-CM

## 2020-04-28 DIAGNOSIS — F41.1 GAD (GENERALIZED ANXIETY DISORDER): Primary | ICD-10-CM

## 2020-04-28 PROCEDURE — 99214 OFFICE O/P EST MOD 30 MIN: CPT | Mod: 95,,, | Performed by: PSYCHIATRY & NEUROLOGY

## 2020-04-28 PROCEDURE — 99214 PR OFFICE/OUTPT VISIT, EST, LEVL IV, 30-39 MIN: ICD-10-PCS | Mod: 95,,, | Performed by: PSYCHIATRY & NEUROLOGY
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
## 2020-05-05 ENCOUNTER — PATIENT MESSAGE (OUTPATIENT)
Dept: ADMINISTRATIVE | Facility: HOSPITAL | Age: 25
End: 2020-05-05

## 2020-05-20 ENCOUNTER — PATIENT MESSAGE (OUTPATIENT)
Dept: FAMILY MEDICINE | Facility: CLINIC | Age: 25
End: 2020-05-20

## 2020-05-20 RX ORDER — HYOSCYAMINE SULFATE 0.125 MG
125 TABLET ORAL EVERY 6 HOURS PRN
Qty: 40 TABLET | Refills: 1 | Status: SHIPPED | OUTPATIENT
Start: 2020-05-20 | End: 2020-07-16 | Stop reason: SDUPTHER

## 2020-06-13 ENCOUNTER — LAB VISIT (OUTPATIENT)
Dept: LAB | Facility: HOSPITAL | Age: 25
End: 2020-06-13
Attending: FAMILY MEDICINE
Payer: COMMERCIAL

## 2020-06-13 DIAGNOSIS — L40.59 POLYARTICULAR PSORIATIC ARTHRITIS: Primary | ICD-10-CM

## 2020-06-13 DIAGNOSIS — Z79.899 ENCOUNTER FOR LONG-TERM (CURRENT) USE OF OTHER MEDICATIONS: ICD-10-CM

## 2020-06-13 LAB
ALBUMIN SERPL BCP-MCNC: 4 G/DL (ref 3.5–5.2)
ALP SERPL-CCNC: 59 U/L (ref 55–135)
ALT SERPL W/O P-5'-P-CCNC: 57 U/L (ref 10–44)
ANION GAP SERPL CALC-SCNC: 9 MMOL/L (ref 8–16)
AST SERPL-CCNC: 30 U/L (ref 10–40)
BASOPHILS # BLD AUTO: 0.07 K/UL (ref 0–0.2)
BASOPHILS NFR BLD: 0.9 % (ref 0–1.9)
BILIRUB SERPL-MCNC: 0.4 MG/DL (ref 0.1–1)
BUN SERPL-MCNC: 11 MG/DL (ref 6–20)
CALCIUM SERPL-MCNC: 9.8 MG/DL (ref 8.7–10.5)
CHLORIDE SERPL-SCNC: 103 MMOL/L (ref 95–110)
CO2 SERPL-SCNC: 28 MMOL/L (ref 23–29)
CREAT SERPL-MCNC: 1.1 MG/DL (ref 0.5–1.4)
CRP SERPL-MCNC: 1.4 MG/L (ref 0–8.2)
DIFFERENTIAL METHOD: ABNORMAL
EOSINOPHIL # BLD AUTO: 0.2 K/UL (ref 0–0.5)
EOSINOPHIL NFR BLD: 2 % (ref 0–8)
ERYTHROCYTE [DISTWIDTH] IN BLOOD BY AUTOMATED COUNT: 12.9 % (ref 11.5–14.5)
ERYTHROCYTE [SEDIMENTATION RATE] IN BLOOD BY WESTERGREN METHOD: 10 MM/HR (ref 0–10)
EST. GFR  (AFRICAN AMERICAN): >60 ML/MIN/1.73 M^2
EST. GFR  (NON AFRICAN AMERICAN): >60 ML/MIN/1.73 M^2
GLUCOSE SERPL-MCNC: 119 MG/DL (ref 70–110)
HCT VFR BLD AUTO: 46 % (ref 40–54)
HGB BLD-MCNC: 14.6 G/DL (ref 14–18)
IMM GRANULOCYTES # BLD AUTO: 0.05 K/UL (ref 0–0.04)
IMM GRANULOCYTES NFR BLD AUTO: 0.6 % (ref 0–0.5)
LYMPHOCYTES # BLD AUTO: 3.9 K/UL (ref 1–4.8)
LYMPHOCYTES NFR BLD: 48.6 % (ref 18–48)
MCH RBC QN AUTO: 31.3 PG (ref 27–31)
MCHC RBC AUTO-ENTMCNC: 31.7 G/DL (ref 32–36)
MCV RBC AUTO: 99 FL (ref 82–98)
MONOCYTES # BLD AUTO: 0.3 K/UL (ref 0.3–1)
MONOCYTES NFR BLD: 3.3 % (ref 4–15)
NEUTROPHILS # BLD AUTO: 3.6 K/UL (ref 1.8–7.7)
NEUTROPHILS NFR BLD: 44.6 % (ref 38–73)
NRBC BLD-RTO: 0 /100 WBC
PLATELET # BLD AUTO: 339 K/UL (ref 150–350)
PMV BLD AUTO: 10 FL (ref 9.2–12.9)
POTASSIUM SERPL-SCNC: 3.8 MMOL/L (ref 3.5–5.1)
PROT SERPL-MCNC: 7 G/DL (ref 6–8.4)
RBC # BLD AUTO: 4.67 M/UL (ref 4.6–6.2)
SODIUM SERPL-SCNC: 140 MMOL/L (ref 136–145)
WBC # BLD AUTO: 8.11 K/UL (ref 3.9–12.7)

## 2020-06-13 PROCEDURE — 86140 C-REACTIVE PROTEIN: CPT

## 2020-06-13 PROCEDURE — 80053 COMPREHEN METABOLIC PANEL: CPT

## 2020-06-13 PROCEDURE — 85025 COMPLETE CBC W/AUTO DIFF WBC: CPT

## 2020-06-13 PROCEDURE — 36415 COLL VENOUS BLD VENIPUNCTURE: CPT | Mod: PO

## 2020-06-13 PROCEDURE — 85651 RBC SED RATE NONAUTOMATED: CPT | Mod: PO

## 2020-06-17 ENCOUNTER — OFFICE VISIT (OUTPATIENT)
Dept: FAMILY MEDICINE | Facility: CLINIC | Age: 25
End: 2020-06-17
Payer: COMMERCIAL

## 2020-06-17 VITALS
TEMPERATURE: 98 F | HEIGHT: 73 IN | SYSTOLIC BLOOD PRESSURE: 112 MMHG | BODY MASS INDEX: 29.96 KG/M2 | RESPIRATION RATE: 18 BRPM | WEIGHT: 226.06 LBS | HEART RATE: 60 BPM | DIASTOLIC BLOOD PRESSURE: 78 MMHG

## 2020-06-17 DIAGNOSIS — L40.50 PSORIATIC ARTHRITIS: Primary | ICD-10-CM

## 2020-06-17 DIAGNOSIS — I10 HYPERTENSION, ESSENTIAL: ICD-10-CM

## 2020-06-17 DIAGNOSIS — N30.10 IC (INTERSTITIAL CYSTITIS): ICD-10-CM

## 2020-06-17 PROCEDURE — 3074F SYST BP LT 130 MM HG: CPT | Mod: CPTII,S$GLB,, | Performed by: FAMILY MEDICINE

## 2020-06-17 PROCEDURE — 3078F PR MOST RECENT DIASTOLIC BLOOD PRESSURE < 80 MM HG: ICD-10-PCS | Mod: CPTII,S$GLB,, | Performed by: FAMILY MEDICINE

## 2020-06-17 PROCEDURE — 3074F PR MOST RECENT SYSTOLIC BLOOD PRESSURE < 130 MM HG: ICD-10-PCS | Mod: CPTII,S$GLB,, | Performed by: FAMILY MEDICINE

## 2020-06-17 PROCEDURE — 3078F DIAST BP <80 MM HG: CPT | Mod: CPTII,S$GLB,, | Performed by: FAMILY MEDICINE

## 2020-06-17 PROCEDURE — 99214 OFFICE O/P EST MOD 30 MIN: CPT | Mod: S$GLB,,, | Performed by: FAMILY MEDICINE

## 2020-06-17 PROCEDURE — 3008F PR BODY MASS INDEX (BMI) DOCUMENTED: ICD-10-PCS | Mod: CPTII,S$GLB,, | Performed by: FAMILY MEDICINE

## 2020-06-17 PROCEDURE — 3008F BODY MASS INDEX DOCD: CPT | Mod: CPTII,S$GLB,, | Performed by: FAMILY MEDICINE

## 2020-06-17 PROCEDURE — 99214 PR OFFICE/OUTPT VISIT, EST, LEVL IV, 30-39 MIN: ICD-10-PCS | Mod: S$GLB,,, | Performed by: FAMILY MEDICINE

## 2020-06-17 RX ORDER — APREMILAST 30 MG/1
30 TABLET, FILM COATED ORAL DAILY
COMMUNITY
End: 2023-10-02 | Stop reason: SDUPTHER

## 2020-06-17 NOTE — PROGRESS NOTES
Subjective:       Patient ID: Jm Chaparro is a 24 y.o. male.    Chief Complaint: Follow-up    HPI   The patient is 24-year-old with autism who is here today for follow-up.  He is here today with his mom.  With covid 19, he has been more isolated.  He is at home with mom and stepdad.  He sleeps a good part of the day way.  He usually gets up to eat around 10:00 a.m. and then goes back for up 4 or 5 hr nap after breakfast.    Today we discussed the followin)  Psoriatic arthritis.  He recently stopped humira because he was getting recurrent illnesses with the Humira.  He has recently started Otezla on .  In addition to the Otezla, he is continuing with methotrexate and Neurontin.  He is having regular follow-up and blood work with his rheumatologist  2) elevated LFTs and fatty liver.  His recent ALT was slightly elevated but overall down to 57.  Previously his values have been 64 and 89.  He has not been doing much physical activity with the covid 19 pandemic but will try to be more active  3)  Asperger's, anxiety and ADD.  He is continuing to work with his psychiatrist.  Currently he is taking Adderall and Prozac.  4) hypertension.  Today's blood pressure is 112/78.  He is doing well with the metoprolol.    5) probable interstitial cystitis.  He is currently taking Ditropan.  He has not seen the urologist since 2018.  He does have chronic bladder symptoms and the symptoms make him want to not leave the house.      Review of Systems   Constitutional: Positive for fatigue. Negative for activity change, appetite change, chills, diaphoresis, fever and unexpected weight change.   HENT: Positive for trouble swallowing. Negative for congestion, ear pain, hearing loss, postnasal drip, rhinorrhea, sinus pressure, sneezing and sore throat.    Eyes: Negative for pain, discharge and visual disturbance.   Respiratory: Negative for cough, chest tightness, shortness of breath and wheezing.     Cardiovascular: Negative for chest pain, palpitations and leg swelling.   Gastrointestinal: Negative for abdominal distention, abdominal pain, blood in stool, constipation, diarrhea, nausea and vomiting.   Endocrine: Negative for polydipsia and polyuria.   Genitourinary: Positive for difficulty urinating and urgency. Negative for hematuria.   Musculoskeletal: Positive for arthralgias and joint swelling. Negative for neck pain.   Skin: Negative for rash.   Neurological: Positive for weakness and headaches.   Psychiatric/Behavioral: Negative for confusion and dysphoric mood.       Objective:      Physical Exam  Constitutional:       General: He is not in acute distress.     Appearance: Normal appearance. He is well-developed.   HENT:      Head: Normocephalic and atraumatic.      Right Ear: Hearing, tympanic membrane, ear canal and external ear normal.      Left Ear: Hearing, tympanic membrane, ear canal and external ear normal.      Nose: Nose normal.      Mouth/Throat:      Mouth: No oral lesions.      Pharynx: No oropharyngeal exudate or posterior oropharyngeal erythema.   Eyes:      General: Lids are normal. No scleral icterus.     Conjunctiva/sclera: Conjunctivae normal.      Pupils: Pupils are equal, round, and reactive to light.   Neck:      Musculoskeletal: Normal range of motion and neck supple.      Thyroid: No thyroid mass or thyromegaly.      Vascular: No carotid bruit.   Cardiovascular:      Rate and Rhythm: Normal rate and regular rhythm.  No extrasystoles are present.     Chest Wall: PMI is not displaced.      Heart sounds: Normal heart sounds. No murmur. No gallop.    Pulmonary:      Effort: Pulmonary effort is normal. No accessory muscle usage or respiratory distress.      Breath sounds: Normal breath sounds.   Abdominal:      General: Bowel sounds are normal. There is no abdominal bruit.      Palpations: Abdomen is soft.      Tenderness: There is no abdominal tenderness. There is no rebound.  "  Lymphadenopathy:      Head:      Right side of head: No submental or submandibular adenopathy.      Left side of head: No submental or submandibular adenopathy.      Cervical:      Right cervical: No superficial, deep or posterior cervical adenopathy.     Left cervical: No superficial, deep or posterior cervical adenopathy.      Upper Body:      Right upper body: No supraclavicular adenopathy.      Left upper body: No supraclavicular adenopathy.   Skin:     General: Skin is warm and dry.   Neurological:      Mental Status: He is alert and oriented to person, place, and time.   Psychiatric:         Mood and Affect: Affect is blunt and flat.         Speech: Speech is delayed.         Behavior: Behavior is slowed.       Blood pressure 112/78, pulse 60, temperature 98.2 °F (36.8 °C), temperature source Oral, resp. rate 18, height 6' 1" (1.854 m), weight 102.6 kg (226 lb 1.3 oz).Body mass index is 29.83 kg/m².            A/P:  1) psoriatic arthritis.  Well controlled.  Follow-up with rheumatology and continue with medication under their direction  2)  Elevated LFTs with fatty liver disease.  Improved.  He will work on diet exercise and weight loss  3) asperger's, anxiety and ADD.  Persistent.  I did discuss the trying to establish a new routine with the covid 19 pandemic restrictions.    They will also address this with the psychiatrist at their next visit   4)  Hypertension.  Well controlled.  Continue with metoprolol   5)  Probable interstitial cystitis.  I did recommend they schedule follow-up with the urologist to re-evaluate his symptoms and possible treatment options  6) overweight with a BMI of 29.  We did discuss routine physical activity of 30 min each day to include some outdoor activities.    I will see him back as previously planned or sooner if needed  "

## 2020-06-21 RX ORDER — ESZOPICLONE 2 MG/1
TABLET, FILM COATED ORAL
Qty: 90 TABLET | OUTPATIENT
Start: 2020-06-21

## 2020-06-27 ENCOUNTER — OFFICE VISIT (OUTPATIENT)
Dept: UROLOGY | Facility: CLINIC | Age: 25
End: 2020-06-27
Payer: COMMERCIAL

## 2020-06-27 VITALS
BODY MASS INDEX: 29.95 KG/M2 | SYSTOLIC BLOOD PRESSURE: 95 MMHG | WEIGHT: 226 LBS | HEIGHT: 73 IN | HEART RATE: 74 BPM | DIASTOLIC BLOOD PRESSURE: 62 MMHG

## 2020-06-27 DIAGNOSIS — N40.0 BENIGN PROSTATIC HYPERPLASIA, UNSPECIFIED WHETHER LOWER URINARY TRACT SYMPTOMS PRESENT: Primary | ICD-10-CM

## 2020-06-27 DIAGNOSIS — N30.00 ACUTE CYSTITIS WITHOUT HEMATURIA: ICD-10-CM

## 2020-06-27 PROCEDURE — 99215 PR OFFICE/OUTPT VISIT, EST, LEVL V, 40-54 MIN: ICD-10-PCS | Mod: S$GLB,,, | Performed by: UROLOGY

## 2020-06-27 PROCEDURE — 99999 PR PBB SHADOW E&M-EST. PATIENT-LVL IV: CPT | Mod: PBBFAC,,, | Performed by: UROLOGY

## 2020-06-27 PROCEDURE — 3078F DIAST BP <80 MM HG: CPT | Mod: CPTII,S$GLB,, | Performed by: UROLOGY

## 2020-06-27 PROCEDURE — 3074F PR MOST RECENT SYSTOLIC BLOOD PRESSURE < 130 MM HG: ICD-10-PCS | Mod: CPTII,S$GLB,, | Performed by: UROLOGY

## 2020-06-27 PROCEDURE — 3074F SYST BP LT 130 MM HG: CPT | Mod: CPTII,S$GLB,, | Performed by: UROLOGY

## 2020-06-27 PROCEDURE — 99999 PR PBB SHADOW E&M-EST. PATIENT-LVL IV: ICD-10-PCS | Mod: PBBFAC,,, | Performed by: UROLOGY

## 2020-06-27 PROCEDURE — 3008F BODY MASS INDEX DOCD: CPT | Mod: CPTII,S$GLB,, | Performed by: UROLOGY

## 2020-06-27 PROCEDURE — 99215 OFFICE O/P EST HI 40 MIN: CPT | Mod: S$GLB,,, | Performed by: UROLOGY

## 2020-06-27 PROCEDURE — 3078F PR MOST RECENT DIASTOLIC BLOOD PRESSURE < 80 MM HG: ICD-10-PCS | Mod: CPTII,S$GLB,, | Performed by: UROLOGY

## 2020-06-27 PROCEDURE — 3008F PR BODY MASS INDEX (BMI) DOCUMENTED: ICD-10-PCS | Mod: CPTII,S$GLB,, | Performed by: UROLOGY

## 2020-06-27 RX ORDER — CIPROFLOXACIN 500 MG/1
500 TABLET ORAL 2 TIMES DAILY
Qty: 28 TABLET | Refills: 0 | Status: SHIPPED | OUTPATIENT
Start: 2020-06-27 | End: 2020-07-11

## 2020-06-27 RX ORDER — APREMILAST 30 MG/1
TABLET, FILM COATED ORAL
COMMUNITY
Start: 2020-06-20 | End: 2020-10-20

## 2020-06-27 RX ORDER — MELOXICAM 15 MG/1
TABLET ORAL
COMMUNITY
Start: 2020-06-20 | End: 2020-10-20

## 2020-06-27 NOTE — PROGRESS NOTES
UROLOGY [place]  6 27 20    Cc abdominal pain    Age 24, accompanied by kind and caring mother. Pt complains of having pains on the L testicle, having bladder pain, and having kidney pain that does not let him sleep. The pain is described by pt as level 10. Is having urinary frequency, and stinging and stabbing feeling in the urethra when voiding. No discharge is present. No fever is present. Claims flanks hurt on both sides.     Recently went to [facility] with these concerns and had a CT scan and was kept in the hospital several days. The tests, mother reports, did not find stones in the kidneys, dilatation, masses, or any deformity. They eventually allowed discharge without major changes. Mother is very careful with diet and exercise for pt.     Pt is on ditropan xl 10 for urinary frequency, on elmiron 100 for insterstitial cystitis, on atarax 50 also for interstitial cystitis, and on hyosciamine for bladder and intestinal spams.     He has a hx of kidney stones, having had a ureteral stone extraction by dr timur callaway. No recurrences have occurred.     For a while pt had severe nocturnal polyuria and we started him on low dose desmopressin (noctiva 1.66) and it helped for a while, but was later replaced by the bladder relaxants.      Pt has asperger syndrome (autism) and is not easily communicative.      In the past he has had intermittent diarrhea and constipation and was taken to [facility] in [place], and they categorized it as IBS.      Also has frequent back pain.    Pt could not give a urine sample to us today     PMH     Surgical:  has a past surgical history that includes Esophagogastroduodenoscopy and ureteral stone extraction.     Medical:  has a past medical history of Abnormal thyroid function test (9/11/2017); ADD (attention deficit disorder); Anxiety disorder; Asperger's disorder; Deformity of both feet; Dyscalculia; Dysgraphia; Dyslexia; Eczema; GERD (gastroesophageal reflux disease); History of  migraine headaches; Interstitial cystitis (chronic); Nephrolithiasis; Psoriasis; Refusal of blood transfusion for reasons of conscience; and Seasonal allergies.     Familial: mother with interstitial cystitis     Social: single, lives in [place]                 Current Outpatient Prescriptions on File Prior to Visit   Medication Sig Dispense Refill    allopurinol (ZYLOPRIM) 300 MG tablet TAKE 1 TABLET 90 tablet 1    cholecalciferol, vitamin D3, (VITAMIN D3)  Take 5,00.        clobetasol 0.05% (TEMOVATE) 0.05  Apply topically 2  60 g 5    dextroamphetamine-amphetamine (ADDERALL XR) 20  Take 1 capsule (20  30 capsule 0    eszopiclone (LUNESTA) 2 MG Tab Take 1 tablet (2  90 tablet 0    fluoxetine (PROZAC) 20 MG capsule Take 1 cap 30 capsule 2    lisinopril (PRINIVIL,ZESTRIL) 5 MG tablet Take 1 tablet ( 30 tablet 6    meloxicam (MOBIC) 15 MG tablet TAKE 1 TA 30 tablet 3    omeprazole (PRILOSEC) 10 MG capsule Take 10         potassium citrate (UROCIT-K) 10 mEq (1,080 mg) TbSR Take 1 tablet (10 m 60 tablet 5   Pt alert, no distress  HEENT: wnl.  Neck: supple, no JVD, no lymphadenopathy  Chest: CV NSR  Lungs: normal chest expansion  Abdomen prominent, obese, nontender, no organomegaly, no masses.  Extremities: no edema, peripheral pulses nl  Neuro: preserved     IMP  Autism  Possible uti or prostatitis, will put on cipro 500 bid  Bilateral flank pain, probably musculoskeletal  Hx of nephrolithiasis (remote)  Urinary frequency and nocturia, recurrent bladder pain. Possible interstitial cystitis, currently with frequent bladder pain. In the past he used myrbetriq but it has not helped. Currently on ditropan 10 xl    Pt has developed a chronic pain syndrome involving several areas of the body that are not found to have any pathology

## 2020-07-16 RX ORDER — HYOSCYAMINE SULFATE 0.125 MG
125 TABLET ORAL EVERY 6 HOURS PRN
Qty: 40 TABLET | Refills: 1 | Status: SHIPPED | OUTPATIENT
Start: 2020-07-16 | End: 2020-09-29

## 2020-07-24 ENCOUNTER — OFFICE VISIT (OUTPATIENT)
Dept: PSYCHIATRY | Facility: CLINIC | Age: 25
End: 2020-07-24
Payer: COMMERCIAL

## 2020-07-24 VITALS
WEIGHT: 223.19 LBS | BODY MASS INDEX: 29.58 KG/M2 | SYSTOLIC BLOOD PRESSURE: 128 MMHG | DIASTOLIC BLOOD PRESSURE: 72 MMHG | HEIGHT: 73 IN

## 2020-07-24 DIAGNOSIS — F40.10 SOCIAL ANXIETY DISORDER: ICD-10-CM

## 2020-07-24 DIAGNOSIS — F41.1 GAD (GENERALIZED ANXIETY DISORDER): Primary | ICD-10-CM

## 2020-07-24 DIAGNOSIS — F84.0 AUTISM SPECTRUM DISORDER REQUIRING SUBSTANTIAL SUPPORT (LEVEL 2): ICD-10-CM

## 2020-07-24 DIAGNOSIS — N30.10 IC (INTERSTITIAL CYSTITIS): ICD-10-CM

## 2020-07-24 PROCEDURE — 99214 PR OFFICE/OUTPT VISIT, EST, LEVL IV, 30-39 MIN: ICD-10-PCS | Mod: S$GLB,,, | Performed by: PSYCHIATRY & NEUROLOGY

## 2020-07-24 PROCEDURE — 3078F DIAST BP <80 MM HG: CPT | Mod: CPTII,S$GLB,, | Performed by: PSYCHIATRY & NEUROLOGY

## 2020-07-24 PROCEDURE — 3074F PR MOST RECENT SYSTOLIC BLOOD PRESSURE < 130 MM HG: ICD-10-PCS | Mod: CPTII,S$GLB,, | Performed by: PSYCHIATRY & NEUROLOGY

## 2020-07-24 PROCEDURE — 99999 PR PBB SHADOW E&M-EST. PATIENT-LVL III: CPT | Mod: PBBFAC,,, | Performed by: PSYCHIATRY & NEUROLOGY

## 2020-07-24 PROCEDURE — 3008F BODY MASS INDEX DOCD: CPT | Mod: CPTII,S$GLB,, | Performed by: PSYCHIATRY & NEUROLOGY

## 2020-07-24 PROCEDURE — 3074F SYST BP LT 130 MM HG: CPT | Mod: CPTII,S$GLB,, | Performed by: PSYCHIATRY & NEUROLOGY

## 2020-07-24 PROCEDURE — 99999 PR PBB SHADOW E&M-EST. PATIENT-LVL III: ICD-10-PCS | Mod: PBBFAC,,, | Performed by: PSYCHIATRY & NEUROLOGY

## 2020-07-24 PROCEDURE — 3078F PR MOST RECENT DIASTOLIC BLOOD PRESSURE < 80 MM HG: ICD-10-PCS | Mod: CPTII,S$GLB,, | Performed by: PSYCHIATRY & NEUROLOGY

## 2020-07-24 PROCEDURE — 3008F PR BODY MASS INDEX (BMI) DOCUMENTED: ICD-10-PCS | Mod: CPTII,S$GLB,, | Performed by: PSYCHIATRY & NEUROLOGY

## 2020-07-24 PROCEDURE — 99214 OFFICE O/P EST MOD 30 MIN: CPT | Mod: S$GLB,,, | Performed by: PSYCHIATRY & NEUROLOGY

## 2020-07-24 RX ORDER — HYDROXYZINE HYDROCHLORIDE 50 MG/1
50 TABLET, FILM COATED ORAL NIGHTLY
Qty: 90 TABLET | Refills: 0 | Status: SHIPPED | OUTPATIENT
Start: 2020-07-24 | End: 2021-06-11

## 2020-07-24 RX ORDER — FLUOXETINE HYDROCHLORIDE 40 MG/1
40 CAPSULE ORAL DAILY
Qty: 90 CAPSULE | Refills: 0 | Status: SHIPPED | OUTPATIENT
Start: 2020-07-24 | End: 2020-09-29

## 2020-07-24 RX ORDER — ESZOPICLONE 2 MG/1
2 TABLET, FILM COATED ORAL NIGHTLY
Qty: 30 TABLET | Refills: 2 | Status: SHIPPED | OUTPATIENT
Start: 2020-07-24 | End: 2020-12-15
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
## 2020-08-19 ENCOUNTER — PATIENT MESSAGE (OUTPATIENT)
Dept: UROLOGY | Facility: CLINIC | Age: 25
End: 2020-08-19

## 2020-10-20 ENCOUNTER — TELEPHONE (OUTPATIENT)
Dept: FAMILY MEDICINE | Facility: CLINIC | Age: 25
End: 2020-10-20

## 2020-10-20 ENCOUNTER — OFFICE VISIT (OUTPATIENT)
Dept: FAMILY MEDICINE | Facility: CLINIC | Age: 25
End: 2020-10-20
Payer: COMMERCIAL

## 2020-10-20 VITALS
TEMPERATURE: 98 F | BODY MASS INDEX: 30.57 KG/M2 | HEIGHT: 73 IN | WEIGHT: 230.69 LBS | HEART RATE: 65 BPM | DIASTOLIC BLOOD PRESSURE: 70 MMHG | RESPIRATION RATE: 18 BRPM | SYSTOLIC BLOOD PRESSURE: 110 MMHG

## 2020-10-20 DIAGNOSIS — F84.0 AUTISM: ICD-10-CM

## 2020-10-20 DIAGNOSIS — N32.81 OAB (OVERACTIVE BLADDER): ICD-10-CM

## 2020-10-20 DIAGNOSIS — K58.9 IRRITABLE BOWEL SYNDROME, UNSPECIFIED TYPE: Primary | ICD-10-CM

## 2020-10-20 PROCEDURE — 3074F PR MOST RECENT SYSTOLIC BLOOD PRESSURE < 130 MM HG: ICD-10-PCS | Mod: CPTII,S$GLB,, | Performed by: FAMILY MEDICINE

## 2020-10-20 PROCEDURE — 90686 FLU VACCINE (QUAD) GREATER THAN OR EQUAL TO 3YO PRESERVATIVE FREE IM: ICD-10-PCS | Mod: S$GLB,,, | Performed by: FAMILY MEDICINE

## 2020-10-20 PROCEDURE — 90471 IMMUNIZATION ADMIN: CPT | Mod: S$GLB,,, | Performed by: FAMILY MEDICINE

## 2020-10-20 PROCEDURE — 3074F SYST BP LT 130 MM HG: CPT | Mod: CPTII,S$GLB,, | Performed by: FAMILY MEDICINE

## 2020-10-20 PROCEDURE — 3008F BODY MASS INDEX DOCD: CPT | Mod: CPTII,S$GLB,, | Performed by: FAMILY MEDICINE

## 2020-10-20 PROCEDURE — 99214 OFFICE O/P EST MOD 30 MIN: CPT | Mod: 25,S$GLB,, | Performed by: FAMILY MEDICINE

## 2020-10-20 PROCEDURE — 99214 PR OFFICE/OUTPT VISIT, EST, LEVL IV, 30-39 MIN: ICD-10-PCS | Mod: 25,S$GLB,, | Performed by: FAMILY MEDICINE

## 2020-10-20 PROCEDURE — 3078F PR MOST RECENT DIASTOLIC BLOOD PRESSURE < 80 MM HG: ICD-10-PCS | Mod: CPTII,S$GLB,, | Performed by: FAMILY MEDICINE

## 2020-10-20 PROCEDURE — 3008F PR BODY MASS INDEX (BMI) DOCUMENTED: ICD-10-PCS | Mod: CPTII,S$GLB,, | Performed by: FAMILY MEDICINE

## 2020-10-20 PROCEDURE — 90471 FLU VACCINE (QUAD) GREATER THAN OR EQUAL TO 3YO PRESERVATIVE FREE IM: ICD-10-PCS | Mod: S$GLB,,, | Performed by: FAMILY MEDICINE

## 2020-10-20 PROCEDURE — 3078F DIAST BP <80 MM HG: CPT | Mod: CPTII,S$GLB,, | Performed by: FAMILY MEDICINE

## 2020-10-20 PROCEDURE — 90686 IIV4 VACC NO PRSV 0.5 ML IM: CPT | Mod: S$GLB,,, | Performed by: FAMILY MEDICINE

## 2020-10-20 RX ORDER — DICYCLOMINE HYDROCHLORIDE 20 MG/1
20 TABLET ORAL EVERY 6 HOURS
Qty: 120 TABLET | Refills: 0 | Status: SHIPPED | OUTPATIENT
Start: 2020-10-20 | End: 2021-01-07

## 2020-10-20 RX ORDER — OXYBUTYNIN CHLORIDE 5 MG/1
5 TABLET, EXTENDED RELEASE ORAL DAILY
Qty: 30 TABLET | Refills: 1 | Status: SHIPPED | OUTPATIENT
Start: 2020-10-20 | End: 2020-11-16

## 2020-10-20 NOTE — TELEPHONE ENCOUNTER
----- Message from Nimisha Shay sent at 10/20/2020  7:06 AM CDT -----  Mom, Nancie, called to say they are running about 10 min late due to school traffic.  Her number is [phone].  Thank you!

## 2020-10-22 NOTE — PROGRESS NOTES
Subjective:       Patient ID: Jm Chaparro is a 25 y.o. male.    Chief Complaint: Follow-up (2 month follow up/Flu shot)    HPI     The patient is a 25-year-old with autism who is here today with his mother for his 2 month follow-up.    He continues to have stomach issues including belly pain and cramping and alternating episodes of diarrhea and constipation.  Today he rates his pain as an 8 because of his stomach.  He has been diagnosed with IBS.  He does have Levsin but uses it infrequently and does not find it to be very helpful.  He has not seen the GI team in over a year and would be willing to go back to see them    He continues to have bladder issues.  He has a diagnosis of OAB and possible interstitial cystitis.  He is currently taking Ditropan and hydroxyzine and Elmiron.  They do not feel these medicines seem to be helping.  The urologist did suggest that they consider stopping his medicine but mom does not know where to start stopping his medications    He does continue to see his rheumatologist for psoriatic arthritis and psoriasis.  Initially, he did well with the Otezla but he is having more joint pain with it now.  In addition to the Otezla, he is also taking the Neurontin to help with the chronic pain    He does continue to follow-up with the psychiatrist on a regular basis    Review of Systems   Constitutional: Negative for appetite change, chills, diaphoresis, fatigue, fever and unexpected weight change.   HENT: Negative for congestion, ear pain, postnasal drip, rhinorrhea, sinus pressure, sneezing, sore throat and trouble swallowing.    Eyes: Negative for pain, discharge and visual disturbance.   Respiratory: Negative for cough, chest tightness, shortness of breath and wheezing.    Cardiovascular: Negative for chest pain, palpitations and leg swelling.   Gastrointestinal: Positive for abdominal pain, constipation, diarrhea and nausea. Negative for abdominal distention, blood in stool  and vomiting.   Genitourinary: Positive for dysuria and frequency. Negative for hematuria.   Musculoskeletal: Positive for arthralgias and myalgias.   Skin: Negative for rash.   Neurological: Positive for headaches.       Objective:      Physical Exam  Constitutional:       General: He is not in acute distress.     Appearance: Normal appearance. He is well-developed.   HENT:      Head: Normocephalic and atraumatic.      Right Ear: Hearing, tympanic membrane, ear canal and external ear normal.      Left Ear: Hearing, tympanic membrane, ear canal and external ear normal.      Nose: Nose normal.      Mouth/Throat:      Mouth: No oral lesions.      Pharynx: No oropharyngeal exudate or posterior oropharyngeal erythema.   Eyes:      General: Lids are normal. No scleral icterus.     Extraocular Movements: Extraocular movements intact.      Conjunctiva/sclera: Conjunctivae normal.      Pupils: Pupils are equal, round, and reactive to light.   Neck:      Musculoskeletal: Normal range of motion and neck supple.      Thyroid: No thyroid mass or thyromegaly.      Vascular: No carotid bruit.   Cardiovascular:      Rate and Rhythm: Normal rate and regular rhythm.  No extrasystoles are present.     Chest Wall: PMI is not displaced.      Heart sounds: Normal heart sounds. No murmur. No gallop.    Pulmonary:      Effort: Pulmonary effort is normal. No accessory muscle usage or respiratory distress.      Breath sounds: Normal breath sounds.   Abdominal:      General: Bowel sounds are normal. There is no abdominal bruit.      Palpations: Abdomen is soft.      Tenderness: There is generalized abdominal tenderness. There is no right CVA tenderness, left CVA tenderness, guarding or rebound.   Lymphadenopathy:      Head:      Right side of head: No submental or submandibular adenopathy.      Left side of head: No submental or submandibular adenopathy.      Cervical:      Right cervical: No superficial, deep or posterior cervical  "adenopathy.     Left cervical: No superficial, deep or posterior cervical adenopathy.      Upper Body:      Right upper body: No supraclavicular adenopathy.      Left upper body: No supraclavicular adenopathy.   Skin:     General: Skin is warm and dry.   Neurological:      Mental Status: He is alert and oriented to person, place, and time.   Psychiatric:         Attention and Perception: Attention and perception normal.         Mood and Affect: Affect normal. Mood is depressed.         Speech: Speech is delayed.         Behavior: Behavior is slowed. Behavior is cooperative.         Thought Content: Thought content normal.       Blood pressure 110/70, pulse 65, temperature 97.7 °F (36.5 °C), temperature source Temporal, resp. rate 18, height 6' 1" (1.854 m), weight 104.6 kg (230 lb 11.4 oz).Body mass index is 30.44 kg/m².            A/P:  1) IBS.  Persistent.  We are going to try Bentyl to see if this helps better.  He will follow-up with GI team to see if they have any additional recommendations   2)  OAB and possible interstitial cystitis.  Chronic.  We will try to decrease the Ditropan to 5 mg once a day and we will see if he notices a difference on the lower dose of the Ditropan.  If he does not notice a difference with the lower dose of the Ditropan, our next step would be to stop this completely.  For now he will continue with hydroxyzine and the Elmiron but we may consider stopping these in the future  3) psoriatic arthritis and psoriasis.  Chronic.  Follow-up with rheumatology continue with medication under their direction  4) autism and anxiety.  Stable.  Continue current medication and the direction of Psychiatry   5) Hypertension.  Well controlled.  Continue with Lopressor        "

## 2020-11-16 ENCOUNTER — LAB VISIT (OUTPATIENT)
Dept: LAB | Facility: HOSPITAL | Age: 25
End: 2020-11-16
Attending: INTERNAL MEDICINE
Payer: COMMERCIAL

## 2020-11-16 DIAGNOSIS — N20.0 KIDNEY STONE: ICD-10-CM

## 2020-11-16 LAB
ALBUMIN SERPL BCP-MCNC: 4 G/DL (ref 3.5–5.2)
ANION GAP SERPL CALC-SCNC: 8 MMOL/L (ref 8–16)
BUN SERPL-MCNC: 12 MG/DL (ref 6–20)
CALCIUM SERPL-MCNC: 9.3 MG/DL (ref 8.7–10.5)
CHLORIDE SERPL-SCNC: 105 MMOL/L (ref 95–110)
CO2 SERPL-SCNC: 29 MMOL/L (ref 23–29)
CREAT SERPL-MCNC: 1.1 MG/DL (ref 0.5–1.4)
EST. GFR  (AFRICAN AMERICAN): >60 ML/MIN/1.73 M^2
EST. GFR  (NON AFRICAN AMERICAN): >60 ML/MIN/1.73 M^2
GLUCOSE SERPL-MCNC: 108 MG/DL (ref 70–110)
PHOSPHATE SERPL-MCNC: 3.3 MG/DL (ref 2.7–4.5)
POTASSIUM SERPL-SCNC: 4.3 MMOL/L (ref 3.5–5.1)
SODIUM SERPL-SCNC: 142 MMOL/L (ref 136–145)

## 2020-11-16 PROCEDURE — 80069 RENAL FUNCTION PANEL: CPT

## 2020-11-16 PROCEDURE — 36415 COLL VENOUS BLD VENIPUNCTURE: CPT | Mod: PO

## 2020-11-20 RX ORDER — OXYBUTYNIN CHLORIDE 10 MG/1
TABLET, EXTENDED RELEASE ORAL
COMMUNITY
Start: 2020-11-11 | End: 2020-12-15

## 2020-11-22 ENCOUNTER — PATIENT MESSAGE (OUTPATIENT)
Dept: NEPHROLOGY | Facility: CLINIC | Age: 25
End: 2020-11-22

## 2020-11-23 ENCOUNTER — OFFICE VISIT (OUTPATIENT)
Dept: NEPHROLOGY | Facility: CLINIC | Age: 25
End: 2020-11-23
Payer: COMMERCIAL

## 2020-11-23 ENCOUNTER — PATIENT MESSAGE (OUTPATIENT)
Dept: PSYCHIATRY | Facility: CLINIC | Age: 25
End: 2020-11-23

## 2020-11-23 ENCOUNTER — TELEPHONE (OUTPATIENT)
Dept: UROLOGY | Facility: CLINIC | Age: 25
End: 2020-11-23

## 2020-11-23 VITALS — DIASTOLIC BLOOD PRESSURE: 70 MMHG | SYSTOLIC BLOOD PRESSURE: 110 MMHG

## 2020-11-23 DIAGNOSIS — N20.0 KIDNEY STONE: Primary | ICD-10-CM

## 2020-11-23 DIAGNOSIS — N30.10 IC (INTERSTITIAL CYSTITIS): ICD-10-CM

## 2020-11-23 PROCEDURE — 99214 OFFICE O/P EST MOD 30 MIN: CPT | Mod: 95,,, | Performed by: INTERNAL MEDICINE

## 2020-11-23 PROCEDURE — 3078F DIAST BP <80 MM HG: CPT | Mod: CPTII,,, | Performed by: INTERNAL MEDICINE

## 2020-11-23 PROCEDURE — 3074F SYST BP LT 130 MM HG: CPT | Mod: CPTII,,, | Performed by: INTERNAL MEDICINE

## 2020-11-23 PROCEDURE — 3074F PR MOST RECENT SYSTOLIC BLOOD PRESSURE < 130 MM HG: ICD-10-PCS | Mod: CPTII,,, | Performed by: INTERNAL MEDICINE

## 2020-11-23 PROCEDURE — 3078F PR MOST RECENT DIASTOLIC BLOOD PRESSURE < 80 MM HG: ICD-10-PCS | Mod: CPTII,,, | Performed by: INTERNAL MEDICINE

## 2020-11-23 PROCEDURE — 99214 PR OFFICE/OUTPT VISIT, EST, LEVL IV, 30-39 MIN: ICD-10-PCS | Mod: 95,,, | Performed by: INTERNAL MEDICINE

## 2020-11-23 NOTE — PROGRESS NOTES
Subjective:       Patient ID: Jm Chaparro is a 25 y.o. White male who presents for return patient evaluation for chronic renal failure.    The patient location is:  Patient Home   The chief complaint leading to consultation is: kidney stones  Visit type: Virtual visit with synchronous audio and video  Total time spent with patient: 12 minutes  Each patient to whom he or she provides medical services by telemedicine is:  (1) informed of the relationship between the physician and patient and the respective role of any other health care provider with respect to management of the patient; and (2) notified that he or she may decline to receive medical services by telemedicine and may withdraw from such care at any time.     He sees Dr. Dickson for his interstitial cystitis chronic symptoms.  He is follows with Rheumatology at [facility] as well.  He and his mother have been ill over the weekend with sore throat and hoarseness.  He is having symptoms of urgency and dysuria at times as per his usual symptoms.      Review of Systems   Constitutional: Negative for appetite change, chills and fever.   Eyes: Negative for visual disturbance.   Respiratory: Negative for cough and shortness of breath.    Cardiovascular: Negative for chest pain and leg swelling.   Gastrointestinal: Positive for abdominal pain (cramps). Negative for diarrhea, nausea and vomiting.   Genitourinary: Positive for dysuria, flank pain and frequency. Negative for difficulty urinating and hematuria.   Musculoskeletal: Positive for arthralgias, back pain, gait problem and neck pain. Negative for myalgias.   Skin: Negative for rash.   Neurological: Negative for headaches.   Psychiatric/Behavioral: Negative for sleep disturbance.       The past medical, family and social histories were reviewed for this encounter.     /70     Objective:      Physical Exam  Vitals signs reviewed.   Constitutional:       General: He is not in acute distress.      Appearance: He is well-developed.   HENT:      Head: Normocephalic and atraumatic.      Mouth/Throat:      Comments: hoarse  Eyes:      General: No scleral icterus.  Pulmonary:      Effort: Pulmonary effort is normal. No respiratory distress.   Neurological:      Mental Status: He is alert and oriented to person, place, and time.   Psychiatric:         Mood and Affect: Mood normal.         Behavior: Behavior normal.         Assessment:       1. Kidney stone    2. IC (interstitial cystitis)        Plan:   Return to clinic in 12 months.  Baseline creatinine is 0.9.  Labs for next visit include rp.  Please try to drink more than two liters of water a day to reduce your risk of stone formation.  Uric acid is controlled.  His renal function is preserved and he has no electrolyte derangements.  I will send this note to Dr. Dickson for his review so he may contact him.  Hismother wishes to get him off of the usual medications he has been taking for his bladder that are not helping him.

## 2020-11-24 ENCOUNTER — OFFICE VISIT (OUTPATIENT)
Dept: PSYCHIATRY | Facility: CLINIC | Age: 25
End: 2020-11-24
Payer: COMMERCIAL

## 2020-11-24 DIAGNOSIS — F84.0 AUTISM SPECTRUM DISORDER REQUIRING SUBSTANTIAL SUPPORT (LEVEL 2): ICD-10-CM

## 2020-11-24 DIAGNOSIS — F40.10 SOCIAL ANXIETY DISORDER: ICD-10-CM

## 2020-11-24 DIAGNOSIS — F41.1 GAD (GENERALIZED ANXIETY DISORDER): Primary | ICD-10-CM

## 2020-11-24 PROCEDURE — 99214 OFFICE O/P EST MOD 30 MIN: CPT | Mod: 95,,, | Performed by: PSYCHIATRY & NEUROLOGY

## 2020-11-24 PROCEDURE — 99214 PR OFFICE/OUTPT VISIT, EST, LEVL IV, 30-39 MIN: ICD-10-PCS | Mod: 95,,, | Performed by: PSYCHIATRY & NEUROLOGY

## 2020-11-24 RX ORDER — FLUOXETINE HYDROCHLORIDE 40 MG/1
40 CAPSULE ORAL DAILY
Qty: 90 CAPSULE | Refills: 3 | Status: SHIPPED | OUTPATIENT
Start: 2020-11-24 | End: 2021-09-28 | Stop reason: SDUPTHER
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
## 2020-12-09 ENCOUNTER — TELEPHONE (OUTPATIENT)
Dept: UROLOGY | Facility: CLINIC | Age: 25
End: 2020-12-09

## 2020-12-09 NOTE — TELEPHONE ENCOUNTER
Please call pt's mother that its ok to stop bladder medication if they prefer. It is not dangerous to stop these medications. They are intended to help with symptoms only. He can only resume them later if they believe in retrospect that they were useful

## 2020-12-11 NOTE — TELEPHONE ENCOUNTER
Patient's mother requesting for patient to stop oxybutynin and hydroxyzine. Advised oxybutynin could be stopped without weaning, but the hydroxyzine should decrease to 1/2 stength for one week before stopping (per pharmacist downstairs). Did advise that the hydroxyzine was prescribe by different physician last time so verify with them before stopping. Pt's mother expressed understanding

## 2020-12-15 ENCOUNTER — OFFICE VISIT (OUTPATIENT)
Dept: FAMILY MEDICINE | Facility: CLINIC | Age: 25
End: 2020-12-15
Payer: COMMERCIAL

## 2020-12-15 VITALS
HEIGHT: 73 IN | TEMPERATURE: 98 F | BODY MASS INDEX: 30.53 KG/M2 | HEART RATE: 66 BPM | SYSTOLIC BLOOD PRESSURE: 112 MMHG | WEIGHT: 230.38 LBS | DIASTOLIC BLOOD PRESSURE: 80 MMHG

## 2020-12-15 DIAGNOSIS — N30.10 IC (INTERSTITIAL CYSTITIS): ICD-10-CM

## 2020-12-15 DIAGNOSIS — L40.50 PSORIATIC ARTHRITIS: ICD-10-CM

## 2020-12-15 DIAGNOSIS — F84.0 AUTISM: ICD-10-CM

## 2020-12-15 DIAGNOSIS — I10 HYPERTENSION, ESSENTIAL: Primary | ICD-10-CM

## 2020-12-15 PROCEDURE — 3074F PR MOST RECENT SYSTOLIC BLOOD PRESSURE < 130 MM HG: ICD-10-PCS | Mod: CPTII,S$GLB,, | Performed by: FAMILY MEDICINE

## 2020-12-15 PROCEDURE — 3074F SYST BP LT 130 MM HG: CPT | Mod: CPTII,S$GLB,, | Performed by: FAMILY MEDICINE

## 2020-12-15 PROCEDURE — 1125F PR PAIN SEVERITY QUANTIFIED, PAIN PRESENT: ICD-10-PCS | Mod: S$GLB,,, | Performed by: FAMILY MEDICINE

## 2020-12-15 PROCEDURE — 3079F DIAST BP 80-89 MM HG: CPT | Mod: CPTII,S$GLB,, | Performed by: FAMILY MEDICINE

## 2020-12-15 PROCEDURE — 99214 OFFICE O/P EST MOD 30 MIN: CPT | Mod: S$GLB,,, | Performed by: FAMILY MEDICINE

## 2020-12-15 PROCEDURE — 1125F AMNT PAIN NOTED PAIN PRSNT: CPT | Mod: S$GLB,,, | Performed by: FAMILY MEDICINE

## 2020-12-15 PROCEDURE — 3008F PR BODY MASS INDEX (BMI) DOCUMENTED: ICD-10-PCS | Mod: CPTII,S$GLB,, | Performed by: FAMILY MEDICINE

## 2020-12-15 PROCEDURE — 99214 PR OFFICE/OUTPT VISIT, EST, LEVL IV, 30-39 MIN: ICD-10-PCS | Mod: S$GLB,,, | Performed by: FAMILY MEDICINE

## 2020-12-15 PROCEDURE — 3008F BODY MASS INDEX DOCD: CPT | Mod: CPTII,S$GLB,, | Performed by: FAMILY MEDICINE

## 2020-12-15 PROCEDURE — 3079F PR MOST RECENT DIASTOLIC BLOOD PRESSURE 80-89 MM HG: ICD-10-PCS | Mod: CPTII,S$GLB,, | Performed by: FAMILY MEDICINE

## 2020-12-15 RX ORDER — DOXYCYCLINE HYCLATE 100 MG
100 TABLET ORAL 2 TIMES DAILY
COMMUNITY
End: 2021-02-10

## 2020-12-27 NOTE — PROGRESS NOTES
Subjective:       Patient ID: Jm Chaparro is a 25 y.o. male.    Chief Complaint: Follow-up    HPI   The patient is a 25-year-old who is here today for short-term follow-up.    He has recently had an upper respiratory infection.  His mom had this as well.  He is completing a course of doxycycline and seeing improvement with the doxycycline    Regarding his bladder, overall this is going fairly well.  He did have some issues with his bladder during the night but attributes that to the doxycycline as antibiotics are always hard on his bladder.  He does find the Levsin helps his bladder.  He is still taking the Elmiron but is likely going to wean that off in the near future.  He has stopped the Ditropan.    Regarding his sleep, he is sleeping really well and completely through the night.  He is no longer using Lunesta.  Mom has been giving him some CBD gummies which really seems to have made a difference from him    Regarding his psoriatic arthritis, he is doing well with the Otezla and the Neurontin.  He is following with his rheumatologist consistently    Regarding his hypertension, his blood pressure today is 112/80.  He is doing well with the Lopressor.      Regarding his emotional health, he is doing better without the Adderall.  He is much less sluggish without the Adderall.  He will occasionally use Adderall when he has trouble focusing but this is very infrequent.  He is taking the Prozac consistently      Review of Systems   Constitutional: Negative for activity change, appetite change, chills, diaphoresis, fatigue, fever and unexpected weight change.   HENT: Positive for trouble swallowing. Negative for congestion, ear pain, hearing loss, postnasal drip, rhinorrhea, sinus pressure, sneezing and sore throat.    Eyes: Negative for pain, discharge and visual disturbance.   Respiratory: Negative for cough, chest tightness, shortness of breath and wheezing.    Cardiovascular: Negative for chest pain,  palpitations and leg swelling.   Gastrointestinal: Negative for abdominal distention, abdominal pain, blood in stool, constipation, diarrhea, nausea and vomiting.   Endocrine: Negative for polydipsia and polyuria.   Genitourinary: Positive for difficulty urinating and urgency. Negative for hematuria.   Musculoskeletal: Positive for arthralgias, joint swelling and neck pain.   Skin: Negative for rash.   Neurological: Positive for headaches. Negative for weakness.   Psychiatric/Behavioral: Negative for confusion and dysphoric mood.       Objective:      Physical Exam  Constitutional:       General: He is not in acute distress.     Appearance: Normal appearance. He is well-developed.   HENT:      Head: Normocephalic and atraumatic.      Right Ear: Hearing, tympanic membrane, ear canal and external ear normal.      Left Ear: Hearing, tympanic membrane, ear canal and external ear normal.      Nose: Nose normal.      Mouth/Throat:      Mouth: No oral lesions.      Pharynx: No oropharyngeal exudate or posterior oropharyngeal erythema.   Eyes:      General: Lids are normal. No scleral icterus.     Extraocular Movements: Extraocular movements intact.      Conjunctiva/sclera: Conjunctivae normal.      Pupils: Pupils are equal, round, and reactive to light.   Neck:      Musculoskeletal: Normal range of motion and neck supple.      Thyroid: No thyroid mass or thyromegaly.      Vascular: No carotid bruit.   Cardiovascular:      Rate and Rhythm: Normal rate and regular rhythm.  No extrasystoles are present.     Chest Wall: PMI is not displaced.      Heart sounds: Normal heart sounds. No murmur. No gallop.    Pulmonary:      Effort: Pulmonary effort is normal. No accessory muscle usage or respiratory distress.      Breath sounds: Normal breath sounds.   Abdominal:      General: Bowel sounds are normal. There is no abdominal bruit.      Palpations: Abdomen is soft.      Tenderness: There is generalized abdominal tenderness. There  "is no right CVA tenderness, left CVA tenderness, guarding or rebound.   Lymphadenopathy:      Head:      Right side of head: No submental or submandibular adenopathy.      Left side of head: No submental or submandibular adenopathy.      Cervical:      Right cervical: No superficial, deep or posterior cervical adenopathy.     Left cervical: No superficial, deep or posterior cervical adenopathy.      Upper Body:      Right upper body: No supraclavicular adenopathy.      Left upper body: No supraclavicular adenopathy.   Skin:     General: Skin is warm and dry.   Neurological:      Mental Status: He is alert and oriented to person, place, and time.   Psychiatric:         Attention and Perception: Attention and perception normal.         Mood and Affect: Mood and affect normal.         Speech: Speech is delayed.         Behavior: Behavior is slowed. Behavior is cooperative.         Thought Content: Thought content normal.       Blood pressure 112/80, pulse 66, temperature 97.7 °F (36.5 °C), temperature source Temporal, height 6' 1" (1.854 m), weight 104.5 kg (230 lb 6.1 oz).Body mass index is 30.4 kg/m².          A/P:  1) URI.  Resolving.  Complete antibiotics as prescribed  2) IC.  Stable.  Continue current medication and wean Elmiron when ready   3)  Insomnia.  Well controlled.  They will keep his sleep-wake cycle as consistent as possible   4)  Psoriatic arthritis.  Well controlled.  Continue current medication follow up with rheumatology as planned   5)  Hypertension.  Well controlled.  Continue current medication  6) Asperger's, ADD and anxiety.  Stable.  Continue with Prozac and p.r.n. Adderall.  Follow up with psychiatry as planned      I will see him back in 2-3 months per mom's request or sooner if needed  "

## 2020-12-30 ENCOUNTER — CLINICAL SUPPORT (OUTPATIENT)
Dept: URGENT CARE | Facility: CLINIC | Age: 25
End: 2020-12-30
Payer: COMMERCIAL

## 2020-12-30 VITALS — HEART RATE: 91 BPM | TEMPERATURE: 98 F | OXYGEN SATURATION: 97 %

## 2020-12-30 DIAGNOSIS — J02.9 SORE THROAT: Primary | ICD-10-CM

## 2020-12-30 LAB
CTP QC/QA: YES
SARS-COV-2 RDRP RESP QL NAA+PROBE: NEGATIVE

## 2020-12-30 PROCEDURE — U0002: ICD-10-PCS | Mod: QW,S$GLB,, | Performed by: PHYSICIAN ASSISTANT

## 2020-12-30 PROCEDURE — U0002 COVID-19 LAB TEST NON-CDC: HCPCS | Mod: QW,S$GLB,, | Performed by: PHYSICIAN ASSISTANT

## 2021-01-06 ENCOUNTER — PATIENT MESSAGE (OUTPATIENT)
Dept: FAMILY MEDICINE | Facility: CLINIC | Age: 26
End: 2021-01-06

## 2021-01-13 ENCOUNTER — PATIENT MESSAGE (OUTPATIENT)
Dept: FAMILY MEDICINE | Facility: CLINIC | Age: 26
End: 2021-01-13

## 2021-02-03 RX ORDER — HYOSCYAMINE SULFATE 0.125 MG
125 TABLET ORAL EVERY 6 HOURS PRN
Qty: 40 TABLET | Refills: 1 | Status: SHIPPED | OUTPATIENT
Start: 2021-02-03 | End: 2021-07-15 | Stop reason: SDUPTHER

## 2021-02-10 ENCOUNTER — OFFICE VISIT (OUTPATIENT)
Dept: FAMILY MEDICINE | Facility: CLINIC | Age: 26
End: 2021-02-10
Payer: COMMERCIAL

## 2021-02-10 VITALS
SYSTOLIC BLOOD PRESSURE: 118 MMHG | WEIGHT: 228.31 LBS | HEART RATE: 60 BPM | RESPIRATION RATE: 18 BRPM | BODY MASS INDEX: 30.26 KG/M2 | DIASTOLIC BLOOD PRESSURE: 82 MMHG | HEIGHT: 73 IN | TEMPERATURE: 98 F

## 2021-02-10 DIAGNOSIS — L40.50 PSORIATIC ARTHRITIS: ICD-10-CM

## 2021-02-10 DIAGNOSIS — I10 HYPERTENSION, ESSENTIAL: Primary | ICD-10-CM

## 2021-02-10 DIAGNOSIS — N32.81 OAB (OVERACTIVE BLADDER): ICD-10-CM

## 2021-02-10 DIAGNOSIS — N30.10 IC (INTERSTITIAL CYSTITIS): ICD-10-CM

## 2021-02-10 PROCEDURE — 3008F PR BODY MASS INDEX (BMI) DOCUMENTED: ICD-10-PCS | Mod: CPTII,S$GLB,, | Performed by: FAMILY MEDICINE

## 2021-02-10 PROCEDURE — 3008F BODY MASS INDEX DOCD: CPT | Mod: CPTII,S$GLB,, | Performed by: FAMILY MEDICINE

## 2021-02-10 PROCEDURE — 1125F PR PAIN SEVERITY QUANTIFIED, PAIN PRESENT: ICD-10-PCS | Mod: S$GLB,,, | Performed by: FAMILY MEDICINE

## 2021-02-10 PROCEDURE — 99214 PR OFFICE/OUTPT VISIT, EST, LEVL IV, 30-39 MIN: ICD-10-PCS | Mod: S$GLB,,, | Performed by: FAMILY MEDICINE

## 2021-02-10 PROCEDURE — 99214 OFFICE O/P EST MOD 30 MIN: CPT | Mod: S$GLB,,, | Performed by: FAMILY MEDICINE

## 2021-02-10 PROCEDURE — 3079F DIAST BP 80-89 MM HG: CPT | Mod: CPTII,S$GLB,, | Performed by: FAMILY MEDICINE

## 2021-02-10 PROCEDURE — 1125F AMNT PAIN NOTED PAIN PRSNT: CPT | Mod: S$GLB,,, | Performed by: FAMILY MEDICINE

## 2021-02-10 PROCEDURE — 3079F PR MOST RECENT DIASTOLIC BLOOD PRESSURE 80-89 MM HG: ICD-10-PCS | Mod: CPTII,S$GLB,, | Performed by: FAMILY MEDICINE

## 2021-02-10 PROCEDURE — 3074F PR MOST RECENT SYSTOLIC BLOOD PRESSURE < 130 MM HG: ICD-10-PCS | Mod: CPTII,S$GLB,, | Performed by: FAMILY MEDICINE

## 2021-02-10 PROCEDURE — 3074F SYST BP LT 130 MM HG: CPT | Mod: CPTII,S$GLB,, | Performed by: FAMILY MEDICINE

## 2021-02-10 RX ORDER — OXYBUTYNIN CHLORIDE 10 MG/1
TABLET, EXTENDED RELEASE ORAL
COMMUNITY
Start: 2021-01-04 | End: 2021-02-10

## 2021-03-11 ENCOUNTER — PATIENT MESSAGE (OUTPATIENT)
Dept: FAMILY MEDICINE | Facility: CLINIC | Age: 26
End: 2021-03-11

## 2021-03-11 DIAGNOSIS — H92.09 OTALGIA, UNSPECIFIED LATERALITY: Primary | ICD-10-CM

## 2021-03-12 ENCOUNTER — OFFICE VISIT (OUTPATIENT)
Dept: PSYCHIATRY | Facility: CLINIC | Age: 26
End: 2021-03-12
Payer: COMMERCIAL

## 2021-03-12 DIAGNOSIS — F40.10 SOCIAL ANXIETY DISORDER: ICD-10-CM

## 2021-03-12 DIAGNOSIS — F41.1 GAD (GENERALIZED ANXIETY DISORDER): Primary | ICD-10-CM

## 2021-03-12 DIAGNOSIS — F84.0 AUTISM SPECTRUM DISORDER REQUIRING SUBSTANTIAL SUPPORT (LEVEL 2): ICD-10-CM

## 2021-03-12 PROCEDURE — 99214 PR OFFICE/OUTPT VISIT, EST, LEVL IV, 30-39 MIN: ICD-10-PCS | Mod: 95,,, | Performed by: PSYCHIATRY & NEUROLOGY

## 2021-03-12 PROCEDURE — 99214 OFFICE O/P EST MOD 30 MIN: CPT | Mod: 95,,, | Performed by: PSYCHIATRY & NEUROLOGY

## 2021-03-15 ENCOUNTER — OFFICE VISIT (OUTPATIENT)
Dept: OTOLARYNGOLOGY | Facility: CLINIC | Age: 26
End: 2021-03-15
Payer: COMMERCIAL

## 2021-03-15 DIAGNOSIS — H92.09 OTALGIA, UNSPECIFIED LATERALITY: ICD-10-CM

## 2021-03-15 DIAGNOSIS — H69.93 ETD (EUSTACHIAN TUBE DYSFUNCTION), BILATERAL: Primary | ICD-10-CM

## 2021-03-15 PROCEDURE — 99203 PR OFFICE/OUTPT VISIT, NEW, LEVL III, 30-44 MIN: ICD-10-PCS | Mod: 95,,, | Performed by: OTOLARYNGOLOGY

## 2021-03-15 PROCEDURE — 99203 OFFICE O/P NEW LOW 30 MIN: CPT | Mod: 95,,, | Performed by: OTOLARYNGOLOGY

## 2021-03-15 RX ORDER — PREDNISONE 20 MG/1
40 TABLET ORAL DAILY
Qty: 14 TABLET | Refills: 0 | Status: SHIPPED | OUTPATIENT
Start: 2021-03-15 | End: 2021-03-22

## 2021-03-29 ENCOUNTER — OFFICE VISIT (OUTPATIENT)
Dept: FAMILY MEDICINE | Facility: CLINIC | Age: 26
End: 2021-03-29
Payer: COMMERCIAL

## 2021-03-29 VITALS
WEIGHT: 228.81 LBS | OXYGEN SATURATION: 95 % | HEART RATE: 68 BPM | BODY MASS INDEX: 30.19 KG/M2 | DIASTOLIC BLOOD PRESSURE: 86 MMHG | SYSTOLIC BLOOD PRESSURE: 124 MMHG

## 2021-03-29 DIAGNOSIS — H60.321 ACUTE HEMORRHAGIC OTITIS EXTERNA OF RIGHT EAR: ICD-10-CM

## 2021-03-29 DIAGNOSIS — H92.01 EAR PAIN, RIGHT: Primary | ICD-10-CM

## 2021-03-29 PROCEDURE — 99214 OFFICE O/P EST MOD 30 MIN: CPT | Mod: S$GLB,,, | Performed by: NURSE PRACTITIONER

## 2021-03-29 PROCEDURE — 3079F DIAST BP 80-89 MM HG: CPT | Mod: CPTII,S$GLB,, | Performed by: NURSE PRACTITIONER

## 2021-03-29 PROCEDURE — 99999 PR PBB SHADOW E&M-EST. PATIENT-LVL III: ICD-10-PCS | Mod: PBBFAC,,, | Performed by: NURSE PRACTITIONER

## 2021-03-29 PROCEDURE — 3074F PR MOST RECENT SYSTOLIC BLOOD PRESSURE < 130 MM HG: ICD-10-PCS | Mod: CPTII,S$GLB,, | Performed by: NURSE PRACTITIONER

## 2021-03-29 PROCEDURE — 3008F PR BODY MASS INDEX (BMI) DOCUMENTED: ICD-10-PCS | Mod: CPTII,S$GLB,, | Performed by: NURSE PRACTITIONER

## 2021-03-29 PROCEDURE — 3008F BODY MASS INDEX DOCD: CPT | Mod: CPTII,S$GLB,, | Performed by: NURSE PRACTITIONER

## 2021-03-29 PROCEDURE — 3074F SYST BP LT 130 MM HG: CPT | Mod: CPTII,S$GLB,, | Performed by: NURSE PRACTITIONER

## 2021-03-29 PROCEDURE — 99999 PR PBB SHADOW E&M-EST. PATIENT-LVL III: CPT | Mod: PBBFAC,,, | Performed by: NURSE PRACTITIONER

## 2021-03-29 PROCEDURE — 3079F PR MOST RECENT DIASTOLIC BLOOD PRESSURE 80-89 MM HG: ICD-10-PCS | Mod: CPTII,S$GLB,, | Performed by: NURSE PRACTITIONER

## 2021-03-29 PROCEDURE — 99214 PR OFFICE/OUTPT VISIT, EST, LEVL IV, 30-39 MIN: ICD-10-PCS | Mod: S$GLB,,, | Performed by: NURSE PRACTITIONER

## 2021-03-29 RX ORDER — OFLOXACIN 3 MG/ML
5 SOLUTION AURICULAR (OTIC) 2 TIMES DAILY
Qty: 70 DROP | Refills: 0 | Status: SHIPPED | OUTPATIENT
Start: 2021-03-29 | End: 2021-03-29 | Stop reason: SDUPTHER

## 2021-03-29 RX ORDER — OFLOXACIN 3 MG/ML
5 SOLUTION AURICULAR (OTIC) 2 TIMES DAILY
Qty: 70 DROP | Refills: 0 | Status: SHIPPED | OUTPATIENT
Start: 2021-03-29 | End: 2021-04-05

## 2021-03-31 ENCOUNTER — OFFICE VISIT (OUTPATIENT)
Dept: OTOLARYNGOLOGY | Facility: CLINIC | Age: 26
End: 2021-03-31
Payer: COMMERCIAL

## 2021-03-31 VITALS
BODY MASS INDEX: 30.21 KG/M2 | DIASTOLIC BLOOD PRESSURE: 81 MMHG | HEIGHT: 73 IN | SYSTOLIC BLOOD PRESSURE: 121 MMHG | HEART RATE: 82 BPM | WEIGHT: 227.94 LBS | OXYGEN SATURATION: 96 %

## 2021-03-31 DIAGNOSIS — H92.01 EAR PAIN, RIGHT: Primary | ICD-10-CM

## 2021-03-31 DIAGNOSIS — H61.21 IMPACTED CERUMEN, RIGHT EAR: ICD-10-CM

## 2021-03-31 DIAGNOSIS — H65.01 NON-RECURRENT ACUTE SEROUS OTITIS MEDIA OF RIGHT EAR: ICD-10-CM

## 2021-03-31 PROCEDURE — 92504 EAR MICROSCOPY EXAMINATION: CPT | Mod: S$GLB,,, | Performed by: OTOLARYNGOLOGY

## 2021-03-31 PROCEDURE — 99214 PR OFFICE/OUTPT VISIT, EST, LEVL IV, 30-39 MIN: ICD-10-PCS | Mod: 25,S$GLB,, | Performed by: OTOLARYNGOLOGY

## 2021-03-31 PROCEDURE — 3079F PR MOST RECENT DIASTOLIC BLOOD PRESSURE 80-89 MM HG: ICD-10-PCS | Mod: CPTII,S$GLB,, | Performed by: OTOLARYNGOLOGY

## 2021-03-31 PROCEDURE — 3008F PR BODY MASS INDEX (BMI) DOCUMENTED: ICD-10-PCS | Mod: CPTII,S$GLB,, | Performed by: OTOLARYNGOLOGY

## 2021-03-31 PROCEDURE — 1125F AMNT PAIN NOTED PAIN PRSNT: CPT | Mod: S$GLB,,, | Performed by: OTOLARYNGOLOGY

## 2021-03-31 PROCEDURE — 92504 PR EAR MICROSCOPY EXAMINATION: ICD-10-PCS | Mod: S$GLB,,, | Performed by: OTOLARYNGOLOGY

## 2021-03-31 PROCEDURE — 1125F PR PAIN SEVERITY QUANTIFIED, PAIN PRESENT: ICD-10-PCS | Mod: S$GLB,,, | Performed by: OTOLARYNGOLOGY

## 2021-03-31 PROCEDURE — 3008F BODY MASS INDEX DOCD: CPT | Mod: CPTII,S$GLB,, | Performed by: OTOLARYNGOLOGY

## 2021-03-31 PROCEDURE — 3074F SYST BP LT 130 MM HG: CPT | Mod: CPTII,S$GLB,, | Performed by: OTOLARYNGOLOGY

## 2021-03-31 PROCEDURE — 3079F DIAST BP 80-89 MM HG: CPT | Mod: CPTII,S$GLB,, | Performed by: OTOLARYNGOLOGY

## 2021-03-31 PROCEDURE — 3074F PR MOST RECENT SYSTOLIC BLOOD PRESSURE < 130 MM HG: ICD-10-PCS | Mod: CPTII,S$GLB,, | Performed by: OTOLARYNGOLOGY

## 2021-03-31 PROCEDURE — 99214 OFFICE O/P EST MOD 30 MIN: CPT | Mod: 25,S$GLB,, | Performed by: OTOLARYNGOLOGY

## 2021-03-31 RX ORDER — FEXOFENADINE HCL AND PSEUDOEPHEDRINE HCI 60; 120 MG/1; MG/1
1 TABLET, EXTENDED RELEASE ORAL DAILY
COMMUNITY
End: 2022-10-25

## 2021-03-31 RX ORDER — FLUTICASONE PROPIONATE 50 MCG
1 SPRAY, SUSPENSION (ML) NASAL DAILY
COMMUNITY
End: 2021-12-07

## 2021-03-31 RX ORDER — DOXYCYCLINE HYCLATE 100 MG
100 TABLET ORAL 2 TIMES DAILY
Qty: 20 TABLET | Refills: 0 | Status: SHIPPED | OUTPATIENT
Start: 2021-03-31 | End: 2021-04-10

## 2021-03-31 RX ORDER — MELOXICAM 15 MG/1
1 TABLET ORAL DAILY
COMMUNITY
End: 2022-10-25

## 2021-05-19 ENCOUNTER — TELEPHONE (OUTPATIENT)
Dept: FAMILY MEDICINE | Facility: CLINIC | Age: 26
End: 2021-05-19

## 2021-05-19 DIAGNOSIS — R29.91 ABNORMAL FOOT FINDING: Primary | ICD-10-CM

## 2021-05-27 ENCOUNTER — PATIENT MESSAGE (OUTPATIENT)
Dept: UROLOGY | Facility: CLINIC | Age: 26
End: 2021-05-27

## 2021-06-02 ENCOUNTER — PATIENT MESSAGE (OUTPATIENT)
Dept: UROLOGY | Facility: CLINIC | Age: 26
End: 2021-06-02

## 2021-06-02 DIAGNOSIS — N32.81 OAB (OVERACTIVE BLADDER): Primary | ICD-10-CM

## 2021-06-16 ENCOUNTER — OFFICE VISIT (OUTPATIENT)
Dept: PODIATRY | Facility: CLINIC | Age: 26
End: 2021-06-16
Payer: COMMERCIAL

## 2021-06-16 VITALS — DIASTOLIC BLOOD PRESSURE: 93 MMHG | HEART RATE: 91 BPM | SYSTOLIC BLOOD PRESSURE: 143 MMHG

## 2021-06-16 DIAGNOSIS — M21.6X1 ACQUIRED EQUINUS DEFORMITY OF BOTH FEET: Primary | ICD-10-CM

## 2021-06-16 DIAGNOSIS — M21.6X2 ACQUIRED EQUINUS DEFORMITY OF BOTH FEET: Primary | ICD-10-CM

## 2021-06-16 DIAGNOSIS — M72.2 PLANTAR FASCIITIS OF LEFT FOOT: ICD-10-CM

## 2021-06-16 PROCEDURE — 99203 OFFICE O/P NEW LOW 30 MIN: CPT | Mod: S$GLB,,, | Performed by: PODIATRIST

## 2021-06-16 PROCEDURE — 99203 PR OFFICE/OUTPT VISIT, NEW, LEVL III, 30-44 MIN: ICD-10-PCS | Mod: S$GLB,,, | Performed by: PODIATRIST

## 2021-06-16 PROCEDURE — 1125F PR PAIN SEVERITY QUANTIFIED, PAIN PRESENT: ICD-10-PCS | Mod: S$GLB,,, | Performed by: PODIATRIST

## 2021-06-16 PROCEDURE — 99999 PR PBB SHADOW E&M-EST. PATIENT-LVL IV: ICD-10-PCS | Mod: PBBFAC,,, | Performed by: PODIATRIST

## 2021-06-16 PROCEDURE — 1125F AMNT PAIN NOTED PAIN PRSNT: CPT | Mod: S$GLB,,, | Performed by: PODIATRIST

## 2021-06-16 PROCEDURE — 99999 PR PBB SHADOW E&M-EST. PATIENT-LVL IV: CPT | Mod: PBBFAC,,, | Performed by: PODIATRIST

## 2021-07-15 RX ORDER — HYOSCYAMINE SULFATE 0.125 MG
125 TABLET ORAL EVERY 6 HOURS PRN
Qty: 40 TABLET | Refills: 1 | Status: SHIPPED | OUTPATIENT
Start: 2021-07-15 | End: 2021-09-10

## 2021-07-16 ENCOUNTER — OFFICE VISIT (OUTPATIENT)
Dept: PSYCHIATRY | Facility: CLINIC | Age: 26
End: 2021-07-16
Payer: COMMERCIAL

## 2021-07-16 ENCOUNTER — PATIENT MESSAGE (OUTPATIENT)
Dept: PSYCHIATRY | Facility: CLINIC | Age: 26
End: 2021-07-16

## 2021-07-16 DIAGNOSIS — F41.1 GAD (GENERALIZED ANXIETY DISORDER): ICD-10-CM

## 2021-07-16 DIAGNOSIS — F40.10 SOCIAL ANXIETY DISORDER: Primary | ICD-10-CM

## 2021-07-16 DIAGNOSIS — F84.0 AUTISM SPECTRUM DISORDER REQUIRING SUBSTANTIAL SUPPORT (LEVEL 2): ICD-10-CM

## 2021-07-16 DIAGNOSIS — Z87.19 HISTORY OF IBS: ICD-10-CM

## 2021-07-16 PROCEDURE — 99214 OFFICE O/P EST MOD 30 MIN: CPT | Mod: 95,,, | Performed by: PSYCHIATRY & NEUROLOGY

## 2021-07-16 PROCEDURE — 99214 PR OFFICE/OUTPT VISIT, EST, LEVL IV, 30-39 MIN: ICD-10-PCS | Mod: 95,,, | Performed by: PSYCHIATRY & NEUROLOGY

## 2021-07-26 ENCOUNTER — PATIENT MESSAGE (OUTPATIENT)
Dept: PODIATRY | Facility: CLINIC | Age: 26
End: 2021-07-26

## 2021-07-26 ENCOUNTER — OFFICE VISIT (OUTPATIENT)
Dept: PODIATRY | Facility: CLINIC | Age: 26
End: 2021-07-26
Payer: COMMERCIAL

## 2021-07-26 VITALS — DIASTOLIC BLOOD PRESSURE: 85 MMHG | SYSTOLIC BLOOD PRESSURE: 122 MMHG | HEART RATE: 53 BPM

## 2021-07-26 DIAGNOSIS — M72.2 PLANTAR FASCIITIS OF LEFT FOOT: Primary | ICD-10-CM

## 2021-07-26 DIAGNOSIS — M21.6X2 ACQUIRED EQUINUS DEFORMITY OF BOTH FEET: ICD-10-CM

## 2021-07-26 DIAGNOSIS — M21.6X1 ACQUIRED EQUINUS DEFORMITY OF BOTH FEET: ICD-10-CM

## 2021-07-26 PROCEDURE — 99213 OFFICE O/P EST LOW 20 MIN: CPT | Mod: PBBFAC,PN | Performed by: PODIATRIST

## 2021-07-26 PROCEDURE — 20550 NJX 1 TENDON SHEATH/LIGAMENT: CPT | Mod: LT,S$GLB,, | Performed by: PODIATRIST

## 2021-07-26 PROCEDURE — 20550 PR INJECT TENDON SHEATH/LIGAMENT: ICD-10-PCS | Mod: LT,S$GLB,, | Performed by: PODIATRIST

## 2021-07-26 PROCEDURE — 99499 UNLISTED E&M SERVICE: CPT | Mod: S$GLB,,, | Performed by: PODIATRIST

## 2021-07-26 PROCEDURE — 99999 PR PBB SHADOW E&M-EST. PATIENT-LVL III: ICD-10-PCS | Mod: PBBFAC,,, | Performed by: PODIATRIST

## 2021-07-26 PROCEDURE — 99999 PR PBB SHADOW E&M-EST. PATIENT-LVL III: CPT | Mod: PBBFAC,,, | Performed by: PODIATRIST

## 2021-07-26 PROCEDURE — 99499 NO LOS: ICD-10-PCS | Mod: S$GLB,,, | Performed by: PODIATRIST

## 2021-07-26 RX ORDER — DEXAMETHASONE SODIUM PHOSPHATE 4 MG/ML
2 INJECTION, SOLUTION INTRA-ARTICULAR; INTRALESIONAL; INTRAMUSCULAR; INTRAVENOUS; SOFT TISSUE
Status: COMPLETED | OUTPATIENT
Start: 2021-07-26 | End: 2021-07-26

## 2021-07-26 RX ORDER — METHYLPREDNISOLONE ACETATE 40 MG/ML
20 INJECTION, SUSPENSION INTRA-ARTICULAR; INTRALESIONAL; INTRAMUSCULAR; SOFT TISSUE
Status: COMPLETED | OUTPATIENT
Start: 2021-07-26 | End: 2021-07-26

## 2021-07-26 RX ADMIN — DEXAMETHASONE SODIUM PHOSPHATE 2 MG: 4 INJECTION, SOLUTION INTRA-ARTICULAR; INTRALESIONAL; INTRAMUSCULAR; INTRAVENOUS; SOFT TISSUE at 08:07

## 2021-07-26 RX ADMIN — METHYLPREDNISOLONE ACETATE 20 MG: 40 INJECTION, SUSPENSION INTRA-ARTICULAR; INTRALESIONAL; INTRAMUSCULAR; SOFT TISSUE at 08:07

## 2021-08-19 ENCOUNTER — TELEPHONE (OUTPATIENT)
Dept: PSYCHIATRY | Facility: CLINIC | Age: 26
End: 2021-08-19

## 2021-08-19 ENCOUNTER — PATIENT MESSAGE (OUTPATIENT)
Dept: PSYCHIATRY | Facility: CLINIC | Age: 26
End: 2021-08-19

## 2021-08-23 ENCOUNTER — TELEPHONE (OUTPATIENT)
Dept: PSYCHIATRY | Facility: CLINIC | Age: 26
End: 2021-08-23

## 2021-08-23 ENCOUNTER — PATIENT MESSAGE (OUTPATIENT)
Dept: PSYCHIATRY | Facility: CLINIC | Age: 26
End: 2021-08-23

## 2021-08-23 RX ORDER — ALPRAZOLAM 0.25 MG/1
0.25 TABLET ORAL DAILY PRN
Qty: 15 TABLET | Refills: 0 | Status: SHIPPED | OUTPATIENT
Start: 2021-08-23 | End: 2021-09-28 | Stop reason: SDUPTHER

## 2021-08-24 ENCOUNTER — LAB VISIT (OUTPATIENT)
Dept: PRIMARY CARE CLINIC | Facility: OTHER | Age: 26
End: 2021-08-24
Payer: COMMERCIAL

## 2021-08-24 DIAGNOSIS — Z20.822 ENCOUNTER FOR LABORATORY TESTING FOR COVID-19 VIRUS: ICD-10-CM

## 2021-08-24 PROCEDURE — U0003 INFECTIOUS AGENT DETECTION BY NUCLEIC ACID (DNA OR RNA); SEVERE ACUTE RESPIRATORY SYNDROME CORONAVIRUS 2 (SARS-COV-2) (CORONAVIRUS DISEASE [COVID-19]), AMPLIFIED PROBE TECHNIQUE, MAKING USE OF HIGH THROUGHPUT TECHNOLOGIES AS DESCRIBED BY CMS-2020-01-R: HCPCS | Performed by: FAMILY MEDICINE

## 2021-08-26 ENCOUNTER — PATIENT MESSAGE (OUTPATIENT)
Dept: FAMILY MEDICINE | Facility: CLINIC | Age: 26
End: 2021-08-26

## 2021-08-26 LAB
SARS-COV-2 RNA RESP QL NAA+PROBE: NOT DETECTED
SARS-COV-2- CYCLE NUMBER: NORMAL

## 2021-08-27 ENCOUNTER — IMMUNIZATION (OUTPATIENT)
Dept: PHARMACY | Facility: CLINIC | Age: 26
End: 2021-08-27
Payer: COMMERCIAL

## 2021-08-27 DIAGNOSIS — Z23 NEED FOR VACCINATION: Primary | ICD-10-CM

## 2021-09-08 ENCOUNTER — PATIENT MESSAGE (OUTPATIENT)
Dept: PSYCHIATRY | Facility: CLINIC | Age: 26
End: 2021-09-08

## 2021-09-13 ENCOUNTER — OFFICE VISIT (OUTPATIENT)
Dept: PODIATRY | Facility: CLINIC | Age: 26
End: 2021-09-13
Payer: COMMERCIAL

## 2021-09-13 VITALS — DIASTOLIC BLOOD PRESSURE: 86 MMHG | HEART RATE: 57 BPM | SYSTOLIC BLOOD PRESSURE: 122 MMHG

## 2021-09-13 DIAGNOSIS — M21.6X2 ACQUIRED EQUINUS DEFORMITY OF BOTH FEET: ICD-10-CM

## 2021-09-13 DIAGNOSIS — M21.6X1 ACQUIRED EQUINUS DEFORMITY OF BOTH FEET: ICD-10-CM

## 2021-09-13 DIAGNOSIS — M72.2 PLANTAR FASCIITIS OF LEFT FOOT: Primary | ICD-10-CM

## 2021-09-13 PROCEDURE — 3074F SYST BP LT 130 MM HG: CPT | Mod: CPTII,S$GLB,, | Performed by: PODIATRIST

## 2021-09-13 PROCEDURE — 1160F RVW MEDS BY RX/DR IN RCRD: CPT | Mod: CPTII,S$GLB,, | Performed by: PODIATRIST

## 2021-09-13 PROCEDURE — 20550 PR INJECT TENDON SHEATH/LIGAMENT: ICD-10-PCS | Mod: LT,S$GLB,, | Performed by: PODIATRIST

## 2021-09-13 PROCEDURE — 1159F PR MEDICATION LIST DOCUMENTED IN MEDICAL RECORD: ICD-10-PCS | Mod: CPTII,S$GLB,, | Performed by: PODIATRIST

## 2021-09-13 PROCEDURE — 99499 NO LOS: ICD-10-PCS | Mod: S$GLB,,, | Performed by: PODIATRIST

## 2021-09-13 PROCEDURE — 3079F DIAST BP 80-89 MM HG: CPT | Mod: CPTII,S$GLB,, | Performed by: PODIATRIST

## 2021-09-13 PROCEDURE — 1159F MED LIST DOCD IN RCRD: CPT | Mod: CPTII,S$GLB,, | Performed by: PODIATRIST

## 2021-09-13 PROCEDURE — 3079F PR MOST RECENT DIASTOLIC BLOOD PRESSURE 80-89 MM HG: ICD-10-PCS | Mod: CPTII,S$GLB,, | Performed by: PODIATRIST

## 2021-09-13 PROCEDURE — 99999 PR PBB SHADOW E&M-EST. PATIENT-LVL III: ICD-10-PCS | Mod: PBBFAC,,, | Performed by: PODIATRIST

## 2021-09-13 PROCEDURE — 3074F PR MOST RECENT SYSTOLIC BLOOD PRESSURE < 130 MM HG: ICD-10-PCS | Mod: CPTII,S$GLB,, | Performed by: PODIATRIST

## 2021-09-13 PROCEDURE — 99999 PR PBB SHADOW E&M-EST. PATIENT-LVL III: CPT | Mod: PBBFAC,,, | Performed by: PODIATRIST

## 2021-09-13 PROCEDURE — 99499 UNLISTED E&M SERVICE: CPT | Mod: S$GLB,,, | Performed by: PODIATRIST

## 2021-09-13 PROCEDURE — 1160F PR REVIEW ALL MEDS BY PRESCRIBER/CLIN PHARMACIST DOCUMENTED: ICD-10-PCS | Mod: CPTII,S$GLB,, | Performed by: PODIATRIST

## 2021-09-13 PROCEDURE — 20550 NJX 1 TENDON SHEATH/LIGAMENT: CPT | Mod: LT,S$GLB,, | Performed by: PODIATRIST

## 2021-09-13 RX ORDER — METHYLPREDNISOLONE ACETATE 40 MG/ML
40 INJECTION, SUSPENSION INTRA-ARTICULAR; INTRALESIONAL; INTRAMUSCULAR; SOFT TISSUE
Status: COMPLETED | OUTPATIENT
Start: 2021-09-13 | End: 2021-09-13

## 2021-09-13 RX ADMIN — METHYLPREDNISOLONE ACETATE 40 MG: 40 INJECTION, SUSPENSION INTRA-ARTICULAR; INTRALESIONAL; INTRAMUSCULAR; SOFT TISSUE at 08:09

## 2021-09-28 ENCOUNTER — OFFICE VISIT (OUTPATIENT)
Dept: PSYCHIATRY | Facility: CLINIC | Age: 26
End: 2021-09-28
Payer: COMMERCIAL

## 2021-09-28 VITALS
HEART RATE: 69 BPM | BODY MASS INDEX: 30.43 KG/M2 | WEIGHT: 229.63 LBS | SYSTOLIC BLOOD PRESSURE: 122 MMHG | DIASTOLIC BLOOD PRESSURE: 85 MMHG | HEIGHT: 73 IN

## 2021-09-28 DIAGNOSIS — F41.1 GAD (GENERALIZED ANXIETY DISORDER): ICD-10-CM

## 2021-09-28 DIAGNOSIS — F43.0 ACUTE STRESS DISORDER: Primary | ICD-10-CM

## 2021-09-28 DIAGNOSIS — F84.0 AUTISM SPECTRUM DISORDER REQUIRING SUBSTANTIAL SUPPORT (LEVEL 2): ICD-10-CM

## 2021-09-28 DIAGNOSIS — F40.10 SOCIAL ANXIETY DISORDER: ICD-10-CM

## 2021-09-28 PROCEDURE — 3074F PR MOST RECENT SYSTOLIC BLOOD PRESSURE < 130 MM HG: ICD-10-PCS | Mod: CPTII,S$GLB,, | Performed by: PSYCHIATRY & NEUROLOGY

## 2021-09-28 PROCEDURE — 1160F PR REVIEW ALL MEDS BY PRESCRIBER/CLIN PHARMACIST DOCUMENTED: ICD-10-PCS | Mod: CPTII,S$GLB,, | Performed by: PSYCHIATRY & NEUROLOGY

## 2021-09-28 PROCEDURE — 90833 PR PSYCHOTHERAPY W/PATIENT W/E&M, 30 MIN (ADD ON): ICD-10-PCS | Mod: S$GLB,,, | Performed by: PSYCHIATRY & NEUROLOGY

## 2021-09-28 PROCEDURE — 3074F SYST BP LT 130 MM HG: CPT | Mod: CPTII,S$GLB,, | Performed by: PSYCHIATRY & NEUROLOGY

## 2021-09-28 PROCEDURE — 3079F PR MOST RECENT DIASTOLIC BLOOD PRESSURE 80-89 MM HG: ICD-10-PCS | Mod: CPTII,S$GLB,, | Performed by: PSYCHIATRY & NEUROLOGY

## 2021-09-28 PROCEDURE — 1159F MED LIST DOCD IN RCRD: CPT | Mod: CPTII,S$GLB,, | Performed by: PSYCHIATRY & NEUROLOGY

## 2021-09-28 PROCEDURE — 99214 OFFICE O/P EST MOD 30 MIN: CPT | Mod: S$GLB,,, | Performed by: PSYCHIATRY & NEUROLOGY

## 2021-09-28 PROCEDURE — 1159F PR MEDICATION LIST DOCUMENTED IN MEDICAL RECORD: ICD-10-PCS | Mod: CPTII,S$GLB,, | Performed by: PSYCHIATRY & NEUROLOGY

## 2021-09-28 PROCEDURE — 3008F BODY MASS INDEX DOCD: CPT | Mod: CPTII,S$GLB,, | Performed by: PSYCHIATRY & NEUROLOGY

## 2021-09-28 PROCEDURE — 99999 PR PBB SHADOW E&M-EST. PATIENT-LVL III: ICD-10-PCS | Mod: PBBFAC,,, | Performed by: PSYCHIATRY & NEUROLOGY

## 2021-09-28 PROCEDURE — 99999 PR PBB SHADOW E&M-EST. PATIENT-LVL III: CPT | Mod: PBBFAC,,, | Performed by: PSYCHIATRY & NEUROLOGY

## 2021-09-28 PROCEDURE — 3008F PR BODY MASS INDEX (BMI) DOCUMENTED: ICD-10-PCS | Mod: CPTII,S$GLB,, | Performed by: PSYCHIATRY & NEUROLOGY

## 2021-09-28 PROCEDURE — 3079F DIAST BP 80-89 MM HG: CPT | Mod: CPTII,S$GLB,, | Performed by: PSYCHIATRY & NEUROLOGY

## 2021-09-28 PROCEDURE — 1160F RVW MEDS BY RX/DR IN RCRD: CPT | Mod: CPTII,S$GLB,, | Performed by: PSYCHIATRY & NEUROLOGY

## 2021-09-28 PROCEDURE — 99214 PR OFFICE/OUTPT VISIT, EST, LEVL IV, 30-39 MIN: ICD-10-PCS | Mod: S$GLB,,, | Performed by: PSYCHIATRY & NEUROLOGY

## 2021-09-28 PROCEDURE — 90833 PSYTX W PT W E/M 30 MIN: CPT | Mod: S$GLB,,, | Performed by: PSYCHIATRY & NEUROLOGY

## 2021-09-28 RX ORDER — FLUOXETINE HYDROCHLORIDE 40 MG/1
40 CAPSULE ORAL DAILY
Qty: 90 CAPSULE | Refills: 3 | Status: SHIPPED | OUTPATIENT
Start: 2021-09-28 | End: 2021-11-15 | Stop reason: SDUPTHER

## 2021-09-28 RX ORDER — ALPRAZOLAM 0.25 MG/1
0.25 TABLET ORAL DAILY PRN
Qty: 15 TABLET | Refills: 0 | Status: SHIPPED | OUTPATIENT
Start: 2021-09-28 | End: 2022-09-12 | Stop reason: SDUPTHER

## 2021-10-06 ENCOUNTER — PATIENT MESSAGE (OUTPATIENT)
Dept: PSYCHIATRY | Facility: CLINIC | Age: 26
End: 2021-10-06

## 2021-10-26 ENCOUNTER — OFFICE VISIT (OUTPATIENT)
Dept: PODIATRY | Facility: CLINIC | Age: 26
End: 2021-10-26
Payer: COMMERCIAL

## 2021-10-26 DIAGNOSIS — M21.6X1 ACQUIRED EQUINUS DEFORMITY OF BOTH FEET: ICD-10-CM

## 2021-10-26 DIAGNOSIS — M72.2 PLANTAR FASCIITIS OF LEFT FOOT: Primary | ICD-10-CM

## 2021-10-26 DIAGNOSIS — M21.6X2 ACQUIRED EQUINUS DEFORMITY OF BOTH FEET: ICD-10-CM

## 2021-10-26 PROCEDURE — 99212 PR OFFICE/OUTPT VISIT, EST, LEVL II, 10-19 MIN: ICD-10-PCS | Mod: S$GLB,,, | Performed by: PODIATRIST

## 2021-10-26 PROCEDURE — 1159F PR MEDICATION LIST DOCUMENTED IN MEDICAL RECORD: ICD-10-PCS | Mod: CPTII,S$GLB,, | Performed by: PODIATRIST

## 2021-10-26 PROCEDURE — 99212 OFFICE O/P EST SF 10 MIN: CPT | Mod: S$GLB,,, | Performed by: PODIATRIST

## 2021-10-26 PROCEDURE — 1160F RVW MEDS BY RX/DR IN RCRD: CPT | Mod: CPTII,S$GLB,, | Performed by: PODIATRIST

## 2021-10-26 PROCEDURE — 1160F PR REVIEW ALL MEDS BY PRESCRIBER/CLIN PHARMACIST DOCUMENTED: ICD-10-PCS | Mod: CPTII,S$GLB,, | Performed by: PODIATRIST

## 2021-10-26 PROCEDURE — 99999 PR PBB SHADOW E&M-EST. PATIENT-LVL III: CPT | Mod: PBBFAC,,, | Performed by: PODIATRIST

## 2021-10-26 PROCEDURE — 1159F MED LIST DOCD IN RCRD: CPT | Mod: CPTII,S$GLB,, | Performed by: PODIATRIST

## 2021-10-26 PROCEDURE — 99999 PR PBB SHADOW E&M-EST. PATIENT-LVL III: ICD-10-PCS | Mod: PBBFAC,,, | Performed by: PODIATRIST

## 2021-11-15 ENCOUNTER — OFFICE VISIT (OUTPATIENT)
Dept: PSYCHIATRY | Facility: CLINIC | Age: 26
End: 2021-11-15
Payer: COMMERCIAL

## 2021-11-15 VITALS
DIASTOLIC BLOOD PRESSURE: 90 MMHG | HEIGHT: 73 IN | SYSTOLIC BLOOD PRESSURE: 128 MMHG | BODY MASS INDEX: 31 KG/M2 | WEIGHT: 233.94 LBS | HEART RATE: 83 BPM

## 2021-11-15 DIAGNOSIS — F40.10 SOCIAL ANXIETY DISORDER: ICD-10-CM

## 2021-11-15 DIAGNOSIS — F41.1 GAD (GENERALIZED ANXIETY DISORDER): Primary | ICD-10-CM

## 2021-11-15 DIAGNOSIS — F84.0 AUTISM SPECTRUM DISORDER REQUIRING SUBSTANTIAL SUPPORT (LEVEL 2): ICD-10-CM

## 2021-11-15 PROCEDURE — 3008F PR BODY MASS INDEX (BMI) DOCUMENTED: ICD-10-PCS | Mod: CPTII,S$GLB,, | Performed by: PSYCHIATRY & NEUROLOGY

## 2021-11-15 PROCEDURE — 1159F MED LIST DOCD IN RCRD: CPT | Mod: CPTII,S$GLB,, | Performed by: PSYCHIATRY & NEUROLOGY

## 2021-11-15 PROCEDURE — 90833 PR PSYCHOTHERAPY W/PATIENT W/E&M, 30 MIN (ADD ON): ICD-10-PCS | Mod: S$GLB,,, | Performed by: PSYCHIATRY & NEUROLOGY

## 2021-11-15 PROCEDURE — 3080F DIAST BP >= 90 MM HG: CPT | Mod: CPTII,S$GLB,, | Performed by: PSYCHIATRY & NEUROLOGY

## 2021-11-15 PROCEDURE — 99214 PR OFFICE/OUTPT VISIT, EST, LEVL IV, 30-39 MIN: ICD-10-PCS | Mod: S$GLB,,, | Performed by: PSYCHIATRY & NEUROLOGY

## 2021-11-15 PROCEDURE — 3080F PR MOST RECENT DIASTOLIC BLOOD PRESSURE >= 90 MM HG: ICD-10-PCS | Mod: CPTII,S$GLB,, | Performed by: PSYCHIATRY & NEUROLOGY

## 2021-11-15 PROCEDURE — 3074F SYST BP LT 130 MM HG: CPT | Mod: CPTII,S$GLB,, | Performed by: PSYCHIATRY & NEUROLOGY

## 2021-11-15 PROCEDURE — 3008F BODY MASS INDEX DOCD: CPT | Mod: CPTII,S$GLB,, | Performed by: PSYCHIATRY & NEUROLOGY

## 2021-11-15 PROCEDURE — 99999 PR PBB SHADOW E&M-EST. PATIENT-LVL III: ICD-10-PCS | Mod: PBBFAC,,, | Performed by: PSYCHIATRY & NEUROLOGY

## 2021-11-15 PROCEDURE — 1159F PR MEDICATION LIST DOCUMENTED IN MEDICAL RECORD: ICD-10-PCS | Mod: CPTII,S$GLB,, | Performed by: PSYCHIATRY & NEUROLOGY

## 2021-11-15 PROCEDURE — 3074F PR MOST RECENT SYSTOLIC BLOOD PRESSURE < 130 MM HG: ICD-10-PCS | Mod: CPTII,S$GLB,, | Performed by: PSYCHIATRY & NEUROLOGY

## 2021-11-15 PROCEDURE — 99214 OFFICE O/P EST MOD 30 MIN: CPT | Mod: S$GLB,,, | Performed by: PSYCHIATRY & NEUROLOGY

## 2021-11-15 PROCEDURE — 99999 PR PBB SHADOW E&M-EST. PATIENT-LVL III: CPT | Mod: PBBFAC,,, | Performed by: PSYCHIATRY & NEUROLOGY

## 2021-11-15 PROCEDURE — 90833 PSYTX W PT W E/M 30 MIN: CPT | Mod: S$GLB,,, | Performed by: PSYCHIATRY & NEUROLOGY

## 2021-11-15 RX ORDER — FLUOXETINE HYDROCHLORIDE 40 MG/1
40 CAPSULE ORAL DAILY
Qty: 90 CAPSULE | Refills: 3 | Status: SHIPPED | OUTPATIENT
Start: 2021-11-15 | End: 2022-02-07 | Stop reason: SDUPTHER

## 2021-11-23 ENCOUNTER — PATIENT OUTREACH (OUTPATIENT)
Dept: ADMINISTRATIVE | Facility: OTHER | Age: 26
End: 2021-11-23
Payer: COMMERCIAL

## 2021-11-26 ENCOUNTER — OFFICE VISIT (OUTPATIENT)
Dept: NEPHROLOGY | Facility: CLINIC | Age: 26
End: 2021-11-26
Payer: COMMERCIAL

## 2021-11-26 VITALS
SYSTOLIC BLOOD PRESSURE: 130 MMHG | OXYGEN SATURATION: 97 % | HEIGHT: 73 IN | WEIGHT: 233 LBS | DIASTOLIC BLOOD PRESSURE: 82 MMHG | BODY MASS INDEX: 30.88 KG/M2 | HEART RATE: 83 BPM

## 2021-11-26 DIAGNOSIS — N20.0 KIDNEY STONE: Primary | ICD-10-CM

## 2021-11-26 PROCEDURE — 99214 OFFICE O/P EST MOD 30 MIN: CPT | Mod: S$GLB,,, | Performed by: INTERNAL MEDICINE

## 2021-11-26 PROCEDURE — 99999 PR PBB SHADOW E&M-EST. PATIENT-LVL III: CPT | Mod: PBBFAC,,, | Performed by: INTERNAL MEDICINE

## 2021-11-26 PROCEDURE — 99214 PR OFFICE/OUTPT VISIT, EST, LEVL IV, 30-39 MIN: ICD-10-PCS | Mod: S$GLB,,, | Performed by: INTERNAL MEDICINE

## 2021-11-26 PROCEDURE — 99999 PR PBB SHADOW E&M-EST. PATIENT-LVL III: ICD-10-PCS | Mod: PBBFAC,,, | Performed by: INTERNAL MEDICINE

## 2021-11-26 PROCEDURE — 3066F NEPHROPATHY DOC TX: CPT | Mod: CPTII,S$GLB,, | Performed by: INTERNAL MEDICINE

## 2021-11-26 PROCEDURE — 3066F PR DOCUMENTATION OF TREATMENT FOR NEPHROPATHY: ICD-10-PCS | Mod: CPTII,S$GLB,, | Performed by: INTERNAL MEDICINE

## 2021-11-29 ENCOUNTER — TELEPHONE (OUTPATIENT)
Dept: UROLOGY | Facility: CLINIC | Age: 26
End: 2021-11-29
Payer: COMMERCIAL

## 2021-12-07 ENCOUNTER — OFFICE VISIT (OUTPATIENT)
Dept: UROLOGY | Facility: CLINIC | Age: 26
End: 2021-12-07
Payer: COMMERCIAL

## 2021-12-07 VITALS — BODY MASS INDEX: 30.97 KG/M2 | WEIGHT: 233.69 LBS | HEIGHT: 73 IN

## 2021-12-07 DIAGNOSIS — G89.29 CHRONIC BILATERAL LOW BACK PAIN WITHOUT SCIATICA: ICD-10-CM

## 2021-12-07 DIAGNOSIS — M54.50 CHRONIC BILATERAL LOW BACK PAIN WITHOUT SCIATICA: ICD-10-CM

## 2021-12-07 DIAGNOSIS — R39.89 URETHRAL PAIN: ICD-10-CM

## 2021-12-07 DIAGNOSIS — R30.0 DYSURIA: Primary | ICD-10-CM

## 2021-12-07 LAB
ALBUMIN SERPL-MCNC: 4.4 G/DL (ref 3.6–5.1)
AMORPH SED URNS QL MICRO: NORMAL /HPF
APPEARANCE UR: CLEAR
BACTERIA #/AREA URNS HPF: NORMAL /HPF
BILIRUB SERPL-MCNC: NORMAL MG/DL
BILIRUB UR QL STRIP: NEGATIVE
BLOOD URINE, POC: NORMAL
BUN SERPL-MCNC: 14 MG/DL (ref 7–25)
BUN/CREAT SERPL: NORMAL (CALC) (ref 6–22)
CALCIUM SERPL-MCNC: 9.8 MG/DL (ref 8.6–10.3)
CAOX CRY #/AREA URNS HPF: NORMAL /HPF
CASTS #/AREA URNS LPF: NORMAL /LPF
CHLORIDE SERPL-SCNC: 102 MMOL/L (ref 98–110)
CLARITY, POC UA: CLEAR
CO2 SERPL-SCNC: 27 MMOL/L (ref 20–32)
COLOR UR: NORMAL
COLOR, POC UA: YELLOW
CREAT SERPL-MCNC: 0.9 MG/DL (ref 0.6–1.35)
CRYSTALS #/AREA URNS HPF: NORMAL /HPF
GLUCOSE SERPL-MCNC: 104 MG/DL (ref 65–139)
GLUCOSE UR QL STRIP: NEGATIVE
GLUCOSE UR QL STRIP: NORMAL
GRAN CASTS #/AREA URNS LPF: NORMAL /LPF
HGB UR QL STRIP: NEGATIVE
HYALINE CASTS #/AREA URNS LPF: NORMAL /LPF
KETONES UR QL STRIP: NEGATIVE
KETONES UR QL STRIP: NORMAL
LEUKOCYTE ESTERASE UR QL STRIP: NEGATIVE
LEUKOCYTE ESTERASE URINE, POC: NORMAL
NITRITE UR QL STRIP: NEGATIVE
NITRITE, POC UA: NORMAL
PH UR STRIP: 6 [PH] (ref 5–8)
PH, POC UA: 7
PHOSPHATE SERPL-MCNC: 3.6 MG/DL (ref 2.5–4.5)
POTASSIUM SERPL-SCNC: 4.3 MMOL/L (ref 3.5–5.3)
PROT UR QL STRIP: NEGATIVE
PROTEIN, POC: NORMAL
RBC #/AREA URNS HPF: NORMAL /HPF
RENAL EPI CELLS #/AREA URNS HPF: NORMAL /HPF
SERVICE CMNT-IMP: NORMAL
SERVICE CMNT-IMP: NORMAL
SODIUM SERPL-SCNC: 137 MMOL/L (ref 135–146)
SP GR UR STRIP: 1.01 (ref 1–1.03)
SPECIFIC GRAVITY, POC UA: 1.02
SQUAMOUS #/AREA URNS HPF: NORMAL /HPF
TRANS CELLS #/AREA URNS HPF: NORMAL /HPF
TRI-PHOS CRY #/AREA URNS HPF: NORMAL /HPF
URATE CRY #/AREA URNS HPF: NORMAL /HPF
UROBILINOGEN, POC UA: NORMAL
WBC #/AREA URNS HPF: NORMAL /HPF
YEAST #/AREA URNS HPF: NORMAL /HPF

## 2021-12-07 PROCEDURE — 81002 POCT URINE DIPSTICK WITHOUT MICROSCOPE: ICD-10-PCS | Mod: S$GLB,,, | Performed by: UROLOGY

## 2021-12-07 PROCEDURE — 3066F PR DOCUMENTATION OF TREATMENT FOR NEPHROPATHY: ICD-10-PCS | Mod: CPTII,S$GLB,, | Performed by: UROLOGY

## 2021-12-07 PROCEDURE — 99999 PR PBB SHADOW E&M-EST. PATIENT-LVL IV: CPT | Mod: PBBFAC,,, | Performed by: UROLOGY

## 2021-12-07 PROCEDURE — 81002 URINALYSIS NONAUTO W/O SCOPE: CPT | Mod: S$GLB,,, | Performed by: UROLOGY

## 2021-12-07 PROCEDURE — 99215 OFFICE O/P EST HI 40 MIN: CPT | Mod: 25,S$GLB,, | Performed by: UROLOGY

## 2021-12-07 PROCEDURE — 3066F NEPHROPATHY DOC TX: CPT | Mod: CPTII,S$GLB,, | Performed by: UROLOGY

## 2021-12-07 PROCEDURE — 99215 PR OFFICE/OUTPT VISIT, EST, LEVL V, 40-54 MIN: ICD-10-PCS | Mod: 25,S$GLB,, | Performed by: UROLOGY

## 2021-12-07 PROCEDURE — 99999 PR PBB SHADOW E&M-EST. PATIENT-LVL IV: ICD-10-PCS | Mod: PBBFAC,,, | Performed by: UROLOGY

## 2021-12-07 RX ORDER — HYOSCYAMINE SULFATE 0.125 MG
125 TABLET ORAL EVERY 6 HOURS PRN
Qty: 40 TABLET | Refills: 2 | Status: SHIPPED | OUTPATIENT
Start: 2021-12-07 | End: 2022-05-04

## 2021-12-23 ENCOUNTER — ANESTHESIA EVENT (OUTPATIENT)
Dept: SURGERY | Facility: HOSPITAL | Age: 26
End: 2021-12-23
Payer: COMMERCIAL

## 2021-12-27 ENCOUNTER — ANESTHESIA (OUTPATIENT)
Dept: SURGERY | Facility: HOSPITAL | Age: 26
End: 2021-12-27
Payer: COMMERCIAL

## 2022-01-03 ENCOUNTER — HOSPITAL ENCOUNTER (OUTPATIENT)
Facility: HOSPITAL | Age: 27
Discharge: HOME | End: 2022-01-03
Attending: UROLOGY | Admitting: UROLOGY
Payer: COMMERCIAL

## 2022-01-03 VITALS
OXYGEN SATURATION: 98 % | BODY MASS INDEX: 30.88 KG/M2 | SYSTOLIC BLOOD PRESSURE: 110 MMHG | TEMPERATURE: 98 F | DIASTOLIC BLOOD PRESSURE: 62 MMHG | HEART RATE: 62 BPM | RESPIRATION RATE: 16 BRPM | HEIGHT: 73 IN | WEIGHT: 233 LBS

## 2022-01-03 DIAGNOSIS — R39.89 BLADDER PAIN: ICD-10-CM

## 2022-01-03 DIAGNOSIS — Z01.818 PREOP TESTING: Primary | ICD-10-CM

## 2022-01-03 LAB
CTP QC/QA: YES
SARS-COV-2 AG RESP QL IA.RAPID: NEGATIVE

## 2022-01-03 PROCEDURE — 88341 PR IHC OR ICC EACH ADD'L SINGLE ANTIBODY  STAINPR: ICD-10-PCS | Mod: 26,,, | Performed by: PATHOLOGY

## 2022-01-03 PROCEDURE — 88305 TISSUE EXAM BY PATHOLOGIST: CPT | Mod: 26,,, | Performed by: PATHOLOGY

## 2022-01-03 PROCEDURE — 36000706: Mod: PO | Performed by: UROLOGY

## 2022-01-03 PROCEDURE — 88342 IMHCHEM/IMCYTCHM 1ST ANTB: CPT | Mod: 26,,, | Performed by: PATHOLOGY

## 2022-01-03 PROCEDURE — D9220A PRA ANESTHESIA: ICD-10-PCS | Mod: ANES,,, | Performed by: ANESTHESIOLOGY

## 2022-01-03 PROCEDURE — 36000707: Mod: PO | Performed by: UROLOGY

## 2022-01-03 PROCEDURE — 88305 TISSUE EXAM BY PATHOLOGIST: ICD-10-PCS | Mod: 26,,, | Performed by: PATHOLOGY

## 2022-01-03 PROCEDURE — 25000003 PHARM REV CODE 250: Mod: PO | Performed by: NURSE ANESTHETIST, CERTIFIED REGISTERED

## 2022-01-03 PROCEDURE — 27200651 HC AIRWAY, LMA: Mod: PO | Performed by: ANESTHESIOLOGY

## 2022-01-03 PROCEDURE — 63600175 PHARM REV CODE 636 W HCPCS: Mod: PO | Performed by: NURSE ANESTHETIST, CERTIFIED REGISTERED

## 2022-01-03 PROCEDURE — 52204 PR CYSTOURETHROSCOPY,BIOPSY: ICD-10-PCS | Mod: ,,, | Performed by: UROLOGY

## 2022-01-03 PROCEDURE — D9220A PRA ANESTHESIA: ICD-10-PCS | Mod: CRNA,,, | Performed by: NURSE ANESTHETIST, CERTIFIED REGISTERED

## 2022-01-03 PROCEDURE — 71000033 HC RECOVERY, INTIAL HOUR: Mod: PO | Performed by: UROLOGY

## 2022-01-03 PROCEDURE — D9220A PRA ANESTHESIA: Mod: CRNA,,, | Performed by: NURSE ANESTHETIST, CERTIFIED REGISTERED

## 2022-01-03 PROCEDURE — 37000008 HC ANESTHESIA 1ST 15 MINUTES: Mod: PO | Performed by: UROLOGY

## 2022-01-03 PROCEDURE — 25000003 PHARM REV CODE 250: Mod: PO | Performed by: ANESTHESIOLOGY

## 2022-01-03 PROCEDURE — 37000009 HC ANESTHESIA EA ADD 15 MINS: Mod: PO | Performed by: UROLOGY

## 2022-01-03 PROCEDURE — 88341 IMHCHEM/IMCYTCHM EA ADD ANTB: CPT | Performed by: PATHOLOGY

## 2022-01-03 PROCEDURE — 52204 CYSTOSCOPY W/BIOPSY(S): CPT | Mod: ,,, | Performed by: UROLOGY

## 2022-01-03 PROCEDURE — 88342 CHG IMMUNOCYTOCHEMISTRY: ICD-10-PCS | Mod: 26,,, | Performed by: PATHOLOGY

## 2022-01-03 PROCEDURE — 63600175 PHARM REV CODE 636 W HCPCS: Mod: PO | Performed by: ANESTHESIOLOGY

## 2022-01-03 PROCEDURE — 88341 IMHCHEM/IMCYTCHM EA ADD ANTB: CPT | Mod: 26,,, | Performed by: PATHOLOGY

## 2022-01-03 PROCEDURE — 88342 IMHCHEM/IMCYTCHM 1ST ANTB: CPT | Performed by: PATHOLOGY

## 2022-01-03 PROCEDURE — 88305 TISSUE EXAM BY PATHOLOGIST: CPT | Performed by: PATHOLOGY

## 2022-01-03 PROCEDURE — 71000015 HC POSTOP RECOV 1ST HR: Mod: PO | Performed by: UROLOGY

## 2022-01-03 PROCEDURE — D9220A PRA ANESTHESIA: Mod: ANES,,, | Performed by: ANESTHESIOLOGY

## 2022-01-03 RX ORDER — FENTANYL CITRATE 50 UG/ML
25 INJECTION, SOLUTION INTRAMUSCULAR; INTRAVENOUS EVERY 5 MIN PRN
Status: DISCONTINUED | OUTPATIENT
Start: 2022-01-03 | End: 2022-03-07

## 2022-01-03 RX ORDER — LIDOCAINE HYDROCHLORIDE 10 MG/ML
1 INJECTION, SOLUTION EPIDURAL; INFILTRATION; INTRACAUDAL; PERINEURAL ONCE AS NEEDED
Status: DISCONTINUED | OUTPATIENT
Start: 2022-01-03 | End: 2022-03-07

## 2022-01-03 RX ORDER — PROPOFOL 10 MG/ML
VIAL (ML) INTRAVENOUS
Status: DISCONTINUED | OUTPATIENT
Start: 2022-01-03 | End: 2022-01-03

## 2022-01-03 RX ORDER — ONDANSETRON 2 MG/ML
INJECTION INTRAMUSCULAR; INTRAVENOUS
Status: DISCONTINUED | OUTPATIENT
Start: 2022-01-03 | End: 2022-01-03

## 2022-01-03 RX ORDER — FENTANYL CITRATE 50 UG/ML
INJECTION, SOLUTION INTRAMUSCULAR; INTRAVENOUS
Status: DISCONTINUED | OUTPATIENT
Start: 2022-01-03 | End: 2022-01-03

## 2022-01-03 RX ORDER — ONDANSETRON 2 MG/ML
4 INJECTION INTRAMUSCULAR; INTRAVENOUS ONCE AS NEEDED
Status: DISCONTINUED | OUTPATIENT
Start: 2022-01-03 | End: 2022-03-07

## 2022-01-03 RX ORDER — ACETAMINOPHEN 10 MG/ML
INJECTION, SOLUTION INTRAVENOUS
Status: DISCONTINUED | OUTPATIENT
Start: 2022-01-03 | End: 2022-01-03

## 2022-01-03 RX ORDER — HYDROMORPHONE HYDROCHLORIDE 2 MG/ML
0.2 INJECTION, SOLUTION INTRAMUSCULAR; INTRAVENOUS; SUBCUTANEOUS EVERY 5 MIN PRN
Status: DISCONTINUED | OUTPATIENT
Start: 2022-01-03 | End: 2022-03-07

## 2022-01-03 RX ORDER — HYDROCODONE BITARTRATE AND ACETAMINOPHEN 5; 325 MG/1; MG/1
1 TABLET ORAL EVERY 4 HOURS PRN
Status: DISCONTINUED | OUTPATIENT
Start: 2022-01-03 | End: 2022-01-03 | Stop reason: HOSPADM

## 2022-01-03 RX ORDER — LIDOCAINE HYDROCHLORIDE 20 MG/ML
INJECTION INTRAVENOUS
Status: DISCONTINUED | OUTPATIENT
Start: 2022-01-03 | End: 2022-01-03

## 2022-01-03 RX ORDER — LIDOCAINE HYDROCHLORIDE 10 MG/ML
1 INJECTION, SOLUTION EPIDURAL; INFILTRATION; INTRACAUDAL; PERINEURAL ONCE
Status: DISCONTINUED | OUTPATIENT
Start: 2022-01-03 | End: 2022-03-07

## 2022-01-03 RX ORDER — SODIUM CHLORIDE, SODIUM LACTATE, POTASSIUM CHLORIDE, CALCIUM CHLORIDE 600; 310; 30; 20 MG/100ML; MG/100ML; MG/100ML; MG/100ML
125 INJECTION, SOLUTION INTRAVENOUS CONTINUOUS
Status: DISCONTINUED | OUTPATIENT
Start: 2022-01-03 | End: 2022-03-07

## 2022-01-03 RX ORDER — ACETAMINOPHEN 325 MG/1
325 TABLET ORAL EVERY 4 HOURS PRN
Status: DISCONTINUED | OUTPATIENT
Start: 2022-01-03 | End: 2022-01-03 | Stop reason: HOSPADM

## 2022-01-03 RX ORDER — OXYCODONE HYDROCHLORIDE 5 MG/1
5 TABLET ORAL ONCE AS NEEDED
Status: DISCONTINUED | OUTPATIENT
Start: 2022-01-03 | End: 2022-03-07

## 2022-01-03 RX ORDER — MIDAZOLAM HYDROCHLORIDE 1 MG/ML
INJECTION, SOLUTION INTRAMUSCULAR; INTRAVENOUS
Status: DISCONTINUED | OUTPATIENT
Start: 2022-01-03 | End: 2022-01-03

## 2022-01-03 RX ORDER — LIDOCAINE HYDROCHLORIDE 10 MG/ML
1 INJECTION, SOLUTION EPIDURAL; INFILTRATION; INTRACAUDAL; PERINEURAL ONCE
Status: COMPLETED | OUTPATIENT
Start: 2022-01-03 | End: 2022-01-03

## 2022-01-03 RX ORDER — ONDANSETRON 8 MG/1
8 TABLET, ORALLY DISINTEGRATING ORAL EVERY 8 HOURS PRN
Status: DISCONTINUED | OUTPATIENT
Start: 2022-01-03 | End: 2022-01-03 | Stop reason: HOSPADM

## 2022-01-03 RX ORDER — SODIUM CHLORIDE, SODIUM LACTATE, POTASSIUM CHLORIDE, CALCIUM CHLORIDE 600; 310; 30; 20 MG/100ML; MG/100ML; MG/100ML; MG/100ML
INJECTION, SOLUTION INTRAVENOUS CONTINUOUS
Status: DISCONTINUED | OUTPATIENT
Start: 2022-01-03 | End: 2022-03-07

## 2022-01-03 RX ORDER — OXYCODONE HYDROCHLORIDE 5 MG/1
5 TABLET ORAL
Status: DISCONTINUED | OUTPATIENT
Start: 2022-01-03 | End: 2022-03-07

## 2022-01-03 RX ADMIN — LIDOCAINE HYDROCHLORIDE 10 MG: 10 INJECTION, SOLUTION EPIDURAL; INFILTRATION; INTRACAUDAL; PERINEURAL at 08:01

## 2022-01-03 RX ADMIN — MIDAZOLAM HYDROCHLORIDE 2 MG: 1 INJECTION, SOLUTION INTRAMUSCULAR; INTRAVENOUS at 09:01

## 2022-01-03 RX ADMIN — PROPOFOL 50 MG: 10 INJECTION, EMULSION INTRAVENOUS at 09:01

## 2022-01-03 RX ADMIN — PROPOFOL 100 MG: 10 INJECTION, EMULSION INTRAVENOUS at 09:01

## 2022-01-03 RX ADMIN — SODIUM CHLORIDE, SODIUM LACTATE, POTASSIUM CHLORIDE, AND CALCIUM CHLORIDE: .6; .31; .03; .02 INJECTION, SOLUTION INTRAVENOUS at 08:01

## 2022-01-03 RX ADMIN — FENTANYL CITRATE 50 MCG: 50 INJECTION, SOLUTION INTRAMUSCULAR; INTRAVENOUS at 09:01

## 2022-01-03 RX ADMIN — ONDANSETRON 4 MG: 2 INJECTION INTRAMUSCULAR; INTRAVENOUS at 09:01

## 2022-01-03 RX ADMIN — LIDOCAINE HYDROCHLORIDE 100 MG: 20 INJECTION, SOLUTION INTRAVENOUS at 09:01

## 2022-01-03 RX ADMIN — ACETAMINOPHEN 1000 MG: 10 INJECTION, SOLUTION INTRAVENOUS at 09:01

## 2022-01-03 RX ADMIN — GLYCOPYRROLATE 0.2 MG: 0.2 INJECTION, SOLUTION INTRAMUSCULAR; INTRAVITREAL at 09:01

## 2022-01-03 RX ADMIN — PROPOFOL 250 MG: 10 INJECTION, EMULSION INTRAVENOUS at 09:01

## 2022-01-03 NOTE — TRANSFER OF CARE
"Anesthesia Transfer of Care Note    Patient: Jm Chaparro    Procedure(s) Performed: Procedure(s) (LRB):  CYSTOSCOPY (N/A)  BIOPSY, BLADDER    Patient location: PACU    Anesthesia Type: general    Transport from OR: Transported from OR on 2-3 L/min O2 by NC with adequate spontaneous ventilation    Post pain: adequate analgesia    Post assessment: no apparent anesthetic complications and tolerated procedure well    Post vital signs: stable    Level of consciousness: sedated    Nausea/Vomiting: no nausea/vomiting    Complications: none    Transfer of care protocol was followed      Last vitals:   Visit Vitals  /79 (BP Location: Right arm, Patient Position: Lying)   Pulse (!) 53   Temp 36.8 °C (98.2 °F) (Skin)   Resp 17   Ht 6' 1" (1.854 m)   Wt 105.7 kg (233 lb)   SpO2 96%   BMI 30.74 kg/m²     "

## 2022-01-03 NOTE — ANESTHESIA PROCEDURE NOTES
LMA insertion    Date/Time: 1/3/2022 9:20 AM  Performed by: Aiyana Londono CRNA  Authorized by: Lianna Gooden MD     Intubation:     Induction:  Intravenous    Intubated:  Postinduction    Mask Ventilation:  Easy mask    Attempts:  2    Attempted By:  CRNA    Attempted By (2nd Attempt):  CRNA    Difficult Airway Encountered?: No      Complications:  None    Airway Device:  Supraglottic airway/LMA    Airway Device Size:  4.0    Style/Cuff Inflation:  Cuffed (inflated to minimal occlusive pressure)    Inflation Amount (mL):  10    Secured at:  The lips    Placement Verified By:  Capnometry    Complicating Factors:  None    Findings Post-Intubation:  BS equal bilateral  Notes:      First attempt 5 LMA- 4 LMA seated easily on 2nd attempt

## 2022-01-03 NOTE — H&P
"UROLOGY [place].   1 3 22     Cc persistent suprapubic pain     Age 26, complains of having pains on the suprapubic area, that have been present off and on for years. Mother says he shows up at her bedroom in tears, owing to the intensity of the pain. mother says pt has no hesitation in classifying the pain at level 10 (maximum). The location of the pain is mostly suprapubic, so they suspect the bladder as the origin. The pain sometimes from the suprapubic area radiates back to the low back bilaterally.      The urethra feels "stung, scratched, pinched and stabbed" (verbatim description by pt).     Mother claims that if pt drinks carbonated beverages the pain is worse, but if he drink alkaline water it gets better.      Pt also says he believes he is "passing small grains with his urine", which he considers sand particles. Has not been able to retrieve any.      Pt has severe psoriatic arthritis and is on neurontin and otezla (specific for psoriasis).     No discharge is present in urethra. No fever is present. Claims flanks hurt on both sides.      Over one year ago he went to [facility] with these concerns and had a CT scan and was kept in the hospital several days. The tests, mother reports, did not find stones in the kidneys, dilatation, masses, or any deformity. They eventually allowed discharge without major changes. Mother is very careful with diet and exercise for pt.      Pt is on ditropan xl 10 for urinary frequency, on elmiron 100 for insterstitial cystitis, on atarax 50 also for interstitial cystitis, and on hyosciamine for bladder and intestinal spams.      He has a hx of kidney stones, having had a ureteral stone extraction by dr timur callaway. No recurrences have occurred.      For a while pt had severe nocturnal polyuria and we started him on low dose desmopressin (noctiva 1.66) and it helped for a while, but was later replaced by the bladder relaxants.      Pt has asperger syndrome (autism) and is not " easily communicative.      In the past he has had intermittent diarrhea and constipation and was taken to [facility] in [place], and they categorized it as IBS.      Also has frequent back pain.     PMH     Surgical:  has a past surgical history that includes Esophagogastroduodenoscopy and ureteral stone extraction.     Medical:  has a past medical history of Abnormal thyroid function test (9/11/2017); ADD (attention deficit disorder); Anxiety disorder; Asperger's disorder; Deformity of both feet; Dyscalculia; Dysgraphia; Dyslexia; Eczema; GERD (gastroesophageal reflux disease); History of migraine headaches; Interstitial cystitis (chronic); Nephrolithiasis; Psoriasis; Refusal of blood transfusion for reasons of conscience; and Seasonal allergies.     Familial: mother with interstitial cystitis     Social: single, lives in [place]                 Current Outpatient Prescriptions on File Prior to Visit   Medication Sig Dispense Refill    allopurinol (ZYLOPRIM) 300 MG  TAKE 1 TABLET 90 tablet 1    cholecalciferol, vitamin D3, (VITAMIN  Take 5,00.        clobetasol 0.05% (TEMOVATE) 0.05  Apply topically  60 g 5    dextroamphetamine-amphetamine (ADDERALL XR) 20  Take 1 capsule (20  30 capsule 0    eszopiclone (LUNESTA) 2 MG Tab Take 1 tablet (2  90 tablet 0    fluoxetine (PROZAC) 20 MG capsule Take 1 cap 30 capsule 2    lisinopril (PRINIVIL,ZESTR Take 1 tablet ( 30 tablet 6    meloxicam (MOBIC) 15 MG tablet TAKE 1 TA 30 tablet 3    omeprazole (PRILOSEC) 10 MG  Take 10         potassium citrate (UROCIT-K) 10 mEq (1,080 mg) TbSR Take 1 tablet (10 m 60 tablet 5   Pt alert, no distress  HEENT: wnl.  Neck: supple, no JVD, no lymphadenopathy  Chest: CV NSR  Lungs: normal chest expansion  Abdomen prominent, obese, claims some tenderness on suprapubic area and especially on LLQ, no organomegaly, no masses.  Extremities: no edema, peripheral pulses nl  Neuro: preserved     IMP  Autism  Possible uti or prostatitis,  has been on various antibiotics.   Bilateral flank pain, probably musculoskeletal  Hx of nephrolithiasis (remote)  Urinary frequency and nocturia, recurrent bladder pain. Possible interstitial cystitis, currently with frequent bladder pain. In the past he used myrbetriq but it has not helped. Currently on ditropan 10 xl     Pt has developed a chronic pain syndrome involving several areas of the body that are not found to have any pathology. However, pt is so insistent regarding the seriousness of his pains that I am going to proceed with cystoscopy and possible bladder biopsy (if necessary). I might update the CT scan to make sure no pathology is missed. Can filter urine in case he has sand particles like he says. Bladder scan urine cytology.      Second opinion

## 2022-01-03 NOTE — ANESTHESIA PREPROCEDURE EVALUATION
01/03/2022  Jm Chaparro is a 26 y.o., male.    Pre-op Assessment    I have reviewed the Patient Summary Reports.     I have reviewed the Nursing Notes.    I have reviewed the Medications.     Review of Systems  Anesthesia Hx:  Hx of Anesthetic complications    Social:  Non-Smoker    Hematology/Oncology:  Hematology Normal   Oncology Normal     EENT/Dental:EENT/Dental Normal   Cardiovascular:   Hypertension    Pulmonary:  Pulmonary Normal    Renal/:   Chronic Renal Disease renal calculi    Hepatic/GI:   Bowel Prep. GERD    Neurological:   Headaches    Endocrine:  Endocrine Normal    Psych:   Psychiatric History          Physical Exam  General:  Well nourished    Airway/Jaw/Neck:  Airway Findings: Mouth Opening: Normal Tongue: Normal  General Airway Assessment: Adult  Mallampati: I  TM Distance: Normal, at least 6 cm        Eyes/Ears/Nose:  EYES/EARS/NOSE FINDINGS: Normal   Dental:  DENTAL FINDINGS: Normal   Chest/Lungs:  Chest/Lungs Findings: Clear to auscultation, Normal Respiratory Rate     Heart/Vascular:  Heart Findings: Rate: Normal  Rhythm: Regular Rhythm        Mental Status:  Mental Status Findings:  Cooperative, Alert and Oriented         Anesthesia Plan  Type of Anesthesia, risks & benefits discussed:  Anesthesia Type:  general    Patient's Preference:   Plan Factors:          Intra-op Monitoring Plan: standard ASA monitors  Intra-op Monitoring Plan Comments:   Post Op Pain Control Plan:   Post Op Pain Control Plan Comments:     Induction:   IV  Beta Blocker:  Patient is not currently on a Beta-Blocker (No further documentation required).       Informed Consent: Patient understands risks and agrees with Anesthesia plan.  Questions answered. Anesthesia consent signed with patient.  ASA Score: 2     Day of Surgery Review of History & Physical: I have interviewed and examined the  patient. I have reviewed the patient's H&P dated:  There are no significant changes.  H&P update referred to the surgeon.         Ready For Surgery From Anesthesia Perspective.

## 2022-01-03 NOTE — ANESTHESIA POSTPROCEDURE EVALUATION
Anesthesia Post Evaluation    Patient: Jm Chaparro    Procedure(s) Performed: Procedure(s) (LRB):  CYSTOSCOPY (N/A)  BIOPSY, BLADDER    Final Anesthesia Type: general      Patient location during evaluation: PACU  Patient participation: Yes- Able to Participate  Level of consciousness: awake and alert, oriented and awake  Post-procedure vital signs: reviewed and stable  Pain management: adequate  Airway patency: patent    PONV status at discharge: No PONV  Anesthetic complications: no      Cardiovascular status: blood pressure returned to baseline and hemodynamically stable  Respiratory status: unassisted, spontaneous ventilation and room air  Hydration status: euvolemic  Follow-up not needed.          Vitals Value Taken Time   /62 01/03/22 1045   Temp 36.4 °C (97.5 °F) 01/03/22 1007   Pulse 62 01/03/22 1045   Resp 16 01/03/22 1045   SpO2 98 % 01/03/22 1045         Event Time   Out of Recovery 10:10:00         Pain/Antonio Score: Antonio Score: 10 (1/3/2022 10:47 AM)

## 2022-01-03 NOTE — DISCHARGE INSTRUCTIONS
CYSTOSCOPE    DO'S   Minimal activity for first 24 hours.   Eat light meals for the first 24 hours.   Drinks lots of fluids for 7 days.   Burning and blood tinged urine is normal day of procedure.     DON'T   No driving for next 24 hours or while taking narcotic medication.   No tub baths, shower only for 7 days.  DO NOT TAKE ANY ADDITIONAL TYLENOL/ACETAMINOPHEN WHILE TAKING NARCOTIC PAIN MEDICATION.      CALL PHYSICIAN FOR:   Unable to urinate within 6 hours after procedure.   Fever greater than 101.   Persistent pain not relieved by pain meds.   Blood in urine with clots lasting greater than 24 hours.    RETURN APPOINTMENT AS INSTRUCTED  CALL [phone] for doctor.

## 2022-01-06 NOTE — DISCHARGE SUMMARY
DISCHARGE SUMMARY:    Reason for hospitalization: persistent pelvic pain  Hospital course: pt was stable and comfortable  Final dx: pelvic pain, origin not elucidated. Possible interstitial cystitis.  Procedure performed: cystoscopy, bladder biopsy  Condition on discharge: satisfactory  Discharge disposition: home  Follow up: Return to urology clinic in 7-10 days  Medication changes: no change with home meds  Pt instructions: drink abundant fluids  Diet: regular  Activity: no restrictions

## 2022-01-06 NOTE — OP NOTE
Site: [facility], [place]  Date of procedure 1 3 22  Surgeon Yessi  Anesthesia: general  Preop dx: persistent suprapubic pain, suspected of being bladder pain  Postop diagnosis: same  Procedure: Cystoscopy, bladder biopsy  Complications: none  EBL: negligible  Drains: none    Indications for the procedure: 25 yo male presents chronic pain vaguely located on the suprapubic area and at times the iliac fossae, happening at random, with no relation to voiding and no constant relation to body movements. We decided to do cystoscopy to see if any abnormalities are present. Pt also has a hx of autism, kidney stones, and interstitial cystitis. Pt requested anesthesia for his cystoscopic examination.     DESCRIPTION OF PROCEDURE   Pt brought into the OR and placed under satisfactory general anesthesia. He received prophylactic ancef, 2 g. Pt was put on stirrups on the cysto table and the genital area scrubbed, prepped and draped. We lubricated the urethra with xylocaine jelly.     The 22 F storz cystoscope sheath with the 30 degree lens was inserted in the urethra and progressed under direct vision.  Anterior urethra was normal. Posterior urethra was 3 cm long, also normal. The bladder neck did not show any stricture or scarring. The bladder cavity distended equally in all directions. The trigone had normal anatomy and both ureteral orifices had normal shape and location. Both ureters were seen to eject clear urine intermittently. The bladder mucosa was noted to have normal characteristics, without any ulcerations and without any exophytic growths. There was some degree of trabeculation, without deep sacculation and without diverticula. There was no bleeding from anywhere in the bladder. There was a single area about 1 cm in size in the posterior wall that showed punctiform submucosal hemorrhages, perhaps a localized focus of glomerulations from interstitial cystitis. This pattern of petechiae was  only seen in the mentioned area, but not in other regions of the bladder. I decided to do a biopsy of this area. I brought in a cold biopsy forceps and obtained two tissue samples of the small area of mucosa with punctiform submucosal bleeding. The biopsy sites were then cauterized with the bovie at the level of 20 martins. Hemostasis was good.        At this point we had finished our plan for this pt and so we removed all instruments, the bladder was emptied, and pt was transferred to the recovery room in satisfactory condition. He was stable throughout the operation.    Plans with pt are to allow him to be discharged home. He can follow with urology in 7-10 days to discuss the findings. I also intend to order an updated CT scan of the pelvis and abdomen to see if the source of vague pelvic pain can more thoroughly be evaluated. I will also be doing a urine cytology.

## 2022-01-11 LAB
FINAL PATHOLOGIC DIAGNOSIS: NORMAL
GROSS: NORMAL
Lab: NORMAL
MICROSCOPIC EXAM: NORMAL

## 2022-02-01 NOTE — TELEPHONE ENCOUNTER
No new care gaps identified.  Powered by [employer] Reference number: [id number].   2/01/2022 4:06:57 AM CST

## 2022-02-07 ENCOUNTER — OFFICE VISIT (OUTPATIENT)
Dept: PSYCHIATRY | Facility: CLINIC | Age: 27
End: 2022-02-07
Payer: COMMERCIAL

## 2022-02-07 VITALS
BODY MASS INDEX: 31.64 KG/M2 | SYSTOLIC BLOOD PRESSURE: 116 MMHG | WEIGHT: 238.75 LBS | DIASTOLIC BLOOD PRESSURE: 72 MMHG | HEIGHT: 73 IN | HEART RATE: 82 BPM

## 2022-02-07 DIAGNOSIS — F40.10 SOCIAL ANXIETY DISORDER: ICD-10-CM

## 2022-02-07 DIAGNOSIS — F41.1 GAD (GENERALIZED ANXIETY DISORDER): Primary | ICD-10-CM

## 2022-02-07 DIAGNOSIS — F84.0 AUTISM SPECTRUM DISORDER REQUIRING SUBSTANTIAL SUPPORT (LEVEL 2): ICD-10-CM

## 2022-02-07 PROCEDURE — 99214 OFFICE O/P EST MOD 30 MIN: CPT | Mod: S$GLB,,, | Performed by: PSYCHIATRY & NEUROLOGY

## 2022-02-07 PROCEDURE — 3078F PR MOST RECENT DIASTOLIC BLOOD PRESSURE < 80 MM HG: ICD-10-PCS | Mod: CPTII,S$GLB,, | Performed by: PSYCHIATRY & NEUROLOGY

## 2022-02-07 PROCEDURE — 1159F MED LIST DOCD IN RCRD: CPT | Mod: CPTII,S$GLB,, | Performed by: PSYCHIATRY & NEUROLOGY

## 2022-02-07 PROCEDURE — 3074F SYST BP LT 130 MM HG: CPT | Mod: CPTII,S$GLB,, | Performed by: PSYCHIATRY & NEUROLOGY

## 2022-02-07 PROCEDURE — 1159F PR MEDICATION LIST DOCUMENTED IN MEDICAL RECORD: ICD-10-PCS | Mod: CPTII,S$GLB,, | Performed by: PSYCHIATRY & NEUROLOGY

## 2022-02-07 PROCEDURE — 90833 PSYTX W PT W E/M 30 MIN: CPT | Mod: S$GLB,,, | Performed by: PSYCHIATRY & NEUROLOGY

## 2022-02-07 PROCEDURE — 3074F PR MOST RECENT SYSTOLIC BLOOD PRESSURE < 130 MM HG: ICD-10-PCS | Mod: CPTII,S$GLB,, | Performed by: PSYCHIATRY & NEUROLOGY

## 2022-02-07 PROCEDURE — 3008F BODY MASS INDEX DOCD: CPT | Mod: CPTII,S$GLB,, | Performed by: PSYCHIATRY & NEUROLOGY

## 2022-02-07 PROCEDURE — 99999 PR PBB SHADOW E&M-EST. PATIENT-LVL III: ICD-10-PCS | Mod: PBBFAC,,, | Performed by: PSYCHIATRY & NEUROLOGY

## 2022-02-07 PROCEDURE — 99214 PR OFFICE/OUTPT VISIT, EST, LEVL IV, 30-39 MIN: ICD-10-PCS | Mod: S$GLB,,, | Performed by: PSYCHIATRY & NEUROLOGY

## 2022-02-07 PROCEDURE — 90833 PR PSYCHOTHERAPY W/PATIENT W/E&M, 30 MIN (ADD ON): ICD-10-PCS | Mod: S$GLB,,, | Performed by: PSYCHIATRY & NEUROLOGY

## 2022-02-07 PROCEDURE — 3008F PR BODY MASS INDEX (BMI) DOCUMENTED: ICD-10-PCS | Mod: CPTII,S$GLB,, | Performed by: PSYCHIATRY & NEUROLOGY

## 2022-02-07 PROCEDURE — 3078F DIAST BP <80 MM HG: CPT | Mod: CPTII,S$GLB,, | Performed by: PSYCHIATRY & NEUROLOGY

## 2022-02-07 PROCEDURE — 99999 PR PBB SHADOW E&M-EST. PATIENT-LVL III: CPT | Mod: PBBFAC,,, | Performed by: PSYCHIATRY & NEUROLOGY

## 2022-02-07 RX ORDER — FLUOXETINE HYDROCHLORIDE 40 MG/1
40 CAPSULE ORAL DAILY
Qty: 90 CAPSULE | Refills: 3 | Status: SHIPPED | OUTPATIENT
Start: 2022-02-07 | End: 2022-05-11 | Stop reason: SDUPTHER
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
## 2022-02-11 RX ORDER — METOPROLOL TARTRATE 25 MG/1
TABLET, FILM COATED ORAL
Qty: 180 TABLET | Refills: 0 | Status: SHIPPED | OUTPATIENT
Start: 2022-02-11 | End: 2022-04-18

## 2022-02-18 ENCOUNTER — PATIENT MESSAGE (OUTPATIENT)
Dept: PSYCHIATRY | Facility: CLINIC | Age: 27
End: 2022-02-18
Payer: COMMERCIAL

## 2022-02-24 ENCOUNTER — TELEPHONE (OUTPATIENT)
Dept: NEPHROLOGY | Facility: CLINIC | Age: 27
End: 2022-02-24
Payer: COMMERCIAL

## 2022-02-28 ENCOUNTER — OFFICE VISIT (OUTPATIENT)
Dept: FAMILY MEDICINE | Facility: CLINIC | Age: 27
End: 2022-02-28
Payer: COMMERCIAL

## 2022-02-28 DIAGNOSIS — M06.4 INFLAMMATORY POLYARTHROPATHY: ICD-10-CM

## 2022-02-28 DIAGNOSIS — I10 HYPERTENSION, ESSENTIAL: Primary | ICD-10-CM

## 2022-02-28 DIAGNOSIS — R10.9 ABDOMINAL PAIN, UNSPECIFIED ABDOMINAL LOCATION: ICD-10-CM

## 2022-02-28 DIAGNOSIS — K58.9 IRRITABLE BOWEL SYNDROME, UNSPECIFIED TYPE: ICD-10-CM

## 2022-02-28 DIAGNOSIS — N30.10 IC (INTERSTITIAL CYSTITIS): ICD-10-CM

## 2022-02-28 DIAGNOSIS — L40.50 PSORIATIC ARTHRITIS: ICD-10-CM

## 2022-02-28 LAB
BILIRUB SERPL-MCNC: NEGATIVE MG/DL
BLOOD URINE, POC: NEGATIVE
CLARITY, POC UA: CLEAR
COLOR, POC UA: NORMAL
GLUCOSE UR QL STRIP: NORMAL
KETONES UR QL STRIP: NEGATIVE
LEUKOCYTE ESTERASE URINE, POC: NEGATIVE
NITRITE, POC UA: NEGATIVE
PH, POC UA: 5
PROTEIN, POC: NEGATIVE
SPECIFIC GRAVITY, POC UA: 1.02
UROBILINOGEN, POC UA: NORMAL

## 2022-02-28 PROCEDURE — 1160F PR REVIEW ALL MEDS BY PRESCRIBER/CLIN PHARMACIST DOCUMENTED: ICD-10-PCS | Mod: CPTII,S$GLB,, | Performed by: FAMILY MEDICINE

## 2022-02-28 PROCEDURE — 99214 OFFICE O/P EST MOD 30 MIN: CPT | Mod: S$GLB,,, | Performed by: FAMILY MEDICINE

## 2022-02-28 PROCEDURE — 3074F PR MOST RECENT SYSTOLIC BLOOD PRESSURE < 130 MM HG: ICD-10-PCS | Mod: CPTII,S$GLB,, | Performed by: FAMILY MEDICINE

## 2022-02-28 PROCEDURE — 81002 URINALYSIS NONAUTO W/O SCOPE: CPT | Mod: S$GLB,,, | Performed by: FAMILY MEDICINE

## 2022-02-28 PROCEDURE — 3079F PR MOST RECENT DIASTOLIC BLOOD PRESSURE 80-89 MM HG: ICD-10-PCS | Mod: CPTII,S$GLB,, | Performed by: FAMILY MEDICINE

## 2022-02-28 PROCEDURE — 81002 POCT URINE DIPSTICK WITHOUT MICROSCOPE: ICD-10-PCS | Mod: S$GLB,,, | Performed by: FAMILY MEDICINE

## 2022-02-28 PROCEDURE — 1159F PR MEDICATION LIST DOCUMENTED IN MEDICAL RECORD: ICD-10-PCS | Mod: CPTII,S$GLB,, | Performed by: FAMILY MEDICINE

## 2022-02-28 PROCEDURE — 3008F PR BODY MASS INDEX (BMI) DOCUMENTED: ICD-10-PCS | Mod: CPTII,S$GLB,, | Performed by: FAMILY MEDICINE

## 2022-02-28 PROCEDURE — 3079F DIAST BP 80-89 MM HG: CPT | Mod: CPTII,S$GLB,, | Performed by: FAMILY MEDICINE

## 2022-02-28 PROCEDURE — 1159F MED LIST DOCD IN RCRD: CPT | Mod: CPTII,S$GLB,, | Performed by: FAMILY MEDICINE

## 2022-02-28 PROCEDURE — 3008F BODY MASS INDEX DOCD: CPT | Mod: CPTII,S$GLB,, | Performed by: FAMILY MEDICINE

## 2022-02-28 PROCEDURE — 1160F RVW MEDS BY RX/DR IN RCRD: CPT | Mod: CPTII,S$GLB,, | Performed by: FAMILY MEDICINE

## 2022-02-28 PROCEDURE — 3074F SYST BP LT 130 MM HG: CPT | Mod: CPTII,S$GLB,, | Performed by: FAMILY MEDICINE

## 2022-02-28 PROCEDURE — 99214 PR OFFICE/OUTPT VISIT, EST, LEVL IV, 30-39 MIN: ICD-10-PCS | Mod: S$GLB,,, | Performed by: FAMILY MEDICINE

## 2022-03-01 VITALS
WEIGHT: 237.75 LBS | OXYGEN SATURATION: 97 % | HEART RATE: 65 BPM | TEMPERATURE: 98 F | DIASTOLIC BLOOD PRESSURE: 80 MMHG | SYSTOLIC BLOOD PRESSURE: 125 MMHG | BODY MASS INDEX: 31.51 KG/M2 | HEIGHT: 73 IN | RESPIRATION RATE: 20 BRPM
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
## 2022-03-02 ENCOUNTER — OFFICE VISIT (OUTPATIENT)
Dept: UROLOGY | Facility: CLINIC | Age: 27
End: 2022-03-02
Payer: COMMERCIAL

## 2022-03-02 ENCOUNTER — TELEPHONE (OUTPATIENT)
Dept: UROLOGY | Facility: CLINIC | Age: 27
End: 2022-03-02
Payer: COMMERCIAL

## 2022-03-02 VITALS — BODY MASS INDEX: 31.73 KG/M2 | HEIGHT: 73 IN | WEIGHT: 239.44 LBS

## 2022-03-02 DIAGNOSIS — R10.2 CHRONIC PELVIC PAIN IN MALE: Primary | ICD-10-CM

## 2022-03-02 DIAGNOSIS — N30.10 INTERSTITIAL CYSTITIS: ICD-10-CM

## 2022-03-02 DIAGNOSIS — M54.50 CHRONIC BILATERAL LOW BACK PAIN WITHOUT SCIATICA: ICD-10-CM

## 2022-03-02 DIAGNOSIS — G89.29 CHRONIC BILATERAL LOW BACK PAIN WITHOUT SCIATICA: ICD-10-CM

## 2022-03-02 DIAGNOSIS — R39.82 CHRONIC BLADDER PAIN: ICD-10-CM

## 2022-03-02 DIAGNOSIS — G89.29 CHRONIC PELVIC PAIN IN MALE: Primary | ICD-10-CM

## 2022-03-02 PROCEDURE — 1160F PR REVIEW ALL MEDS BY PRESCRIBER/CLIN PHARMACIST DOCUMENTED: ICD-10-PCS | Mod: CPTII,S$GLB,, | Performed by: UROLOGY

## 2022-03-02 PROCEDURE — 1159F PR MEDICATION LIST DOCUMENTED IN MEDICAL RECORD: ICD-10-PCS | Mod: CPTII,S$GLB,, | Performed by: UROLOGY

## 2022-03-02 PROCEDURE — 99214 PR OFFICE/OUTPT VISIT, EST, LEVL IV, 30-39 MIN: ICD-10-PCS | Mod: S$GLB,,, | Performed by: UROLOGY

## 2022-03-02 PROCEDURE — 99999 PR PBB SHADOW E&M-EST. PATIENT-LVL III: CPT | Mod: PBBFAC,,, | Performed by: UROLOGY

## 2022-03-02 PROCEDURE — 1160F RVW MEDS BY RX/DR IN RCRD: CPT | Mod: CPTII,S$GLB,, | Performed by: UROLOGY

## 2022-03-02 PROCEDURE — 99999 PR PBB SHADOW E&M-EST. PATIENT-LVL III: ICD-10-PCS | Mod: PBBFAC,,, | Performed by: UROLOGY

## 2022-03-02 PROCEDURE — 3008F BODY MASS INDEX DOCD: CPT | Mod: CPTII,S$GLB,, | Performed by: UROLOGY

## 2022-03-02 PROCEDURE — 3008F PR BODY MASS INDEX (BMI) DOCUMENTED: ICD-10-PCS | Mod: CPTII,S$GLB,, | Performed by: UROLOGY

## 2022-03-02 PROCEDURE — 1159F MED LIST DOCD IN RCRD: CPT | Mod: CPTII,S$GLB,, | Performed by: UROLOGY

## 2022-03-02 PROCEDURE — 99214 OFFICE O/P EST MOD 30 MIN: CPT | Mod: S$GLB,,, | Performed by: UROLOGY

## 2022-03-02 NOTE — PROGRESS NOTES
"  UROLOGY [facility].   3 2 22     Cc persistent suprapubic pain     Age 26, accompanied by loving mother. Mother is very knowledgeable on pt's complex history.  Pt has asperger's syndrome. Has intermittent diarrhea and constipation and was taken to [facility] in [place], and they categorized it as IBS.    Pt continues to complain of persistent pains on the suprapubic area, at times involving the iliac fossas and at times with radiation to the low back bilaterally. Says the pain has been present for several years. In the past the bladder was felt to be the principal culprit and pt was given the loose diagnosis of interstitial cystitis. He was put on Elmiron, which pt took for several months or a year, but then he stopped it because it appeared to be ineffective. He also points out that he has a hx of kidney stones, having had stone extraction by dr timur callaway years ago, and off and on might be passing some sand; but on a CT scan done on 1 16 22 no stone formations are seen in the upper or lower urinary tract.     Tramadol has helped at times.      The urethra feels "stung, scratched, pinched and stabbed" (verbatim description by pt).     Mother claims that if pt drinks carbonated beverages the pain is worse, but if he drinks alkaline water it gets better.       Pt has severe psoriatic arthritis and is on neurontin and otezla (specific for psoriasis).     No discharge is present in urethra. No fever is present. Claims flanks hurt on both sides.      Over one year ago he went to [facility] with these concerns and had a CT scan and was kept in the hospital several days. The tests, mother reports, did not find stones in the kidneys, dilatation, masses, or any deformity. They eventually allowed discharge without major changes. Mother is very careful with insisting on proper diet and exercise for pt, who is obese (BMI 30).      Pt was on ditropan xl 10 for urinary frequency, and previously on elmiron 100 and atarax 50 for " "insterstitial cystitis. He is on hyosciamine for bladder and intestinal spams.       For a while pt had severe nocturnal polyuria and we started him on low dose desmopressin (noctiva 1.66) and it helped for a while, but was later replaced by the bladder relaxants.     On 1 5 22 we performed a cystoscopy under sedation at the [facility] and no stones, exophytic growths or ulcerations were seen. A biopsy of a small focal area in the bladder was done because it showed apparent tiny petechiae. Path report: - "Very mild nonspecific chronic inflammation with slight vascular congestion and focal lamina propria hemorrhage.  No evidence of acute inflammation, granulomatous inflammation, atypia or malignancy. The histopathologic findings seen in these biopsies are nonspecific, but could be seen in interstitial cystitis".         [facility]     Surgical:  has a past surgical history that includes Esophagogastroduodenoscopy and ureteral stone extraction.     Medical:  has a past medical history of Abnormal thyroid function test (9/11/2017); ADD (attention deficit disorder); Anxiety disorder; Asperger's disorder; Deformity of both feet; Dyscalculia; Dysgraphia; Dyslexia; Eczema; GERD (gastroesophageal reflux disease); History of migraine headaches; Interstitial cystitis (chronic); Nephrolithiasis; Psoriasis; Refusal of blood transfusion for reasons of conscience; and Seasonal allergies.     Familial: mother with interstitial cystitis     Social: single, lives in [place]                 Current Outpatient Prescriptions on File Prior to Visit   Medication Sig Dispense Refill    allopurinol (ZYLOPRIM) 300 MG tablet TAKE 1 TABLET 90 tablet 1    cholecalciferol, vitamin D3, (VITAMIN D3)  Take 5,00.        clobetasol 0.05% (TEMOVATE) 0.05  Apply topically  60 g 5    dextroamphetamine-amphetamine (ADDERALL XR) 20  Take 1 capsule (20  30 capsule 0    eszopiclone (LUNESTA) 2 MG Tab Take 1 tablet (2  90 tablet 0    fluoxetine (PROZAC) 20 MG " capsule Take 1 cap 30 capsule 2    lisinopril (PRINIVIL,ZESTRIL) 5 MG tablet Take 1 tablet ( 30 tablet 6    meloxicam (MOBIC) 15 MG tablet TAKE 1 TA 30 tablet 3    omeprazole (PRILOSEC) 10 MG capsule Take 10         potassium citrate (UROCIT-K) 10 mEq (1,080 mg) TbSR Take 1 tablet (10 m 60 tablet 5   Pt alert, no distress  HEENT: wnl.  Neck: supple, no JVD, no lymphadenopathy  Chest: CV NSR  Lungs: normal chest expansion  Abdomen prominent, obese, nontender, no organomegaly, no masses.  Extremities: no edema, peripheral pulses nl  Neuro: preserved     IMP  Autism  In the past thought to possibly have uti or prostatitis, but has been on various antibiotics with no advantage.     Bilateral flank pain, probably musculoskeletal  Hx of nephrolithiasis (remote). No current stones.    Persistent pain, involving suprapubic area extending laterally and posteriorly. The pain distribution is excessive for interstitial cystitis but this entity can be considered.     I am going to refer to pt to dr natalie mittal for evaluation from a chronic pain management point of view.     For interstitial cystitis pt can be considered for being placed on Elmiron again and perhaps on the antihistaminic atarax (50 bid), and the antidepressant elavil (50 qhs) if we can check that it would not interfere with his current medication. He has been on Myrbetriq and on Ditropan, which generally help in interstitial cystitis, without positive effects. Pts with IC can often benefit from bladder irrigations with various therapeutic bladder cocktails with heparin, lidocaine, marcaine, kenalog, sodium bicarbonate. However, pt does not tolerate catheterizations in the office but we might try to persuade him further.

## 2022-03-06 ENCOUNTER — PATIENT MESSAGE (OUTPATIENT)
Dept: UROLOGY | Facility: CLINIC | Age: 27
End: 2022-03-06
Payer: COMMERCIAL

## 2022-03-07 ENCOUNTER — OFFICE VISIT (OUTPATIENT)
Dept: PAIN MEDICINE | Facility: CLINIC | Age: 27
End: 2022-03-07
Payer: COMMERCIAL

## 2022-03-07 VITALS
SYSTOLIC BLOOD PRESSURE: 121 MMHG | HEART RATE: 68 BPM | DIASTOLIC BLOOD PRESSURE: 77 MMHG | BODY MASS INDEX: 31.18 KG/M2 | WEIGHT: 236.31 LBS

## 2022-03-07 DIAGNOSIS — N30.10 IC (INTERSTITIAL CYSTITIS): ICD-10-CM

## 2022-03-07 DIAGNOSIS — R39.89 BLADDER PAIN: Primary | ICD-10-CM

## 2022-03-07 DIAGNOSIS — K58.2 IRRITABLE BOWEL SYNDROME WITH BOTH CONSTIPATION AND DIARRHEA: ICD-10-CM

## 2022-03-07 DIAGNOSIS — R29.898 MUSCULAR DECONDITIONING: ICD-10-CM

## 2022-03-07 DIAGNOSIS — M06.9 RHEUMATOID ARTHRITIS INVOLVING MULTIPLE SITES, UNSPECIFIED WHETHER RHEUMATOID FACTOR PRESENT: ICD-10-CM

## 2022-03-07 DIAGNOSIS — R30.0 DYSURIA: ICD-10-CM

## 2022-03-07 PROCEDURE — 1159F MED LIST DOCD IN RCRD: CPT | Mod: CPTII,S$GLB,, | Performed by: ANESTHESIOLOGY

## 2022-03-07 PROCEDURE — 3078F PR MOST RECENT DIASTOLIC BLOOD PRESSURE < 80 MM HG: ICD-10-PCS | Mod: CPTII,S$GLB,, | Performed by: ANESTHESIOLOGY

## 2022-03-07 PROCEDURE — 3008F PR BODY MASS INDEX (BMI) DOCUMENTED: ICD-10-PCS | Mod: CPTII,S$GLB,, | Performed by: ANESTHESIOLOGY

## 2022-03-07 PROCEDURE — 1159F PR MEDICATION LIST DOCUMENTED IN MEDICAL RECORD: ICD-10-PCS | Mod: CPTII,S$GLB,, | Performed by: ANESTHESIOLOGY

## 2022-03-07 PROCEDURE — 3074F PR MOST RECENT SYSTOLIC BLOOD PRESSURE < 130 MM HG: ICD-10-PCS | Mod: CPTII,S$GLB,, | Performed by: ANESTHESIOLOGY

## 2022-03-07 PROCEDURE — 99205 PR OFFICE/OUTPT VISIT, NEW, LEVL V, 60-74 MIN: ICD-10-PCS | Mod: S$GLB,,, | Performed by: ANESTHESIOLOGY

## 2022-03-07 PROCEDURE — 3074F SYST BP LT 130 MM HG: CPT | Mod: CPTII,S$GLB,, | Performed by: ANESTHESIOLOGY

## 2022-03-07 PROCEDURE — 99999 PR PBB SHADOW E&M-EST. PATIENT-LVL III: ICD-10-PCS | Mod: PBBFAC,,, | Performed by: ANESTHESIOLOGY

## 2022-03-07 PROCEDURE — 3078F DIAST BP <80 MM HG: CPT | Mod: CPTII,S$GLB,, | Performed by: ANESTHESIOLOGY

## 2022-03-07 PROCEDURE — 99205 OFFICE O/P NEW HI 60 MIN: CPT | Mod: S$GLB,,, | Performed by: ANESTHESIOLOGY

## 2022-03-07 PROCEDURE — 3008F BODY MASS INDEX DOCD: CPT | Mod: CPTII,S$GLB,, | Performed by: ANESTHESIOLOGY

## 2022-03-07 PROCEDURE — 99999 PR PBB SHADOW E&M-EST. PATIENT-LVL III: CPT | Mod: PBBFAC,,, | Performed by: ANESTHESIOLOGY

## 2022-03-07 RX ORDER — TRAMADOL HYDROCHLORIDE 50 MG/1
50 TABLET ORAL 3 TIMES DAILY PRN
Qty: 21 TABLET | Refills: 0 | Status: SHIPPED | OUTPATIENT
Start: 2022-03-07 | End: 2022-03-17

## 2022-03-07 NOTE — PROGRESS NOTES
This note was completed with dictation software and grammatical errors may exist.    Chief Complaint   Patient presents with    Bladder Pain        HPI: Jm Chaparro is a 26 y.o. year old male patient who has a past medical history of Abnormal thyroid function test, ADD (attention deficit disorder), Anxiety disorder, Asperger's disorder, Deformity of both feet, Dyscalculia, Dysgraphia, Dyslexia, Eczema, Fatty liver, GERD (gastroesophageal reflux disease), History of migraine headaches, Hypertension, Interstitial cystitis (chronic), Irritable bowel syndrome, Nephrolithiasis, PONV (postoperative nausea and vomiting), Psoriasis, Psoriatic arthritis, Refusal of blood transfusion for reasons of conscience, Seasonal allergies, and Special needs assessment. He presents in referral from Dr. Derek Dickson for abdominal and bladder pain.    The patient's mother was present with him at the visit today who also helps with most of the history.  She reports that in March, 2015 she had gone out of town on a business trip and her son accompanying her.  Unfortunately he came down with cold and virus symptoms at the time and began developing severe abdominal pain.  She went to the emergency department at that time in [place], they were told he was having viral symptoms.  She returned to the [facility] in  and had followed up with several other physicians that year.  She reports that initially he was having intermittent pain on and off, would have pain for a week and then it would disappear without cause.  He then developed diarrhea and constipation at times, he was seen at [facility] in [place] in 2015 and diagnosed with IBS with constipation and diarrhea.  They recommend an antidepressant that he tried, no relief with this.  Shortly after he began having renal calculi as well and needed to present to the emergency department.  He had seen neurology, Dr. Larry and reports that he required stone removal, does not  "sound like he has had lithotripsy.  He has seen Dr. Dickson, and now Nephrology, Dr. Enriquez.  He has seen Gastroenterology, Dr. Shaver and had undergone colonoscopy with no significant findings.  He has been tried on multiple medications over time as discussed below without any benefit.  He continues to have frequent abdominal pain that is often worse with caffeine or other foods, worse with activity.  He reports that he gets some relief with a bowel movement.  He still has been passing small sand like renal calculi, they report that his urine turns dark when he is about to pass a stone and he often has pain throughout the bladder and urethra after passing a stone.    He has a history of rheumatoid arthritis, has been seeing a rheumatologist, reports pain throughout his entire spine, bilateral hips, shoulders, knees, feet.  He does well with being in a bath with hot water.    Pain intervention history:  No injections    Spine surgeries:  None    Antineuropathics: Gabapentin 300mg three times daily, was given this for inflammatory joint pain??  NSAIDs: Mobic 15, no benefit  Physical therapy:  Has done some physical therapy, water therapy in the past with some improvement but things worsened when he did land-based therapy and stretching  Antidepressants: Fluoxetine 40mg  Muscle relaxers:  Opioids:  The patient reports having benefit from tramadol in the past  Antiplatelets/Anticoagulants:  From Dr. Vyas note:  Prozac 40mg po qam, adderall xr 20mg po qam (very rare use), lunesta 2mg po qhs PRN insomnia  Past Psych Meds: adderall ("robot child"), focalin ("zombie"), strattera (angry), zoloft (as a child- ineffective), lexapro (ineffective) topomax ("migraines"), celexa (overly sedating but effective), remeron (ineffective- wgt gain), atarax, elavil (ineffective for pain), ritalin (inc'd bp)     ROS:  He reports fatigue, weight gain, itching, color change, headaches head trauma, ear pain, constipation, diarrhea, " stomach pain, nausea, flank pain, urinary urgency and frequency, painful urination, joint stiffness, joint swelling, back pain, difficulty sleeping and anxiety.  Balance of review of systems is negative.    Lab Results   Component Value Date    HGBA1C 5.3 02/19/2020       Lab Results   Component Value Date    WBC 8.07 01/16/2022    HGB 15.7 01/16/2022    HCT 46.8 01/16/2022    MCV 94 01/16/2022     01/16/2022             Past Medical History:   Diagnosis Date    Abnormal thyroid function test 09/11/2017    ADD (attention deficit disorder)     Anxiety disorder     Asperger's disorder     (AUTISM)    Deformity of both feet     Dyscalculia     Dysgraphia     Dyslexia     Eczema     Fatty liver     GERD (gastroesophageal reflux disease)     History of migraine headaches     Hypertension     Interstitial cystitis (chronic)     Irritable bowel syndrome     Nephrolithiasis     2014    PONV (postoperative nausea and vomiting)     Psoriasis     Psoriatic arthritis     Refusal of blood transfusion for reasons of conscience     Seasonal allergies     Special needs assessment        Past Surgical History:   Procedure Laterality Date    BIOPSY OF BLADDER  1/3/2022    Procedure: BIOPSY, BLADDER;  Surgeon: Derek Dickson MD;  [facility];  Service: Urology;;    COLONOSCOPY  ~2015    [facility]; told IBS    COLONOSCOPY  08/10/2015    Dr. Shaver; sent for scanning: unremarkable findings, no specimens collected    COLONOSCOPY N/A 2/18/2019    Procedure: COLONOSCOPY;  Surgeon: Puma Preston Jr., MD;  Location: [facility];  Service: Endoscopy;  Laterality: N/A;    UPPER GASTROINTESTINAL ENDOSCOPY  05/14/2012    Dr. Preston    UPPER GASTROINTESTINAL ENDOSCOPY  07/17/2015    Dr. Shaver, sent for scanning: normal esophagus- dilated & biopsied, findings WNL, biopsies taken from z-line, stomach and duodenum; biopsy: duodenum WNL, antrum reactive gastropathy, negative for h pylori or int.  metaplasia, GEJ WNL, negative for EOE & cotton's esophagus    ureteral stone extraction      basket extraction       Social History     Socioeconomic History    Marital status: Single   Tobacco Use    Smoking status: Never Smoker    Smokeless tobacco: Never Used   Substance and Sexual Activity    Alcohol use: No    Drug use: No    Sexual activity: Never     Social Determinants of Health     Financial Resource Strain: High Risk    Difficulty of Paying Living Expenses: Very hard   Food Insecurity: Unknown    Worried About Running Out of Food in the Last Year: Patient refused    Ran Out of Food in the Last Year: Patient refused   Transportation Needs: No Transportation Needs    Lack of Transportation (Medical): No    Lack of Transportation (Non-Medical): No   Physical Activity: Inactive    Days of Exercise per Week: 0 days    Minutes of Exercise per Session: 0 min   Stress: Stress Concern Present    Feeling of Stress : To some extent   Social Connections: Unknown    Frequency of Communication with Friends and Family: Three times a week    Frequency of Social Gatherings with Friends and Family: Once a week    Active Member of Clubs or Organizations: No    Attends Club or Organization Meetings: Never    Marital Status: Never    Housing Stability: Unknown    Unable to Pay for Housing in the Last Year: Patient refused    Number of Places Lived in the Last Year: 1    Unstable Housing in the Last Year: No         Medications/Allergies: See med card    Vitals:    03/07/22 0816   BP: 121/77   Pulse: 68   Weight: 107.2 kg (236 lb 5.3 oz)   PainSc:   7   PainLoc: Generalized     Body mass index is 31.18 kg/m².    Physical exam:  Gen: A and O x3, pleasant, well-groomed  Skin: No rashes or obvious lesions  HEENT: PERRLA, no obvious deformities on ears or in canals. Trachea midline.  CVS: Regular rate and rhythm, normal palpable pulses.  Resp:No increased work of breathing, symmetrical chest  rise.  Abdomen: Soft, NT/ND.  Musculoskeletal:  Moving all extremities equally      Imagin22 CT renal protocol:  Liver normal enhancement with no focal lesion.  Gallbladder, pancreas, stomach, spleen, adrenal glands normal.  Kidneys normal size and contour with no nephrolithiasis, hydronephrosis, or ureteral obstruction.  Aorta tapers normally.  No bowel obstruction, ascites, inflammatory change.  Appendix normal.  Bladder and rectum normal.  No significant bladder wall thickening evident.  Bones are intact.  Lung bases clear.    Assessment:  Jm Chaparro is a 26 y.o. year old male patient who has a past medical history of Abnormal thyroid function test, ADD (attention deficit disorder), Anxiety disorder, Asperger's disorder, Deformity of both feet, Dyscalculia, Dysgraphia, Dyslexia, Eczema, Fatty liver, GERD (gastroesophageal reflux disease), History of migraine headaches, Hypertension, Interstitial cystitis (chronic), Irritable bowel syndrome, Nephrolithiasis, PONV (postoperative nausea and vomiting), Psoriasis, Psoriatic arthritis, Refusal of blood transfusion for reasons of conscience, Seasonal allergies, and Special needs assessment. He presents in referral from Dr. Derek Dickson for bladder pain.    1. Bladder pain     2. IC (interstitial cystitis)     3. Dysuria     4. Irritable bowel syndrome with both constipation and diarrhea     5. Rheumatoid arthritis involving multiple sites, unspecified whether rheumatoid factor present  Ambulatory referral/consult to Physical/Occupational Therapy   6. Muscular deconditioning  Ambulatory referral/consult to Physical/Occupational Therapy       Plan:  1.  I discussed his case with his mom and with him, discussed symptoms.  We discussed that he has 2 separate issues with IBS and also interstitial cystitis type symptoms.  In terms of what I can offer with interventional pain management, we discussed  lumbar superior hypogastric plexus blocks can  improve sympathetically mediated pain of the lower abdomen and pelvis.  We can also consider ganglion impar blocks for some of the sacral nerve roots with a larger amount of local anesthetic.  2. In terms of medications, he has had some relief with tramadol in the past and I think this would be reasonable to take this occasionally, discussed not taking this on a regular basis due to development of tolerance.  I have given him a prescription for # 21 tablets.  3. We also discussed that Cymbalta can sometimes provide relief with neuropathic abdominal pain, also chronic joint pain, this would be worth trying but I would want to review this with his psychiatrist since he has tried multiple SSRIs and he is already taking fluoxetine.  4. We discussed that long-term, he needs to work on overall conditioning, strength, weight loss to keep pressure off his joints and to provide strength and support of the joints.  He would do well with aquatic therapy, [facility] and they plan on building a pool at home which he will be able to use at some point as well.  5. I am going to have him follow up in 1 month, we can discuss further options at that time.      Thank you for referring this interesting patient, and I look forward to continuing to collaborate in his care.    The total time spent for evaluation and management today including reviewing separately obtained history, performing a medically appropriate exam and evaluation, documenting clinical information in the health record, independently interpreting results and communicating them to the patient/family/caregiver, and ordering medications/tests/procedures was between 60-74 minutes.

## 2022-04-07 ENCOUNTER — OFFICE VISIT (OUTPATIENT)
Dept: PAIN MEDICINE | Facility: CLINIC | Age: 27
End: 2022-04-07
Payer: COMMERCIAL

## 2022-04-07 VITALS
HEIGHT: 73 IN | WEIGHT: 238 LBS | BODY MASS INDEX: 31.54 KG/M2 | DIASTOLIC BLOOD PRESSURE: 96 MMHG | HEART RATE: 71 BPM | SYSTOLIC BLOOD PRESSURE: 133 MMHG

## 2022-04-07 DIAGNOSIS — K58.2 IRRITABLE BOWEL SYNDROME WITH BOTH CONSTIPATION AND DIARRHEA: ICD-10-CM

## 2022-04-07 DIAGNOSIS — R30.0 DYSURIA: ICD-10-CM

## 2022-04-07 DIAGNOSIS — M06.9 RHEUMATOID ARTHRITIS INVOLVING MULTIPLE SITES, UNSPECIFIED WHETHER RHEUMATOID FACTOR PRESENT: ICD-10-CM

## 2022-04-07 DIAGNOSIS — N30.10 IC (INTERSTITIAL CYSTITIS): ICD-10-CM

## 2022-04-07 DIAGNOSIS — R39.89 BLADDER PAIN: Primary | ICD-10-CM

## 2022-04-07 DIAGNOSIS — R29.898 MUSCULAR DECONDITIONING: ICD-10-CM

## 2022-04-07 PROCEDURE — 99999 PR PBB SHADOW E&M-EST. PATIENT-LVL III: CPT | Mod: PBBFAC,,, | Performed by: ANESTHESIOLOGY

## 2022-04-07 PROCEDURE — 3008F PR BODY MASS INDEX (BMI) DOCUMENTED: ICD-10-PCS | Mod: CPTII,S$GLB,, | Performed by: ANESTHESIOLOGY

## 2022-04-07 PROCEDURE — 3080F DIAST BP >= 90 MM HG: CPT | Mod: CPTII,S$GLB,, | Performed by: ANESTHESIOLOGY

## 2022-04-07 PROCEDURE — 99213 PR OFFICE/OUTPT VISIT, EST, LEVL III, 20-29 MIN: ICD-10-PCS | Mod: S$GLB,,, | Performed by: ANESTHESIOLOGY

## 2022-04-07 PROCEDURE — 99999 PR PBB SHADOW E&M-EST. PATIENT-LVL III: ICD-10-PCS | Mod: PBBFAC,,, | Performed by: ANESTHESIOLOGY

## 2022-04-07 PROCEDURE — 3075F SYST BP GE 130 - 139MM HG: CPT | Mod: CPTII,S$GLB,, | Performed by: ANESTHESIOLOGY

## 2022-04-07 PROCEDURE — 3008F BODY MASS INDEX DOCD: CPT | Mod: CPTII,S$GLB,, | Performed by: ANESTHESIOLOGY

## 2022-04-07 PROCEDURE — 99213 OFFICE O/P EST LOW 20 MIN: CPT | Mod: S$GLB,,, | Performed by: ANESTHESIOLOGY

## 2022-04-07 PROCEDURE — 3075F PR MOST RECENT SYSTOLIC BLOOD PRESS GE 130-139MM HG: ICD-10-PCS | Mod: CPTII,S$GLB,, | Performed by: ANESTHESIOLOGY

## 2022-04-07 PROCEDURE — 1159F PR MEDICATION LIST DOCUMENTED IN MEDICAL RECORD: ICD-10-PCS | Mod: CPTII,S$GLB,, | Performed by: ANESTHESIOLOGY

## 2022-04-07 PROCEDURE — 3080F PR MOST RECENT DIASTOLIC BLOOD PRESSURE >= 90 MM HG: ICD-10-PCS | Mod: CPTII,S$GLB,, | Performed by: ANESTHESIOLOGY

## 2022-04-07 PROCEDURE — 1159F MED LIST DOCD IN RCRD: CPT | Mod: CPTII,S$GLB,, | Performed by: ANESTHESIOLOGY

## 2022-04-07 RX ORDER — TRAMADOL HYDROCHLORIDE 50 MG/1
50 TABLET ORAL EVERY 12 HOURS PRN
Qty: 45 TABLET | Refills: 1 | Status: SHIPPED | OUTPATIENT
Start: 2022-04-07 | End: 2022-06-02 | Stop reason: SDUPTHER

## 2022-04-07 NOTE — PROGRESS NOTES
This note was completed with dictation software and grammatical errors may exist.    Chief Complaint   Patient presents with    Bladder Pain        HPI: Jm Chaparro is a 26 y.o. year old male patient who has a past medical history of Abnormal thyroid function test, ADD (attention deficit disorder), Anxiety disorder, Asperger's disorder, Deformity of both feet, Dyscalculia, Dysgraphia, Dyslexia, Eczema, Fatty liver, GERD (gastroesophageal reflux disease), History of migraine headaches, Hypertension, Interstitial cystitis (chronic), Irritable bowel syndrome, Nephrolithiasis, PONV (postoperative nausea and vomiting), Psoriasis, Psoriatic arthritis, Refusal of blood transfusion for reasons of conscience, Seasonal allergies, and Special needs assessment. He presents in referral from No ref. provider found for abdominal and bladder pain.  Since I had last seen the patient, he was able to attend physical therapy, tried aquatic PT but had a reaction to the chlorine with blistering and required Benadryl.  He has not returned to physical therapy at this point but plans to return this next week and discuss possible land-based PT.  As far as his abdominal pain, he was actually doing well, was taking tramadol 50 mg at night and half tablet in the morning and this to help break the cycle of pain, states that he was doing fairly well on a regular basis and not waking up in the middle the night.  He ran out recently but had not called us.  Otherwise he denies any major changes.      Previous history:  The patient's mother was present with him at the visit today who also helps with most of the history.  She reports that in March, 2015 she had gone out of town on a business trip and her son accompanying her.  Unfortunately he came down with cold and virus symptoms at the time and began developing severe abdominal pain.  She went to the emergency department at that time in [place], they were told he was having viral  symptoms.  She returned to the [facility] in  and had followed up with several other physicians that year.  She reports that initially he was having intermittent pain on and off, would have pain for a week and then it would disappear without cause.  He then developed diarrhea and constipation at times, he was seen at [facility] in [place] in 2015 and diagnosed with IBS with constipation and diarrhea.  They recommend an antidepressant that he tried, no relief with this.  Shortly after he began having renal calculi as well and needed to present to the emergency department.  He had seen neurology, Dr. Larry and reports that he required stone removal, does not sound like he has had lithotripsy.  He has seen Dr. Dickson, and now Nephrology, Dr. Enriquez.  He has seen Gastroenterology, Dr. Shaver and had undergone colonoscopy with no significant findings.  He has been tried on multiple medications over time as discussed below without any benefit.  He continues to have frequent abdominal pain that is often worse with caffeine or other foods, worse with activity.  He reports that he gets some relief with a bowel movement.  He still has been passing small sand like renal calculi, they report that his urine turns dark when he is about to pass a stone and he often has pain throughout the bladder and urethra after passing a stone.    He has a history of rheumatoid arthritis, has been seeing a rheumatologist, reports pain throughout his entire spine, bilateral hips, shoulders, knees, feet.  He does well with being in a bath with hot water.    Pain intervention history:  No injections    Spine surgeries:  None    Antineuropathics: Gabapentin 300mg three times daily, was given this for inflammatory joint pain??  NSAIDs: Mobic 15, no benefit  Physical therapy:  Has done some physical therapy, water therapy in the past with some improvement but things worsened when he did land-based therapy and stretching  Antidepressants:  "Fluoxetine 40mg  Muscle relaxers:  Opioids:  The patient reports having benefit from tramadol in the past  Antiplatelets/Anticoagulants:  From Dr. Vyas note:  Prozac 40mg po qam, adderall xr 20mg po qam (very rare use), lunesta 2mg po qhs PRN insomnia  Past Psych Meds: adderall ("robot child"), focalin ("zombie"), strattera (angry), zoloft (as a child- ineffective), lexapro (ineffective) topomax ("migraines"), celexa (overly sedating but effective), remeron (ineffective- wgt gain), atarax, elavil (ineffective for pain), ritalin (inc'd bp)     ROS:  He reports fatigue, weight gain, itching, color change, headaches head trauma, ear pain, constipation, diarrhea, stomach pain, nausea, flank pain, urinary urgency and frequency, painful urination, joint stiffness, joint swelling, back pain, difficulty sleeping and anxiety.  Balance of review of systems is negative.    Lab Results   Component Value Date    HGBA1C 5.3 02/19/2020       Lab Results   Component Value Date    WBC 8.07 01/16/2022    HGB 15.7 01/16/2022    HCT 46.8 01/16/2022    MCV 94 01/16/2022     01/16/2022             Past Medical History:   Diagnosis Date    Abnormal thyroid function test 09/11/2017    ADD (attention deficit disorder)     Anxiety disorder     Asperger's disorder     (AUTISM)    Deformity of both feet     Dyscalculia     Dysgraphia     Dyslexia     Eczema     Fatty liver     GERD (gastroesophageal reflux disease)     History of migraine headaches     Hypertension     Interstitial cystitis (chronic)     Irritable bowel syndrome     Nephrolithiasis     2014    PONV (postoperative nausea and vomiting)     Psoriasis     Psoriatic arthritis     Refusal of blood transfusion for reasons of conscience     Seasonal allergies     Special needs assessment        Past Surgical History:   Procedure Laterality Date    BIOPSY OF BLADDER  1/3/2022    Procedure: BIOPSY, BLADDER;  Surgeon: Derek Dickson MD;  Location: " [facility] OR;  Service: Urology;;    [facility]; told IBS    COLONOSCOPY  08/10/2015    Dr. Shaver; sent for scanning: unremarkable findings, no specimens collected    COLONOSCOPY N/A 2/18/2019    Procedure: COLONOSCOPY;  Surgeon: Puma Preston Jr., MD;  Location: [facility];  Service: Endoscopy;  Laterality: N/A;    UPPER GASTROINTESTINAL ENDOSCOPY  05/14/2012    Dr. Preston    UPPER GASTROINTESTINAL ENDOSCOPY  07/17/2015    Dr. Shaver, sent for scanning: normal esophagus- dilated & biopsied, findings WNL, biopsies taken from z-line, stomach and duodenum; biopsy: duodenum WNL, antrum reactive gastropathy, negative for h pylori or int. metaplasia, GEJ WNL, negative for EOE & cotton's esophagus    ureteral stone extraction      basket extraction       Social History     Socioeconomic History    Marital status: Single   Tobacco Use    Smoking status: Never Smoker    Smokeless tobacco: Never Used   Substance and Sexual Activity    Alcohol use: No    Drug use: No    Sexual activity: Never     Social Determinants of Health     Financial Resource Strain: High Risk    Difficulty of Paying Living Expenses: Very hard   Food Insecurity: Unknown    Worried About Running Out of Food in the Last Year: Patient refused    Ran Out of Food in the Last Year: Patient refused   Transportation Needs: No Transportation Needs    Lack of Transportation (Medical): No    Lack of Transportation (Non-Medical): No   Physical Activity: Inactive    Days of Exercise per Week: 0 days    Minutes of Exercise per Session: 0 min   Stress: Stress Concern Present    Feeling of Stress : To some extent   Social Connections: Unknown    Frequency of Communication with Friends and Family: Three times a week    Frequency of Social Gatherings with Friends and Family: Once a week    Active Member of Clubs or Organizations: No    Attends Club or Organization Meetings: Never    Marital Status: Never    Housing  "Stability: Unknown    Unable to Pay for Housing in the Last Year: Patient refused    Number of Places Lived in the Last Year: 1    Unstable Housing in the Last Year: No         Medications/Allergies: See med card    Vitals:    22 0831   BP: (!) 133/96   Pulse: 71   Weight: 108 kg (237 lb 15.8 oz)   Height: 6' 1.2" (1.859 m)   PainSc:   7   PainLoc: Back     Body mass index is 31.23 kg/m².    Physical exam:  Gen: A and O x3, pleasant, well-groomed  Skin: No rashes or obvious lesions  HEENT: PERRLA, no obvious deformities on ears or in canals. Trachea midline.  CVS: Regular rate and rhythm, normal palpable pulses.  Resp:No increased work of breathing, symmetrical chest rise.  Abdomen: Soft, NT/ND.  Musculoskeletal:  Moving all extremities equally      Imagin22 CT renal protocol:  Liver normal enhancement with no focal lesion.  Gallbladder, pancreas, stomach, spleen, adrenal glands normal.  Kidneys normal size and contour with no nephrolithiasis, hydronephrosis, or ureteral obstruction.  Aorta tapers normally.  No bowel obstruction, ascites, inflammatory change.  Appendix normal.  Bladder and rectum normal.  No significant bladder wall thickening evident.  Bones are intact.  Lung bases clear.    Assessment:  Jm Chaparro is a 26 y.o. year old male patient who has a past medical history of Abnormal thyroid function test, ADD (attention deficit disorder), Anxiety disorder, Asperger's disorder, Deformity of both feet, Dyscalculia, Dysgraphia, Dyslexia, Eczema, Fatty liver, GERD (gastroesophageal reflux disease), History of migraine headaches, Hypertension, Interstitial cystitis (chronic), Irritable bowel syndrome, Nephrolithiasis, PONV (postoperative nausea and vomiting), Psoriasis, Psoriatic arthritis, Refusal of blood transfusion for reasons of conscience, Seasonal allergies, and Special needs assessment. He presents in referral from Dr. Derek Dickson for bladder pain.    1. Bladder " pain     2. IC (interstitial cystitis)     3. Dysuria     4. Rheumatoid arthritis involving multiple sites, unspecified whether rheumatoid factor present     5. Muscular deconditioning     6. Irritable bowel syndrome with both constipation and diarrhea         Plan:  1.  We discussed that since the tramadol does seem to be helping, it would be reasonable to continue.  We discussed the concept of tolerance, would want to avoid continued buildup of tolerance so continue taking the minimum necessary.  He will continue tramadol 50 mg at night and 25 mg in the morning, I have given him a prescription for the next 2 months.  I will have him follow up in 2 months or sooner as needed.  2. I encouraged him to continue with physical therapy to work on overall muscular conditioning, he is going to avoid the pool for now.  3. We discussed that Cymbalta may be an option in the future, that may be something that he can discuss with his psychiatrist.

## 2022-04-30 NOTE — TELEPHONE ENCOUNTER
Patient Requesting Refill  LOV:2/28/22  Last fill date:12/7/21  Allergies reviewed  Medication Pended

## 2022-05-04 RX ORDER — HYOSCYAMINE SULFATE 0.125 MG
125 TABLET ORAL EVERY 6 HOURS PRN
Qty: 40 TABLET | Refills: 2 | Status: SHIPPED | OUTPATIENT
Start: 2022-05-04 | End: 2023-02-28

## 2022-05-11 ENCOUNTER — OFFICE VISIT (OUTPATIENT)
Dept: PSYCHIATRY | Facility: CLINIC | Age: 27
End: 2022-05-11
Payer: COMMERCIAL

## 2022-05-11 VITALS
WEIGHT: 235.88 LBS | HEART RATE: 71 BPM | SYSTOLIC BLOOD PRESSURE: 124 MMHG | BODY MASS INDEX: 31.26 KG/M2 | DIASTOLIC BLOOD PRESSURE: 94 MMHG | HEIGHT: 73 IN

## 2022-05-11 DIAGNOSIS — F90.0 ADHD (ATTENTION DEFICIT HYPERACTIVITY DISORDER), INATTENTIVE TYPE: ICD-10-CM

## 2022-05-11 DIAGNOSIS — F41.1 GAD (GENERALIZED ANXIETY DISORDER): Primary | ICD-10-CM

## 2022-05-11 DIAGNOSIS — F40.10 SOCIAL ANXIETY DISORDER: ICD-10-CM

## 2022-05-11 DIAGNOSIS — F84.0 AUTISM SPECTRUM DISORDER REQUIRING SUBSTANTIAL SUPPORT (LEVEL 2): ICD-10-CM

## 2022-05-11 PROCEDURE — 90833 PSYTX W PT W E/M 30 MIN: CPT | Mod: S$GLB,,, | Performed by: PSYCHIATRY & NEUROLOGY

## 2022-05-11 PROCEDURE — 99214 PR OFFICE/OUTPT VISIT, EST, LEVL IV, 30-39 MIN: ICD-10-PCS | Mod: S$GLB,,, | Performed by: PSYCHIATRY & NEUROLOGY

## 2022-05-11 PROCEDURE — 3080F DIAST BP >= 90 MM HG: CPT | Mod: CPTII,S$GLB,, | Performed by: PSYCHIATRY & NEUROLOGY

## 2022-05-11 PROCEDURE — 3074F PR MOST RECENT SYSTOLIC BLOOD PRESSURE < 130 MM HG: ICD-10-PCS | Mod: CPTII,S$GLB,, | Performed by: PSYCHIATRY & NEUROLOGY

## 2022-05-11 PROCEDURE — 90833 PR PSYCHOTHERAPY W/PATIENT W/E&M, 30 MIN (ADD ON): ICD-10-PCS | Mod: S$GLB,,, | Performed by: PSYCHIATRY & NEUROLOGY

## 2022-05-11 PROCEDURE — 1159F PR MEDICATION LIST DOCUMENTED IN MEDICAL RECORD: ICD-10-PCS | Mod: CPTII,S$GLB,, | Performed by: PSYCHIATRY & NEUROLOGY

## 2022-05-11 PROCEDURE — 1159F MED LIST DOCD IN RCRD: CPT | Mod: CPTII,S$GLB,, | Performed by: PSYCHIATRY & NEUROLOGY

## 2022-05-11 PROCEDURE — 3008F BODY MASS INDEX DOCD: CPT | Mod: CPTII,S$GLB,, | Performed by: PSYCHIATRY & NEUROLOGY

## 2022-05-11 PROCEDURE — 99999 PR PBB SHADOW E&M-EST. PATIENT-LVL III: ICD-10-PCS | Mod: PBBFAC,,, | Performed by: PSYCHIATRY & NEUROLOGY

## 2022-05-11 PROCEDURE — 3080F PR MOST RECENT DIASTOLIC BLOOD PRESSURE >= 90 MM HG: ICD-10-PCS | Mod: CPTII,S$GLB,, | Performed by: PSYCHIATRY & NEUROLOGY

## 2022-05-11 PROCEDURE — 3008F PR BODY MASS INDEX (BMI) DOCUMENTED: ICD-10-PCS | Mod: CPTII,S$GLB,, | Performed by: PSYCHIATRY & NEUROLOGY

## 2022-05-11 PROCEDURE — 99999 PR PBB SHADOW E&M-EST. PATIENT-LVL III: CPT | Mod: PBBFAC,,, | Performed by: PSYCHIATRY & NEUROLOGY

## 2022-05-11 PROCEDURE — 99214 OFFICE O/P EST MOD 30 MIN: CPT | Mod: S$GLB,,, | Performed by: PSYCHIATRY & NEUROLOGY

## 2022-05-11 PROCEDURE — 3074F SYST BP LT 130 MM HG: CPT | Mod: CPTII,S$GLB,, | Performed by: PSYCHIATRY & NEUROLOGY

## 2022-05-11 RX ORDER — FLUOXETINE HYDROCHLORIDE 40 MG/1
40 CAPSULE ORAL DAILY
Qty: 90 CAPSULE | Refills: 3 | Status: SHIPPED | OUTPATIENT
Start: 2022-05-11 | End: 2023-10-09 | Stop reason: SDUPTHER

## 2022-05-11 RX ORDER — DEXTROAMPHETAMINE SACCHARATE, AMPHETAMINE ASPARTATE, DEXTROAMPHETAMINE SULFATE AND AMPHETAMINE SULFATE 2.5; 2.5; 2.5; 2.5 MG/1; MG/1; MG/1; MG/1
10 TABLET ORAL DAILY
Qty: 30 TABLET | Refills: 0 | Status: SHIPPED | OUTPATIENT
Start: 2022-05-11 | End: 2022-12-12 | Stop reason: SDUPTHER
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
## 2022-06-07 ENCOUNTER — OFFICE VISIT (OUTPATIENT)
Dept: PAIN MEDICINE | Facility: CLINIC | Age: 27
End: 2022-06-07
Payer: COMMERCIAL

## 2022-06-07 VITALS
DIASTOLIC BLOOD PRESSURE: 85 MMHG | WEIGHT: 242.06 LBS | BODY MASS INDEX: 32.08 KG/M2 | SYSTOLIC BLOOD PRESSURE: 119 MMHG | HEIGHT: 73 IN | HEART RATE: 71 BPM

## 2022-06-07 DIAGNOSIS — K58.2 IRRITABLE BOWEL SYNDROME WITH BOTH CONSTIPATION AND DIARRHEA: ICD-10-CM

## 2022-06-07 DIAGNOSIS — R39.89 BLADDER PAIN: ICD-10-CM

## 2022-06-07 DIAGNOSIS — M06.9 RHEUMATOID ARTHRITIS INVOLVING MULTIPLE SITES, UNSPECIFIED WHETHER RHEUMATOID FACTOR PRESENT: ICD-10-CM

## 2022-06-07 DIAGNOSIS — N30.10 IC (INTERSTITIAL CYSTITIS): Primary | ICD-10-CM

## 2022-06-07 PROCEDURE — 1159F MED LIST DOCD IN RCRD: CPT | Mod: CPTII,S$GLB,, | Performed by: PHYSICIAN ASSISTANT

## 2022-06-07 PROCEDURE — 99214 PR OFFICE/OUTPT VISIT, EST, LEVL IV, 30-39 MIN: ICD-10-PCS | Mod: S$GLB,,, | Performed by: PHYSICIAN ASSISTANT

## 2022-06-07 PROCEDURE — 3008F BODY MASS INDEX DOCD: CPT | Mod: CPTII,S$GLB,, | Performed by: PHYSICIAN ASSISTANT

## 2022-06-07 PROCEDURE — 3079F PR MOST RECENT DIASTOLIC BLOOD PRESSURE 80-89 MM HG: ICD-10-PCS | Mod: CPTII,S$GLB,, | Performed by: PHYSICIAN ASSISTANT

## 2022-06-07 PROCEDURE — 3074F PR MOST RECENT SYSTOLIC BLOOD PRESSURE < 130 MM HG: ICD-10-PCS | Mod: CPTII,S$GLB,, | Performed by: PHYSICIAN ASSISTANT

## 2022-06-07 PROCEDURE — 1160F RVW MEDS BY RX/DR IN RCRD: CPT | Mod: CPTII,S$GLB,, | Performed by: PHYSICIAN ASSISTANT

## 2022-06-07 PROCEDURE — 3079F DIAST BP 80-89 MM HG: CPT | Mod: CPTII,S$GLB,, | Performed by: PHYSICIAN ASSISTANT

## 2022-06-07 PROCEDURE — 1159F PR MEDICATION LIST DOCUMENTED IN MEDICAL RECORD: ICD-10-PCS | Mod: CPTII,S$GLB,, | Performed by: PHYSICIAN ASSISTANT

## 2022-06-07 PROCEDURE — 99999 PR PBB SHADOW E&M-EST. PATIENT-LVL IV: ICD-10-PCS | Mod: PBBFAC,,, | Performed by: PHYSICIAN ASSISTANT

## 2022-06-07 PROCEDURE — 3074F SYST BP LT 130 MM HG: CPT | Mod: CPTII,S$GLB,, | Performed by: PHYSICIAN ASSISTANT

## 2022-06-07 PROCEDURE — 1160F PR REVIEW ALL MEDS BY PRESCRIBER/CLIN PHARMACIST DOCUMENTED: ICD-10-PCS | Mod: CPTII,S$GLB,, | Performed by: PHYSICIAN ASSISTANT

## 2022-06-07 PROCEDURE — 3008F PR BODY MASS INDEX (BMI) DOCUMENTED: ICD-10-PCS | Mod: CPTII,S$GLB,, | Performed by: PHYSICIAN ASSISTANT

## 2022-06-07 PROCEDURE — 99999 PR PBB SHADOW E&M-EST. PATIENT-LVL IV: CPT | Mod: PBBFAC,,, | Performed by: PHYSICIAN ASSISTANT

## 2022-06-07 PROCEDURE — 99214 OFFICE O/P EST MOD 30 MIN: CPT | Mod: S$GLB,,, | Performed by: PHYSICIAN ASSISTANT

## 2022-06-07 RX ORDER — TRAMADOL HYDROCHLORIDE 50 MG/1
50 TABLET ORAL EVERY 12 HOURS PRN
Qty: 45 TABLET | Refills: 1 | Status: SHIPPED | OUTPATIENT
Start: 2022-07-07 | End: 2022-08-12

## 2022-06-07 NOTE — PROGRESS NOTES
This note was completed with dictation software and grammatical errors may exist.    Chief Complaint   Patient presents with    Bladder Pain        HPI: Jm Chaparro is a 26 y.o. year old male patient who has a past medical history of Abnormal thyroid function test, ADD (attention deficit disorder), Anxiety disorder, Asperger's disorder, Deformity of both feet, Dyscalculia, Dysgraphia, Dyslexia, Eczema, Fatty liver, GERD (gastroesophageal reflux disease), History of migraine headaches, Hypertension, Interstitial cystitis (chronic), Irritable bowel syndrome, Nephrolithiasis, PONV (postoperative nausea and vomiting), Psoriasis, Psoriatic arthritis, Refusal of blood transfusion for reasons of conscience, Seasonal allergies, and Special needs assessment. He presents in referral from No ref. provider found for abdominal and bladder pain.  He returns in follow-up today with abdominal pain and bladder pain.  He is accompanied by his mother to help with history.  He does report that he feels tramadol has improved his quality of life and helps his daily pain significantly.  For the past couple of days he feels that he has been passing a right-sided kidney stone.  They state that this occurs usually on a monthly basis.  Otherwise, he has been doing well.      Previous history:  The patient's mother was present with him at the visit today who also helps with most of the history.  She reports that in March, 2015 she had gone out of town on a business trip and her son accompanying her.  Unfortunately he came down with cold and virus symptoms at the time and began developing severe abdominal pain.  She went to the emergency department at that time in [place], they were told he was having viral symptoms.  She returned to the [facility] in  and had followed up with several other physicians that year.  She reports that initially he was having intermittent pain on and off, would have pain for a week and then it would  disappear without cause.  He then developed diarrhea and constipation at times, he was seen at [facility] in [place] in 2015 and diagnosed with IBS with constipation and diarrhea.  They recommend an antidepressant that he tried, no relief with this.  Shortly after he began having renal calculi as well and needed to present to the emergency department.  He had seen neurology, Dr. Larry and reports that he required stone removal, does not sound like he has had lithotripsy.  He has seen Dr. Dickson, and now Nephrology, Dr. Enriquez.  He has seen Gastroenterology, Dr. Shaver and had undergone colonoscopy with no significant findings.  He has been tried on multiple medications over time as discussed below without any benefit.  He continues to have frequent abdominal pain that is often worse with caffeine or other foods, worse with activity.  He reports that he gets some relief with a bowel movement.  He still has been passing small sand like renal calculi, they report that his urine turns dark when he is about to pass a stone and he often has pain throughout the bladder and urethra after passing a stone.    He has a history of rheumatoid arthritis, has been seeing a rheumatologist, reports pain throughout his entire spine, bilateral hips, shoulders, knees, feet.  He does well with being in a bath with hot water.    Pain intervention history:  No injections    Spine surgeries:  None    Antineuropathics: Gabapentin 300mg three times daily, was given this for inflammatory joint pain??  NSAIDs: Mobic 15, no benefit  Physical therapy:  Has done some physical therapy, water therapy in the past with some improvement but things worsened when he did land-based therapy and stretching  Antidepressants: Fluoxetine 40mg  Muscle relaxers:  Opioids:  The patient reports having benefit from tramadol in the past  Antiplatelets/Anticoagulants:  From Dr. Vyas note:  Prozac 40mg po qam, adderall xr 20mg po qam (very rare use), lunesta  "2mg po qhs PRN insomnia  Past Psych Meds: adderall ("robot child"), focalin ("zombie"), strattera (angry), zoloft (as a child- ineffective), lexapro (ineffective) topomax ("migraines"), celexa (overly sedating but effective), remeron (ineffective- wgt gain), atarax, elavil (ineffective for pain), ritalin (inc'd bp)     ROS:  He reports fatigue, weight gain, itching, color change, headaches head trauma, ear pain, constipation, diarrhea, stomach pain, nausea, flank pain, urinary urgency and frequency, painful urination, joint stiffness, joint swelling, back pain, difficulty sleeping and anxiety.  Balance of review of systems is negative.    Lab Results   Component Value Date    HGBA1C 5.3 02/19/2020       Lab Results   Component Value Date    WBC 8.07 01/16/2022    HGB 15.7 01/16/2022    HCT 46.8 01/16/2022    MCV 94 01/16/2022     01/16/2022             Past Medical History:   Diagnosis Date    Abnormal thyroid function test 09/11/2017    ADD (attention deficit disorder)     Anxiety disorder     Asperger's disorder     (AUTISM)    Deformity of both feet     Dyscalculia     Dysgraphia     Dyslexia     Eczema     Fatty liver     GERD (gastroesophageal reflux disease)     History of migraine headaches     Hypertension     Interstitial cystitis (chronic)     Irritable bowel syndrome     Nephrolithiasis     2014    PONV (postoperative nausea and vomiting)     Psoriasis     Psoriatic arthritis     Refusal of blood transfusion for reasons of conscience     Seasonal allergies     Special needs assessment        Past Surgical History:   Procedure Laterality Date    BIOPSY OF BLADDER  1/3/2022    Procedure: BIOPSY, BLADDER;  Surgeon: Derek Dickson MD;  Location: [facility];  Service: Urology;;    COLONOSCOPY  ~2015    [facility]; told IBS    COLONOSCOPY  08/10/2015    Dr. Shaver; sent for scanning: unremarkable findings, no specimens collected    COLONOSCOPY N/A 2/18/2019    Procedure: " COLONOSCOPY;  Surgeon: Puma Preston Jr., MD;  Location: [facility];  Service: Endoscopy;  Laterality: N/A;    UPPER GASTROINTESTINAL ENDOSCOPY  05/14/2012    Dr. Preston    UPPER GASTROINTESTINAL ENDOSCOPY  07/17/2015    Dr. Shaver, sent for scanning: normal esophagus- dilated & biopsied, findings WNL, biopsies taken from z-line, stomach and duodenum; biopsy: duodenum WNL, antrum reactive gastropathy, negative for h pylori or int. metaplasia, GEJ WNL, negative for EOE & cotton's esophagus    ureteral stone extraction      basket extraction       Social History     Socioeconomic History    Marital status: Single   Tobacco Use    Smoking status: Never Smoker    Smokeless tobacco: Never Used   Substance and Sexual Activity    Alcohol use: No    Drug use: No    Sexual activity: Never     Social Determinants of Health     Financial Resource Strain: High Risk    Difficulty of Paying Living Expenses: Very hard   Food Insecurity: Unknown    Worried About Running Out of Food in the Last Year: Patient refused    Ran Out of Food in the Last Year: Patient refused   Transportation Needs: No Transportation Needs    Lack of Transportation (Medical): No    Lack of Transportation (Non-Medical): No   Physical Activity: Inactive    Days of Exercise per Week: 0 days    Minutes of Exercise per Session: 0 min   Stress: Stress Concern Present    Feeling of Stress : To some extent   Social Connections: Unknown    Frequency of Communication with Friends and Family: Three times a week    Frequency of Social Gatherings with Friends and Family: Once a week    Active Member of Clubs or Organizations: No    Attends Club or Organization Meetings: Never    Marital Status: Never    Housing Stability: Unknown    Unable to Pay for Housing in the Last Year: Patient refused    Number of Places Lived in the Last Year: 1    Unstable Housing in the Last Year: No         Medications/Allergies: See med card    Vitals:     "22 0812   BP: 119/85   Pulse: 71   Weight: 109.8 kg (242 lb 1 oz)   Height: 6' 1.2" (1.859 m)   PainSc:   8     Body mass index is 31.76 kg/m².    Physical exam:  Gen: A and O x3, pleasant, well-groomed  Skin: No rashes or obvious lesions  HEENT: PERRLA, no obvious deformities on ears or in canals. Trachea midline.  CVS: Regular rate and rhythm, normal palpable pulses.  Resp:No increased work of breathing, symmetrical chest rise.  Abdomen: Soft, NT/ND.  Musculoskeletal:  Moving all extremities equally      Imagin22 CT renal protocol:  Liver normal enhancement with no focal lesion.  Gallbladder, pancreas, stomach, spleen, adrenal glands normal.  Kidneys normal size and contour with no nephrolithiasis, hydronephrosis, or ureteral obstruction.  Aorta tapers normally.  No bowel obstruction, ascites, inflammatory change.  Appendix normal.  Bladder and rectum normal.  No significant bladder wall thickening evident.  Bones are intact.  Lung bases clear.    Assessment:  Jm Chaparro is a 26 y.o. year old male patient who has a past medical history of Abnormal thyroid function test, ADD (attention deficit disorder), Anxiety disorder, Asperger's disorder, Deformity of both feet, Dyscalculia, Dysgraphia, Dyslexia, Eczema, Fatty liver, GERD (gastroesophageal reflux disease), History of migraine headaches, Hypertension, Interstitial cystitis (chronic), Irritable bowel syndrome, Nephrolithiasis, PONV (postoperative nausea and vomiting), Psoriasis, Psoriatic arthritis, Refusal of blood transfusion for reasons of conscience, Seasonal allergies, and Special needs assessment. He presents in referral from Dr. Derek Dickson for bladder pain.    1. IC (interstitial cystitis)     2. Bladder pain     3. Rheumatoid arthritis involving multiple sites, unspecified whether rheumatoid factor present     4. Irritable bowel syndrome with both constipation and diarrhea         Plan:  1. Dr. Gonzalez has refilled " tramadol 25 mg in the morning and 50 mg at night.  We discussed that we are happy to continue prescribe this medication but since he is on a stable dose, his primary care physician can take over writing for this in the future so that he does not have to come to another specialist visit regularly.  I have reviewed the [place] Board of [employer]acy website and there are no abberancies.    2. We will schedule a three-month follow-up visit for now unless his primary care physician decides to take over writing for the medication.  They will be seeing her in August.  This has been reviewed with Dr. Gonzalez.

## 2022-06-07 NOTE — TELEPHONE ENCOUNTER
Patient seen in clinic.  Please send prescription to his pharmacy.    I have reviewed the [place] Board of Pharmacy website and there are no abberancies.

## 2022-06-17 ENCOUNTER — PATIENT MESSAGE (OUTPATIENT)
Dept: NEPHROLOGY | Facility: CLINIC | Age: 27
End: 2022-06-17
Payer: COMMERCIAL

## 2022-06-17 ENCOUNTER — PATIENT MESSAGE (OUTPATIENT)
Dept: PSYCHIATRY | Facility: CLINIC | Age: 27
End: 2022-06-17
Payer: COMMERCIAL

## 2022-06-17 NOTE — TELEPHONE ENCOUNTER
Please schedule an appointment for patient. Please let us know when the appointment is so I can let 's know. Thank you.

## 2022-06-29 ENCOUNTER — PATIENT MESSAGE (OUTPATIENT)
Dept: PSYCHIATRY | Facility: CLINIC | Age: 27
End: 2022-06-29
Payer: COMMERCIAL

## 2022-07-23 RX ORDER — AZELASTINE 1 MG/ML
2 SPRAY, METERED NASAL 2 TIMES DAILY
Qty: 30 ML | Refills: 2 | Status: SHIPPED | OUTPATIENT
Start: 2022-07-23 | End: 2022-10-05

## 2022-08-09 RX ORDER — TRAMADOL HYDROCHLORIDE 50 MG/1
50 TABLET ORAL EVERY 12 HOURS PRN
Qty: 45 TABLET | Refills: 1 | Status: CANCELLED | OUTPATIENT
Start: 2022-08-09 | End: 2022-10-08

## 2022-08-10 ENCOUNTER — PATIENT MESSAGE (OUTPATIENT)
Dept: FAMILY MEDICINE | Facility: CLINIC | Age: 27
End: 2022-08-10
Payer: COMMERCIAL

## 2022-08-22 ENCOUNTER — OFFICE VISIT (OUTPATIENT)
Dept: FAMILY MEDICINE | Facility: CLINIC | Age: 27
End: 2022-08-22
Payer: COMMERCIAL

## 2022-08-22 VITALS
WEIGHT: 236.56 LBS | BODY MASS INDEX: 31.35 KG/M2 | SYSTOLIC BLOOD PRESSURE: 118 MMHG | RESPIRATION RATE: 20 BRPM | HEART RATE: 65 BPM | OXYGEN SATURATION: 98 % | DIASTOLIC BLOOD PRESSURE: 78 MMHG | HEIGHT: 73 IN | TEMPERATURE: 98 F

## 2022-08-22 DIAGNOSIS — I10 HYPERTENSION, ESSENTIAL: Primary | ICD-10-CM

## 2022-08-22 DIAGNOSIS — L40.9 PSORIASIS: ICD-10-CM

## 2022-08-22 DIAGNOSIS — L40.50 PSORIATIC ARTHRITIS: ICD-10-CM

## 2022-08-22 DIAGNOSIS — Z00.00 ANNUAL PHYSICAL EXAM: ICD-10-CM

## 2022-08-22 PROCEDURE — 3044F HG A1C LEVEL LT 7.0%: CPT | Mod: CPTII,S$GLB,, | Performed by: FAMILY MEDICINE

## 2022-08-22 PROCEDURE — 3044F PR MOST RECENT HEMOGLOBIN A1C LEVEL <7.0%: ICD-10-PCS | Mod: CPTII,S$GLB,, | Performed by: FAMILY MEDICINE

## 2022-08-22 PROCEDURE — 3008F PR BODY MASS INDEX (BMI) DOCUMENTED: ICD-10-PCS | Mod: CPTII,S$GLB,, | Performed by: FAMILY MEDICINE

## 2022-08-22 PROCEDURE — 99213 PR OFFICE/OUTPT VISIT, EST, LEVL III, 20-29 MIN: ICD-10-PCS | Mod: S$GLB,,, | Performed by: FAMILY MEDICINE

## 2022-08-22 PROCEDURE — 1160F PR REVIEW ALL MEDS BY PRESCRIBER/CLIN PHARMACIST DOCUMENTED: ICD-10-PCS | Mod: CPTII,S$GLB,, | Performed by: FAMILY MEDICINE

## 2022-08-22 PROCEDURE — 3074F SYST BP LT 130 MM HG: CPT | Mod: CPTII,S$GLB,, | Performed by: FAMILY MEDICINE

## 2022-08-22 PROCEDURE — 3078F PR MOST RECENT DIASTOLIC BLOOD PRESSURE < 80 MM HG: ICD-10-PCS | Mod: CPTII,S$GLB,, | Performed by: FAMILY MEDICINE

## 2022-08-22 PROCEDURE — 1159F MED LIST DOCD IN RCRD: CPT | Mod: CPTII,S$GLB,, | Performed by: FAMILY MEDICINE

## 2022-08-22 PROCEDURE — 1160F RVW MEDS BY RX/DR IN RCRD: CPT | Mod: CPTII,S$GLB,, | Performed by: FAMILY MEDICINE

## 2022-08-22 PROCEDURE — 99213 OFFICE O/P EST LOW 20 MIN: CPT | Mod: S$GLB,,, | Performed by: FAMILY MEDICINE

## 2022-08-22 PROCEDURE — 3008F BODY MASS INDEX DOCD: CPT | Mod: CPTII,S$GLB,, | Performed by: FAMILY MEDICINE

## 2022-08-22 PROCEDURE — 1159F PR MEDICATION LIST DOCUMENTED IN MEDICAL RECORD: ICD-10-PCS | Mod: CPTII,S$GLB,, | Performed by: FAMILY MEDICINE

## 2022-08-22 PROCEDURE — 3074F PR MOST RECENT SYSTOLIC BLOOD PRESSURE < 130 MM HG: ICD-10-PCS | Mod: CPTII,S$GLB,, | Performed by: FAMILY MEDICINE

## 2022-08-22 PROCEDURE — 3078F DIAST BP <80 MM HG: CPT | Mod: CPTII,S$GLB,, | Performed by: FAMILY MEDICINE

## 2022-08-22 RX ORDER — METOPROLOL SUCCINATE 50 MG/1
TABLET, EXTENDED RELEASE ORAL
Qty: 60 TABLET | Refills: 1 | Status: SHIPPED | OUTPATIENT
Start: 2022-08-22 | End: 2022-09-17

## 2022-08-29 ENCOUNTER — LAB VISIT (OUTPATIENT)
Dept: LAB | Facility: HOSPITAL | Age: 27
End: 2022-08-29
Attending: FAMILY MEDICINE
Payer: COMMERCIAL

## 2022-08-29 DIAGNOSIS — Z00.00 ANNUAL PHYSICAL EXAM: ICD-10-CM

## 2022-08-29 LAB
ALBUMIN SERPL BCP-MCNC: 4.1 G/DL (ref 3.5–5.2)
ALP SERPL-CCNC: 79 U/L (ref 55–135)
ALT SERPL W/O P-5'-P-CCNC: 41 U/L (ref 10–44)
ANION GAP SERPL CALC-SCNC: 11 MMOL/L (ref 8–16)
AST SERPL-CCNC: 26 U/L (ref 10–40)
BASOPHILS # BLD AUTO: 0.07 K/UL (ref 0–0.2)
BASOPHILS NFR BLD: 0.7 % (ref 0–1.9)
BILIRUB SERPL-MCNC: 0.4 MG/DL (ref 0.1–1)
BUN SERPL-MCNC: 15 MG/DL (ref 6–20)
CALCIUM SERPL-MCNC: 9.8 MG/DL (ref 8.7–10.5)
CHLORIDE SERPL-SCNC: 102 MMOL/L (ref 95–110)
CHOLEST SERPL-MCNC: 288 MG/DL (ref 120–199)
CHOLEST/HDLC SERPL: 7 {RATIO} (ref 2–5)
CO2 SERPL-SCNC: 26 MMOL/L (ref 23–29)
CREAT SERPL-MCNC: 1 MG/DL (ref 0.5–1.4)
DIFFERENTIAL METHOD: ABNORMAL
EOSINOPHIL # BLD AUTO: 0.3 K/UL (ref 0–0.5)
EOSINOPHIL NFR BLD: 2.5 % (ref 0–8)
ERYTHROCYTE [DISTWIDTH] IN BLOOD BY AUTOMATED COUNT: 12.9 % (ref 11.5–14.5)
EST. GFR  (NO RACE VARIABLE): >60 ML/MIN/1.73 M^2
ESTIMATED AVG GLUCOSE: 103 MG/DL (ref 68–131)
GLUCOSE SERPL-MCNC: 91 MG/DL (ref 70–110)
HBA1C MFR BLD: 5.2 % (ref 4–5.6)
HCT VFR BLD AUTO: 44 % (ref 40–54)
HDLC SERPL-MCNC: 41 MG/DL (ref 40–75)
HDLC SERPL: 14.2 % (ref 20–50)
HGB BLD-MCNC: 15.5 G/DL (ref 14–18)
IMM GRANULOCYTES # BLD AUTO: 0.03 K/UL (ref 0–0.04)
IMM GRANULOCYTES NFR BLD AUTO: 0.3 % (ref 0–0.5)
LDLC SERPL CALC-MCNC: 199.6 MG/DL (ref 63–159)
LYMPHOCYTES # BLD AUTO: 3.7 K/UL (ref 1–4.8)
LYMPHOCYTES NFR BLD: 35.8 % (ref 18–48)
MCH RBC QN AUTO: 33.3 PG (ref 27–31)
MCHC RBC AUTO-ENTMCNC: 35.2 G/DL (ref 32–36)
MCV RBC AUTO: 94 FL (ref 82–98)
MONOCYTES # BLD AUTO: 0.6 K/UL (ref 0.3–1)
MONOCYTES NFR BLD: 6 % (ref 4–15)
NEUTROPHILS # BLD AUTO: 5.6 K/UL (ref 1.8–7.7)
NEUTROPHILS NFR BLD: 54.7 % (ref 38–73)
NONHDLC SERPL-MCNC: 247 MG/DL
NRBC BLD-RTO: 0 /100 WBC
PLATELET # BLD AUTO: 269 K/UL (ref 150–450)
PMV BLD AUTO: 12.2 FL (ref 9.2–12.9)
POTASSIUM SERPL-SCNC: 4.2 MMOL/L (ref 3.5–5.1)
PROT SERPL-MCNC: 7.5 G/DL (ref 6–8.4)
RBC # BLD AUTO: 4.66 M/UL (ref 4.6–6.2)
SODIUM SERPL-SCNC: 139 MMOL/L (ref 136–145)
TRIGL SERPL-MCNC: 237 MG/DL (ref 30–150)
TSH SERPL DL<=0.005 MIU/L-ACNC: 1.51 UIU/ML (ref 0.4–4)
WBC # BLD AUTO: 10.24 K/UL (ref 3.9–12.7)

## 2022-08-29 PROCEDURE — 84443 ASSAY THYROID STIM HORMONE: CPT | Performed by: FAMILY MEDICINE

## 2022-08-29 PROCEDURE — 83036 HEMOGLOBIN GLYCOSYLATED A1C: CPT | Performed by: FAMILY MEDICINE

## 2022-08-29 PROCEDURE — 85025 COMPLETE CBC W/AUTO DIFF WBC: CPT | Performed by: FAMILY MEDICINE

## 2022-08-29 PROCEDURE — 80053 COMPREHEN METABOLIC PANEL: CPT | Performed by: FAMILY MEDICINE

## 2022-08-29 PROCEDURE — 80061 LIPID PANEL: CPT | Performed by: FAMILY MEDICINE

## 2022-08-29 PROCEDURE — 36415 COLL VENOUS BLD VENIPUNCTURE: CPT | Mod: PO | Performed by: FAMILY MEDICINE

## 2022-08-30 ENCOUNTER — PATIENT MESSAGE (OUTPATIENT)
Dept: FAMILY MEDICINE | Facility: CLINIC | Age: 27
End: 2022-08-30
Payer: COMMERCIAL

## 2022-08-30 DIAGNOSIS — E78.5 HYPERLIPIDEMIA, UNSPECIFIED HYPERLIPIDEMIA TYPE: Primary | ICD-10-CM

## 2022-08-30 RX ORDER — ATORVASTATIN CALCIUM 20 MG/1
20 TABLET, FILM COATED ORAL DAILY
Qty: 90 TABLET | Refills: 0 | Status: SHIPPED | OUTPATIENT
Start: 2022-08-30 | End: 2022-11-05

## 2022-08-31 ENCOUNTER — PATIENT MESSAGE (OUTPATIENT)
Dept: FAMILY MEDICINE | Facility: CLINIC | Age: 27
End: 2022-08-31
Payer: COMMERCIAL

## 2022-09-12 ENCOUNTER — OFFICE VISIT (OUTPATIENT)
Dept: PSYCHIATRY | Facility: CLINIC | Age: 27
End: 2022-09-12
Payer: COMMERCIAL

## 2022-09-12 VITALS
BODY MASS INDEX: 31.35 KG/M2 | WEIGHT: 236.56 LBS | HEART RATE: 75 BPM | SYSTOLIC BLOOD PRESSURE: 116 MMHG | HEIGHT: 73 IN | DIASTOLIC BLOOD PRESSURE: 82 MMHG

## 2022-09-12 DIAGNOSIS — F40.10 SOCIAL ANXIETY DISORDER: Primary | ICD-10-CM

## 2022-09-12 DIAGNOSIS — F90.2 ATTENTION DEFICIT HYPERACTIVITY DISORDER (ADHD), COMBINED TYPE: ICD-10-CM

## 2022-09-12 DIAGNOSIS — F41.1 GENERALIZED ANXIETY DISORDER: ICD-10-CM

## 2022-09-12 DIAGNOSIS — F84.0 AUTISM SPECTRUM DISORDER: ICD-10-CM

## 2022-09-12 PROCEDURE — 99999 PR PBB SHADOW E&M-EST. PATIENT-LVL IV: ICD-10-PCS | Mod: PBBFAC,,, | Performed by: PSYCHOLOGIST

## 2022-09-12 PROCEDURE — 3008F PR BODY MASS INDEX (BMI) DOCUMENTED: ICD-10-PCS | Mod: CPTII,S$GLB,, | Performed by: PSYCHOLOGIST

## 2022-09-12 PROCEDURE — 90792 PR PSYCHIATRIC DIAGNOSTIC EVALUATION W/MEDICAL SERVICES: ICD-10-PCS | Mod: S$GLB,,, | Performed by: PSYCHOLOGIST

## 2022-09-12 PROCEDURE — 3079F DIAST BP 80-89 MM HG: CPT | Mod: CPTII,S$GLB,, | Performed by: PSYCHOLOGIST

## 2022-09-12 PROCEDURE — 3044F PR MOST RECENT HEMOGLOBIN A1C LEVEL <7.0%: ICD-10-PCS | Mod: CPTII,S$GLB,, | Performed by: PSYCHOLOGIST

## 2022-09-12 PROCEDURE — 3044F HG A1C LEVEL LT 7.0%: CPT | Mod: CPTII,S$GLB,, | Performed by: PSYCHOLOGIST

## 2022-09-12 PROCEDURE — 3074F PR MOST RECENT SYSTOLIC BLOOD PRESSURE < 130 MM HG: ICD-10-PCS | Mod: CPTII,S$GLB,, | Performed by: PSYCHOLOGIST

## 2022-09-12 PROCEDURE — 90792 PSYCH DIAG EVAL W/MED SRVCS: CPT | Mod: S$GLB,,, | Performed by: PSYCHOLOGIST

## 2022-09-12 PROCEDURE — 3074F SYST BP LT 130 MM HG: CPT | Mod: CPTII,S$GLB,, | Performed by: PSYCHOLOGIST

## 2022-09-12 PROCEDURE — 3008F BODY MASS INDEX DOCD: CPT | Mod: CPTII,S$GLB,, | Performed by: PSYCHOLOGIST

## 2022-09-12 PROCEDURE — 3079F PR MOST RECENT DIASTOLIC BLOOD PRESSURE 80-89 MM HG: ICD-10-PCS | Mod: CPTII,S$GLB,, | Performed by: PSYCHOLOGIST

## 2022-09-12 PROCEDURE — 99999 PR PBB SHADOW E&M-EST. PATIENT-LVL IV: CPT | Mod: PBBFAC,,, | Performed by: PSYCHOLOGIST

## 2022-09-12 RX ORDER — ALPRAZOLAM 0.25 MG/1
0.25 TABLET ORAL DAILY PRN
Qty: 15 TABLET | Refills: 0 | Status: SHIPPED | OUTPATIENT
Start: 2022-09-12 | End: 2023-06-19 | Stop reason: SDUPTHER

## 2022-09-12 NOTE — PROGRESS NOTES
"Outpatient Psychiatric Initial Visit  09/12/2022     ID:   Patient presents with mother for an initial evaluation.      Reason for encounter: Referral from Dr. Pierre, Dr. Vyas     Chief Complaint: anxiety, ADHD, ASD    History of Presenting Illness: Pt is a 28 y/o male presenting with concerns of anxiety, ADHD, and Autism Spectrum Disorder. Pt was seen by Dr. Vyas who recently left the practice (see chart for review). Pt reported some current difficulty with sleep as gets up frequently to use the bathroom. Pt expressed health related concerns (I.e., IBS, kidney stones about 1 per month?, arthritis, and joint pains). Pt reported that he is engaged with urology, and rheumatology. Pt stated that he is working with specialists to reduce polypharmacy and watching his diet. Pt reported that current medication plan is working well.    Depression symptoms: Pt denied     Anxiety symptoms: Nonproductive worry, concern about judgement, muscle tension Pt denied symptoms consistent with OCD, panic, phobias.    Rhonda/Hypomania Symptoms: Pt denied current or history of related symptoms.    Psychosis Symptoms: Pt denied current or history of related symptoms.    Attention/Concentration Symptoms: Pt dx and treated for ADHD (see chart review).    Disordered Eating/Body Image Concerns: Pt denied current or history of related symptoms.    Suicidal Ideation and Risk: Pt denied current or history of related symptoms.    Homicidal/Violent Ideation and Risk: Pt denied current or history of related symptoms.    Criminal History: Pt denied.     PPHx: Denies h/o self injury, inpt psych hospitalization, denies h/o suicide attempt      Current psychiatric medication: Prozac 40mg po qam, Adderall 10 mg pt qam (PRN about once a month), alprazolam 0.25 mg PRN (cut in half - very infrequently)    Past Psych Meds: adderall ("robot child"), focalin ("zombie"), strattera (angry), zoloft (as a child- ineffective), lexapro (ineffective), topomax " "("migraines"), celexa (overly sedating but effective), remeron (ineffective- wgt gain), atarax, elavil (ineffective for pain), ritalin (inc'd bp)      PMHx: IBS-combined type, IC    Prior Psychiatric Treatment/Hospitalizations: Pt currently in therapy with Nieves Elizabeth at [facility]. Psychiatry with Dr. Vyas    Current Medical Conditions Per Chart Review:   Patient Active Problem List   Diagnosis    Foot joint pain    Recurrent dislocation of foot    Gastrocnemius equinus    Coalition, calcaneus navicular    Other specified disorders of urethra    Social anxiety disorder    YUAN (generalized anxiety disorder)    History of IBS    Elevated uric acid in blood    Bladder pain    IC (interstitial cystitis)    Dysuria    Bladder spasm    Frequency    Kidney stone    Autism spectrum disorder requiring substantial support (level 2)    Abnormal thyroid function test    Palpitations    RLQ abdominal pain    Psoriatic arthritis      Family Psychiatric History:  Pt denied    Alcohol Use: Pt denied current alcohol use and denied a history of problematic drinking.    Tobacco and Drug Use: Pt denied tobacco or drug use.      Trauma history:  Pt denied     Mental Status Exam      Physical Exam  Constitutional:       Appearance: Normal appearance. He is normal weight.   Neurological:      Mental Status: He is alert.   Psychiatric:         Attention and Perception: Perception normal. He is inattentive.         Mood and Affect: Affect normal. Mood is anxious.         Speech: Speech normal.         Behavior: Behavior normal. Behavior is cooperative.         Thought Content: Thought content normal.         Cognition and Memory: Cognition and memory normal.        Current Evaluation:  Nutritional Screening:  Considering the patient's height and weight, medications, medical history and preferences, should a referral be made to the dietitian? No  Vitals: most recent vitals signs, dated greater than 90 days prior to this appointment, were " "reviewed  General: age appropriate, well nourished, casually dressed, neatly groomed  MSK: muscle strength/tone : no tremor or abnormal movements. Gait/Station: no ataxic, steady    Clinical Assessment : Pt is a 28 y/o male who is transferring med management services as his previous provider has left the practice. Pt's anxiety and mood appear to be managed well with current medication plan. Will continue and monitor sxs moving forward.     Summary     Diagnosis(es):   1) Social Anxiety Disorder  2) Generalized Anxiety Disorder  3) Attention Deficit Hyperactivity Disorder  4) Autism Spectrum Disorder (Level "requiring substantial support")    Plan      Goal #1: Maintain stable mood and attention    Pt is to continue with medication plan from previous provider: Prozac 40mg po qam, Adderall 10 mg pt qam (PRN about once a month), alprazolam 0.25 mg PRN (cut in half - very infrequently).    Treatment plan and medication changes will be coordinated and consulted with PCP, Dr Pierre on 9/12 new med/med change. All general medical concerns will be managed by the PCP.     This author reviewed limits to confidentiality and this author's collaboration with pt's physician. Pt indicated understanding and denied any questions.    Return to Clinic:    -Call to report any worsening of symptoms or problems associated with medication  - Pt instructed to go to ER if thoughts of harming self or others arise     -Supportive therapy and psychoeducation provided  -R/B/SE's of medications discussed with the pt who expresses understanding and chooses to take medications as prescribed.   -Pt instructed to call clinic, 911 or go to nearest emergency room if sxs worsen or pt is in   crisis. The pt expresses understanding.    Antidepressant/Antianxiety Medication Initiation:  Patient informed of risks, benefits, and potential side effects of medication and accepts informed consent.  Common side effects include nausea, fatigue, headache, " insomnia., Specifically discussed the possibility of new or worsening suicidal thoughts/depression.  Patient instructed to stop the medication immediately and seek urgent treatment if this occurs. Patient instructed not to abruptly discontinue medication without physician guidance except in cases of sudden onset or worsening of SI.       Stimulant Medication Initiation:  Patient advised of risks, benefits, and side effects of medication and accepts informed consent.  Common side effects include insomnia, irritability, jittery feeling, dry mouth, and agitation/hostility., Patient advised of potential addictive nature of medication and controlled substance classification.  Instructed to safeguard medication as no early refills can be given for lost or stolen medications.       Benzodiazepine Initiation:  Patient advised of the risks, benefits, and common side effects of medication and has accepted informed consent.  Common side effects include drowsiness, impaired coordination, possible memory loss. Patient advised NOT to operate a vehicle or machinery until they are sure how the medication will affect them.  Client also advised of danger of mixing this medication with alcohol. Patient advised of potential addictive nature of medication and need to safeguard medication as no early refills for lost or stolen medications can be authorized.

## 2022-09-15 NOTE — TELEPHONE ENCOUNTER
No new care gaps identified.  [facility] Embedded Care Gaps. Reference number: [id number]. 9/15/2022   9:31:28 AM NORMAT

## 2022-09-15 NOTE — TELEPHONE ENCOUNTER
Refill Routing Note   Medication(s) are not appropriate for processing by [facility] for the following reason(s):      - Medication is a new start (<3 months)    [facility] action(s):  Defer          Medication reconciliation completed: No     Appointments  past 12m or future 3m with PCP    Date Provider   Last Visit   8/22/2022 China Pierre MD   Next Visit   9/26/2022 China Pierre MD   ED visits in past 90 days: 0        Note composed:12:01 PM 09/15/2022

## 2022-09-17 RX ORDER — METOPROLOL SUCCINATE 50 MG/1
50 TABLET, EXTENDED RELEASE ORAL 2 TIMES DAILY
Qty: 180 TABLET | Refills: 1 | Status: SHIPPED | OUTPATIENT
Start: 2022-09-17 | End: 2023-03-22 | Stop reason: SDUPTHER

## 2022-09-18 NOTE — PROGRESS NOTES
Subjective:       Patient ID: Jm Chaparro is a 27 y.o. male.    Chief Complaint: Follow-up    HPI  The patient is a 27-year-old with autism who is here today with his mom.  In July, he had COVID and was quite sick with it.  He has finally recovered.    Today we discussed the followin)  Hypertension.  Since COVID, his blood pressures had been high.  His highest blood pressure was 146/106.  Mom wonders if his blood pressure has been high because of the weight gain during COVID as he was very inactive because of how sick he was.  They have been adjusting his Lopressor for his high blood pressure.  We did discussed changing to Toprol which they would be willing to consider  2) psoriasis and psoriatic arthritis.  He does continue to follow with his rheumatologist regularly.  He is currently taking Otezla      Review of Systems   Constitutional:  Negative for appetite change, chills, diaphoresis, fatigue, fever and unexpected weight change.   HENT:  Negative for congestion, dental problem, ear pain, postnasal drip, rhinorrhea, sinus pressure, sneezing, sore throat and trouble swallowing.    Eyes:  Negative for photophobia, pain, discharge and visual disturbance.   Respiratory:  Negative for cough, chest tightness, shortness of breath and wheezing.    Cardiovascular:  Negative for chest pain, palpitations and leg swelling.   Gastrointestinal:  Negative for abdominal distention, abdominal pain, blood in stool, constipation, diarrhea, nausea and vomiting.   Endocrine: Negative for polydipsia and polyuria.   Genitourinary:  Negative for dysuria, flank pain, frequency, genital sores, hematuria, penile discharge and testicular pain.   Musculoskeletal:  Negative for arthralgias, joint swelling and myalgias.   Skin:  Negative for rash.   Neurological:  Negative for dizziness, syncope, weakness, light-headedness and headaches.   Hematological:  Negative for adenopathy. Does not bruise/bleed easily.    Psychiatric/Behavioral:  Negative for dysphoric mood, self-injury, sleep disturbance and suicidal ideas. The patient is not nervous/anxious.      Objective:      Physical Exam  Constitutional:       General: He is not in acute distress.     Appearance: Normal appearance. He is well-developed.   HENT:      Head: Normocephalic and atraumatic.      Right Ear: Hearing, tympanic membrane, ear canal and external ear normal.      Left Ear: Hearing, tympanic membrane, ear canal and external ear normal.      Nose: Nose normal.      Mouth/Throat:      Mouth: No oral lesions.      Pharynx: No oropharyngeal exudate or posterior oropharyngeal erythema.   Eyes:      General: Lids are normal. No scleral icterus.     Extraocular Movements: Extraocular movements intact.      Conjunctiva/sclera: Conjunctivae normal.      Pupils: Pupils are equal, round, and reactive to light.   Neck:      Thyroid: No thyroid mass or thyromegaly.      Vascular: No carotid bruit.   Cardiovascular:      Rate and Rhythm: Normal rate and regular rhythm. No extrasystoles are present.     Chest Wall: PMI is not displaced.      Heart sounds: Normal heart sounds. No murmur heard.    No gallop.   Pulmonary:      Effort: Pulmonary effort is normal. No accessory muscle usage or respiratory distress.      Breath sounds: Normal breath sounds.   Abdominal:      General: Bowel sounds are normal. There is no abdominal bruit.      Palpations: Abdomen is soft.      Tenderness: There is no abdominal tenderness. There is no rebound.   Musculoskeletal:      Cervical back: Normal range of motion and neck supple.   Lymphadenopathy:      Head:      Right side of head: No submental or submandibular adenopathy.      Left side of head: No submental or submandibular adenopathy.      Cervical:      Right cervical: No superficial, deep or posterior cervical adenopathy.     Left cervical: No superficial, deep or posterior cervical adenopathy.      Upper Body:      Right upper  "body: No supraclavicular adenopathy.      Left upper body: No supraclavicular adenopathy.   Skin:     General: Skin is warm and dry.   Neurological:      Mental Status: He is alert and oriented to person, place, and time.      Cranial Nerves: No cranial nerve deficit.      Sensory: No sensory deficit.   Psychiatric:         Attention and Perception: Attention and perception normal.         Mood and Affect: Mood is anxious. Affect is flat.         Speech: Speech is delayed.         Behavior: Behavior is slowed. Behavior is cooperative.         Thought Content: Thought content normal.     Blood pressure 118/78, pulse 65, temperature 98.2 °F (36.8 °C), resp. rate 20, height 6' 1.2" (1.859 m), weight 107.3 kg (236 lb 8.9 oz), SpO2 98 %.Body mass index is 31.04 kg/m².            A/P:  1)  Hypertension.  Intermittently controlled.  We are going to stop the Lopressor.  We are going to change to Toprol.  We will start with Toprol XL 50 mg once a day and then changed that to twice a day.  If he has any trouble tolerating this or if his blood pressures are consistently higher than 135/85, they will let me know  2) psoriasis and psoriatic arthritis.  Well controlled.  Follow-up with Rheumatology and continue with Otezla under their direction  3) health maintenance issues.  We will check fasting labs today    "

## 2022-09-21 ENCOUNTER — OFFICE VISIT (OUTPATIENT)
Dept: PAIN MEDICINE | Facility: CLINIC | Age: 27
End: 2022-09-21
Payer: COMMERCIAL

## 2022-09-21 VITALS
DIASTOLIC BLOOD PRESSURE: 78 MMHG | HEART RATE: 63 BPM | WEIGHT: 234.25 LBS | HEIGHT: 73 IN | BODY MASS INDEX: 31.04 KG/M2 | SYSTOLIC BLOOD PRESSURE: 120 MMHG | OXYGEN SATURATION: 98 %

## 2022-09-21 DIAGNOSIS — R39.89 BLADDER PAIN: ICD-10-CM

## 2022-09-21 DIAGNOSIS — N30.10 IC (INTERSTITIAL CYSTITIS): Primary | ICD-10-CM

## 2022-09-21 DIAGNOSIS — M06.9 RHEUMATOID ARTHRITIS INVOLVING MULTIPLE SITES, UNSPECIFIED WHETHER RHEUMATOID FACTOR PRESENT: ICD-10-CM

## 2022-09-21 DIAGNOSIS — K58.2 IRRITABLE BOWEL SYNDROME WITH BOTH CONSTIPATION AND DIARRHEA: ICD-10-CM

## 2022-09-21 PROCEDURE — 3044F PR MOST RECENT HEMOGLOBIN A1C LEVEL <7.0%: ICD-10-PCS | Mod: CPTII,S$GLB,, | Performed by: PHYSICIAN ASSISTANT

## 2022-09-21 PROCEDURE — 3078F DIAST BP <80 MM HG: CPT | Mod: CPTII,S$GLB,, | Performed by: PHYSICIAN ASSISTANT

## 2022-09-21 PROCEDURE — 3074F SYST BP LT 130 MM HG: CPT | Mod: CPTII,S$GLB,, | Performed by: PHYSICIAN ASSISTANT

## 2022-09-21 PROCEDURE — 99214 PR OFFICE/OUTPT VISIT, EST, LEVL IV, 30-39 MIN: ICD-10-PCS | Mod: S$GLB,,, | Performed by: PHYSICIAN ASSISTANT

## 2022-09-21 PROCEDURE — 99214 OFFICE O/P EST MOD 30 MIN: CPT | Mod: S$GLB,,, | Performed by: PHYSICIAN ASSISTANT

## 2022-09-21 PROCEDURE — 3044F HG A1C LEVEL LT 7.0%: CPT | Mod: CPTII,S$GLB,, | Performed by: PHYSICIAN ASSISTANT

## 2022-09-21 PROCEDURE — 3008F BODY MASS INDEX DOCD: CPT | Mod: CPTII,S$GLB,, | Performed by: PHYSICIAN ASSISTANT

## 2022-09-21 PROCEDURE — 99999 PR PBB SHADOW E&M-EST. PATIENT-LVL IV: ICD-10-PCS | Mod: PBBFAC,,, | Performed by: PHYSICIAN ASSISTANT

## 2022-09-21 PROCEDURE — 3078F PR MOST RECENT DIASTOLIC BLOOD PRESSURE < 80 MM HG: ICD-10-PCS | Mod: CPTII,S$GLB,, | Performed by: PHYSICIAN ASSISTANT

## 2022-09-21 PROCEDURE — 1159F MED LIST DOCD IN RCRD: CPT | Mod: CPTII,S$GLB,, | Performed by: PHYSICIAN ASSISTANT

## 2022-09-21 PROCEDURE — 99999 PR PBB SHADOW E&M-EST. PATIENT-LVL IV: CPT | Mod: PBBFAC,,, | Performed by: PHYSICIAN ASSISTANT

## 2022-09-21 PROCEDURE — 1159F PR MEDICATION LIST DOCUMENTED IN MEDICAL RECORD: ICD-10-PCS | Mod: CPTII,S$GLB,, | Performed by: PHYSICIAN ASSISTANT

## 2022-09-21 PROCEDURE — 3008F PR BODY MASS INDEX (BMI) DOCUMENTED: ICD-10-PCS | Mod: CPTII,S$GLB,, | Performed by: PHYSICIAN ASSISTANT

## 2022-09-21 PROCEDURE — 1160F PR REVIEW ALL MEDS BY PRESCRIBER/CLIN PHARMACIST DOCUMENTED: ICD-10-PCS | Mod: CPTII,S$GLB,, | Performed by: PHYSICIAN ASSISTANT

## 2022-09-21 PROCEDURE — 3074F PR MOST RECENT SYSTOLIC BLOOD PRESSURE < 130 MM HG: ICD-10-PCS | Mod: CPTII,S$GLB,, | Performed by: PHYSICIAN ASSISTANT

## 2022-09-21 PROCEDURE — 1160F RVW MEDS BY RX/DR IN RCRD: CPT | Mod: CPTII,S$GLB,, | Performed by: PHYSICIAN ASSISTANT

## 2022-09-21 RX ORDER — TRAMADOL HYDROCHLORIDE 50 MG/1
TABLET ORAL
Qty: 55 TABLET | Refills: 2 | Status: SHIPPED | OUTPATIENT
Start: 2022-09-21 | End: 2022-12-19

## 2022-09-21 RX ORDER — RIFAXIMIN 550 MG/1
550 TABLET ORAL 3 TIMES DAILY
COMMUNITY
Start: 2022-09-15 | End: 2023-01-16

## 2022-09-21 NOTE — PROGRESS NOTES
This note was completed with dictation software and grammatical errors may exist.    Chief Complaint   Patient presents with    Joint Pain     C/o joint pain all over        HPI: Jm Chaparro is a 27 y.o. year old male patient who has a past medical history of Abnormal thyroid function test, ADD (attention deficit disorder), Anxiety disorder, Asperger's disorder, Deformity of both feet, Dyscalculia, Dysgraphia, Dyslexia, Eczema, Fatty liver, GERD (gastroesophageal reflux disease), History of migraine headaches, Hypertension, Interstitial cystitis (chronic), Irritable bowel syndrome, Nephrolithiasis, PONV (postoperative nausea and vomiting), Psoriasis, Psoriatic arthritis, Refusal of blood transfusion for reasons of conscience, Seasonal allergies, and Special needs assessment. He presents in referral from No ref. provider found for abdominal and bladder pain.  He returns in follow-up today with continued abdominal and bladder pain.  He is seeing a new gastroenterologist, Dr. Long who has been making some changes.  Otherwise, his pain is usually tolerable with tramadol.  However, if he is passing stones which can occur several times a month his mother does feel that he needs a whole tramadol in the morning instead of 1/2 tablet.  He continues to take a whole tablet in the evenings with relief as well.      Previous history:  The patient's mother was present with him at the visit today who also helps with most of the history.  She reports that in March, 2015 she had gone out of town on a business trip and her son accompanying her.  Unfortunately he came down with cold and virus symptoms at the time and began developing severe abdominal pain.  She went to the emergency department at that time in [place], they were told he was having viral symptoms.  She returned to the [facility] and had followed up with several other physicians that year.  She reports that initially he was having intermittent pain on  and off, would have pain for a week and then it would disappear without cause.  He then developed diarrhea and constipation at times, he was seen at [facility] in [place] in 2015 and diagnosed with IBS with constipation and diarrhea.  They recommend an antidepressant that he tried, no relief with this.  Shortly after he began having renal calculi as well and needed to present to the emergency department.  He had seen neurology, Dr. Larry and reports that he required stone removal, does not sound like he has had lithotripsy.  He has seen Dr. Dickson, and now Nephrology, Dr. Enriquez.  He has seen Gastroenterology, Dr. Shaver and had undergone colonoscopy with no significant findings.  He has been tried on multiple medications over time as discussed below without any benefit.  He continues to have frequent abdominal pain that is often worse with caffeine or other foods, worse with activity.  He reports that he gets some relief with a bowel movement.  He still has been passing small sand like renal calculi, they report that his urine turns dark when he is about to pass a stone and he often has pain throughout the bladder and urethra after passing a stone.    He has a history of rheumatoid arthritis, has been seeing a rheumatologist, reports pain throughout his entire spine, bilateral hips, shoulders, knees, feet.  He does well with being in a bath with hot water.    Pain intervention history:  No injections    Spine surgeries:  None    Antineuropathics: Gabapentin 300mg three times daily, was given this for inflammatory joint pain??  NSAIDs: Mobic 15, no benefit  Physical therapy:  Has done some physical therapy, water therapy in the past with some improvement but things worsened when he did land-based therapy and stretching  Antidepressants: Fluoxetine 40mg  Muscle relaxers:  Opioids:  The patient reports having benefit from tramadol in the past  Antiplatelets/Anticoagulants:  From Dr. Vyas note:  Prozac 40mg po  "qam, adderall xr 20mg po qam (very rare use), lunesta 2mg po qhs PRN insomnia  Past Psych Meds: adderall ("robot child"), focalin ("zombie"), strattera (angry), zoloft (as a child- ineffective), lexapro (ineffective) topomax ("migraines"), celexa (overly sedating but effective), remeron (ineffective- wgt gain), atarax, elavil (ineffective for pain), ritalin (inc'd bp)     ROS:  He reports fatigue, weight gain, itching, color change, headaches head trauma, ear pain, constipation, diarrhea, stomach pain, nausea, flank pain, urinary urgency and frequency, painful urination, joint stiffness, joint swelling, back pain, difficulty sleeping and anxiety.  Balance of review of systems is negative.    Lab Results   Component Value Date    HGBA1C 5.2 08/29/2022       Lab Results   Component Value Date    WBC 10.24 08/29/2022    HGB 15.5 08/29/2022    HCT 44.0 08/29/2022    MCV 94 08/29/2022     08/29/2022             Past Medical History:   Diagnosis Date    Abnormal thyroid function test 09/11/2017    ADD (attention deficit disorder)     Anxiety disorder     Asperger's disorder     (AUTISM)    Deformity of both feet     Dyscalculia     Dysgraphia     Dyslexia     Eczema     Fatty liver     GERD (gastroesophageal reflux disease)     History of migraine headaches     Hypertension     Interstitial cystitis (chronic)     Irritable bowel syndrome     Nephrolithiasis     2014    PONV (postoperative nausea and vomiting)     Psoriasis     Psoriatic arthritis     Refusal of blood transfusion for reasons of conscience     Seasonal allergies     Special needs assessment        Past Surgical History:   Procedure Laterality Date    BIOPSY OF BLADDER  1/3/2022    Procedure: BIOPSY, BLADDER;  Surgeon: Derek Dickson MD;  Location: [facility] OR;  Service: Urology;;    COLONOSCOPY  ~2015    [facility]; told IBS    COLONOSCOPY  08/10/2015    Dr. Shaver; sent for scanning: unremarkable findings, no specimens collected    COLONOSCOPY N/A " 2/18/2019    Procedure: COLONOSCOPY;  Surgeon: Puma Preston Jr., MD;  Location: [facility];  Service: Endoscopy;  Laterality: N/A;    UPPER GASTROINTESTINAL ENDOSCOPY  05/14/2012    Dr. Preston    UPPER GASTROINTESTINAL ENDOSCOPY  07/17/2015    Dr. Shaver, sent for scanning: normal esophagus- dilated & biopsied, findings WNL, biopsies taken from z-line, stomach and duodenum; biopsy: duodenum WNL, antrum reactive gastropathy, negative for h pylori or int. metaplasia, GEJ WNL, negative for EOE & cotton's esophagus    ureteral stone extraction      basket extraction       Social History     Socioeconomic History    Marital status: Single   Tobacco Use    Smoking status: Never    Smokeless tobacco: Never   Substance and Sexual Activity    Alcohol use: No    Drug use: No    Sexual activity: Never     Social Determinants of Health     Financial Resource Strain: Unknown    Difficulty of Paying Living Expenses: Patient refused   Food Insecurity: No Food Insecurity    Worried About Running Out of Food in the Last Year: Never true    Ran Out of Food in the Last Year: Never true   Transportation Needs: No Transportation Needs    Lack of Transportation (Medical): No    Lack of Transportation (Non-Medical): No   Physical Activity: Inactive    Days of Exercise per Week: 0 days    Minutes of Exercise per Session: 0 min   Stress: Stress Concern Present    Feeling of Stress : To some extent   Social Connections: Unknown    Frequency of Communication with Friends and Family: More than three times a week    Frequency of Social Gatherings with Friends and Family: Once a week    Active Member of Clubs or Organizations: No    Attends Club or Organization Meetings: Patient refused    Marital Status: Never    Housing Stability: Low Risk     Unable to Pay for Housing in the Last Year: No    Number of Places Lived in the Last Year: 1    Unstable Housing in the Last Year: No         Medications/Allergies: See med card    Vitals:     "22 0805   BP: 120/78   Pulse: 63   SpO2: 98%   Weight: 106.3 kg (234 lb 3.8 oz)   Height: 6' 1.2" (1.859 m)   PainSc:   6   PainLoc: Generalized     Body mass index is 30.74 kg/m².    Physical exam:  Gen: A and O x3, pleasant, well-groomed  Skin: No rashes or obvious lesions  HEENT: PERRLA, no obvious deformities on ears or in canals. Trachea midline.  CVS: Regular rate and rhythm, normal palpable pulses.  Resp:No increased work of breathing, symmetrical chest rise.  Abdomen: Soft, NT/ND.  Musculoskeletal:  Moving all extremities equally      Imagin22 CT renal protocol:  Liver normal enhancement with no focal lesion.  Gallbladder, pancreas, stomach, spleen, adrenal glands normal.  Kidneys normal size and contour with no nephrolithiasis, hydronephrosis, or ureteral obstruction.  Aorta tapers normally.  No bowel obstruction, ascites, inflammatory change.  Appendix normal.  Bladder and rectum normal.  No significant bladder wall thickening evident.  Bones are intact.  Lung bases clear.    Assessment:  Jm Chaparro is a 27 y.o. year old male patient who has a past medical history of Abnormal thyroid function test, ADD (attention deficit disorder), Anxiety disorder, Asperger's disorder, Deformity of both feet, Dyscalculia, Dysgraphia, Dyslexia, Eczema, Fatty liver, GERD (gastroesophageal reflux disease), History of migraine headaches, Hypertension, Interstitial cystitis (chronic), Irritable bowel syndrome, Nephrolithiasis, PONV (postoperative nausea and vomiting), Psoriasis, Psoriatic arthritis, Refusal of blood transfusion for reasons of conscience, Seasonal allergies, and Special needs assessment. He presents in referral from Dr. Derek Dickson for bladder pain.    1. IC (interstitial cystitis)        2. Bladder pain        3. Rheumatoid arthritis involving multiple sites, unspecified whether rheumatoid factor present        4. Irritable bowel syndrome with both constipation and " diarrhea            Plan:  1. I reviewed the patient's case with Dr. Gonzalez and he will increase his tramadol prescription from 45 tablets per month to 55 tablets per month so that he can occasionally take a whole tablet in the morning when he is passing stones instead of 1/2 tablet and he will continue to take 50 mg at night.  I have reviewed the [place] Board of Pharmacy website and there are no abberancies.    2. Follow-up in 3 months or sooner as needed.

## 2022-09-21 NOTE — TELEPHONE ENCOUNTER
Please send Rx as discussed.    I have reviewed the [place] Board of Pharmacy website and there are no abberancies.

## 2022-09-26 ENCOUNTER — TELEPHONE (OUTPATIENT)
Dept: FAMILY MEDICINE | Facility: CLINIC | Age: 27
End: 2022-09-26

## 2022-09-26 ENCOUNTER — OFFICE VISIT (OUTPATIENT)
Dept: FAMILY MEDICINE | Facility: CLINIC | Age: 27
End: 2022-09-26
Payer: COMMERCIAL

## 2022-09-26 ENCOUNTER — PATIENT MESSAGE (OUTPATIENT)
Dept: FAMILY MEDICINE | Facility: CLINIC | Age: 27
End: 2022-09-26

## 2022-09-26 DIAGNOSIS — I10 HYPERTENSION, ESSENTIAL: Primary | ICD-10-CM

## 2022-09-26 DIAGNOSIS — E78.5 HYPERLIPIDEMIA, UNSPECIFIED HYPERLIPIDEMIA TYPE: ICD-10-CM

## 2022-09-26 DIAGNOSIS — N30.10 IC (INTERSTITIAL CYSTITIS): ICD-10-CM

## 2022-09-26 PROCEDURE — 1160F PR REVIEW ALL MEDS BY PRESCRIBER/CLIN PHARMACIST DOCUMENTED: ICD-10-PCS | Mod: CPTII,95,, | Performed by: FAMILY MEDICINE

## 2022-09-26 PROCEDURE — 3078F DIAST BP <80 MM HG: CPT | Mod: CPTII,95,, | Performed by: FAMILY MEDICINE

## 2022-09-26 PROCEDURE — 3044F PR MOST RECENT HEMOGLOBIN A1C LEVEL <7.0%: ICD-10-PCS | Mod: CPTII,95,, | Performed by: FAMILY MEDICINE

## 2022-09-26 PROCEDURE — 3074F PR MOST RECENT SYSTOLIC BLOOD PRESSURE < 130 MM HG: ICD-10-PCS | Mod: CPTII,95,, | Performed by: FAMILY MEDICINE

## 2022-09-26 PROCEDURE — 1159F MED LIST DOCD IN RCRD: CPT | Mod: CPTII,95,, | Performed by: FAMILY MEDICINE

## 2022-09-26 PROCEDURE — 99213 OFFICE O/P EST LOW 20 MIN: CPT | Mod: 95,,, | Performed by: FAMILY MEDICINE

## 2022-09-26 PROCEDURE — 3078F PR MOST RECENT DIASTOLIC BLOOD PRESSURE < 80 MM HG: ICD-10-PCS | Mod: CPTII,95,, | Performed by: FAMILY MEDICINE

## 2022-09-26 PROCEDURE — 99213 PR OFFICE/OUTPT VISIT, EST, LEVL III, 20-29 MIN: ICD-10-PCS | Mod: 95,,, | Performed by: FAMILY MEDICINE

## 2022-09-26 PROCEDURE — 3044F HG A1C LEVEL LT 7.0%: CPT | Mod: CPTII,95,, | Performed by: FAMILY MEDICINE

## 2022-09-26 PROCEDURE — 3074F SYST BP LT 130 MM HG: CPT | Mod: CPTII,95,, | Performed by: FAMILY MEDICINE

## 2022-09-26 PROCEDURE — 1159F PR MEDICATION LIST DOCUMENTED IN MEDICAL RECORD: ICD-10-PCS | Mod: CPTII,95,, | Performed by: FAMILY MEDICINE

## 2022-09-26 PROCEDURE — 1160F RVW MEDS BY RX/DR IN RCRD: CPT | Mod: CPTII,95,, | Performed by: FAMILY MEDICINE

## 2022-09-26 NOTE — TELEPHONE ENCOUNTER
----- Message from Laurence Layton sent at 9/26/2022  1:08 PM CDT -----  Contact: mom  Type:  Patient Call          Who Called:Patient mom       Does the patient know what this is regarding?: requesting a virtual appt today instead of the in person appt ;please advise           Would the patient rather a call back or a response via [facility]? Call           Best Call Back Number:[phone]             Additional Information:

## 2022-10-02 VITALS — DIASTOLIC BLOOD PRESSURE: 78 MMHG | SYSTOLIC BLOOD PRESSURE: 122 MMHG

## 2022-10-03 NOTE — PROGRESS NOTES
Subjective:       Patient ID: Jm Chaparro is a 27 y.o. male.    Chief Complaint: Follow-up    HPI  The patient is a 27-year-old who is here today virtually for follow-up.  He recently participated in a community walk which was a good event for him and his mom and they plan to do more of this    Today we discussed the followin)  Hypertension.  He is currently taking Toprol XL 50 mg twice a day.  His most recent blood pressure is 122/78.  His blood pressure has been consistently good since the adjustment in his Toprol  2) Asperger's with ADD and anxiety.  He is now seeing Dr. Camara for med management and continues with his therapist Nieves at [facility].  He has adjusted well to Dr. Camara.  He is currently on the Prozac and Adderall    3)  IBS.  He has recently been working with Dr. Long.  He is currently on Xifaxan and a no fructose diet.  There are hoping to see improvement with these interventions  4) IC.  He continues to be bothered by IC.  He will also occasionally past tiny grains of sand and is doing so today which is why he wanted to convert to a virtual visit.  5)  Hyperlipidemia.  His recent labs showed a total cholesterol of 288 and LDL of 199.  He was started on Lipitor.  He is tolerating the Lipitor well so far    He does feel recovered from his COVID    Review of Systems   Constitutional:  Negative for activity change, appetite change, chills, diaphoresis, fatigue, fever and unexpected weight change.   HENT:  Negative for congestion, ear pain, hearing loss, postnasal drip, rhinorrhea, sinus pressure, sneezing, sore throat and trouble swallowing.    Eyes:  Negative for pain, discharge and visual disturbance.   Respiratory:  Negative for cough, chest tightness, shortness of breath and wheezing.    Cardiovascular:  Negative for chest pain, palpitations and leg swelling.   Gastrointestinal:  Negative for abdominal distention, abdominal pain, blood in stool, constipation, diarrhea, nausea  and vomiting.   Endocrine: Negative for polydipsia and polyuria.   Genitourinary:  Positive for dysuria. Negative for difficulty urinating, hematuria and urgency.   Musculoskeletal:  Negative for arthralgias, joint swelling and neck pain.   Skin:  Negative for rash.   Neurological:  Negative for weakness and headaches.   Psychiatric/Behavioral:  Negative for confusion and dysphoric mood.        Objective:      Physical Exam  Constitutional:       General: He is not in acute distress.     Appearance: Normal appearance.   Neurological:      Mental Status: He is alert.   Psychiatric:         Attention and Perception: Attention and perception normal.         Mood and Affect: Mood and affect normal.         Speech: Speech normal.         Behavior: Behavior normal. Behavior is cooperative.         Thought Content: Thought content normal.         Cognition and Memory: Cognition and memory normal.         Judgment: Judgment normal.     Blood pressure 122/78.There is no height or weight on file to calculate BMI.      14      A/P:  1)  Hypertension.  Well controlled.  Continue with Toprol.  If his blood pressure readings at home are consistently higher than 135/85, they will let me know    2) Asperger's with ADD and anxiety.  Stable.  Continue with mental health providers and continue with Prozac and Adderall  3) IBS.  Persistent.  Follow-up with GI and continue with Xifaxan and dietary restrictions under their direction     4) IC.  Persistent.  We will schedule him back with Urology for follow-up   5) hyperlipidemia.  Uncontrolled.  We will see how he does with the Lipitor.  We will check labs again later this year      As long as he does well, I will see him back in 6 months or sooner if needed      The patient location is: home  The chief complaint leading to consultation is Follow-up     Visit type: Virtual visit with synchronous audio and video    Each patient to whom he or she provides medical services by telemedicine  is:  (1) informed of the relationship between the physician and patient and the respective role of any other health care provider with respect to management of the patient; and (2) notified that he or she may decline to receive medical services by telemedicine and may withdraw from such care at any time.    I spent 14 minutes on this encounter.  This time includes face-to-face time and non-face to face time including previsit chart review, obtaining and/or reviewing separately obtained history, documenting clinical information in the electronic or other health record, independently interpreting results and communicating results to the patient/family/caregiver, or care coordinator.

## 2022-10-05 ENCOUNTER — TELEPHONE (OUTPATIENT)
Dept: FAMILY MEDICINE | Facility: CLINIC | Age: 27
End: 2022-10-05
Payer: COMMERCIAL

## 2022-10-05 NOTE — TELEPHONE ENCOUNTER
----- Message from China Pierre MD sent at 10/2/2022  7:32 PM CDT -----  I mistakenly wrote to check labs in 6 months but it was supposed to be 3 months (early dec).  Can you change this?  Did we also get him in with urology?  He saw dr mcintyre but needs to see someone new since he's retired.  Thank you!

## 2022-10-25 ENCOUNTER — PATIENT MESSAGE (OUTPATIENT)
Dept: UROLOGY | Facility: CLINIC | Age: 27
End: 2022-10-25

## 2022-10-25 ENCOUNTER — OFFICE VISIT (OUTPATIENT)
Dept: UROLOGY | Facility: CLINIC | Age: 27
End: 2022-10-25
Payer: COMMERCIAL

## 2022-10-25 VITALS — HEIGHT: 73 IN | BODY MASS INDEX: 30.88 KG/M2 | WEIGHT: 233 LBS

## 2022-10-25 DIAGNOSIS — R10.2 CHRONIC PELVIC PAIN IN MALE: Primary | ICD-10-CM

## 2022-10-25 DIAGNOSIS — G89.29 CHRONIC PELVIC PAIN IN MALE: Primary | ICD-10-CM

## 2022-10-25 DIAGNOSIS — R39.82 CHRONIC BLADDER PAIN: ICD-10-CM

## 2022-10-25 LAB
BILIRUBIN, UA POC OHS: ABNORMAL
BLOOD, UA POC OHS: ABNORMAL
CLARITY, UA POC OHS: CLEAR
COLOR, UA POC OHS: YELLOW
GLUCOSE, UA POC OHS: NEGATIVE
KETONES, UA POC OHS: NEGATIVE
LEUKOCYTES, UA POC OHS: NEGATIVE
NITRITE, UA POC OHS: NEGATIVE
PH, UA POC OHS: 5.5
PROTEIN, UA POC OHS: 30
SPECIFIC GRAVITY, UA POC OHS: >=1.03
UROBILINOGEN, UA POC OHS: 0.2

## 2022-10-25 PROCEDURE — 1160F PR REVIEW ALL MEDS BY PRESCRIBER/CLIN PHARMACIST DOCUMENTED: ICD-10-PCS | Mod: CPTII,S$GLB,, | Performed by: STUDENT IN AN ORGANIZED HEALTH CARE EDUCATION/TRAINING PROGRAM

## 2022-10-25 PROCEDURE — 81003 URINALYSIS AUTO W/O SCOPE: CPT | Mod: QW,S$GLB,, | Performed by: STUDENT IN AN ORGANIZED HEALTH CARE EDUCATION/TRAINING PROGRAM

## 2022-10-25 PROCEDURE — 1159F PR MEDICATION LIST DOCUMENTED IN MEDICAL RECORD: ICD-10-PCS | Mod: CPTII,S$GLB,, | Performed by: STUDENT IN AN ORGANIZED HEALTH CARE EDUCATION/TRAINING PROGRAM

## 2022-10-25 PROCEDURE — 81003 POCT URINALYSIS(INSTRUMENT): ICD-10-PCS | Mod: QW,S$GLB,, | Performed by: STUDENT IN AN ORGANIZED HEALTH CARE EDUCATION/TRAINING PROGRAM

## 2022-10-25 PROCEDURE — 3044F PR MOST RECENT HEMOGLOBIN A1C LEVEL <7.0%: ICD-10-PCS | Mod: CPTII,S$GLB,, | Performed by: STUDENT IN AN ORGANIZED HEALTH CARE EDUCATION/TRAINING PROGRAM

## 2022-10-25 PROCEDURE — 1159F MED LIST DOCD IN RCRD: CPT | Mod: CPTII,S$GLB,, | Performed by: STUDENT IN AN ORGANIZED HEALTH CARE EDUCATION/TRAINING PROGRAM

## 2022-10-25 PROCEDURE — 99999 PR PBB SHADOW E&M-EST. PATIENT-LVL IV: CPT | Mod: PBBFAC,,, | Performed by: STUDENT IN AN ORGANIZED HEALTH CARE EDUCATION/TRAINING PROGRAM

## 2022-10-25 PROCEDURE — 3044F HG A1C LEVEL LT 7.0%: CPT | Mod: CPTII,S$GLB,, | Performed by: STUDENT IN AN ORGANIZED HEALTH CARE EDUCATION/TRAINING PROGRAM

## 2022-10-25 PROCEDURE — 1160F RVW MEDS BY RX/DR IN RCRD: CPT | Mod: CPTII,S$GLB,, | Performed by: STUDENT IN AN ORGANIZED HEALTH CARE EDUCATION/TRAINING PROGRAM

## 2022-10-25 PROCEDURE — 99214 PR OFFICE/OUTPT VISIT, EST, LEVL IV, 30-39 MIN: ICD-10-PCS | Mod: S$GLB,,, | Performed by: STUDENT IN AN ORGANIZED HEALTH CARE EDUCATION/TRAINING PROGRAM

## 2022-10-25 PROCEDURE — 99999 PR PBB SHADOW E&M-EST. PATIENT-LVL IV: ICD-10-PCS | Mod: PBBFAC,,, | Performed by: STUDENT IN AN ORGANIZED HEALTH CARE EDUCATION/TRAINING PROGRAM

## 2022-10-25 PROCEDURE — 99214 OFFICE O/P EST MOD 30 MIN: CPT | Mod: S$GLB,,, | Performed by: STUDENT IN AN ORGANIZED HEALTH CARE EDUCATION/TRAINING PROGRAM

## 2022-10-25 NOTE — PROGRESS NOTES
"[facility] Urology   Clinic Note    SUBJECTIVE:     Chief Complaint: Dysuria, Flank Pain, and Low-back Pain      History of Present Illness:  Jm Chaparro is a 27 y.o. male who presents to clinic for pelvic/suprapubic pain. He is established to our clinic and was last seen by Dr. Dickson. His mother is here who is helpful with the history.    He has autism spectrum disorder, IBS, and has been diagnosed with possible IC. His symptoms include episodic suprapubic pressure and pain.  He reports severe dysuria and razor blades" when urinating.  Episodes last a few days.  They happen about once a month.  He also reports urgency and frequency. He has been on elmiron but stopped after no benefit. He has been on Myrbetriq and ditropan without improvement. He was previously offered bladder instillations but could not tolerate catheterization in the office.  He has seen pain management.  During acute episodes he takes azo with minimal relief.  He was also prescribed tramadol by his pain management and is found minimal relief with this.    He also reports passing small stones and grain like sand. CTRSS from 1/2022 were independently reviewed and interpreted and shows no evidence of renal or ureteral stones, and no evidence of hydronephrosis. He also underwent a cystoscopy with Dr. Dickson 01/2022.  I reviewed the op notes, and a biopsy was taken from a possible glomerulation area.  Pathology showed nonspecific inflammation.    He has also had three 24 hour urinalyses for stones which have found no abnormalities per report.    Past medical, family, surgical and social history reviewed as documented in chart with pertinent positive medical, family, surgical and social history detailed in HPI.    A review systems was conducted with pertinent positive and negative findings documented in HPI.    OBJECTIVE:     Estimated body mass index is 30.72 kg/m² as calculated from the following:    Height as of this encounter: " "6' 1.02" (1.855 m).    Weight as of this encounter: 105.7 kg (233 lb).    Vital Signs (Most Recent)       Physical Exam  Vitals and nursing note reviewed.   Constitutional:       General: He is not in acute distress.     Appearance: Normal appearance. He is well-developed. He is not ill-appearing or toxic-appearing.   Pulmonary:      Effort: Pulmonary effort is normal. No accessory muscle usage or respiratory distress.   Neurological:      General: No focal deficit present.      Mental Status: He is alert and oriented to person, place, and time. Mental status is at baseline.   Psychiatric:         Mood and Affect: Mood normal.         Behavior: Behavior is cooperative.         Thought Content: Thought content normal.         Judgment: Judgment normal.       Lab Results   Component Value Date    BUN 15 08/29/2022    CREATININE 1.0 08/29/2022    WBC 10.24 08/29/2022    HGB 15.5 08/29/2022    HCT 44.0 08/29/2022     08/29/2022    AST 26 08/29/2022    ALT 41 08/29/2022    ALKPHOS 79 08/29/2022    ALBUMIN 4.1 08/29/2022    HGBA1C 5.2 08/29/2022        ASSESSMENT     1. Chronic pelvic pain in male    2. Chronic bladder pain      PLAN:     We had a long discussion about interstitial cystitis/bladder pain syndrome.  I provided them information as far as diet and options for medications and further therapies.  They feel that they have exhausted most of their efforts thus far, without much benefit. We discussed another trial of a medication, however without previous benefit, I do not recommend this.     I explained options including cystoscopy with hydrodistention which may provide temporary relief.  He also may be a candidate for sacral neuromodulation for both his urgency frequency as well as his bladder pain syndrome.  Information on InterStim was provided as well.  I discussed the testing and progression to implantable generator criteria.    Discussed that given the complexity of his case I recommend that he see " Dr. Trino Cain. Referral was placed.    Jasmeet Benito MD

## 2022-11-08 ENCOUNTER — PATIENT MESSAGE (OUTPATIENT)
Dept: PSYCHIATRY | Facility: CLINIC | Age: 27
End: 2022-11-08
Payer: COMMERCIAL

## 2022-11-09 ENCOUNTER — PATIENT MESSAGE (OUTPATIENT)
Dept: PSYCHIATRY | Facility: CLINIC | Age: 27
End: 2022-11-09
Payer: COMMERCIAL

## 2022-11-28 NOTE — TELEPHONE ENCOUNTER
No new care gaps identified.  [facility] Embedded Care Gaps. Reference number: [id number]. 11/28/2022   10:17:31 AM CST

## 2022-12-01 RX ORDER — ATORVASTATIN CALCIUM 20 MG/1
20 TABLET, FILM COATED ORAL DAILY
Qty: 90 TABLET | Refills: 0 | OUTPATIENT
Start: 2022-12-01

## 2022-12-05 ENCOUNTER — LAB VISIT (OUTPATIENT)
Dept: LAB | Facility: HOSPITAL | Age: 27
End: 2022-12-05
Attending: FAMILY MEDICINE
Payer: COMMERCIAL

## 2022-12-05 DIAGNOSIS — E78.5 HYPERLIPIDEMIA, UNSPECIFIED HYPERLIPIDEMIA TYPE: ICD-10-CM

## 2022-12-05 LAB
CHOLEST SERPL-MCNC: 155 MG/DL (ref 120–199)
CHOLEST/HDLC SERPL: 4.2 {RATIO} (ref 2–5)
HDLC SERPL-MCNC: 37 MG/DL (ref 40–75)
HDLC SERPL: 23.9 % (ref 20–50)
LDLC SERPL CALC-MCNC: 84.8 MG/DL (ref 63–159)
NONHDLC SERPL-MCNC: 118 MG/DL
TRIGL SERPL-MCNC: 166 MG/DL (ref 30–150)

## 2022-12-05 PROCEDURE — 36415 COLL VENOUS BLD VENIPUNCTURE: CPT | Mod: PN | Performed by: FAMILY MEDICINE

## 2022-12-05 PROCEDURE — 80061 LIPID PANEL: CPT | Performed by: FAMILY MEDICINE

## 2022-12-07 ENCOUNTER — PATIENT MESSAGE (OUTPATIENT)
Dept: FAMILY MEDICINE | Facility: CLINIC | Age: 27
End: 2022-12-07
Payer: COMMERCIAL

## 2022-12-11 NOTE — PROGRESS NOTES
"Outpatient Psychiatry Follow-Up Visit    Clinical Status of Patient: Outpatient (Ambulatory)  12/11/2022     Chief Complaint: 28 y/o male presenting today for a follow-up.       Interval History and Content of Current Session:  Interim Events/Subjective Report/Content of Current Session:  follow-up appointment.    Pt is a 28 y/o male with past psychiatric hx of anxiety, ADHD, ASD who presents for follow-up treatment. Pt has been asking his mother more frequently for the Adderall. Pt has been cleaning room more frequently and organizing his belongings. Increase ADLs (e.g., shower, etc.) without reminders. Some increase in anxiety as a result of increased Adderall use, about once per week. Will see a new urologist this week due to bladder pain.     Past Psychiatric hx: adderall ("robot child"), focalin ("zombie"), strattera (angry), zoloft (as a child- ineffective), lexapro (ineffective), topomax ("migraines"), celexa (overly sedating but effective), remeron (ineffective- wgt gain), atarax, elavil (ineffective for pain), ritalin (inc'd bp)     Past Medical hx:   Past Medical History:   Diagnosis Date    Abnormal thyroid function test 09/11/2017    ADD (attention deficit disorder)     Anxiety disorder     Asperger's disorder     (AUTISM)    Deformity of both feet     Dyscalculia     Dysgraphia     Dyslexia     Eczema     Fatty liver     GERD (gastroesophageal reflux disease)     History of migraine headaches     Hypertension     Interstitial cystitis (chronic)     Irritable bowel syndrome     Nephrolithiasis     2014    PONV (postoperative nausea and vomiting)     Psoriasis     Psoriatic arthritis     Refusal of blood transfusion for reasons of conscience     Seasonal allergies     Special needs assessment         Interim hx:  Medication changes last visit: None  Anxiety: mild  Depression: stable     Denies suicidal/homicidal ideations.  Denies hopelessness/worthlessness.    Denies auditory/visual hallucinations    "   Alcohol: Pt denied  Drug: Pt denied  Caffeine: not assessed  Tobacco: Pt denied      Review of Systems   PSYCHIATRIC: Pertinent items are noted in the narrative.        CONSTITUTIONAL: weight stable    Past Medical, Family and Social History: The patient's past medical, family and social history have been reviewed and updated as appropriate within the electronic medical record. See encounter notes.     Current Psychiatric Medication:  Prozac 40mg po qam, Adderall 10 mg pt qam (PRN about once a month), alprazolam 0.25 mg PRN (cut in half - very infrequently).     Compliance: yes      Side effects: Pt denies     Risk Parameters:  Patient reports no suicidal ideation  Patient reports no homicidal ideation  Patient reports no self-injurious behavior  Patient reports no violent behavior     Exam (detailed: at least 9 elements; comprehensive: all 15 elements)   Constitutional  Vitals:  Most recent vital signs, dated less than 90 days prior to this appointment, were reviewed. BP: ()/()   Arterial Line BP: ()/()       General:  unremarkable, age appropriate, casual attire, good eye contact, good rapport       Musculoskeletal  Muscle Strength/Tone:  no flaccidity, no tremor    Gait & Station:  normal      Psychiatric                       Speech:  normal tone, normal rate, rhythm, and volume   Mood & Affect:   Depressed, anxious         Thought Process:   Goal directed; Linear    Associations:   intact   Thought Content:   No SI/HI, delusions, or paranoia, no AV/VH   Insight & Judgement:   Good, adequate to circumstances   Orientation:   grossly intact; alert and oriented x 4    Memory:  intact for content of interview    Language:  grossly intact, can repeat    Attention Span  : Grossly intact for content of interview   Fund of Knowledge:   intact and appropriate to age and level of education        Assessment and Diagnosis   Status/Progress: Based on the examination today, the patient's problem(s) is/are adequately  "controlled.  New problems have not been presented today. Co-morbidities are not complicating management of the primary condition. There are no active rule-out diagnoses for this patient at this time.      Impression: Pt continues to be stable with medication plan. Some increased frequency of anxiety (once per week) attributed to the psychostimulant. Will continue as is for now and monitor moving forward.     Diagnosis:   1) Social Anxiety Disorder  2) Generalized Anxiety Disorder  3) Attention Deficit Hyperactivity Disorder  4) Autism Spectrum Disorder (Level "requiring substantial support")  Intervention/Counseling/Treatment Plan   Medication Management:      1. Prozac 40mg po qam    2. Adderall 10 mg po qam (PRN about once a month)    3. alprazolam 0.25 mg PRN (cut in half - very infrequently).     4. Call to report any worsening of symptoms or problems with the medication. Pt instructed to go to ER with thoughts of harming self, others     5. Patient given contact # for psychotherapists at [facility] and also instructed to check with insurance for list of providers.     Psychotherapy: 20 minutes  Target symptoms:   Why chosen therapy is appropriate versus another modality: CBT used; relevant to diagnosis, patient responds to this modality  Outcome monitoring methods: self-report, observation  Therapeutic intervention type: Cognitive Behavioral Therapy  Topics discussed/themes: building skills sets for symptom management, symptom recognition, nutrition, exercise  The patient's response to the intervention is accepting  Patient's response to treatment is: good.   The patient's progress toward treatment goals: improving     Return to clinic: 3 months    -Cognitive-Behavioral/Supportive therapy and psychoeducation provided  -R/B/SE's of medications discussed with the pt who expresses understanding and chooses to take medications as prescribed.   -Pt instructed to call clinic, 911 or go to nearest emergency " room if sxs worsen or pt is in   crisis. The pt expresses understanding.    Jp Hines, PhD, MP     Antidepressant/Antianxiety Medication Initiation:  Patient informed of risks, benefits, and potential side effects of medication and accepts informed consent.  Common side effects include nausea, fatigue, headache, insomnia., Specifically discussed the possibility of new or worsening suicidal thoughts/depression.  Patient instructed to stop the medication immediately and seek urgent treatment if this occurs. Patient instructed not to abruptly discontinue medication without physician guidance except in cases of sudden onset or worsening of SI.       Stimulant Medication Initiation:  Patient advised of risks, benefits, and side effects of medication and accepts informed consent.  Common side effects include insomnia, irritability, jittery feeling, dry mouth, and agitation/hostility., Patient advised of potential addictive nature of medication and controlled substance classification.  Instructed to safeguard medication as no early refills can be given for lost or stolen medications.       Benzodiazepine Initiation:  Patient advised of the risks, benefits, and common side effects of medication and has accepted informed consent.  Common side effects include drowsiness, impaired coordination, possible memory loss., Patient advised NOT to operate a vehicle or machinery untiil they are sure how the medication will affec them.  Client also advised of danger of mixing this medication with alcohol., Patient advised of potential addictive nature of medication and need to safeguard medication as no early refills for lost or stolen medications can be authorized.

## 2022-12-12 ENCOUNTER — OFFICE VISIT (OUTPATIENT)
Dept: PSYCHIATRY | Facility: CLINIC | Age: 27
End: 2022-12-12
Payer: COMMERCIAL

## 2022-12-12 VITALS
WEIGHT: 235.81 LBS | HEART RATE: 76 BPM | SYSTOLIC BLOOD PRESSURE: 108 MMHG | DIASTOLIC BLOOD PRESSURE: 75 MMHG | BODY MASS INDEX: 31.09 KG/M2

## 2022-12-12 DIAGNOSIS — F40.10 SOCIAL ANXIETY DISORDER: ICD-10-CM

## 2022-12-12 DIAGNOSIS — F90.0 ADHD (ATTENTION DEFICIT HYPERACTIVITY DISORDER), INATTENTIVE TYPE: ICD-10-CM

## 2022-12-12 DIAGNOSIS — F84.0 AUTISM SPECTRUM DISORDER: Primary | ICD-10-CM

## 2022-12-12 DIAGNOSIS — F41.1 GENERALIZED ANXIETY DISORDER: ICD-10-CM

## 2022-12-12 PROCEDURE — 90832 PSYTX W PT 30 MINUTES: CPT | Mod: S$GLB,,, | Performed by: PSYCHOLOGIST

## 2022-12-12 PROCEDURE — 1159F PR MEDICATION LIST DOCUMENTED IN MEDICAL RECORD: ICD-10-PCS | Mod: CPTII,S$GLB,, | Performed by: PSYCHOLOGIST

## 2022-12-12 PROCEDURE — 3074F PR MOST RECENT SYSTOLIC BLOOD PRESSURE < 130 MM HG: ICD-10-PCS | Mod: CPTII,S$GLB,, | Performed by: PSYCHOLOGIST

## 2022-12-12 PROCEDURE — 99999 PR PBB SHADOW E&M-EST. PATIENT-LVL III: ICD-10-PCS | Mod: PBBFAC,,, | Performed by: PSYCHOLOGIST

## 2022-12-12 PROCEDURE — 1159F MED LIST DOCD IN RCRD: CPT | Mod: CPTII,S$GLB,, | Performed by: PSYCHOLOGIST

## 2022-12-12 PROCEDURE — 3044F PR MOST RECENT HEMOGLOBIN A1C LEVEL <7.0%: ICD-10-PCS | Mod: CPTII,S$GLB,, | Performed by: PSYCHOLOGIST

## 2022-12-12 PROCEDURE — 99999 PR PBB SHADOW E&M-EST. PATIENT-LVL III: CPT | Mod: PBBFAC,,, | Performed by: PSYCHOLOGIST

## 2022-12-12 PROCEDURE — 3078F DIAST BP <80 MM HG: CPT | Mod: CPTII,S$GLB,, | Performed by: PSYCHOLOGIST

## 2022-12-12 PROCEDURE — 90863 PHARMACOLOGIC MGMT W/PSYTX: CPT | Mod: S$GLB,,, | Performed by: PSYCHOLOGIST

## 2022-12-12 PROCEDURE — 3074F SYST BP LT 130 MM HG: CPT | Mod: CPTII,S$GLB,, | Performed by: PSYCHOLOGIST

## 2022-12-12 PROCEDURE — 90832 PR PSYCHOTHERAPY W/PATIENT, 30 MIN: ICD-10-PCS | Mod: S$GLB,,, | Performed by: PSYCHOLOGIST

## 2022-12-12 PROCEDURE — 3044F HG A1C LEVEL LT 7.0%: CPT | Mod: CPTII,S$GLB,, | Performed by: PSYCHOLOGIST

## 2022-12-12 PROCEDURE — 3008F BODY MASS INDEX DOCD: CPT | Mod: CPTII,S$GLB,, | Performed by: PSYCHOLOGIST

## 2022-12-12 PROCEDURE — 90863 PR PHARMACOLOGIC MANAGEMENT: ICD-10-PCS | Mod: S$GLB,,, | Performed by: PSYCHOLOGIST

## 2022-12-12 PROCEDURE — 3008F PR BODY MASS INDEX (BMI) DOCUMENTED: ICD-10-PCS | Mod: CPTII,S$GLB,, | Performed by: PSYCHOLOGIST

## 2022-12-12 PROCEDURE — 3078F PR MOST RECENT DIASTOLIC BLOOD PRESSURE < 80 MM HG: ICD-10-PCS | Mod: CPTII,S$GLB,, | Performed by: PSYCHOLOGIST

## 2022-12-12 RX ORDER — DEXTROAMPHETAMINE SACCHARATE, AMPHETAMINE ASPARTATE, DEXTROAMPHETAMINE SULFATE AND AMPHETAMINE SULFATE 2.5; 2.5; 2.5; 2.5 MG/1; MG/1; MG/1; MG/1
10 TABLET ORAL DAILY
Qty: 30 TABLET | Refills: 0 | Status: SHIPPED | OUTPATIENT
Start: 2022-12-12 | End: 2023-01-28 | Stop reason: SDUPTHER

## 2022-12-16 ENCOUNTER — PATIENT MESSAGE (OUTPATIENT)
Dept: PSYCHIATRY | Facility: CLINIC | Age: 27
End: 2022-12-16
Payer: COMMERCIAL

## 2022-12-21 ENCOUNTER — OFFICE VISIT (OUTPATIENT)
Dept: UROLOGY | Facility: CLINIC | Age: 27
End: 2022-12-21
Payer: COMMERCIAL

## 2022-12-21 VITALS
RESPIRATION RATE: 16 BRPM | WEIGHT: 235 LBS | HEIGHT: 73 IN | DIASTOLIC BLOOD PRESSURE: 76 MMHG | SYSTOLIC BLOOD PRESSURE: 112 MMHG | BODY MASS INDEX: 31.14 KG/M2 | HEART RATE: 72 BPM

## 2022-12-21 DIAGNOSIS — G89.29 CHRONIC PELVIC PAIN IN MALE: ICD-10-CM

## 2022-12-21 DIAGNOSIS — R10.2 CHRONIC PELVIC PAIN IN MALE: ICD-10-CM

## 2022-12-21 PROCEDURE — 99999 PR PBB SHADOW E&M-EST. PATIENT-LVL IV: ICD-10-PCS | Mod: PBBFAC,,, | Performed by: UROLOGY

## 2022-12-21 PROCEDURE — 1159F MED LIST DOCD IN RCRD: CPT | Mod: CPTII,S$GLB,, | Performed by: UROLOGY

## 2022-12-21 PROCEDURE — 3074F SYST BP LT 130 MM HG: CPT | Mod: CPTII,S$GLB,, | Performed by: UROLOGY

## 2022-12-21 PROCEDURE — 3078F DIAST BP <80 MM HG: CPT | Mod: CPTII,S$GLB,, | Performed by: UROLOGY

## 2022-12-21 PROCEDURE — 3008F PR BODY MASS INDEX (BMI) DOCUMENTED: ICD-10-PCS | Mod: CPTII,S$GLB,, | Performed by: UROLOGY

## 2022-12-21 PROCEDURE — 87081 CULTURE SCREEN ONLY: CPT | Performed by: UROLOGY

## 2022-12-21 PROCEDURE — 3044F HG A1C LEVEL LT 7.0%: CPT | Mod: CPTII,S$GLB,, | Performed by: UROLOGY

## 2022-12-21 PROCEDURE — 1159F PR MEDICATION LIST DOCUMENTED IN MEDICAL RECORD: ICD-10-PCS | Mod: CPTII,S$GLB,, | Performed by: UROLOGY

## 2022-12-21 PROCEDURE — 3078F PR MOST RECENT DIASTOLIC BLOOD PRESSURE < 80 MM HG: ICD-10-PCS | Mod: CPTII,S$GLB,, | Performed by: UROLOGY

## 2022-12-21 PROCEDURE — 3008F BODY MASS INDEX DOCD: CPT | Mod: CPTII,S$GLB,, | Performed by: UROLOGY

## 2022-12-21 PROCEDURE — 99214 OFFICE O/P EST MOD 30 MIN: CPT | Mod: S$GLB,,, | Performed by: UROLOGY

## 2022-12-21 PROCEDURE — 3044F PR MOST RECENT HEMOGLOBIN A1C LEVEL <7.0%: ICD-10-PCS | Mod: CPTII,S$GLB,, | Performed by: UROLOGY

## 2022-12-21 PROCEDURE — 99999 PR PBB SHADOW E&M-EST. PATIENT-LVL IV: CPT | Mod: PBBFAC,,, | Performed by: UROLOGY

## 2022-12-21 PROCEDURE — 3074F PR MOST RECENT SYSTOLIC BLOOD PRESSURE < 130 MM HG: ICD-10-PCS | Mod: CPTII,S$GLB,, | Performed by: UROLOGY

## 2022-12-21 PROCEDURE — 99214 PR OFFICE/OUTPT VISIT, EST, LEVL IV, 30-39 MIN: ICD-10-PCS | Mod: S$GLB,,, | Performed by: UROLOGY

## 2022-12-21 NOTE — H&P (VIEW-ONLY)
[facility] Department of Urology      New Urinary Frequency Note    12/21/2022    Referred by:  Jasmeet Benito MD    HPI: Jm Chaparro is a very pleasant 27 y.o. male who has not previously been [facility] by an [facility] in our department referred for evaluation of urinary frequency of several years duration. He reports bother is associated with urinary daytime frequency (10-12x daily), with nocturia (3-4x per night) and with urgency that results in urinary incontinence occasionally . He reports no stress urinary incontinence associated with exertion. He does not require daily pad use.  He has decreased overall fluid intake.  He reports urinary frequency is day and night but more predominant during the day. Patient reports urgency is independent of position. His urinary frequency is largely driven by pain.     He reports symptoms of irritative voiding including dysuria. He experiences pain with most voids.  He reports symptoms of obstructive voiding including decreased stream, hesitancy, intermittency, and post void dribbling. Bladder scan PVR was 95 mL.     He has autism spectrum disorder, IBS, and has been diagnosed with possible IC.    His previous evaluations were reviewed in detail today. He has been on elmiron but stopped after no benefit. He has been on Myrbetriq and ditropan without improvement. He was previously offered bladder instillations but could not tolerate catheterization in the office.  He has seen pain management. His cystoscopy report from January 2022 reports an area that was biopsied showing only chronic inflammation. I'm not clear if this may have represented a hunner lesion. His symptoms did not improve following that procedure.     Numerous urine cultures without evidence of infection.     Previous Therapies  Behavioral Therapy: (fluid/dietary modification timed voiding/bladder training) -  provided no benefit  Medication:  oral oxybutynin ER 15 mg (>1 year) (provided no  benefit)  Myrbetriq 50 mg daily (5 months) (provided no benefit)   Elmiron 100 mg TID  (>1 year) (no improvement)  Bladder instillations - did not tolerate    A review of 10+ systems was conducted with pertinent positive and negative findings documented in HPI with all other systems reviewed and negative.    Past medical, family, surgical and social history reviewed as documented in chart with pertinent positive medical, family, surgical and social history detailed in HPI.    Exam Findings:    Const: no acute distress, conversant and alert  Eyes: anicteric, extraocular muscles intact  ENMT: normocephalic, Nl oral membranes  Cardio: no cyanosis, nl cap refill  Pulm: no tachypnea; no resp distress  Musc: no laceration, no tenderness  Neuro: alert; oriented x 3  Skin: warm, dry; no petichiae  Psych: no anxiety; normal speech     Assessment/Plan:    Urinary Frequency  (new, addt'l workup): His urinary frequency and urgency appears largely driven by pain. No evidence of infection. There was a urinalysis with high RBC around the time of prior cystoscopy. Could the noted lesion have been a hunner lesion? Given the possibility and the severity of his symptoms, would recommend cystoscopy with fulguration/injection if HL seen. He will not tolerate this without sedation.     If no HL, we discussed proceeding with staged testing of SNM. This would potentially address both the frequency and urgency as well as the dysfunctional voiding.     This patient will be scheduled for staged implantation of lead (CPT 24125) as a 1-2 week test of whether there is sufficient response to justify implantation of a permanent generator (CPT 24399). Please note that the placement of a staged  shares the same CPT code (95055) as placement of a permanent lead and generator (additional CPT 34944) after a successful basic evaluation test . This patient must demonstrate at least 50% improvement in symptoms to proceed to generator  implantation, which is the purpose of this test. Clinical data to support the placement of the patient generator (CPT 92924) can only be supplied after placement of the lead (CPT 58130) in this testing scenario.

## 2022-12-21 NOTE — PROGRESS NOTES
[facility]      New Urinary Frequency Note    12/21/2022    Referred by:  Jasmeet Benito MD    HPI: Jm Chaparro is a very pleasant 27 y.o. male who [facility] referred for evaluation of urinary frequency of several years duration. He reports bother is associated with urinary daytime frequency (10-12x daily), with nocturia (3-4x per night) and with urgency that results in urinary incontinence occasionally . He reports no stress urinary incontinence associated with exertion. He does not require daily pad use.  He has decreased overall fluid intake.  He reports urinary frequency is day and night but more predominant during the day. Patient reports urgency is independent of position. His urinary frequency is largely driven by pain.     He reports symptoms of irritative voiding including dysuria. He experiences pain with most voids.  He reports symptoms of obstructive voiding including decreased stream, hesitancy, intermittency, and post void dribbling. Bladder scan PVR was 95 mL.     He has autism spectrum disorder, IBS, and has been diagnosed with possible IC.    His previous evaluations were reviewed in detail today. He has been on elmiron but stopped after no benefit. He has been on Myrbetriq and ditropan without improvement. He was previously offered bladder instillations but could not tolerate catheterization in the office.  He has seen pain management. His cystoscopy report from January 2022 reports an area that was biopsied showing only chronic inflammation. I'm not clear if this may have represented a hunner lesion. His symptoms did not improve following that procedure.     Numerous urine cultures without evidence of infection.     Previous Therapies  Behavioral Therapy: (fluid/dietary modification timed voiding/bladder training) -  provided no benefit  Medication:  oral oxybutynin ER 15 mg (>1 year) (provided no  benefit)  Myrbetriq 50 mg daily (5 months) (provided no benefit)   Elmiron 100 mg TID  (>1 year) (no improvement)  Bladder instillations - did not tolerate    A review of 10+ systems was conducted with pertinent positive and negative findings documented in HPI with all other systems reviewed and negative.    Past medical, family, surgical and social history reviewed as documented in chart with pertinent positive medical, family, surgical and social history detailed in HPI.    Exam Findings:    Const: no acute distress, conversant and alert  Eyes: anicteric, extraocular muscles intact  ENMT: normocephalic, Nl oral membranes  Cardio: no cyanosis, nl cap refill  Pulm: no tachypnea; no resp distress  Musc: no laceration, no tenderness  Neuro: alert; oriented x 3  Skin: warm, dry; no petichiae  Psych: no anxiety; normal speech     Assessment/Plan:    Urinary Frequency  (new, addt'l workup): His urinary frequency and urgency appears largely driven by pain. No evidence of infection. There was a urinalysis with high RBC around the time of prior cystoscopy. Could the noted lesion have been a hunner lesion? Given the possibility and the severity of his symptoms, would recommend cystoscopy with fulguration/injection if HL seen. He will not tolerate this without sedation.     If no HL, we discussed proceeding with staged testing of SNM. This would potentially address both the frequency and urgency as well as the dysfunctional voiding.     This patient will be scheduled for staged implantation of lead (CPT 32495) as a 1-2 week test of whether there is sufficient response to justify implantation of a permanent generator (CPT 50834). Please note that the placement of a staged  shares the same CPT code (09198) as placement of a permanent lead and generator (additional CPT 58838) after a successful basic evaluation test . This patient must demonstrate at least 50% improvement in symptoms to proceed to generator  implantation, which is the purpose of this test. Clinical data to support the placement of the patient generator (CPT 49216) can only be supplied after placement of the lead (CPT 92104) in this testing scenario.

## 2022-12-22 ENCOUNTER — TELEPHONE (OUTPATIENT)
Dept: UROLOGY | Facility: CLINIC | Age: 27
End: 2022-12-22
Payer: COMMERCIAL

## 2022-12-22 DIAGNOSIS — R35.0 URINARY FREQUENCY: Primary | ICD-10-CM

## 2022-12-22 DIAGNOSIS — N30.10 INTERSTITIAL CYSTITIS: Primary | ICD-10-CM

## 2022-12-23 LAB — MRSA SPEC QL CULT: NORMAL

## 2023-01-04 NOTE — ANESTHESIA PAT ROS NOTE
2023  Jm Chaparro is a 27 y.o., male.      Pre-op Assessment          Review of Systems  Anesthesia Hx:    PONV           Family Hx of Anesthesia complications:  Family Anesthesia Complications are While caring for  a cancer patient, patients' Aunt(a nurse)  from touching a  Phentanl  Patch-(Aunt had Hx of Asthma & tried using her  Inhaler & it did not help in this situation, and she ). Personal Hx of Anesthesia complications, Post-Operative Nausea/Vomiting, in the past, but not with recent anesthetics / prophylaxis                    Social:  No Alcohol Use, Non-Smoker       Hematology/Oncology:  Hematology Normal   Oncology Normal                                   EENT/Dental:   Wears glasses for distance/Possible x1-2 crowns          Cardiovascular:  Exercise tolerance: poor   Hypertension           hyperlipidemia    Walking for exercise-daily for 30 minutes/                          Pulmonary:        Sleep Apnea Does not use CPAP               Renal/:  Chronic Renal Disease renal calculi  Interstitial cystitis             Hepatic/GI:     GERD Liver Disease,  Fatty Liver          Musculoskeletal:     Psoriatic arthritis/ Jaw Clicking            Neurological:      Headaches     Tension/ Sinus headaches                            Psych:  Psychiatric History anxiety depression             Rebecca Pena RN  23      Anesthesia Assessment: Preoperative EQUATION    Planned Procedure: Procedure(s) (LRB):  CYSTOSCOPY (N/A)  Requested Anesthesia Type:Monitor Anesthesia Care  Surgeon: Trino Cain MD  Service: Urology  Known or anticipated Date of Surgery:2023    Surgeon notes: reviewed and Interstitial cystitis    Previous anesthesia records:No problems and 1/3/22-Cystoscopy/Biopsy,Bladder-General - no apparent anesthetic complications and tolerated procedure  well-no nausea/vomiting    Anesthesia Hx:  Hx of Anesthetic complications  ?    Airway/Jaw/Neck:  Airway Findings: Mouth Opening: Normal Tongue: Normal  General Airway Assessment: Adult  Mallampati: I  TM Distance: Normal, at least 6 cm     ----------------------------------------------------------  **Family Hx:While caring for  a cancer patient, patients' Aunt(a nurse)  from touching a  Phentanl  Patch-(Aunt had Hx of Asthma & tried using her  Inhaler & it did not help in this situation, and she ).**    ------------------------------------------------------------  Last PCP note: 3-6 months ago , within [facility] , 22-China Pierre MD-[facility]-Hypertension, essential +2 more Dx-F/U  Subspecialty notes: Allergy, Nephrology, Pain Management, Psychiatry, Otolaryngology/ Podiatry    Other important co-morbidities: GERD, HTN, and Special Needs assessment/Interstitial cystitis     Medical History    Diagnosis Date Comment Source   Abnormal thyroid function test 2017     ADD (attention deficit disorder)      Anxiety disorder      Asperger's disorder  (AUTISM)    Deformity of both feet      Dyscalculia      Dysgraphia      Dyslexia      Eczema      Fatty liver      GERD (gastroesophageal reflux disease)      History of migraine headaches      Hypertension      Interstitial cystitis (chronic)      Irritable bowel syndrome      Nephrolithiasis      PONV (postoperative nausea and vomiting)      Psoriasis      Psoriatic arthritis      Refusal of blood transfusion for reasons of conscience      Seasonal allergies      Special needs assessment        Tests already available:  Results have been reviewed. Labs- 22-Lipid Panel/ 10/25/22-POCT Urinalysis (Instrument)      Plan: Phone pending     Testing:  BMP   Patient  has previously scheduled Medical Appointment:None    Navigation: Phone Completed                      Tests Scheduled. BMP done 23,  -pending anesthesia review             Consults  scheduled.Reviewed with anesthesia.             Results will be tracked by Preop Clinic.                   Rebecca Pena RN  1/4/23 & 1/10/23

## 2023-01-05 ENCOUNTER — TELEPHONE (OUTPATIENT)
Dept: PREADMISSION TESTING | Facility: HOSPITAL | Age: 28
End: 2023-01-05
Payer: COMMERCIAL

## 2023-01-05 NOTE — TELEPHONE ENCOUNTER
----- Message from Rebecca Pena RN sent at 1/4/2023  5:16 PM CST -----  Regarding: preop testing  Sx  date-1/11/23-Need:Lab-BMP(ordered), prior to the surgery date. Thank you. ROMEL

## 2023-01-06 ENCOUNTER — LAB VISIT (OUTPATIENT)
Dept: LAB | Facility: HOSPITAL | Age: 28
End: 2023-01-06
Attending: ANESTHESIOLOGY
Payer: COMMERCIAL

## 2023-01-06 DIAGNOSIS — Z01.818 PREOP TESTING: ICD-10-CM

## 2023-01-06 PROCEDURE — 36415 COLL VENOUS BLD VENIPUNCTURE: CPT | Mod: PN | Performed by: ANESTHESIOLOGY

## 2023-01-06 PROCEDURE — 80048 BASIC METABOLIC PNL TOTAL CA: CPT | Performed by: ANESTHESIOLOGY

## 2023-01-07 LAB
ANION GAP SERPL CALC-SCNC: 11 MMOL/L (ref 8–16)
BUN SERPL-MCNC: 11 MG/DL (ref 6–20)
CALCIUM SERPL-MCNC: 9.5 MG/DL (ref 8.7–10.5)
CHLORIDE SERPL-SCNC: 104 MMOL/L (ref 95–110)
CO2 SERPL-SCNC: 26 MMOL/L (ref 23–29)
CREAT SERPL-MCNC: 1 MG/DL (ref 0.5–1.4)
EST. GFR  (NO RACE VARIABLE): >60 ML/MIN/1.73 M^2
GLUCOSE SERPL-MCNC: 88 MG/DL (ref 70–110)
POTASSIUM SERPL-SCNC: 4 MMOL/L (ref 3.5–5.1)
SODIUM SERPL-SCNC: 141 MMOL/L (ref 136–145)

## 2023-01-09 ENCOUNTER — PATIENT MESSAGE (OUTPATIENT)
Dept: SURGERY | Facility: HOSPITAL | Age: 28
End: 2023-01-09
Payer: COMMERCIAL

## 2023-01-10 ENCOUNTER — TELEPHONE (OUTPATIENT)
Dept: UROLOGY | Facility: CLINIC | Age: 28
End: 2023-01-10
Payer: COMMERCIAL

## 2023-01-10 ENCOUNTER — TELEPHONE (OUTPATIENT)
Dept: UROLOGY | Facility: CLINIC | Age: 28
End: 2023-01-10

## 2023-01-10 NOTE — TELEPHONE ENCOUNTER
Called pt to confirm arrival time of 7am for procedure on 01-. Gave pt NPO instructions and gave pt opportunity to ask questions. Pt verbalized understanding.

## 2023-01-10 NOTE — TELEPHONE ENCOUNTER
----- Message from Myke Andrade MA sent at 1/10/2023 12:09 PM CST -----  Regarding: pt  advice  Contact: nancie   Pt  mother Nancie  is  calling  stated pt procedure is  tomorrow  but have  not  receive a  time to  be at the appt . Please call nancie

## 2023-01-11 ENCOUNTER — HOSPITAL ENCOUNTER (OUTPATIENT)
Facility: HOSPITAL | Age: 28
Discharge: HOME | End: 2023-01-11
Attending: UROLOGY | Admitting: UROLOGY
Payer: COMMERCIAL

## 2023-01-11 ENCOUNTER — ANESTHESIA (OUTPATIENT)
Dept: SURGERY | Facility: HOSPITAL | Age: 28
End: 2023-01-11
Payer: COMMERCIAL

## 2023-01-11 ENCOUNTER — ANESTHESIA EVENT (OUTPATIENT)
Dept: SURGERY | Facility: HOSPITAL | Age: 28
End: 2023-01-11
Payer: COMMERCIAL

## 2023-01-11 VITALS
OXYGEN SATURATION: 96 % | TEMPERATURE: 98 F | RESPIRATION RATE: 16 BRPM | DIASTOLIC BLOOD PRESSURE: 68 MMHG | WEIGHT: 230 LBS | HEART RATE: 59 BPM | HEIGHT: 73 IN | BODY MASS INDEX: 30.48 KG/M2 | SYSTOLIC BLOOD PRESSURE: 112 MMHG

## 2023-01-11 DIAGNOSIS — N30.10 INTERSTITIAL CYSTITIS: Primary | ICD-10-CM

## 2023-01-11 PROCEDURE — 36000707: Performed by: UROLOGY

## 2023-01-11 PROCEDURE — D9220A PRA ANESTHESIA: Mod: CRNA,,, | Performed by: NURSE ANESTHETIST, CERTIFIED REGISTERED

## 2023-01-11 PROCEDURE — 25000003 PHARM REV CODE 250: Performed by: NURSE ANESTHETIST, CERTIFIED REGISTERED

## 2023-01-11 PROCEDURE — D9220A PRA ANESTHESIA: Mod: ANES,,, | Performed by: ANESTHESIOLOGY

## 2023-01-11 PROCEDURE — 52000 CYSTOURETHROSCOPY: CPT | Mod: ,,, | Performed by: UROLOGY

## 2023-01-11 PROCEDURE — D9220A PRA ANESTHESIA: ICD-10-PCS | Mod: CRNA,,, | Performed by: NURSE ANESTHETIST, CERTIFIED REGISTERED

## 2023-01-11 PROCEDURE — 63600175 PHARM REV CODE 636 W HCPCS: Performed by: STUDENT IN AN ORGANIZED HEALTH CARE EDUCATION/TRAINING PROGRAM

## 2023-01-11 PROCEDURE — 52000 PR CYSTOURETHROSCOPY: ICD-10-PCS | Mod: ,,, | Performed by: UROLOGY

## 2023-01-11 PROCEDURE — 37000009 HC ANESTHESIA EA ADD 15 MINS: Performed by: UROLOGY

## 2023-01-11 PROCEDURE — 36000706: Performed by: UROLOGY

## 2023-01-11 PROCEDURE — 71000044 HC DOSC ROUTINE RECOVERY FIRST HOUR: Performed by: UROLOGY

## 2023-01-11 PROCEDURE — D9220A PRA ANESTHESIA: ICD-10-PCS | Mod: ANES,,, | Performed by: ANESTHESIOLOGY

## 2023-01-11 PROCEDURE — 37000008 HC ANESTHESIA 1ST 15 MINUTES: Performed by: UROLOGY

## 2023-01-11 PROCEDURE — 63600175 PHARM REV CODE 636 W HCPCS: Performed by: NURSE ANESTHETIST, CERTIFIED REGISTERED

## 2023-01-11 PROCEDURE — 71000015 HC POSTOP RECOV 1ST HR: Performed by: UROLOGY

## 2023-01-11 PROCEDURE — 71000016 HC POSTOP RECOV ADDL HR: Performed by: UROLOGY

## 2023-01-11 RX ORDER — CIPROFLOXACIN 2 MG/ML
400 INJECTION, SOLUTION INTRAVENOUS
Status: COMPLETED | OUTPATIENT
Start: 2023-01-11 | End: 2023-01-11

## 2023-01-11 RX ORDER — PROPOFOL 10 MG/ML
VIAL (ML) INTRAVENOUS CONTINUOUS PRN
Status: DISCONTINUED | OUTPATIENT
Start: 2023-01-11 | End: 2023-01-18

## 2023-01-11 RX ORDER — PROPOFOL 10 MG/ML
VIAL (ML) INTRAVENOUS
Status: DISCONTINUED | OUTPATIENT
Start: 2023-01-11 | End: 2023-01-18

## 2023-01-11 RX ORDER — LIDOCAINE HYDROCHLORIDE 20 MG/ML
INJECTION INTRAVENOUS
Status: DISCONTINUED | OUTPATIENT
Start: 2023-01-11 | End: 2023-01-18

## 2023-01-11 RX ORDER — FENTANYL CITRATE 50 UG/ML
INJECTION, SOLUTION INTRAMUSCULAR; INTRAVENOUS
Status: DISCONTINUED | OUTPATIENT
Start: 2023-01-11 | End: 2023-01-18

## 2023-01-11 RX ORDER — MIDAZOLAM HYDROCHLORIDE 1 MG/ML
INJECTION, SOLUTION INTRAMUSCULAR; INTRAVENOUS
Status: DISCONTINUED | OUTPATIENT
Start: 2023-01-11 | End: 2023-01-18

## 2023-01-11 RX ADMIN — PROPOFOL 70 MG: 10 INJECTION, EMULSION INTRAVENOUS at 08:01

## 2023-01-11 RX ADMIN — CIPROFLOXACIN 400 MG: 2 INJECTION, SOLUTION INTRAVENOUS at 08:01

## 2023-01-11 RX ADMIN — FENTANYL CITRATE 50 MCG: 50 INJECTION, SOLUTION INTRAMUSCULAR; INTRAVENOUS at 08:01

## 2023-01-11 RX ADMIN — Medication 100 MCG/KG/MIN: at 08:01

## 2023-01-11 RX ADMIN — LIDOCAINE HYDROCHLORIDE 100 MG: 20 INJECTION INTRAVENOUS at 08:01

## 2023-01-11 RX ADMIN — MIDAZOLAM HYDROCHLORIDE 2 MG: 1 INJECTION, SOLUTION INTRAMUSCULAR; INTRAVENOUS at 08:01

## 2023-01-11 RX ADMIN — SODIUM CHLORIDE: 9 INJECTION, SOLUTION INTRAVENOUS at 08:01

## 2023-01-11 NOTE — INTERVAL H&P NOTE
The patient has been examined and the H&P has been reviewed:    I concur with the findings and no changes have occurred since H&P was written.    Surgery risks, benefits and alternative options discussed and understood by patient/family.    Interval h&p reviewed and unchanged from previous encounter. Urine dipped: positive for nitrite, patient is taking AZO. No symptoms concerning for UTI .    The patient has stopped all blood thinners.    Patient consented and would like to proceed with the procedure. Consented via mother in front of pre-operative nurse.        Active Hospital Problems    Diagnosis  POA    *Bladder pain [R39.89]  Yes      Resolved Hospital Problems   No resolved problems to display.

## 2023-01-11 NOTE — PATIENT INSTRUCTIONS
Post Cystoscopy Instructions  Do not strain to have a bowel movement  No strenuous exercise x 7 days  No driving while you are on narcotic pain medications or if your vieira  catheter is in place    You can expect:  To pass stone fragments if you had a stone procedure  Have pain when you void from your stent if you have a stent in place  See blood in your urine if you have a stent in place    If you have a catheter, please return to the ER if your catheter stops draining or you are having abdominal pain.    Call the doctor if:  Temperature is greater than 101F  Persistent vomiting and inability to keep food down  Inability to void if you do not have a catheter

## 2023-01-11 NOTE — TRANSFER OF CARE
Anesthesia Transfer of Care Note    Patient: Jm Chaparro    Procedure(s) Performed: Procedure(s):  CYSTOSCOPY    Patient location: PACU    Anesthesia Type: MAC    Transport from OR: Transported from OR on room air with adequate spontaneous ventilation    Post pain: adequate analgesia    Post assessment: no apparent anesthetic complications    Post vital signs: stable    Level of consciousness: awake    Nausea/Vomiting: no nausea/vomiting    Complications: none    Transfer of care protocol was followed      Last vitals:

## 2023-01-11 NOTE — OP NOTE
[facility]  Operative Note    Date: 01/11/2023    Pre-Op Diagnosis: Urinary frequency, history of bladder lesion    Post-Op Diagnosis: Same    Procedure(s) Performed:   1.  Cystoscopy     Specimen(s): None    Staff Surgeon: Trino Cain MD    Assistant Surgeon: Bandar Lozada MD    Anesthesia: General mask inhalational anesthesia    Indications: Jm Chaparro is a 27 y.o. male with urinary frequency and bladder lesions, possibly concerning for Hunner lesion.    Findings: No sign of active infection, inflammation or Hunner lesions. Single area of healed lesion consistent with previous biopsy/fulguration    Estimated Blood Loss: min    Drains: None    Procedure in Detail:  After risks, benefits and possible complications of cystoscopy were explained, the patient elected to undergo the procedure and informed consent was obtained. All questions were answered in the elizabeth-operative area. The patient was transferred to the cystoscopy suite and placed in the supine position.  SCDs were applied and working.  Anesthesia was administered.  Once the patient was adequately sedated, he was placed in the dorsal lithotomy position and prepped and draped in the usual sterile fashion.  Time out was performed, elizabeth-procedural antibiotics were confirmed.     A rigid cystoscope in a 22 Fr sheath was introduced into the bladder per urethra. This passed easily.  The entire urethra was visualized which showed no masses or strictures.  The right and left ureteral orifices were identified in the normal anatomic position and were seen effluxing clear urine.  Formal cystoscopy was performed which revealed no masses or lesions suspicious for malignancy, no trabeculations, no bladder stones and no bladder diverticuli.  There was a lesion on the left blader wall that had a scar from previously healed lesion. The bladder was drained, and the patient was removed from lithotomy.     The patient tolerated the  procedure well and was transferred to recovery in stable condition.    Disposition:  The patient will follow up with Dr. Cain in the operating room for staged neuromodular implant in 4 weeks.      Bandar Lozada MD

## 2023-01-11 NOTE — ANESTHESIA PREPROCEDURE EVALUATION
2023  Jm Chaparro is a 27 y.o., male.      Pre-op Assessment          Review of Systems  Anesthesia Hx:  PONV Family Hx of Anesthesia complications: Family Anesthesia Complications are While caring for  a cancer patient, patients' Aunt(a nurse)  from touching a  Phentanl  Patch-(Aunt had Hx of Asthma & tried using her  Inhaler & it did not help in this situation, and she ). Personal Hx of Anesthesia complications, Post-Operative Nausea/Vomiting, in the past, but not with recent anesthetics / prophylaxis   Social:  No Alcohol Use, Non-Smoker    Hematology/Oncology:  Hematology Normal   Oncology Normal     EENT/Dental:   Wears glasses for distance/Possible x1-2 crowns   Cardiovascular:   Exercise tolerance: poor Hypertension hyperlipidemia Walking for exercise-daily for 30 minutes/    Pulmonary:   Sleep Apnea Does not use CPAP   Renal/:   Chronic Renal Disease renal calculi Interstitial cystitis   Hepatic/GI:   GERD Liver Disease, Fatty Liver   Musculoskeletal:   Psoriatic arthritis/ Jaw Clicking   Neurological:   Headaches Tension/ Sinus headaches   Psych:   Psychiatric History anxiety depression        Physical Exam  General: Well nourished    Airway:  Mallampati: II   Mouth Opening: Normal  Tongue: Normal  Neck ROM: Normal ROM    Dental:  Intact    Chest/Lungs:  Clear to auscultation    Rebecca Pena RN  23      Anesthesia Assessment: Preoperative EQUATION    Planned Procedure: Procedure(s) (LRB):  CYSTOSCOPY (N/A)  Requested Anesthesia Type:Monitor Anesthesia Care  Surgeon: Trino Cain MD  Service: Urology  Known or anticipated Date of Surgery:2023    Surgeon notes: reviewed and Interstitial cystitis    Previous anesthesia records:No problems and 1/3/22-Cystoscopy/Biopsy,Bladder-General - no apparent anesthetic complications and tolerated procedure well-no  nausea/vomiting    Anesthesia Hx:  Hx of Anesthetic complications  ?    Airway/Jaw/Neck:  Airway Findings: Mouth Opening: Normal Tongue: Normal  General Airway Assessment: Adult  Mallampati: I  TM Distance: Normal, at least 6 cm     ----------------------------------------------------------  **Family Hx:While caring for  a cancer patient, patients' Aunt(a nurse)  from touching a  Phentanl  Patch-(Aunt had Hx of Asthma & tried using her  Inhaler & it did not help in this situation, and she ).**    ------------------------------------------------------------  Last PCP note: 3-6 months ago , within [facility] , 22-China Pierre MD-[facility]-Hypertension, essential +2 more Dx-F/U  Subspecialty notes: Allergy, Nephrology, Pain Management, Psychiatry, Otolaryngology/ Podiatry    Other important co-morbidities: GERD, HTN, and Special Needs assessment/Interstitial cystitis     Medical History    Diagnosis Date Comment Source   Abnormal thyroid function test 2017     ADD (attention deficit disorder)      Anxiety disorder      Asperger's disorder  (AUTISM)    Deformity of both feet      Dyscalculia      Dysgraphia      Dyslexia      Eczema      Fatty liver      GERD (gastroesophageal reflux disease)      History of migraine headaches      Hypertension      Interstitial cystitis (chronic)      Irritable bowel syndrome      Nephrolithiasis      PONV (postoperative nausea and vomiting)      Psoriasis      Psoriatic arthritis      Refusal of blood transfusion for reasons of conscience      Seasonal allergies      Special needs assessment        Tests already available:  Results have been reviewed. Labs- 22-Lipid Panel/ 10/25/22-POCT Urinalysis (Instrument)      Plan: Phone pending     Testing:  BMP   Patient  has previously scheduled Medical Appointment:None    Navigation: Phone Completed                      Tests Scheduled. BMP done 23,  -pending anesthesia review             Consults  scheduled.Reviewed with anesthesia.             Results will be tracked by Preop Clinic.                   Rebecca Pena RN  1/4/23 & 1/10/23        Anesthesia Plan  Type of Anesthesia, risks & benefits discussed:    Anesthesia Type: Gen ETT, Gen Supraglottic Airway  Intra-op Monitoring Plan: Standard ASA Monitors  Post Op Pain Control Plan: multimodal analgesia  Airway Plan: Direct  Informed Consent: Informed consent signed with the Patient representative and all parties understand the risks and agree with anesthesia plan.  All questions answered.   ASA Score: 2    Ready For Surgery From Anesthesia Perspective.       .

## 2023-01-11 NOTE — DISCHARGE SUMMARY
[facility] (1st Fl)  Discharge Note  Short Stay    Procedure(s):  CYSTOSCOPY      OUTCOME: Patient tolerated treatment/procedure well without complication and is now ready for discharge.    DISPOSITION: Home or Self Care    FINAL DIAGNOSIS:  Bladder pain    FOLLOWUP: In clinic    DISCHARGE INSTRUCTIONS:    Discharge Procedure Orders   Notify your health care provider if you experience any of the following:  temperature >100.4     Notify your health care provider if you experience any of the following:  persistent nausea and vomiting or diarrhea     Notify your health care provider if you experience any of the following:  severe uncontrolled pain     Notify your health care provider if you experience any of the following:  redness, tenderness, or signs of infection (pain, swelling, redness, odor or green/yellow discharge around incision site)     Notify your health care provider if you experience any of the following:  worsening rash     Notify your health care provider if you experience any of the following:  persistent dizziness, light-headedness, or visual disturbances        TIME SPENT ON DISCHARGE: 10 minutes

## 2023-01-12 ENCOUNTER — OFFICE VISIT (OUTPATIENT)
Dept: PAIN MEDICINE | Facility: CLINIC | Age: 28
End: 2023-01-12
Payer: COMMERCIAL

## 2023-01-12 VITALS
OXYGEN SATURATION: 93 % | DIASTOLIC BLOOD PRESSURE: 77 MMHG | WEIGHT: 238.88 LBS | HEART RATE: 68 BPM | BODY MASS INDEX: 31.66 KG/M2 | SYSTOLIC BLOOD PRESSURE: 139 MMHG | HEIGHT: 73 IN

## 2023-01-12 DIAGNOSIS — R39.89 BLADDER PAIN: ICD-10-CM

## 2023-01-12 DIAGNOSIS — N30.10 IC (INTERSTITIAL CYSTITIS): Primary | ICD-10-CM

## 2023-01-12 DIAGNOSIS — R30.0 DYSURIA: ICD-10-CM

## 2023-01-12 PROCEDURE — 99999 PR PBB SHADOW E&M-EST. PATIENT-LVL III: CPT | Mod: PBBFAC,,, | Performed by: ANESTHESIOLOGY

## 2023-01-12 PROCEDURE — 3075F PR MOST RECENT SYSTOLIC BLOOD PRESS GE 130-139MM HG: ICD-10-PCS | Mod: CPTII,S$GLB,, | Performed by: ANESTHESIOLOGY

## 2023-01-12 PROCEDURE — 99213 PR OFFICE/OUTPT VISIT, EST, LEVL III, 20-29 MIN: ICD-10-PCS | Mod: S$GLB,,, | Performed by: ANESTHESIOLOGY

## 2023-01-12 PROCEDURE — 3078F DIAST BP <80 MM HG: CPT | Mod: CPTII,S$GLB,, | Performed by: ANESTHESIOLOGY

## 2023-01-12 PROCEDURE — 3008F PR BODY MASS INDEX (BMI) DOCUMENTED: ICD-10-PCS | Mod: CPTII,S$GLB,, | Performed by: ANESTHESIOLOGY

## 2023-01-12 PROCEDURE — 99213 OFFICE O/P EST LOW 20 MIN: CPT | Mod: PBBFAC,PN | Performed by: ANESTHESIOLOGY

## 2023-01-12 PROCEDURE — 3008F BODY MASS INDEX DOCD: CPT | Mod: CPTII,S$GLB,, | Performed by: ANESTHESIOLOGY

## 2023-01-12 PROCEDURE — 99213 OFFICE O/P EST LOW 20 MIN: CPT | Mod: S$GLB,,, | Performed by: ANESTHESIOLOGY

## 2023-01-12 PROCEDURE — 3075F SYST BP GE 130 - 139MM HG: CPT | Mod: CPTII,S$GLB,, | Performed by: ANESTHESIOLOGY

## 2023-01-12 PROCEDURE — 99999 PR PBB SHADOW E&M-EST. PATIENT-LVL III: ICD-10-PCS | Mod: PBBFAC,,, | Performed by: ANESTHESIOLOGY

## 2023-01-12 PROCEDURE — 3078F PR MOST RECENT DIASTOLIC BLOOD PRESSURE < 80 MM HG: ICD-10-PCS | Mod: CPTII,S$GLB,, | Performed by: ANESTHESIOLOGY

## 2023-01-12 NOTE — PROGRESS NOTES
This note was completed with dictation software and grammatical errors may exist.    Chief Complaint   Patient presents with    Follow-up        HPI: Jm Chaparro is a 27 y.o. year old male patient who has a past medical history of Abnormal thyroid function test, ADD (attention deficit disorder), Anxiety disorder, Asperger's disorder, Deformity of both feet, Dyscalculia, Dysgraphia, Dyslexia, Eczema, Fatty liver, GERD (gastroesophageal reflux disease), History of migraine headaches, Hypertension, Interstitial cystitis (chronic), Irritable bowel syndrome, Nephrolithiasis, PONV (postoperative nausea and vomiting), Psoriasis, Psoriatic arthritis, Refusal of blood transfusion for reasons of conscience, Seasonal allergies, and Special needs assessment. He presents in referral from No ref. provider found for abdominal and bladder pain.  He returns in follow-up today with his mom.  He had just undergone a cystoscope yesterday in preparation for a sacral stimulation trial that he is going to be having.  He is reporting great deal of increased pain and bladder spasms today due to the cystoscope yesterday.  Otherwise he has been having continued pain, tramadol help some but still has not really improved his overall functioning and quality of life.      Previous history:  The patient's mother was present with him at the visit today who also helps with most of the history.  She reports that in March, 2015 she had gone out of town on a business trip and her son accompanying her.  Unfortunately he came down with cold and virus symptoms at the time and began developing severe abdominal pain.  She went to the emergency department at that time in [place], they were told he was having viral symptoms.  She returned to the [facility] in  and had followed up with several other physicians that year.  She reports that initially he was having intermittent pain on and off, would have pain for a week and then it would disappear  without cause.  He then developed diarrhea and constipation at times, he was seen at [facility] in [place] in 2015 and diagnosed with IBS with constipation and diarrhea.  They recommend an antidepressant that he tried, no relief with this.  Shortly after he began having renal calculi as well and needed to present to the emergency department.  He had seen neurology, Dr. Larry and reports that he required stone removal, does not sound like he has had lithotripsy.  He has seen Dr. Dickson, and now Nephrology, Dr. Enriquez.  He has seen Gastroenterology, Dr. Shaver and had undergone colonoscopy with no significant findings.  He has been tried on multiple medications over time as discussed below without any benefit.  He continues to have frequent abdominal pain that is often worse with caffeine or other foods, worse with activity.  He reports that he gets some relief with a bowel movement.  He still has been passing small sand like renal calculi, they report that his urine turns dark when he is about to pass a stone and he often has pain throughout the bladder and urethra after passing a stone.    He has a history of rheumatoid arthritis, has been seeing a rheumatologist, reports pain throughout his entire spine, bilateral hips, shoulders, knees, feet.  He does well with being in a bath with hot water.    Pain intervention history:  No injections    Spine surgeries:  None    Antineuropathics: Gabapentin 300mg three times daily, was given this for inflammatory joint pain??  NSAIDs: Mobic 15, no benefit  Physical therapy:  Has done some physical therapy, water therapy in the past with some improvement but things worsened when he did land-based therapy and stretching  Antidepressants: Fluoxetine 40mg  Muscle relaxers:  Opioids:  The patient reports having benefit from tramadol in the past  Antiplatelets/Anticoagulants:  From Dr. Vyas note:  Prozac 40mg po qam, adderall xr 20mg po qam (very rare use), lunesta 2mg po  "[facility] PRN insomnia  Past Psych Meds: adderall ("robot child"), focalin ("zombie"), strattera (angry), zoloft (as a child- ineffective), lexapro (ineffective) topomax ("migraines"), celexa (overly sedating but effective), remeron (ineffective- wgt gain), atarax, elavil (ineffective for pain), ritalin (inc'd bp)     ROS:  He reports fatigue, weight gain, itching, color change, headaches head trauma, ear pain, constipation, diarrhea, stomach pain, nausea, flank pain, urinary urgency and frequency, painful urination, joint stiffness, joint swelling, back pain, difficulty sleeping and anxiety.  Balance of review of systems is negative.    Lab Results   Component Value Date    HGBA1C 5.2 08/29/2022       Lab Results   Component Value Date    WBC 10.24 08/29/2022    HGB 15.5 08/29/2022    HCT 44.0 08/29/2022    MCV 94 08/29/2022     08/29/2022             Past Medical History:   Diagnosis Date    Abnormal thyroid function test 09/11/2017    ADD (attention deficit disorder)     Anxiety disorder     Asperger's disorder     (AUTISM)    Deformity of both feet     Dyscalculia     Dysgraphia     Dyslexia     Eczema     Fatty liver     GERD (gastroesophageal reflux disease)     History of migraine headaches     Hypertension     Interstitial cystitis (chronic)     Irritable bowel syndrome     Nephrolithiasis     2014    PONV (postoperative nausea and vomiting)     Psoriasis     Psoriatic arthritis     Refusal of blood transfusion for reasons of conscience     Seasonal allergies     Special needs assessment        Past Surgical History:   Procedure Laterality Date    BIOPSY OF BLADDER  1/3/2022    Procedure: BIOPSY, BLADDER;  Surgeon: Derek Dickson MD;  Location: [facility] OR;  Service: Urology;;    COLONOSCOPY  ~2015    [facility]; told IBS    COLONOSCOPY  08/10/2015    Dr. Shaver; sent for scanning: unremarkable findings, no specimens collected    COLONOSCOPY N/A 2/18/2019    Procedure: COLONOSCOPY;  Surgeon: Puma BETHEA" Mohan Fermin MD;  Location: [facility];  Service: Endoscopy;  Laterality: N/A;    UPPER GASTROINTESTINAL ENDOSCOPY  05/14/2012    Dr. Preston    UPPER GASTROINTESTINAL ENDOSCOPY  07/17/2015    Dr. Shaver, sent for scanning: normal esophagus- dilated & biopsied, findings WNL, biopsies taken from z-line, stomach and duodenum; biopsy: duodenum WNL, antrum reactive gastropathy, negative for h pylori or int. metaplasia, GEJ WNL, negative for EOE & cotton's esophagus    ureteral stone extraction      basket extraction       Social History     Socioeconomic History    Marital status: Single   Tobacco Use    Smoking status: Never    Smokeless tobacco: Never   Substance and Sexual Activity    Alcohol use: No    Drug use: No    Sexual activity: Never     Social Determinants of Health     Financial Resource Strain: Unknown    Difficulty of Paying Living Expenses: Patient refused   Food Insecurity: Unknown    Worried About Running Out of Food in the Last Year: Patient refused    Ran Out of Food in the Last Year: Patient refused   Transportation Needs: No Transportation Needs    Lack of Transportation (Medical): No    Lack of Transportation (Non-Medical): No   Physical Activity: Insufficiently Active    Days of Exercise per Week: 1 day    Minutes of Exercise per Session: 20 min   Stress: Stress Concern Present    Feeling of Stress : To some extent   Social Connections: Unknown    Frequency of Communication with Friends and Family: More than three times a week    Frequency of Social Gatherings with Friends and Family: Once a week    Active Member of Clubs or Organizations: No    Attends Club or Organization Meetings: Patient refused    Marital Status: Never    Housing Stability: Unknown    Unable to Pay for Housing in the Last Year: Patient refused    Number of Places Lived in the Last Year: 1    Unstable Housing in the Last Year: No         Medications/Allergies: See med card    Vitals:    01/12/23 0932   BP: 139/77   Pulse:  "68   SpO2: (!) 93%   Weight: 108.3 kg (238 lb 13.9 oz)   Height: 6' 1" (1.854 m)   PainSc:   9   PainLoc: Abdomen     Body mass index is 31.51 kg/m².    Physical exam:  Gen: A and O x3, pleasant, well-groomed  Skin: No rashes or obvious lesions  HEENT: PERRLA, no obvious deformities on ears or in canals. Trachea midline.  CVS: Regular rate and rhythm, normal palpable pulses.  Resp:No increased work of breathing, symmetrical chest rise.  Abdomen: Soft, NT/ND.  Musculoskeletal:  Moving all extremities equally      Imagin22 CT renal protocol:  Liver normal enhancement with no focal lesion.  Gallbladder, pancreas, stomach, spleen, adrenal glands normal.  Kidneys normal size and contour with no nephrolithiasis, hydronephrosis, or ureteral obstruction.  Aorta tapers normally.  No bowel obstruction, ascites, inflammatory change.  Appendix normal.  Bladder and rectum normal.  No significant bladder wall thickening evident.  Bones are intact.  Lung bases clear.    Assessment:  Jm Chaparro is a 27 y.o. year old male patient who has a past medical history of Abnormal thyroid function test, ADD (attention deficit disorder), Anxiety disorder, Asperger's disorder, Deformity of both feet, Dyscalculia, Dysgraphia, Dyslexia, Eczema, Fatty liver, GERD (gastroesophageal reflux disease), History of migraine headaches, Hypertension, Interstitial cystitis (chronic), Irritable bowel syndrome, Nephrolithiasis, PONV (postoperative nausea and vomiting), Psoriasis, Psoriatic arthritis, Refusal of blood transfusion for reasons of conscience, Seasonal allergies, and Special needs assessment. He presents in referral from Dr. Derek Dickson for bladder pain.    1. IC (interstitial cystitis)        2. Bladder pain        3. Dysuria            Plan:  1. We discussed that he should call his urology office to find out if there is any medication that he can take for the increased bladder spasms today.  2.  I have refilled " his tramadol, he will follow up in 2-3 months or sooner as needed.  He is going to be having a sacral stimulator trial, hopefully this will provide some benefit. [place] Board of Pharmacy website checked and no aberrant patterns noted.

## 2023-01-13 NOTE — ANESTHESIA PAT ROS NOTE
2023  Jm Chaparro is a 27 y.o., male.      Pre-op Assessment          Review of Systems  Anesthesia Hx:    Pt. -PONV /Post-Operative Nausea/Vomiting, in the past, but not with recent anesthetics / prophylaxis                  Family Hx of Anesthesia complications:  Family Anesthesia Complications are Family Hx of Anesthesia complications:  Family Anesthesia Complications are While caring for  a cancer patient, patients' Aunt(a nurse)  from touching a  Phentanl  Patch-(Aunt had Hx of Asthma & tried using her  Inhaler & it did not help in this situation, and she ). prophylaxis                   Personal Hx of Anesthesia complications, Post-Operative Nausea/Vomiting, in the past, but not with recent anesthetics / prophylaxis                    Social:  Non-Smoker, No Alcohol Use       Hematology/Oncology:  Hematology Normal   Oncology Normal                                   EENT/Dental:   Wears glasses for distance/Possible x1-2 crowns/Jaw Clicking          Cardiovascular:  Exercise tolerance: poor   Hypertension           hyperlipidemia    Walking for exercise daily for 30 minutes                         Pulmonary:        Sleep Apnea Does not use CPAP               Renal/:  Chronic Renal Disease renal calculi  Urinary frequency/Interstitial cystitis             Hepatic/GI:     GERD Liver Disease,  Fatty Liver          Musculoskeletal:  Arthritis   Psoriatic            Neurological:      Headaches     Tension/Sinus headaches                            Endocrine:  Endocrine Normal            Psych:  Psychiatric History anxiety depression             Rebecca Pena, RN  23      Anesthesia Assessment: Preoperative EQUATION    Planned Procedure: Procedure(s) (LRB):  INSERTION, NEUROSTIMULATOR, TEMPORARY, SACRAL (N/A)  Requested Anesthesia Type:General  Surgeon: Trino PARSONS  MD Ant  Service: Urology  Known or anticipated Date of Surgery:1/24/2023    Surgeon notes: reviewed and Urinary frequency    Previous anesthesia records:No problems and Urinary frequency    Last PCP note: 3-6 months ago , within [facility] , 9/26/22-China Pierre MD-[facility]-Hypertension, essential +2 more Dx-F/U  Subspecialty notes: Allergy, Nephrology, Pain Management, Psychiatry, Otolaryngology/Podiatry    Other important co-morbidities: GERD, HTN, and Urinary frequency  Special needs Assessment      Medical History    Diagnosis Date Comment Source   Abnormal thyroid function test 09/11/2017     ADD (attention deficit disorder)      Anxiety disorder      Asperger's disorder  (AUTISM)    Deformity of both feet      Dyscalculia      Dysgraphia      Dyslexia      Eczema      Fatty liver      GERD (gastroesophageal reflux disease)      History of migraine headaches      Hypertension      Interstitial cystitis (chronic)      Irritable bowel syndrome      Nephrolithiasis  2014    PONV (postoperative nausea and vomiting)      Psoriasis      Psoriatic arthritis      Refusal of blood transfusion for reasons of conscience      Seasonal allergies      Special needs assessment        Tests already available:  Results have been reviewed. Labs-1/6/23-BMP/12/5/22-Lipid Panel/ 10/25/22-POCT UA(Instrument)/ CXR-3/20/19        Plan: Phone pending     Testing:  None Needed   Patient  has previously scheduled Medical Appointment:None    Navigation: Phone Completed                                    Consults scheduled.None needed at this time.                           Straight Line to surgery.               No tests, anesthesia preop clinic visit, or consult required.      Rebecca Pena RN  1/13/23

## 2023-01-23 ENCOUNTER — ANESTHESIA EVENT (OUTPATIENT)
Dept: SURGERY | Facility: HOSPITAL | Age: 28
End: 2023-01-23
Payer: COMMERCIAL

## 2023-01-23 ENCOUNTER — TELEPHONE (OUTPATIENT)
Dept: UROLOGY | Facility: CLINIC | Age: 28
End: 2023-01-23
Payer: COMMERCIAL

## 2023-01-23 NOTE — ANESTHESIA POSTPROCEDURE EVALUATION
Anesthesia Post Evaluation    Patient: Jm Chaparro    Procedure(s) Performed: Procedure(s):  CYSTOSCOPY    Final Anesthesia Type: general      Patient location during evaluation: PACU  Patient participation: Yes- Able to Participate  Level of consciousness: awake and alert  Post-procedure vital signs: reviewed and stable  Pain management: adequate  Airway patency: patent    PONV status at discharge: No PONV  Anesthetic complications: no      Cardiovascular status: blood pressure returned to baseline  Respiratory status: unassisted  Hydration status: euvolemic  Follow-up not needed.          Vitals Value Taken Time   /68 01/11/23 1045   Temp 36.6 °C (97.9 °F) 01/11/23 0910   Pulse 59 01/11/23 1045   Resp 16 01/11/23 1045   SpO2 96 % 01/11/23 1045         No case tracking events are documented in the log.      Pain/Antonio Score: No data recorded

## 2023-01-24 ENCOUNTER — HOSPITAL ENCOUNTER (OUTPATIENT)
Facility: HOSPITAL | Age: 28
Discharge: HOME | End: 2023-01-24
Attending: UROLOGY | Admitting: UROLOGY
Payer: COMMERCIAL

## 2023-01-24 ENCOUNTER — ANESTHESIA (OUTPATIENT)
Dept: SURGERY | Facility: HOSPITAL | Age: 28
End: 2023-01-24
Payer: COMMERCIAL

## 2023-01-24 VITALS
WEIGHT: 230 LBS | SYSTOLIC BLOOD PRESSURE: 118 MMHG | OXYGEN SATURATION: 94 % | TEMPERATURE: 98 F | HEIGHT: 73 IN | HEART RATE: 65 BPM | DIASTOLIC BLOOD PRESSURE: 77 MMHG | BODY MASS INDEX: 30.48 KG/M2 | RESPIRATION RATE: 19 BRPM

## 2023-01-24 DIAGNOSIS — R39.15 URINARY URGENCY: Primary | ICD-10-CM

## 2023-01-24 PROBLEM — R35.0 URINARY FREQUENCY: Status: ACTIVE | Noted: 2023-01-24

## 2023-01-24 PROCEDURE — 36000706: Performed by: UROLOGY

## 2023-01-24 PROCEDURE — D9220A PRA ANESTHESIA: ICD-10-PCS | Mod: CRNA,,, | Performed by: STUDENT IN AN ORGANIZED HEALTH CARE EDUCATION/TRAINING PROGRAM

## 2023-01-24 PROCEDURE — 25000003 PHARM REV CODE 250: Performed by: STUDENT IN AN ORGANIZED HEALTH CARE EDUCATION/TRAINING PROGRAM

## 2023-01-24 PROCEDURE — 64561 PR PERCUT IMPLANT,NEUROELEC,SACRAL NERVE: ICD-10-PCS | Mod: RT,,, | Performed by: UROLOGY

## 2023-01-24 PROCEDURE — D9220A PRA ANESTHESIA: Mod: CRNA,,, | Performed by: STUDENT IN AN ORGANIZED HEALTH CARE EDUCATION/TRAINING PROGRAM

## 2023-01-24 PROCEDURE — 63600175 PHARM REV CODE 636 W HCPCS: Performed by: STUDENT IN AN ORGANIZED HEALTH CARE EDUCATION/TRAINING PROGRAM

## 2023-01-24 PROCEDURE — 63600175 PHARM REV CODE 636 W HCPCS: Performed by: ANESTHESIOLOGY

## 2023-01-24 PROCEDURE — C1767 GENERATOR, NEURO NON-RECHARG: HCPCS | Performed by: UROLOGY

## 2023-01-24 PROCEDURE — 71000015 HC POSTOP RECOV 1ST HR: Performed by: UROLOGY

## 2023-01-24 PROCEDURE — 36000707: Performed by: UROLOGY

## 2023-01-24 PROCEDURE — 37000009 HC ANESTHESIA EA ADD 15 MINS: Performed by: UROLOGY

## 2023-01-24 PROCEDURE — 25000003 PHARM REV CODE 250

## 2023-01-24 PROCEDURE — 71000016 HC POSTOP RECOV ADDL HR: Performed by: UROLOGY

## 2023-01-24 PROCEDURE — 71000044 HC DOSC ROUTINE RECOVERY FIRST HOUR: Performed by: UROLOGY

## 2023-01-24 PROCEDURE — 64561 IMPLANT NEUROELECTRODES: CPT | Mod: RT,,, | Performed by: UROLOGY

## 2023-01-24 PROCEDURE — C1778 LEAD, NEUROSTIMULATOR: HCPCS | Performed by: UROLOGY

## 2023-01-24 PROCEDURE — 37000008 HC ANESTHESIA 1ST 15 MINUTES: Performed by: UROLOGY

## 2023-01-24 PROCEDURE — C1883 ADAPT/EXT, PACING/NEURO LEAD: HCPCS | Performed by: UROLOGY

## 2023-01-24 PROCEDURE — 25000003 PHARM REV CODE 250: Performed by: UROLOGY

## 2023-01-24 PROCEDURE — D9220A PRA ANESTHESIA: Mod: ANES,,, | Performed by: ANESTHESIOLOGY

## 2023-01-24 PROCEDURE — D9220A PRA ANESTHESIA: ICD-10-PCS | Mod: ANES,,, | Performed by: ANESTHESIOLOGY

## 2023-01-24 DEVICE — LEAD INTERSTIM 2 SURESCAN 28CM: Type: IMPLANTABLE DEVICE | Site: BACK | Status: FUNCTIONAL

## 2023-01-24 RX ORDER — LIDOCAINE HYDROCHLORIDE 20 MG/ML
INJECTION, SOLUTION EPIDURAL; INFILTRATION; INTRACAUDAL; PERINEURAL
Status: DISCONTINUED | OUTPATIENT
Start: 2023-01-24 | End: 2023-01-24

## 2023-01-24 RX ORDER — OXYCODONE HYDROCHLORIDE 5 MG/1
5 TABLET ORAL EVERY 4 HOURS PRN
Qty: 5 TABLET | Refills: 0 | Status: SHIPPED | OUTPATIENT
Start: 2023-01-24 | End: 2023-03-26

## 2023-01-24 RX ORDER — PROPOFOL 10 MG/ML
VIAL (ML) INTRAVENOUS
Status: DISCONTINUED | OUTPATIENT
Start: 2023-01-24 | End: 2023-01-24

## 2023-01-24 RX ORDER — ONDANSETRON 2 MG/ML
INJECTION INTRAMUSCULAR; INTRAVENOUS
Status: DISCONTINUED | OUTPATIENT
Start: 2023-01-24 | End: 2023-01-24

## 2023-01-24 RX ORDER — LIDOCAINE HYDROCHLORIDE 10 MG/ML
1 INJECTION, SOLUTION EPIDURAL; INFILTRATION; INTRACAUDAL; PERINEURAL ONCE AS NEEDED
Status: COMPLETED | OUTPATIENT
Start: 2023-01-24 | End: 2023-01-24

## 2023-01-24 RX ORDER — HYDROMORPHONE HYDROCHLORIDE 1 MG/ML
0.2 INJECTION, SOLUTION INTRAMUSCULAR; INTRAVENOUS; SUBCUTANEOUS EVERY 5 MIN PRN
Status: DISCONTINUED | OUTPATIENT
Start: 2023-01-24 | End: 2023-01-24 | Stop reason: HOSPADM

## 2023-01-24 RX ORDER — DEXAMETHASONE SODIUM PHOSPHATE 4 MG/ML
INJECTION, SOLUTION INTRA-ARTICULAR; INTRALESIONAL; INTRAMUSCULAR; INTRAVENOUS; SOFT TISSUE
Status: DISCONTINUED | OUTPATIENT
Start: 2023-01-24 | End: 2023-01-24

## 2023-01-24 RX ORDER — FENTANYL CITRATE 50 UG/ML
INJECTION, SOLUTION INTRAMUSCULAR; INTRAVENOUS
Status: DISCONTINUED | OUTPATIENT
Start: 2023-01-24 | End: 2023-01-24

## 2023-01-24 RX ORDER — CIPROFLOXACIN 2 MG/ML
400 INJECTION, SOLUTION INTRAVENOUS
Status: COMPLETED | OUTPATIENT
Start: 2023-01-24 | End: 2023-01-24

## 2023-01-24 RX ORDER — BUPIVACAINE HYDROCHLORIDE AND EPINEPHRINE 5; 5 MG/ML; UG/ML
INJECTION, SOLUTION EPIDURAL; INTRACAUDAL; PERINEURAL
Status: DISCONTINUED | OUTPATIENT
Start: 2023-01-24 | End: 2023-01-24 | Stop reason: HOSPADM

## 2023-01-24 RX ORDER — ROCURONIUM BROMIDE 10 MG/ML
INJECTION, SOLUTION INTRAVENOUS
Status: DISCONTINUED | OUTPATIENT
Start: 2023-01-24 | End: 2023-01-24

## 2023-01-24 RX ORDER — MIDAZOLAM HYDROCHLORIDE 1 MG/ML
INJECTION, SOLUTION INTRAMUSCULAR; INTRAVENOUS
Status: DISCONTINUED | OUTPATIENT
Start: 2023-01-24 | End: 2023-01-24

## 2023-01-24 RX ORDER — DEXMEDETOMIDINE HYDROCHLORIDE 100 UG/ML
INJECTION, SOLUTION INTRAVENOUS
Status: DISCONTINUED | OUTPATIENT
Start: 2023-01-24 | End: 2023-01-24

## 2023-01-24 RX ORDER — SODIUM CHLORIDE 0.9 % (FLUSH) 0.9 %
3 SYRINGE (ML) INJECTION
Status: DISCONTINUED | OUTPATIENT
Start: 2023-01-24 | End: 2023-01-24 | Stop reason: HOSPADM

## 2023-01-24 RX ORDER — LIDOCAINE HYDROCHLORIDE 10 MG/ML
INJECTION, SOLUTION EPIDURAL; INFILTRATION; INTRACAUDAL; PERINEURAL
Status: COMPLETED
Start: 2023-01-24 | End: 2023-01-24

## 2023-01-24 RX ADMIN — HYDROMORPHONE HYDROCHLORIDE 0.2 MG: 1 INJECTION, SOLUTION INTRAMUSCULAR; INTRAVENOUS; SUBCUTANEOUS at 10:01

## 2023-01-24 RX ADMIN — SUGAMMADEX 400 MG: 100 INJECTION, SOLUTION INTRAVENOUS at 07:01

## 2023-01-24 RX ADMIN — PROPOFOL 200 MG: 10 INJECTION, EMULSION INTRAVENOUS at 07:01

## 2023-01-24 RX ADMIN — DEXMEDETOMIDINE HYDROCHLORIDE 8 MCG: 100 INJECTION, SOLUTION INTRAVENOUS at 08:01

## 2023-01-24 RX ADMIN — LIDOCAINE HYDROCHLORIDE 100 MG: 20 INJECTION, SOLUTION EPIDURAL; INFILTRATION; INTRACAUDAL; PERINEURAL at 07:01

## 2023-01-24 RX ADMIN — CIPROFLOXACIN 400 MG: 2 INJECTION, SOLUTION INTRAVENOUS at 07:01

## 2023-01-24 RX ADMIN — LIDOCAINE HYDROCHLORIDE 10 MG: 10 INJECTION, SOLUTION EPIDURAL; INFILTRATION; INTRACAUDAL; PERINEURAL at 06:01

## 2023-01-24 RX ADMIN — DEXAMETHASONE SODIUM PHOSPHATE 4 MG: 4 INJECTION INTRA-ARTICULAR; INTRALESIONAL; INTRAMUSCULAR; INTRAVENOUS; SOFT TISSUE at 07:01

## 2023-01-24 RX ADMIN — ONDANSETRON 4 MG: 2 INJECTION INTRAMUSCULAR; INTRAVENOUS at 08:01

## 2023-01-24 RX ADMIN — GLYCOPYRROLATE 0.2 MG: 0.2 INJECTION INTRAMUSCULAR; INTRAVENOUS at 07:01

## 2023-01-24 RX ADMIN — FENTANYL CITRATE 100 MCG: 50 INJECTION, SOLUTION INTRAMUSCULAR; INTRAVENOUS at 07:01

## 2023-01-24 RX ADMIN — SODIUM CHLORIDE: 9 INJECTION, SOLUTION INTRAVENOUS at 07:01

## 2023-01-24 RX ADMIN — MIDAZOLAM 2 MG: 1 INJECTION INTRAMUSCULAR; INTRAVENOUS at 07:01

## 2023-01-24 RX ADMIN — ROCURONIUM BROMIDE 50 MG: 10 INJECTION INTRAVENOUS at 07:01

## 2023-01-24 NOTE — ANESTHESIA PREPROCEDURE EVALUATION
01/24/2023  Jm Chaparro is a 27 y.o., male.      Pre-op Assessment    I have reviewed the Patient Summary Reports.          Review of Systems  Anesthesia Hx:  No problems with previous Anesthesia    Social:  Non-Smoker    Hematology/Oncology:  Hematology Normal   Oncology Normal     EENT/Dental:EENT/Dental Normal   Cardiovascular:   Hypertension    Pulmonary:  Pulmonary Normal    Renal/:  Renal/ Normal     Hepatic/GI:   GERD    Musculoskeletal:  Musculoskeletal Normal    Neurological:  Neurology Normal    Endocrine:  Endocrine Normal    Dermatological:  Skin Normal    Psych:   anxiety          Physical Exam  General: Alert and Oriented    Airway:  Mallampati: II / II  Mouth Opening: Normal  TM Distance: Normal  Tongue: Normal  Neck ROM: Normal ROM    Dental:  Intact    Chest/Lungs:  Clear to auscultation, Normal Respiratory Rate    Heart:  Rate: Normal  Rhythm: Regular Rhythm  Sounds: Normal        Anesthesia Plan  Type of Anesthesia, risks & benefits discussed:    Anesthesia Type: Gen ETT, MAC  Intra-op Monitoring Plan: Standard ASA Monitors  Post Op Pain Control Plan: multimodal analgesia  Airway Plan: Direct  Informed Consent: Informed consent signed with the Patient and all parties understand the risks and agree with anesthesia plan.  All questions answered.   ASA Score: 2    Ready For Surgery From Anesthesia Perspective.     .

## 2023-01-24 NOTE — PATIENT INSTRUCTIONS
Interstim Post-Operative Care    You play an important role in your own recovery. You will be given a daily diary to document the  effectiveness of the Interstim implant.     Caring for Your Wound   After surgery you will have a dressing over the surgical site.  Do not remove or change the dressing. If your dressing becomes saturated or undone, you  may reinforce it with more tape and/or gauze but you should call the office to be evaluated.    Post Surgical Pain Management   It is normal to experience some discomfort post operatively, however the stimulation should not  be painful.   Take Tylenol 650mg 1-2 tabs every 4-6 hours as needed if you feel tenderness from surgical  incision (Do not to exceed 4000mg daily) or Motrin 200mg 1-2 tabs every 4-6 hours.  Warning: Do not take additional Tylenol while taking Percocet or Vicodin. All of these  products contain Acetaminophen, which can be toxic if taken in excess.    Stimulation   Stimulation is the pulling or tingling sensation felt in the pelvic area (near the vagina, scrotum  or anal area.) It should not be painful. If it is painful or you feel the stimulation sensation move  down the legs decrease the stimulation and call the office and speak to a nurse.   During the trial period (stage 1) you may notice that the Interstim device has improved your  continence which means it is working and on the correct setting. If you are having episodes of  incontinence you will need to increase your device setting by moving the dial up slowly.    Activity   Avoid heavy lifting or strenuous activity for 14 days after your surgery.   Frontal Showers or Sponge bath only for two weeks after each surgery.  Avoid immersion in any water until after your post-op appointment.    Symptoms to Report   Severe or worsening pain, unrelieved by pain medications.   Fever, greater than 101.5ºF, chills or sweats   Painful or problems urinating   Any problems with the device    If you  experience any of the above symptoms, call the [facility] urology clinic at [phone] during business hours on weekdays. If after hours or on weekends, call [phone] and ask to speak with the urology resident on call.    You may also call the Social 2 Step Patient Help Line for specific help troubleshooting your device.  For help with the temporary implant Call [phone].  For help with the permanent implant Call [phone].

## 2023-01-24 NOTE — H&P
[facility] Department of U[facility]      New Urinary Frequency Note    1/24/2023    Referred by:  Jasmeet Benito MD    HPI: Jm Chaparro is a very pleasant 27 y.o. male who has not previously been seen by an [facility] specialist in our department referred for evaluation of urinary frequency of several years duration. He reports bother is associated with urinary daytime frequency (10-12x daily), with nocturia (3-4x per night) and with urgency that results in urinary incontinence occasionally . He reports no stress urinary incontinence associated with exertion. He does not require daily pad use.  He has decreased overall fluid intake.  He reports urinary frequency is day and night but more predominant during the day. Patient reports urgency is independent of position. His urinary frequency is largely driven by pain.     He reports symptoms of irritative voiding including dysuria. He experiences pain with most voids.  He reports symptoms of obstructive voiding including decreased stream, hesitancy, intermittency, and post void dribbling. Bladder scan PVR was 95 mL.     He has autism spectrum disorder, IBS, and has been diagnosed with possible IC.    Patient underwent cystoscopy in the operating room on 1/11/23 which revealed a scar from his previous bladder biopsy but no lesions concerning for carcinoma.    His previous evaluations were reviewed in detail today. He has been on elmiron but stopped after no benefit. He has been on Myrbetriq and ditropan without improvement. He was previously offered bladder instillations but could not tolerate catheterization in the office.  He has seen pain management. His cystoscopy report from January 2022 reports an area that was biopsied showing only chronic inflammation. I'm not clear if this may have represented a hunner lesion. His symptoms did not improve following that procedure.     Numerous urine cultures without evidence of infection.     Previous Therapies  Behavioral  Therapy: (fluid/dietary modification timed voiding/bladder training) -  provided no benefit  Medication:  oral oxybutynin ER 15 mg (>1 year) (provided no benefit)  Myrbetriq 50 mg daily (5 months) (provided no benefit)   Elmiron 100 mg TID  (>1 year) (no improvement)  Bladder instillations - did not tolerate    A review of 10+ systems was conducted with pertinent positive and negative findings documented in HPI with all other systems reviewed and negative.    Past medical, family, surgical and social history reviewed as documented in chart with pertinent positive medical, family, surgical and social history detailed in HPI.    Exam Findings:    Const: no acute distress, conversant and alert  Eyes: anicteric, extraocular muscles intact  ENMT: normocephalic, Nl oral membranes  Cardio: no cyanosis, nl cap refill  Pulm: no tachypnea; no resp distress  Musc: no laceration, no tenderness  Neuro: alert; oriented x 3  Skin: warm, dry; no petichiae  Psych: no anxiety; normal speech     Assessment/Plan:    Urinary Frequency  (new, addt'l workup): His urinary frequency and urgency appears largely driven by pain. No evidence of infection. There was a urinalysis with high RBC around the time of prior cystoscopy. Could the noted lesion have been a hunner lesion? Given the possibility and the severity of his symptoms, would recommend cystoscopy with fulguration/injection if HL seen. He will not tolerate this without sedation.     If no HL, we discussed proceeding with staged testing of SNM. This would potentially address both the frequency and urgency as well as the dysfunctional voiding.     This patient will be scheduled for staged implantation of lead (CPT 52596) as a 1-2 week test of whether there is sufficient response to justify implantation of a permanent generator (CPT 40669). Please note that the placement of a staged  shares the same CPT code (09204) as placement of a permanent lead and generator (additional CPT  81470) after a successful basic evaluation test . This patient must demonstrate at least 50% improvement in symptoms to proceed to generator implantation, which is the purpose of this test. Clinical data to support the placement of the patient generator (CPT 28057) can only be supplied after placement of the lead (CPT 44525) in this testing scenario.     Interval History 1/24/23:  - No concerning lesions on cystoscopic evaluation 2 weeks ago  - Consent obtained via mother in preoperative area  - Would like to proceed with planned sacral neuromodulator implantation

## 2023-01-24 NOTE — PLAN OF CARE
Patient is stable and ready for discharge. Instructions given to patient and mother. Patient and mother will  Rx from outpatient pharmacy. Questions answered. Patient tolerating po liquids with no difficulty. Patient has no pain or states it is a tolerable level for them.

## 2023-01-24 NOTE — ANESTHESIA PROCEDURE NOTES
Intubation    Date/Time: 1/24/2023 7:17 AM  Performed by: Estefania Wells CRNA  Authorized by: Pineda Ramirez MD     Intubation:     Induction:  Intravenous    Intubated:  Postinduction    Mask Ventilation:  Easy with oral airway    Attempts:  1    Attempted By:  CRNA    Method of Intubation:  Direct    Blade:  Steiner 2    Laryngeal View Grade: Grade IIb - only the arytenoids and epiglottis seen      Difficult Airway Encountered?: No      Complications:  None    Airway Device:  Oral endotracheal tube    Airway Device Size:  7.5    Style/Cuff Inflation:  Cuffed    Inflation Amount (mL):  7    Tube secured:  22    Secured at:  The lips    Placement Verified By:  Capnometry    Complicating Factors:  None    Findings Post-Intubation:  BS equal bilateral and atraumatic/condition of teeth unchanged

## 2023-01-24 NOTE — DISCHARGE SUMMARY
[facility] (2nd Fl)  Discharge Note  Short Stay    Procedure(s) (LRB):  INSERTION, NEUROSTIMULATOR, TEMPORARY, SACRAL (N/A)      OUTCOME: Patient tolerated treatment/procedure well without complication and is now ready for discharge.    DISPOSITION: Home or Self Care    FINAL DIAGNOSIS:  Urinary urgency    FOLLOWUP: In clinic    DISCHARGE INSTRUCTIONS:    Discharge Procedure Orders   Notify your health care provider if you experience any of the following:  temperature >100.4     Notify your health care provider if you experience any of the following:  persistent nausea and vomiting or diarrhea     Notify your health care provider if you experience any of the following:  severe uncontrolled pain     Notify your health care provider if you experience any of the following:  redness, tenderness, or signs of infection (pain, swelling, redness, odor or green/yellow discharge around incision site)     Notify your health care provider if you experience any of the following:  worsening rash     Notify your health care provider if you experience any of the following:  persistent dizziness, light-headedness, or visual disturbances        TIME SPENT ON DISCHARGE: 10 minutes

## 2023-01-24 NOTE — TRANSFER OF CARE
"Anesthesia Transfer of Care Note    Patient: Jm Chaparro    Procedure(s) Performed: Procedure(s) (LRB):  INSERTION, NEUROSTIMULATOR, TEMPORARY, SACRAL (N/A)    Patient location: PACU    Anesthesia Type: general    Transport from OR: Transported from OR on 6-10 L/min O2 by face mask with adequate spontaneous ventilation    Post pain: adequate analgesia    Post assessment: no apparent anesthetic complications    Post vital signs: stable    Level of consciousness: responds to stimulation    Nausea/Vomiting: no nausea/vomiting    Complications: none    Transfer of care protocol was followed      Last vitals:   Visit Vitals  /71 (BP Location: Left arm, Patient Position: Lying)   Pulse 61   Temp 37 °C (98.6 °F) (Temporal)   Resp 18   Ht 6' 1" (1.854 m)   Wt 104.3 kg (230 lb)   SpO2 95%   BMI 30.34 kg/m²     "

## 2023-01-24 NOTE — OP NOTE
Neuromodulation Operative Note    Preoperative Diagnosis:   Urinary frequency  Interstitial Cystitis/Bladder Pain Syndrome    Postoperative Diagnosis:  Urinary frequency  Interstitial Cystitis/Bladder Pain Syndrome    Procedure:  Incision for implantation of neurostimulator electrode array; sacral nerve (17761)  Fluoroscopic guidance of needle (25728-95)    Attending Surgeon: Trino Cain MD    Anesthesia: General Endotracheal anesthesia     EBL: <10 mL    Complications: None    Findings:   Lead Placement: right  IPG Placement: right  Opening Thresholds: all less than or equal to 1.0 V  Placement of Interstim X lead    Reason for Procedure: Jm Cahparro is a very pleasant 27 y.o. male with a history of frequency and Interstitial Cystitis/Bladder Pain Syndrome. Patient has attempted previous, more conservative, behavioral therapies in the form of dietary modification, fluid moderation, and timed voiding . Patient has had an adequate trial of pharmacologic therapy including oral oxybutynin ER and Elmiron used for an adequate duration and adequate dosage. Patient either did not have a satisfactory response or had intolerable side effects with each of these medications.  After discussing risks and benefits in detail and answering all questions, the patient has been consented to proceed with staged testing of sacral neuromodulation.     Procedure in Detail: Our standard sacral neuromodulation infection protocol was utilized preoperatively, intraoperatively and postoperatively, including perioperative antibiotics in accordance with that protocol. After informed consent was obtained and documented in the chart, He was brought to the OR suite where general endotracheal anesthesia was undertaken by the OR staff. He was then gently rolled into a prone position with all pressure points padded and chest rolls used to facilitate ventilation. A formal timeout was performed. He was prepped and draped in  accordance with our infection protocol.  A ground pad was placed on the bottom of the patient's foot and the proximal ends of the test stimulation cable were connected to the ground pad and the external neurostimulator (ENS).     The c-arm was moved into AP position and  AP images were obtained of the sacrum. The medial borders of the S3 foramina were identified and marked on the skin. The c-arm was then moved into the lateral position to identify the S3 foramen.The 5.0 inch needle was used and inserted along the most upper and medial aspect of the S3 foramen bilaterally and appropriate needle depth was visualized utilizing fluoroscopy. Opening motor thresholds with the needle tip just at the anterior edge of the sacrum were found to be less than or equal to 1.0 mA on all electrodes.  The right motor response was found to be superior and the decision was made to proceed on this side. Of note, there was no toe curl response within S3 and only onel noted throughout the case bilaterally. When testing within the S2 foraman, there was medial rotation of the foot bilaterally.    The foramen needle stylet was removed and a directional guide was placed through the needle using markers on the guide to assure appropriate depth. The foramen needle was removed by sliding over the directional guide. A small vertical incision was made inclusive of the directional guide through the skin. The lead introducer with dilator was placed over the directional guide and utilizing  fluoroscopic guidance, the lead introducer was advanced until the tip of the dilator sheath was seen at the anterior edge of the sacrum. The dilator sheath was removed along with the directional guide. Under fluoroscopic guidance, the [id number] (Interstim X) lead with the curved-tip stylet directed laterally was advanced such that the most proximal contact was situated at the anterior edge of the sacrum.    All four electrode contacts were tested, observing  onel and plantar flexion of the great toe utilizing the test stimulation cable and the external neurostimulator and controller. Opening thresholds with electrode contacts 1-4 less than or equal to 1.0 mA on all electrodes. The introducer was retracted over the lead, deploying the tines. Retesting of all four electrodes confirmed appropriate opening responses.     The potential internal neurostimulator pocket site was identified below the iliac crest and lateral to the sacrum. Local anesthetic was administered and an approximately 2cm incision was made into the subcutaneous tissue creating a connection pocket site. A vicryl anchoring suture was pre-placed in the medial aspect of the pocket through the fascia at the base.     The tunneling tool with sheath and dilator tip was placed from the lead insertion site subcutaneously to the small incised connection pocket site. The tunneling tool was removed and the lead was fed through the sheath, exiting at the connection pocket site. The sheath was removed. An incision was made at the contralateral percutaneous extension site. The tunneling tool was reassembled with the dilator tip excluding the tunneling tube. The tunneling tool was inserted at the pocket connection site and tunneled to the percutaneous extension exit site. The dilator tip was removed and replaced with the carrier tip. The connector end of the percutaneous extension was pressed fit into the carrier tip and the tunneling tool was pulled back to the connection pocket site. The connector end of the percutaneous extension was detached and the tunneling tool and carrier tip was removed. The lead and connector end of the percutaneous extension were cleaned and dried. The lead was inserted into percutaneous extension connection hub. The setscrew was tightened with the torque wrench until an audible click was heard.     The anchor suture was tied down just distal to the percutaneous extension connector and  tension on the external portion of the extension resulted in no migration of the connector. The incision was irrigated with sterile water and closed with 3-0 Monocryl suture. The incisions were then covered with Tegaderm dressing with the addition of a Biopatch at the percutaneous extension exit site.     The patient was transferred to the recovery room in stable condition. Using the controller and external neurostimulator, the patient was programmed to the electrode of optimum sensation and provided utilization instructions prior to discharge. Patient will complete a voiding diary during testing period to help document results of this test procedure.    As attending surgeon, I was present for all key portions of the procedure and immediately available for any non-key portions of the procedure including induction and extubation.

## 2023-01-26 NOTE — ANESTHESIA PAT ROS NOTE
2023  Jm Chaparro is a 27 y.o., male.      Pre-op Assessment          Review of Systems  Anesthesia Hx:    Pt. -PONV /Post-Operative Nausea/Vomiting, in the past, but not with recent anesthetics / prophylaxis                   Family Hx of Anesthesia complications:  Family Anesthesia Complications are            Family Anesthesia Complications are While caring for  a cancer patient, patients' Aunt(a nurse)  from touching a  Phentanl  Patch-(Aunt had Hx of Asthma & tried using her  Inhaler & it did not help in this situation, and she ). prophylaxis        Personal Hx of Anesthesia complications, Post-Operative Nausea/Vomiting                    Social:  Non-Smoker, No Alcohol Use       Hematology/Oncology:  Hematology Normal   Oncology Normal                                   EENT/Dental:   Wears glasses for distance/Possible x1-2 crowns/Jaw Clicking          Cardiovascular:  Exercise tolerance: poor   Hypertension           hyperlipidemia    Walking for exercise daily for 30 minutes                         Pulmonary:        Sleep Apnea Does not use CPAP               Renal/:  Chronic Renal Disease, CKD renal calculi  Urinary frequency/ interstitial cystitis             Hepatic/GI:     GERD Liver Disease,  Fatty Liver          Musculoskeletal:  Arthritis   Psoriatic            Neurological:      Headaches     Tension & Sinus headaches                            Endocrine:  Endocrine Normal            Psych:  Psychiatric History anxiety depression             Rebecca Pena, RN  23      Anesthesia Assessment: Preoperative EQUATION    Planned Procedure: Procedure(s) (LRB):  INSERTION, NEUROSTIMULATOR, PERMANENT, SACRAL (N/A)  Requested Anesthesia Type:General  Surgeon: Trino Cain MD  Service: Urology  Known or anticipated Date of Surgery:2023    Surgeon notes:  reviewed and Urinary frequency    Previous anesthesia records:23-Insertion, Neurostimulator, Temporary-General-No problems/HX-PONV in past history, but not with recent anesthesia/prophylaxis-no apparent anesthetic complications-no nausea/vomiting       Anesthesia Hx:  Pt. -PONV /Post-Operative Nausea/Vomiting, in the past, but not with recent anesthetics / prophylaxis       Airway:  Mallampati: II / II  Mouth Opening: Normal  TM Distance: Normal  Tongue: Normal  Neck ROM: Normal ROM        **- Family Hx of Anesthesia complications:  Family Anesthesia Complications are            Family Anesthesia Complications are While caring for  a cancer patient, patients' Aunt(a nurse)  from touching a  Phentanl  Patch-(Aunt had Hx of Asthma & tried using her  Inhaler & it did not help in this situation, and she ). prophylaxis        Personal Hx of Anesthesia complications, Post-Operative Nausea/Vomiting  **                    Last PCP note: 3-6 months ago , within [facility] , 22-China Pierre MD-[facility]-HTN, essential +2 more Dx-F/U    Subspecialty notes: Allergy, Nephrology, Pain Management, Psychiatry, Otolaryngology/Podiatry    Other important co-morbidities: GERD, HTN, and Urinary frequency, Special Needs Assessment       Tests already available:  Results have been reviewed. Recent Labs-23-BMP/ 22-Lipid Panel          Plan:  Phone pending    Testing:  None Needed    Patient  has previously scheduled Medical Appointment:Appointment on 23, prior to the surgery date.    Navigation: Phone Completed               Consults scheduled.None needed at this time.                            Straight Line to surgery.               No tests, anesthesia preop clinic visit, or consult required.       Rebecca Pena RN  23

## 2023-01-30 ENCOUNTER — PATIENT MESSAGE (OUTPATIENT)
Dept: UROLOGY | Facility: CLINIC | Age: 28
End: 2023-01-30
Payer: COMMERCIAL

## 2023-01-30 ENCOUNTER — TELEPHONE (OUTPATIENT)
Dept: NEPHROLOGY | Facility: CLINIC | Age: 28
End: 2023-01-30
Payer: COMMERCIAL

## 2023-01-30 ENCOUNTER — PATIENT MESSAGE (OUTPATIENT)
Dept: NEPHROLOGY | Facility: CLINIC | Age: 28
End: 2023-01-30
Payer: COMMERCIAL

## 2023-01-30 NOTE — TELEPHONE ENCOUNTER
----- Message from Valeria Sheikh sent at 1/30/2023 11:05 AM CST -----  Contact: mom  Type:  Needs Medical Advice    Who Called: mom    Would the patient rather a call back or a response via [facility]? call  Best Call Back Number: [phone] (home)     Additional Information: pt needs to reschedule an appt that's on 2/6. Please advise and thnak you.

## 2023-02-01 ENCOUNTER — PATIENT MESSAGE (OUTPATIENT)
Dept: NEPHROLOGY | Facility: CLINIC | Age: 28
End: 2023-02-01
Payer: COMMERCIAL

## 2023-02-03 NOTE — PRE-PROCEDURE INSTRUCTIONS
Preop and medication instructions given to mom and reviewed; instructed to hold vitamins, herbal supplements and NSAIDS one week prior to surgery;  Patient's mom verbalized understanding.  Call placed to surgeon's office RE parent's message regarding surgery.  Message relayed to  who indicated she will speak with Dr. Cain about a return call to patient's parent.

## 2023-02-08 ENCOUNTER — ANESTHESIA EVENT (OUTPATIENT)
Dept: SURGERY | Facility: HOSPITAL | Age: 28
End: 2023-02-08
Payer: COMMERCIAL

## 2023-02-08 NOTE — ANESTHESIA PREPROCEDURE EVALUATION
2023  Jm Chaparro is a 27 y.o., male.      Pre-op Assessment    I have reviewed the Patient Summary Reports.     I have reviewed the Nursing Notes. I have reviewed the NPO Status.      Review of Systems  Anesthesia Hx:  Denies Family Hx of Anesthesia complications. (aunt  of fentanyl )  Personal Hx of Anesthesia complications, Post-Operative Nausea/Vomiting   Cardiovascular:   Exercise tolerance: good Hypertension, well controlled  Hypertension    Pulmonary:   Sleep Apnea, CPAP Pt not tolerating CPAP   Renal/:   Chronic Renal Disease  Kidney Function/Disease    Hepatic/GI:   GERD Liver Disease, (fatty)  Liver Disease    Endocrine:  Thyroid Disease Hypothyroidism    Dermatological:  eczema  Psych:   Psychiatric History (ADHD  Autism) anxiety          Physical Exam    Airway:  Mallampati: II   Mouth Opening: Normal  TM Distance: Normal  Tongue: Large  Neck ROM: Normal ROM    Dental:  Intact        Anesthesia Plan  Type of Anesthesia, risks & benefits discussed:    Anesthesia Type: Gen ETT  Intra-op Monitoring Plan: Standard ASA Monitors  Post Op Pain Control Plan: multimodal analgesia and IV/PO Opioids PRN  ASA Score: 2  Day of Surgery Review of History & Physical: H&P Update referred to the surgeon/provider.I have interviewed and examined the patient. I have reviewed the patient's H&P dated: There are no significant changes. H&P completed by Anesthesiologist.    Ready For Surgery From Anesthesia Perspective.     .

## 2023-02-08 NOTE — PRE-PROCEDURE INSTRUCTIONS
Spoke with patients mother who takes care of the patient and informed her to arrive at 8am and the address where we are located. She verbalized understanding.

## 2023-02-09 ENCOUNTER — ANESTHESIA (OUTPATIENT)
Dept: SURGERY | Facility: HOSPITAL | Age: 28
End: 2023-02-09
Payer: COMMERCIAL

## 2023-02-09 ENCOUNTER — HOSPITAL ENCOUNTER (OUTPATIENT)
Facility: HOSPITAL | Age: 28
Discharge: HOME | End: 2023-02-09
Attending: UROLOGY | Admitting: UROLOGY
Payer: COMMERCIAL

## 2023-02-09 VITALS
SYSTOLIC BLOOD PRESSURE: 116 MMHG | BODY MASS INDEX: 30.48 KG/M2 | DIASTOLIC BLOOD PRESSURE: 66 MMHG | TEMPERATURE: 98 F | OXYGEN SATURATION: 97 % | WEIGHT: 230 LBS | HEART RATE: 74 BPM | HEIGHT: 73 IN | RESPIRATION RATE: 18 BRPM

## 2023-02-09 DIAGNOSIS — R39.15 URINARY URGENCY: ICD-10-CM

## 2023-02-09 DIAGNOSIS — R35.0 URINARY FREQUENCY: Primary | ICD-10-CM

## 2023-02-09 PROCEDURE — 52287 PR CYSTOURETHROSCOPY WITH INJ FOR CHEMODENERVATION: ICD-10-PCS | Mod: ,,, | Performed by: UROLOGY

## 2023-02-09 PROCEDURE — 64585 PR REVISE/REMOVE PERIPHERAL NEUROELECTRODE: ICD-10-PCS | Mod: 51,,, | Performed by: UROLOGY

## 2023-02-09 PROCEDURE — 71000015 HC POSTOP RECOV 1ST HR: Performed by: UROLOGY

## 2023-02-09 PROCEDURE — 94761 N-INVAS EAR/PLS OXIMETRY MLT: CPT

## 2023-02-09 PROCEDURE — 99900035 HC TECH TIME PER 15 MIN (STAT)

## 2023-02-09 PROCEDURE — 37000009 HC ANESTHESIA EA ADD 15 MINS: Performed by: UROLOGY

## 2023-02-09 PROCEDURE — 25000003 PHARM REV CODE 250: Performed by: UROLOGY

## 2023-02-09 PROCEDURE — D9220A PRA ANESTHESIA: ICD-10-PCS | Mod: ANES,,, | Performed by: ANESTHESIOLOGY

## 2023-02-09 PROCEDURE — 37000008 HC ANESTHESIA 1ST 15 MINUTES: Performed by: UROLOGY

## 2023-02-09 PROCEDURE — D9220A PRA ANESTHESIA: Mod: ANES,,, | Performed by: ANESTHESIOLOGY

## 2023-02-09 PROCEDURE — 52287 CYSTOSCOPY CHEMODENERVATION: CPT | Mod: ,,, | Performed by: UROLOGY

## 2023-02-09 PROCEDURE — 36000706: Performed by: UROLOGY

## 2023-02-09 PROCEDURE — 36000707: Performed by: UROLOGY

## 2023-02-09 PROCEDURE — 25000003 PHARM REV CODE 250: Performed by: NURSE ANESTHETIST, CERTIFIED REGISTERED

## 2023-02-09 PROCEDURE — 64585 REV/RMV PERPH NSTIM ELTRD RA: CPT | Mod: 51,,, | Performed by: UROLOGY

## 2023-02-09 PROCEDURE — 63600175 PHARM REV CODE 636 W HCPCS: Performed by: NURSE ANESTHETIST, CERTIFIED REGISTERED

## 2023-02-09 PROCEDURE — D9220A PRA ANESTHESIA: ICD-10-PCS | Mod: CRNA,,, | Performed by: NURSE ANESTHETIST, CERTIFIED REGISTERED

## 2023-02-09 PROCEDURE — 63600175 PHARM REV CODE 636 W HCPCS: Performed by: UROLOGY

## 2023-02-09 PROCEDURE — 71000033 HC RECOVERY, INTIAL HOUR: Performed by: UROLOGY

## 2023-02-09 PROCEDURE — D9220A PRA ANESTHESIA: Mod: CRNA,,, | Performed by: NURSE ANESTHETIST, CERTIFIED REGISTERED

## 2023-02-09 RX ORDER — HYDROCODONE BITARTRATE AND ACETAMINOPHEN 5; 325 MG/1; MG/1
1 TABLET ORAL EVERY 6 HOURS PRN
Qty: 15 TABLET | Refills: 0 | Status: SHIPPED | OUTPATIENT
Start: 2023-02-09 | End: 2023-03-28

## 2023-02-09 RX ORDER — FENTANYL CITRATE 50 UG/ML
INJECTION, SOLUTION INTRAMUSCULAR; INTRAVENOUS
Status: DISCONTINUED | OUTPATIENT
Start: 2023-02-09 | End: 2023-02-09

## 2023-02-09 RX ORDER — ONDANSETRON 2 MG/ML
INJECTION INTRAMUSCULAR; INTRAVENOUS
Status: DISCONTINUED | OUTPATIENT
Start: 2023-02-09 | End: 2023-02-09

## 2023-02-09 RX ORDER — BUPIVACAINE HYDROCHLORIDE AND EPINEPHRINE 2.5; 5 MG/ML; UG/ML
INJECTION, SOLUTION EPIDURAL; INFILTRATION; INTRACAUDAL; PERINEURAL
Status: DISCONTINUED | OUTPATIENT
Start: 2023-02-09 | End: 2023-02-09 | Stop reason: HOSPADM

## 2023-02-09 RX ORDER — SODIUM CHLORIDE 0.9 % (FLUSH) 0.9 %
2 SYRINGE (ML) INJECTION ONCE
Status: DISCONTINUED | OUTPATIENT
Start: 2023-02-09 | End: 2023-02-09 | Stop reason: HOSPADM

## 2023-02-09 RX ORDER — DEXAMETHASONE SODIUM PHOSPHATE 4 MG/ML
INJECTION, SOLUTION INTRA-ARTICULAR; INTRALESIONAL; INTRAMUSCULAR; INTRAVENOUS; SOFT TISSUE
Status: DISCONTINUED | OUTPATIENT
Start: 2023-02-09 | End: 2023-02-09

## 2023-02-09 RX ORDER — MIDAZOLAM HYDROCHLORIDE 1 MG/ML
INJECTION, SOLUTION INTRAMUSCULAR; INTRAVENOUS
Status: DISCONTINUED | OUTPATIENT
Start: 2023-02-09 | End: 2023-02-09

## 2023-02-09 RX ORDER — ROCURONIUM BROMIDE 10 MG/ML
INJECTION, SOLUTION INTRAVENOUS
Status: DISCONTINUED | OUTPATIENT
Start: 2023-02-09 | End: 2023-02-09

## 2023-02-09 RX ORDER — LIDOCAINE HYDROCHLORIDE 20 MG/ML
INJECTION INTRAVENOUS
Status: DISCONTINUED | OUTPATIENT
Start: 2023-02-09 | End: 2023-02-09

## 2023-02-09 RX ORDER — PROPOFOL 10 MG/ML
VIAL (ML) INTRAVENOUS
Status: DISCONTINUED | OUTPATIENT
Start: 2023-02-09 | End: 2023-02-09

## 2023-02-09 RX ADMIN — FENTANYL CITRATE 50 MCG: 50 INJECTION, SOLUTION INTRAMUSCULAR; INTRAVENOUS at 10:02

## 2023-02-09 RX ADMIN — LIDOCAINE HYDROCHLORIDE 100 MG: 20 INJECTION INTRAVENOUS at 10:02

## 2023-02-09 RX ADMIN — GLYCOPYRROLATE 0.2 MG: 0.2 INJECTION, SOLUTION INTRAMUSCULAR; INTRAVENOUS at 11:02

## 2023-02-09 RX ADMIN — ONDANSETRON 4 MG: 2 INJECTION, SOLUTION INTRAMUSCULAR; INTRAVENOUS at 11:02

## 2023-02-09 RX ADMIN — SODIUM CHLORIDE: 0.9 INJECTION, SOLUTION INTRAVENOUS at 10:02

## 2023-02-09 RX ADMIN — SUGAMMADEX 417 MG: 100 INJECTION, SOLUTION INTRAVENOUS at 11:02

## 2023-02-09 RX ADMIN — DEXAMETHASONE SODIUM PHOSPHATE 4 MG: 4 INJECTION, SOLUTION INTRAMUSCULAR; INTRAVENOUS at 11:02

## 2023-02-09 RX ADMIN — ROCURONIUM BROMIDE 50 MG: 10 INJECTION INTRAVENOUS at 10:02

## 2023-02-09 RX ADMIN — PROPOFOL 150 MG: 10 INJECTION, EMULSION INTRAVENOUS at 10:02

## 2023-02-09 RX ADMIN — MIDAZOLAM HYDROCHLORIDE 2 MG: 1 INJECTION, SOLUTION INTRAMUSCULAR; INTRAVENOUS at 10:02

## 2023-02-09 RX ADMIN — VANCOMYCIN HYDROCHLORIDE 2000 MG: 1 INJECTION, POWDER, LYOPHILIZED, FOR SOLUTION INTRAVENOUS at 09:02

## 2023-02-09 NOTE — DISCHARGE SUMMARY
[facility]  Discharge Note  Short Stay    Procedure(s) (LRB):  INSERTION, NEUROSTIMULATOR, PERMANENT, SACRAL (N/A)  CYSTOSCOPY,WITH BOTULINUM TOXIN INJECTION (N/A)      OUTCOME: Patient tolerated treatment/procedure well without complication and is now ready for discharge.    DISPOSITION: Home or Self Care    FINAL DIAGNOSIS:  <principal problem not specified>    FOLLOWUP: In clinic    DISCHARGE INSTRUCTIONS:  No discharge procedures on file.     TIME SPENT ON DISCHARGE: 15 minutes

## 2023-02-09 NOTE — TRANSFER OF CARE
"Anesthesia Transfer of Care Note    Patient: Jm Chaparro    Procedure(s) Performed: Procedure(s) (LRB):  INSERTION, NEUROSTIMULATOR, PERMANENT, SACRAL (N/A)  CYSTOSCOPY,WITH BOTULINUM TOXIN INJECTION (N/A)    Patient location: PACU    Anesthesia Type: general    Transport from OR: Transported from OR on 6-10 L/min O2 by face mask with adequate spontaneous ventilation    Post pain: adequate analgesia    Post assessment: no apparent anesthetic complications and tolerated procedure well    Post vital signs: stable    Level of consciousness: awake and lethargic    Nausea/Vomiting: no nausea/vomiting    Transfer of care protocol was followed      Last vitals:   Visit Vitals  /86 (BP Location: Left arm, Patient Position: Sitting)   Pulse 72   Temp 36.9 °C (98.4 °F) (Tympanic)   Resp 18   Ht 6' 1" (1.854 m)   Wt 104.3 kg (230 lb)   SpO2 100%   BMI 30.34 kg/m²     "

## 2023-02-09 NOTE — OP NOTE
Neuromodulation Operative Note  2/9/2023    Preoperative Diagnosis:   Urinary frequency  Urinary urgency  Urgency incontinence    Postoperative Diagnosis:  Urinary frequency  Urinary urgency  Urgency incontinence    Procedure:  Removal without replacement of neurostimulator electrode array  Cystoscopy with Botox injection 100 units    Attending Surgeon: Trino Cain MD    Anesthesia: General Endotracheal    EBL: <10 mL    Complications: None    Findings: Removal of all test components    Reason for Procedure: Jm Chaparro is a very pleasant 27 y.o. male with a history of frequency, urgency, and urgency incontinence. During the last week of testing he failed to show satisfactory improvement in symptoms are requested removal of the . Given refractory frequency, urgency and urgency incontinence, he wished to proceed with Botox injection of the bladder.     PROCEDURE IN DETAIL: Informed consent was obtained by explaining all risks and benefits of the procedure to the patient in detail. He was given appropriate perioperative antibiotics within 30 minutes prior to beginning the procedure. He was brought to the OR suite, where monitored anesthesia care was administered by anesthesia staff in a prone position. He was prepped and draped in accordance with our standard sacral neuromodulation infection protocol, which was utilized preoperatively, intraoperatively, and postoperatively.     A #15 scalpel blade was used to open the percutaneous extension incision and the percutaneous extension was delivered. This was  from the lead, cut and delivered sterilely from the field. The quadripolar lead was then removed entirely, though a separate incision.  The pocket was thoroughly irrigated with sterile water. The incision was closed in 2 layers, deeply with a 2-0 Vicryl and superficially with a running 3-0 Monocryl. The incision was covered with Dermabond.     After repositioning in lithotomy  position, a second formal timeout was performed prior to the procedure. The patient was gently placed in lithotomy position with all pressure points padded. Bilateral sequential compression devices were applied and activated. The patient was prepped and draped in standard fashion.    A 20-Romansh rigid cystoscope was passed per urethra into the bladder. Inspection of the bladder demonstrated a normal bladder. We then injected 100 units of onabotulinum toxin A (Botox) using a dedicated needle ([employer]) set to a depth of 2 mm. A total of 30 injections were used throughout the bladder including the bladder base and trigone.     He tolerated the procedure well and was extubated and transferred in stable condition to the recovery room.     We will plan to see him back in 4-6 weeks for continued evaluation.

## 2023-02-09 NOTE — ANESTHESIA PROCEDURE NOTES
Intubation    Date/Time: 2/9/2023 10:51 AM  Performed by: Enzo Mcfarland CRNA  Authorized by: Calvin Obregon DO     Intubation:     Induction:  Intravenous    Intubated:  Postinduction    Mask Ventilation:  Easy with oral airway    Attempts:  1    Attempted By:  CRNA    Method of Intubation:  Video laryngoscopy    Blade:  Robles 3    Laryngeal View Grade: Grade I - full view of cords      Difficult Airway Encountered?: No      Complications:  None    Airway Device:  Oral endotracheal tube    Airway Device Size:  7.5    Style/Cuff Inflation:  Cuffed (inflated to minimal occlusive pressure)    Inflation Amount (mL):  5    Tube secured:  22    Secured at:  The lips    Placement Verified By:  Capnometry and Revisualization with laryngoscopy    Complicating Factors:  None    Findings Post-Intubation:  BS equal bilateral and atraumatic/condition of teeth unchanged

## 2023-02-09 NOTE — H&P
[facility] of [facility]        History and Physical     2/9/2023     Referred by:  Jasmeet Benito MD     HPI: Jm Chaparro is a very pleasant 27 y.o. male who [facility] referred for evaluation of urinary frequency of several years duration. He reports bother is associated with urinary daytime frequency (10-12x daily), with nocturia (3-4x per night) and with urgency that results in urinary incontinence occasionally . He reports no stress urinary incontinence associated with exertion. He does not require daily pad use.  He has decreased overall fluid intake.  He reports urinary frequency is day and night but more predominant during the day. Patient reports urgency is independent of position. His urinary frequency is largely driven by pain.      He reports symptoms of irritative voiding including dysuria. He experiences pain with most voids.  He reports symptoms of obstructive voiding including decreased stream, hesitancy, intermittency, and post void dribbling. Bladder scan PVR was 95 mL.      He has autism spectrum disorder, IBS, and has been diagnosed with possible IC.     His previous evaluations were reviewed in detail today. He has been on elmiron but stopped after no benefit. He has been on Myrbetriq and ditropan without improvement. He was previously offered bladder instillations but could not tolerate catheterization in the office.  He has seen pain management. His cystoscopy report from January 2022 reports an area that was biopsied showing only chronic inflammation. I'm not clear if this may have represented a hunner lesion. His symptoms did not improve following that procedure.      Numerous urine cultures without evidence of infection.      Previous Therapies  Behavioral Therapy: (fluid/dietary modification timed voiding/bladder training) -  provided no benefit  Medication:  oral oxybutynin ER 15 mg (>1 year) (provided no  benefit)  Myrbetriq 50 mg daily (5 months) (provided no benefit)   Elmiron 100 mg TID  (>1 year) (no improvement)  Bladder instillations - did not tolerate     A review of 10+ systems was conducted with pertinent positive and negative findings documented in HPI with all other systems reviewed and negative.     Past medical, family, surgical and social history reviewed as documented in chart with pertinent positive medical, family, surgical and social history detailed in HPI.     Exam Findings:     Const: no acute distress, conversant and alert  Eyes: anicteric, extraocular muscles intact  ENMT: normocephalic, Nl oral membranes  Cardio: no cyanosis, nl cap refill  Pulm: no tachypnea; no resp distress  Musc: no laceration, no tenderness  Neuro: alert; oriented x 3  Skin: warm, dry; no petichiae  Psych: no anxiety; normal speech      Assessment/Plan:     Urinary Frequency  (new, addt'l workup): Unfortunately, while he saw improvement in urinary frequency and urgency, he did not see satisfactory improvement in bladder pain. He would like to remove all test components.

## 2023-02-09 NOTE — ANESTHESIA POSTPROCEDURE EVALUATION
Anesthesia Post Evaluation    Patient: Jm Chaparro    Procedure(s) Performed: Procedure(s) (LRB):  INSERTION, NEUROSTIMULATOR, PERMANENT, SACRAL (N/A)  CYSTOSCOPY,WITH BOTULINUM TOXIN INJECTION (N/A)    [facility] Anesthesia Post Op Evaluation      Vitals Value Taken Time   /73 02/09/23 1219   Temp 36.9 °C (98.4 °F) 02/09/23 1156   Pulse 73 02/09/23 1220   Resp 38 02/09/23 1220   SpO2 96 % 02/09/23 1220   Vitals shown include unvalidated device data.      No case tracking events are documented in the log.      Pain/Antonio Score: No data recorded

## 2023-02-09 NOTE — ANESTHESIA POSTPROCEDURE EVALUATION
Anesthesia Post Evaluation    Patient: Jm Chaparro    Procedure(s) Performed: Procedure(s) (LRB):  INSERTION, NEUROSTIMULATOR, PERMANENT, SACRAL (N/A)  CYSTOSCOPY,WITH BOTULINUM TOXIN INJECTION (N/A)    Final Anesthesia Type: general      Patient location during evaluation: PACU  Patient participation: Yes- Able to Participate  Level of consciousness: awake and alert  Post-procedure vital signs: reviewed and stable  Pain management: adequate  Airway patency: patent    PONV status at discharge: No PONV      Cardiovascular status: blood pressure returned to baseline  Respiratory status: spontaneous ventilation  Hydration status: euvolemic  Follow-up not needed.          Vitals Value Taken Time   /66 02/09/23 1212   Temp 36.9 °C (98.4 °F) 02/09/23 1156   Pulse 71 02/09/23 1218   Resp 28 02/09/23 1218   SpO2 92 % 02/09/23 1218   Vitals shown include unvalidated device data.      No case tracking events are documented in the log.      Pain/Antonio Score: No data recorded

## 2023-02-09 NOTE — ANESTHESIA POSTPROCEDURE EVALUATION
Anesthesia Post Evaluation    Patient: Jm Chaparro    Procedure(s) Performed: Procedure(s) (LRB):  INSERTION, NEUROSTIMULATOR, PERMANENT, SACRAL (N/A)  CYSTOSCOPY,WITH BOTULINUM TOXIN INJECTION (N/A)    Final Anesthesia Type: general      Patient location during evaluation: PACU  Patient participation: Yes- Able to Participate  Level of consciousness: awake and alert  Post-procedure vital signs: reviewed and stable  Pain management: adequate  Airway patency: patent    PONV status at discharge: No PONV  Anesthetic complications: no      Cardiovascular status: blood pressure returned to baseline  Respiratory status: spontaneous ventilation  Hydration status: euvolemic  Follow-up not needed.          Vitals Value Taken Time   /73 02/09/23 1219   Temp 36.9 °C (98.4 °F) 02/09/23 1156   Pulse 73 02/09/23 1220   Resp 38 02/09/23 1220   SpO2 96 % 02/09/23 1220   Vitals shown include unvalidated device data.      No case tracking events are documented in the log.      Pain/Antonio Score: No data recorded

## 2023-02-09 NOTE — PLAN OF CARE
Pre-op care completed. IV inserted and patient tolerated well. Anesthesia consent completed. Needs surgery consent and outpatient orders. Mother will take personal belongings. Instructed to call for assistance with understanding verbalized.

## 2023-02-27 ENCOUNTER — PATIENT MESSAGE (OUTPATIENT)
Dept: FAMILY MEDICINE | Facility: CLINIC | Age: 28
End: 2023-02-27
Payer: COMMERCIAL

## 2023-02-28 ENCOUNTER — OFFICE VISIT (OUTPATIENT)
Dept: UROLOGY | Facility: CLINIC | Age: 28
End: 2023-02-28
Payer: COMMERCIAL

## 2023-02-28 ENCOUNTER — TELEPHONE (OUTPATIENT)
Dept: UROLOGY | Facility: CLINIC | Age: 28
End: 2023-02-28
Payer: COMMERCIAL

## 2023-02-28 VITALS
SYSTOLIC BLOOD PRESSURE: 111 MMHG | WEIGHT: 239.88 LBS | DIASTOLIC BLOOD PRESSURE: 78 MMHG | HEIGHT: 73 IN | BODY MASS INDEX: 31.79 KG/M2 | HEART RATE: 67 BPM

## 2023-02-28 DIAGNOSIS — R30.0 DYSURIA: Primary | ICD-10-CM

## 2023-02-28 DIAGNOSIS — R35.0 URINARY FREQUENCY: Primary | ICD-10-CM

## 2023-02-28 PROCEDURE — 87077 CULTURE AEROBIC IDENTIFY: CPT | Performed by: UROLOGY

## 2023-02-28 PROCEDURE — 87186 SC STD MICRODIL/AGAR DIL: CPT | Performed by: UROLOGY

## 2023-02-28 PROCEDURE — 3008F BODY MASS INDEX DOCD: CPT | Mod: CPTII,S$GLB,, | Performed by: UROLOGY

## 2023-02-28 PROCEDURE — 1159F PR MEDICATION LIST DOCUMENTED IN MEDICAL RECORD: ICD-10-PCS | Mod: CPTII,S$GLB,, | Performed by: UROLOGY

## 2023-02-28 PROCEDURE — 87088 URINE BACTERIA CULTURE: CPT | Performed by: UROLOGY

## 2023-02-28 PROCEDURE — 3078F PR MOST RECENT DIASTOLIC BLOOD PRESSURE < 80 MM HG: ICD-10-PCS | Mod: CPTII,S$GLB,, | Performed by: UROLOGY

## 2023-02-28 PROCEDURE — 99999 PR PBB SHADOW E&M-EST. PATIENT-LVL III: CPT | Mod: PBBFAC,,, | Performed by: UROLOGY

## 2023-02-28 PROCEDURE — 3078F DIAST BP <80 MM HG: CPT | Mod: CPTII,S$GLB,, | Performed by: UROLOGY

## 2023-02-28 PROCEDURE — 3074F PR MOST RECENT SYSTOLIC BLOOD PRESSURE < 130 MM HG: ICD-10-PCS | Mod: CPTII,S$GLB,, | Performed by: UROLOGY

## 2023-02-28 PROCEDURE — 99214 PR OFFICE/OUTPT VISIT, EST, LEVL IV, 30-39 MIN: ICD-10-PCS | Mod: S$GLB,,, | Performed by: UROLOGY

## 2023-02-28 PROCEDURE — 3074F SYST BP LT 130 MM HG: CPT | Mod: CPTII,S$GLB,, | Performed by: UROLOGY

## 2023-02-28 PROCEDURE — 99999 PR PBB SHADOW E&M-EST. PATIENT-LVL III: ICD-10-PCS | Mod: PBBFAC,,, | Performed by: UROLOGY

## 2023-02-28 PROCEDURE — 1159F MED LIST DOCD IN RCRD: CPT | Mod: CPTII,S$GLB,, | Performed by: UROLOGY

## 2023-02-28 PROCEDURE — 99214 OFFICE O/P EST MOD 30 MIN: CPT | Mod: S$GLB,,, | Performed by: UROLOGY

## 2023-02-28 PROCEDURE — 87086 URINE CULTURE/COLONY COUNT: CPT | Performed by: UROLOGY

## 2023-02-28 PROCEDURE — 3008F PR BODY MASS INDEX (BMI) DOCUMENTED: ICD-10-PCS | Mod: CPTII,S$GLB,, | Performed by: UROLOGY

## 2023-02-28 RX ORDER — URINARY ANTISEPTIC ANTISPASMODIC 81.6; 40.8; 10.8; .12 MG/1; MG/1; MG/1; MG/1
1 TABLET ORAL 3 TIMES DAILY PRN
Qty: 60 TABLET | Refills: 4 | Status: SHIPPED | OUTPATIENT
Start: 2023-02-28 | End: 2023-08-21

## 2023-02-28 NOTE — PROGRESS NOTES
[facility] U[facility]        New Urinary Frequency Note     2/28/2023     Referred by:  Jasmeet Benito MD     HPI: Jm Chaparro is a very pleasant 27 y.o. male who returns to our department referred for evaluation of urinary frequency of several years duration. He reports bother is associated with urinary daytime frequency (10-12x daily), with nocturia (3-4x per night) and with urgency that results in urinary incontinence occasionally . He reports no stress urinary incontinence associated with exertion. He does not require daily pad use.  He has decreased overall fluid intake.  He reports urinary frequency is day and night but more predominant during the day. Patient reports urgency is independent of position. His urinary frequency is largely driven by pain.     He had Botox injected after failed staged test of SNM 3 weeks ago. He notes improvement but not resolution. He is still voidng 10-15x per day, but has more good days since Botox. He has no voiding difficulty and PVR today is 36 mL.      His previous evaluations were reviewed in detail today. He has been on elmiron but stopped after no benefit. He has been on Myrbetriq and ditropan without improvement. He was previously offered bladder instillations but could not tolerate catheterization in the office.  He has seen pain management. His cystoscopy report from January 2022 reports an area that was biopsied showing only chronic inflammation. I'm not clear if this may have represented a hunner lesion. His symptoms did not improve following that procedure.      Numerous urine cultures without evidence of infection.      Previous Therapies  Behavioral Therapy: (fluid/dietary modification timed voiding/bladder training) -  provided no benefit  Medication:  oral oxybutynin ER 15 mg (>1 year) (provided no benefit)  Myrbetriq 50 mg daily (5 months) (provided no benefit)   Elmiron 100 mg TID  (>1 year) (no improvement)  Bladder instillations - did  not tolerate  SNM - No improvement with staged testing  Botox - partial improvement from 100 units     A review of 10+ systems was conducted with pertinent positive and negative findings documented in HPI with all other systems reviewed and negative.     Past medical, family, surgical and social history reviewed as documented in chart with pertinent positive medical, family, surgical and social history detailed in HPI.     Exam Findings:     Const: no acute distress, conversant and alert  Eyes: anicteric, extraocular muscles intact  ENMT: normocephalic, Nl oral membranes  Cardio: no cyanosis, nl cap refill  Pulm: no tachypnea; no resp distress  Musc: no laceration, no tenderness  Neuro: alert; oriented x 3  Skin: warm, dry; no petichiae  Psych: no anxiety; normal speech      Assessment/Plan:     Urinary Frequency  (established, not meeting goals): Will plan to reinject with 200 units 3 months from prior Botox. Urogesic rx sent today.

## 2023-03-02 LAB
BACTERIA UR CULT: ABNORMAL
BACTERIA UR CULT: ABNORMAL

## 2023-03-03 ENCOUNTER — PATIENT MESSAGE (OUTPATIENT)
Dept: SURGERY | Facility: HOSPITAL | Age: 28
End: 2023-03-03
Payer: COMMERCIAL

## 2023-03-03 ENCOUNTER — PATIENT MESSAGE (OUTPATIENT)
Dept: FAMILY MEDICINE | Facility: CLINIC | Age: 28
End: 2023-03-03
Payer: COMMERCIAL

## 2023-03-03 RX ORDER — SULFAMETHOXAZOLE AND TRIMETHOPRIM 800; 160 MG/1; MG/1
1 TABLET ORAL 2 TIMES DAILY
Qty: 6 TABLET | Refills: 0 | Status: SHIPPED | OUTPATIENT
Start: 2023-03-03 | End: 2023-03-30

## 2023-03-13 ENCOUNTER — OFFICE VISIT (OUTPATIENT)
Dept: PSYCHIATRY | Facility: CLINIC | Age: 28
End: 2023-03-13
Payer: COMMERCIAL

## 2023-03-13 VITALS
SYSTOLIC BLOOD PRESSURE: 108 MMHG | DIASTOLIC BLOOD PRESSURE: 66 MMHG | WEIGHT: 241.94 LBS | HEART RATE: 61 BPM | BODY MASS INDEX: 31.92 KG/M2

## 2023-03-13 DIAGNOSIS — F84.0 AUTISM SPECTRUM DISORDER: ICD-10-CM

## 2023-03-13 DIAGNOSIS — F90.0 ADHD (ATTENTION DEFICIT HYPERACTIVITY DISORDER), INATTENTIVE TYPE: ICD-10-CM

## 2023-03-13 DIAGNOSIS — F41.1 GENERALIZED ANXIETY DISORDER: ICD-10-CM

## 2023-03-13 DIAGNOSIS — F40.10 SOCIAL ANXIETY DISORDER: Primary | ICD-10-CM

## 2023-03-13 PROCEDURE — 3008F BODY MASS INDEX DOCD: CPT | Mod: CPTII,S$GLB,, | Performed by: PSYCHOLOGIST

## 2023-03-13 PROCEDURE — 3078F DIAST BP <80 MM HG: CPT | Mod: CPTII,S$GLB,, | Performed by: PSYCHOLOGIST

## 2023-03-13 PROCEDURE — 90833 PR PSYCHOTHERAPY W/PATIENT W/E&M, 30 MIN (ADD ON): ICD-10-PCS | Mod: S$GLB,,, | Performed by: PSYCHOLOGIST

## 2023-03-13 PROCEDURE — 1159F MED LIST DOCD IN RCRD: CPT | Mod: CPTII,S$GLB,, | Performed by: PSYCHOLOGIST

## 2023-03-13 PROCEDURE — 3078F PR MOST RECENT DIASTOLIC BLOOD PRESSURE < 80 MM HG: ICD-10-PCS | Mod: CPTII,S$GLB,, | Performed by: PSYCHOLOGIST

## 2023-03-13 PROCEDURE — 99214 OFFICE O/P EST MOD 30 MIN: CPT | Mod: S$GLB,,, | Performed by: PSYCHOLOGIST

## 2023-03-13 PROCEDURE — 99214 PR OFFICE/OUTPT VISIT, EST, LEVL IV, 30-39 MIN: ICD-10-PCS | Mod: S$GLB,,, | Performed by: PSYCHOLOGIST

## 2023-03-13 PROCEDURE — 99999 PR PBB SHADOW E&M-EST. PATIENT-LVL III: CPT | Mod: PBBFAC,,, | Performed by: PSYCHOLOGIST

## 2023-03-13 PROCEDURE — 3074F SYST BP LT 130 MM HG: CPT | Mod: CPTII,S$GLB,, | Performed by: PSYCHOLOGIST

## 2023-03-13 PROCEDURE — 3074F PR MOST RECENT SYSTOLIC BLOOD PRESSURE < 130 MM HG: ICD-10-PCS | Mod: CPTII,S$GLB,, | Performed by: PSYCHOLOGIST

## 2023-03-13 PROCEDURE — 99999 PR PBB SHADOW E&M-EST. PATIENT-LVL III: ICD-10-PCS | Mod: PBBFAC,,, | Performed by: PSYCHOLOGIST

## 2023-03-13 PROCEDURE — 1159F PR MEDICATION LIST DOCUMENTED IN MEDICAL RECORD: ICD-10-PCS | Mod: CPTII,S$GLB,, | Performed by: PSYCHOLOGIST

## 2023-03-13 PROCEDURE — 3008F PR BODY MASS INDEX (BMI) DOCUMENTED: ICD-10-PCS | Mod: CPTII,S$GLB,, | Performed by: PSYCHOLOGIST

## 2023-03-13 PROCEDURE — 90833 PSYTX W PT W E/M 30 MIN: CPT | Mod: S$GLB,,, | Performed by: PSYCHOLOGIST

## 2023-03-13 RX ORDER — DEXTROAMPHETAMINE SACCHARATE, AMPHETAMINE ASPARTATE, DEXTROAMPHETAMINE SULFATE AND AMPHETAMINE SULFATE 2.5; 2.5; 2.5; 2.5 MG/1; MG/1; MG/1; MG/1
10 TABLET ORAL DAILY
Qty: 30 TABLET | Refills: 0 | Status: SHIPPED | OUTPATIENT
Start: 2023-04-10 | End: 2023-05-19 | Stop reason: SDUPTHER

## 2023-03-13 NOTE — PROGRESS NOTES
"Outpatient Psychiatry Follow-Up Visit    Clinical Status of Patient: Outpatient (Ambulatory)  03/13/2023     Chief Complaint: 28 y/o male presenting with his mother today for a follow-up.       Interval History and Content of Current Session:  Interim Events/Subjective Report/Content of Current Session:  follow-up appointment.    Pt is a 28 y/o male with past psychiatric hx of anxiety, ADHD, ASD who presents for follow-up treatment. Pt reported being excited as his sister got engaged and he has a new niece. Pt reported that he has been doing well. No difficulty with sleep. Pt's mother reported that he switched insurance plans and was unable to  his prescription of Adderall until today. Pt's mother reported that he only uses the alprazolam during court dates. Court will decide in April if charges against pt are dropped and proceedings will end. He did meet with new urologist. Placed a temporary neurostimulator for urinary frequency without noticeable impact. Was removed and pt received botox injections with some benefit.    Past Psychiatric hx: adderall ("robot child"), focalin ("zombie"), strattera (angry), zoloft (as a child- ineffective), lexapro (ineffective), topomax ("migraines"), celexa (overly sedating but effective), remeron (ineffective- wgt gain), atarax, elavil (ineffective for pain), ritalin (inc'd bp)     Past Medical hx:   Past Medical History:   Diagnosis Date    Abnormal thyroid function test 09/11/2017    ADD (attention deficit disorder)     Anxiety disorder     Asperger's disorder     (AUTISM)    Deformity of both feet     Dyscalculia     Dysgraphia     Dyslexia     Eczema     Fatty liver     GERD (gastroesophageal reflux disease)     History of migraine headaches     Hypertension     Interstitial cystitis (chronic)     Irritable bowel syndrome     Nephrolithiasis     2014    PONV (postoperative nausea and vomiting)     Psoriasis     Psoriatic arthritis     Refusal of blood transfusion for " reasons of conscience     Seasonal allergies     Special needs assessment         Interim hx:  Medication changes last visit: None  Anxiety: mild  Depression: stable     Denies suicidal/homicidal ideations.  Denies hopelessness/worthlessness.    Denies auditory/visual hallucinations      Alcohol: Pt denied  Drug: Pt denied  Caffeine: not assessed  Tobacco: Pt denied      Review of Systems   PSYCHIATRIC: Pertinent items are noted in the narrative.        CONSTITUTIONAL: weight stable    Past Medical, Family and Social History: The patient's past medical, family and social history have been reviewed and updated as appropriate within the electronic medical record. See encounter notes.     Current Psychiatric Medication:  Prozac 40mg po qam, Adderall 10 mg pt qam (PRN about once a month), alprazolam 0.25 mg PRN (cut in half - very infrequently).     Compliance: yes      Side effects: Pt denies     Risk Parameters:  Patient reports no suicidal ideation  Patient reports no homicidal ideation  Patient reports no self-injurious behavior  Patient reports no violent behavior     Exam (detailed: at least 9 elements; comprehensive: all 15 elements)   Constitutional  Vitals:  Most recent vital signs, dated less than 90 days prior to this appointment, were reviewed. BP: ()/()   Arterial Line BP: ()/()       General:  unremarkable, age appropriate, casual attire, good eye contact, good rapport       Musculoskeletal  Muscle Strength/Tone:  no flaccidity, no tremor    Gait & Station:  normal      Psychiatric                       Speech:  normal tone, normal rate, rhythm, and volume   Mood & Affect:   Depressed, anxious         Thought Process:   Goal directed; Linear    Associations:   intact   Thought Content:   No SI/HI, delusions, or paranoia, no AV/VH   Insight & Judgement:   Good, adequate to circumstances   Orientation:   grossly intact; alert and oriented x 4    Memory:  intact for content of interview    Language:  grossly  "intact, can repeat    Attention Span  : Grossly intact for content of interview   Fund of Knowledge:   intact and appropriate to age and level of education        Assessment and Diagnosis   Status/Progress: Based on the examination today, the patient's problem(s) is/are adequately controlled.  New problems have not been presented today. Co-morbidities are not complicating management of the primary condition. There are no active rule-out diagnoses for this patient at this time.      Impression: Pt continues to be stable with medication plan. Has had difficulty acquiring Adderall due to insurance concerns. Not significant impact on mood. Will continue as is for now and monitor moving forward.     Diagnosis:   1) Social Anxiety Disorder  2) Generalized Anxiety Disorder  3) Attention Deficit Hyperactivity Disorder  4) Autism Spectrum Disorder (Level "requiring substantial support")  Intervention/Counseling/Treatment Plan   Medication Management:      1. Prozac 40mg po qam    2. Adderall 10 mg po qam (PRN about once a month)    3. alprazolam 0.25 mg PRN (cut in half - very infrequently).     4. Call to report any worsening of symptoms or problems with the medication. Pt instructed to go to ER with thoughts of harming self, others     5. Patient given contact # for psychotherapists at [facility] and also instructed to check with insurance for list of providers.     Psychotherapy: 20 minutes  Target symptoms:   Why chosen therapy is appropriate versus another modality: CBT used; relevant to diagnosis, patient responds to this modality  Outcome monitoring methods: self-report, observation  Therapeutic intervention type: Cognitive Behavioral Therapy  Topics discussed/themes: building skills sets for symptom management, symptom recognition, nutrition, exercise  The patient's response to the intervention is accepting  Patient's response to treatment is: good.   The patient's progress toward treatment goals: improving   "   Return to clinic: 3 months    -Cognitive-Behavioral/Supportive therapy and psychoeducation provided  -R/B/SE's of medications discussed with the pt who expresses understanding and chooses to take medications as prescribed.   -Pt instructed to call clinic, 911 or go to nearest emergency room if sxs worsen or pt is in   crisis. The pt expresses understanding.    Jp Hines, PhD, MP     Antidepressant/Antianxiety Medication Initiation:  Patient informed of risks, benefits, and potential side effects of medication and accepts informed consent.  Common side effects include nausea, fatigue, headache, insomnia., Specifically discussed the possibility of new or worsening suicidal thoughts/depression.  Patient instructed to stop the medication immediately and seek urgent treatment if this occurs. Patient instructed not to abruptly discontinue medication without physician guidance except in cases of sudden onset or worsening of SI.       Stimulant Medication Initiation:  Patient advised of risks, benefits, and side effects of medication and accepts informed consent.  Common side effects include insomnia, irritability, jittery feeling, dry mouth, and agitation/hostility., Patient advised of potential addictive nature of medication and controlled substance classification.  Instructed to safeguard medication as no early refills can be given for lost or stolen medications.       Benzodiazepine Initiation:  Patient advised of the risks, benefits, and common side effects of medication and has accepted informed consent.  Common side effects include drowsiness, impaired coordination, possible memory loss., Patient advised NOT to operate a vehicle or machinery untiil they are sure how the medication will affec them.  Client also advised of danger of mixing this medication with alcohol., Patient advised of potential addictive nature of medication and need to safeguard medication as no early refills for lost or stolen medications  can be authorized.

## 2023-03-22 RX ORDER — METOPROLOL SUCCINATE 50 MG/1
50 TABLET, EXTENDED RELEASE ORAL 2 TIMES DAILY
Qty: 180 TABLET | Refills: 1 | Status: SHIPPED | OUTPATIENT
Start: 2023-03-22 | End: 2023-08-31

## 2023-03-22 NOTE — TELEPHONE ENCOUNTER
Refill Decision Note   Nimajodie Krysta  is requesting a refill authorization.  Brief Assessment and Rationale for Refill:  Approve     Medication Therapy Plan:       Medication Reconciliation Completed: No    Comments:     No Care Gaps recommended.     Note composed:4:06 PM 03/22/2023

## 2023-03-22 NOTE — TELEPHONE ENCOUNTER
No new care gaps identified.  [facility] Embedded Care Gaps. Reference number: [id number]. 3/22/2023   10:53:01 AM ISABELLE

## 2023-03-24 RX ORDER — TRAMADOL HYDROCHLORIDE 50 MG/1
TABLET ORAL
Qty: 55 TABLET | Refills: 2 | Status: CANCELLED | OUTPATIENT
Start: 2023-03-24

## 2023-03-28 RX ORDER — TRAMADOL HYDROCHLORIDE 50 MG/1
TABLET ORAL
Qty: 55 TABLET | Refills: 2 | Status: CANCELLED | OUTPATIENT
Start: 2023-03-28

## 2023-03-28 RX ORDER — TRAMADOL HYDROCHLORIDE 50 MG/1
50 TABLET ORAL EVERY 12 HOURS PRN
Qty: 55 TABLET | Refills: 2 | Status: SHIPPED | OUTPATIENT
Start: 2023-03-28 | End: 2023-07-12 | Stop reason: SDUPTHER

## 2023-03-30 ENCOUNTER — OFFICE VISIT (OUTPATIENT)
Dept: FAMILY MEDICINE | Facility: CLINIC | Age: 28
End: 2023-03-30
Payer: COMMERCIAL

## 2023-03-30 VITALS
OXYGEN SATURATION: 98 % | SYSTOLIC BLOOD PRESSURE: 108 MMHG | BODY MASS INDEX: 32.2 KG/M2 | DIASTOLIC BLOOD PRESSURE: 68 MMHG | TEMPERATURE: 98 F | WEIGHT: 242.94 LBS | HEART RATE: 62 BPM | HEIGHT: 73 IN | RESPIRATION RATE: 20 BRPM

## 2023-03-30 DIAGNOSIS — I10 HYPERTENSION, ESSENTIAL: Primary | ICD-10-CM

## 2023-03-30 DIAGNOSIS — E78.5 HYPERLIPIDEMIA, UNSPECIFIED HYPERLIPIDEMIA TYPE: ICD-10-CM

## 2023-03-30 DIAGNOSIS — N30.10 IC (INTERSTITIAL CYSTITIS): ICD-10-CM

## 2023-03-30 PROCEDURE — 1159F MED LIST DOCD IN RCRD: CPT | Mod: CPTII,S$GLB,, | Performed by: FAMILY MEDICINE

## 2023-03-30 PROCEDURE — 3008F BODY MASS INDEX DOCD: CPT | Mod: CPTII,S$GLB,, | Performed by: FAMILY MEDICINE

## 2023-03-30 PROCEDURE — 1159F PR MEDICATION LIST DOCUMENTED IN MEDICAL RECORD: ICD-10-PCS | Mod: CPTII,S$GLB,, | Performed by: FAMILY MEDICINE

## 2023-03-30 PROCEDURE — 3074F PR MOST RECENT SYSTOLIC BLOOD PRESSURE < 130 MM HG: ICD-10-PCS | Mod: CPTII,S$GLB,, | Performed by: FAMILY MEDICINE

## 2023-03-30 PROCEDURE — 99214 OFFICE O/P EST MOD 30 MIN: CPT | Mod: S$GLB,,, | Performed by: FAMILY MEDICINE

## 2023-03-30 PROCEDURE — 1160F PR REVIEW ALL MEDS BY PRESCRIBER/CLIN PHARMACIST DOCUMENTED: ICD-10-PCS | Mod: CPTII,S$GLB,, | Performed by: FAMILY MEDICINE

## 2023-03-30 PROCEDURE — 3078F PR MOST RECENT DIASTOLIC BLOOD PRESSURE < 80 MM HG: ICD-10-PCS | Mod: CPTII,S$GLB,, | Performed by: FAMILY MEDICINE

## 2023-03-30 PROCEDURE — 3078F DIAST BP <80 MM HG: CPT | Mod: CPTII,S$GLB,, | Performed by: FAMILY MEDICINE

## 2023-03-30 PROCEDURE — 1160F RVW MEDS BY RX/DR IN RCRD: CPT | Mod: CPTII,S$GLB,, | Performed by: FAMILY MEDICINE

## 2023-03-30 PROCEDURE — 3074F SYST BP LT 130 MM HG: CPT | Mod: CPTII,S$GLB,, | Performed by: FAMILY MEDICINE

## 2023-03-30 PROCEDURE — 99214 PR OFFICE/OUTPT VISIT, EST, LEVL IV, 30-39 MIN: ICD-10-PCS | Mod: S$GLB,,, | Performed by: FAMILY MEDICINE

## 2023-03-30 PROCEDURE — 3008F PR BODY MASS INDEX (BMI) DOCUMENTED: ICD-10-PCS | Mod: CPTII,S$GLB,, | Performed by: FAMILY MEDICINE

## 2023-03-30 NOTE — PROGRESS NOTES
Subjective:       Patient ID: Jm Chaparro is a 27 y.o. male.    Chief Complaint: Follow-up    HPI  The patient is a 27-year-old who is here today for follow-up.  Overall he is doing well.  He continues to enjoy playing his video games.  He has been doing some exercise.    Today we discussed the followin)  Hypertension.  Currently he is taking Toprol-XL 50 mg twice a day.  Today's blood pressure is 108/68.  They did not check his blood pressure at home but have the ability to do so  2) hyperlipidemia.  In December, his total cholesterol was 155 (down from prior value of 288) and his LDL was 84 (down from prior out of 199.  He has been tolerating the Lipitor well with no side effects  3)   Psoriatic arthritis.  He continues to follow regularly with his rheumatologist.  He is taking Otezla add gabapentin which generally works well.  He is having more skin issues lately and they are going to address this with rheumatologist  4) interstitial cystitis.  They are working closely with the urologist.  They did try a neurostimulator but that did not work.  They next tried Botox and this seems to be very helpful.  He is scheduled for a repeat Botox injection      Review of Systems   Constitutional:  Negative for appetite change, chills, diaphoresis, fatigue, fever and unexpected weight change.   HENT:  Negative for congestion, ear pain, postnasal drip, rhinorrhea, sinus pressure, sneezing, sore throat and trouble swallowing.    Eyes:  Negative for pain, discharge and visual disturbance.   Respiratory:  Negative for cough, chest tightness, shortness of breath and wheezing.    Cardiovascular:  Negative for chest pain, palpitations and leg swelling.   Gastrointestinal:  Negative for abdominal distention, abdominal pain, blood in stool, constipation, diarrhea, nausea and vomiting.   Skin:  Negative for rash.       Objective:      Physical Exam  Constitutional:       General: He is not in acute distress.      Appearance: Normal appearance. He is well-developed.   HENT:      Head: Normocephalic and atraumatic.      Right Ear: Hearing, tympanic membrane, ear canal and external ear normal.      Left Ear: Hearing, tympanic membrane, ear canal and external ear normal.      Nose: Nose normal.      Mouth/Throat:      Mouth: No oral lesions.      Pharynx: No oropharyngeal exudate or posterior oropharyngeal erythema.   Eyes:      General: Lids are normal. No scleral icterus.     Extraocular Movements: Extraocular movements intact.      Conjunctiva/sclera: Conjunctivae normal.      Pupils: Pupils are equal, round, and reactive to light.   Neck:      Thyroid: No thyroid mass or thyromegaly.      Vascular: No carotid bruit.   Cardiovascular:      Rate and Rhythm: Normal rate and regular rhythm. No extrasystoles are present.     Chest Wall: PMI is not displaced.      Heart sounds: Normal heart sounds. No murmur heard.    No gallop.   Pulmonary:      Effort: Pulmonary effort is normal. No accessory muscle usage or respiratory distress.      Breath sounds: Normal breath sounds.   Abdominal:      General: Bowel sounds are normal. There is no abdominal bruit.      Palpations: Abdomen is soft.      Tenderness: There is no abdominal tenderness. There is no rebound.   Musculoskeletal:      Cervical back: Normal range of motion and neck supple.   Lymphadenopathy:      Head:      Right side of head: No submental or submandibular adenopathy.      Left side of head: No submental or submandibular adenopathy.      Cervical:      Right cervical: No superficial, deep or posterior cervical adenopathy.     Left cervical: No superficial, deep or posterior cervical adenopathy.      Upper Body:      Right upper body: No supraclavicular adenopathy.      Left upper body: No supraclavicular adenopathy.   Skin:     General: Skin is warm and dry.   Neurological:      Mental Status: He is alert and oriented to person, place, and time.     Blood pressure  "108/68, pulse 62, temperature 98.1 °F (36.7 °C), resp. rate 20, height 6' 1" (1.854 m), weight 110.2 kg (242 lb 15.2 oz), SpO2 98 %.Body mass index is 32.05 kg/m².            A/P:  1)  Hypertension.  Well controlled in probably over controlled.  We are going to change his Toprol to 50 mg once a day.  Mom will let me know if his blood pressures are consistently higher than 135/85  2) hyperlipidemia.  Well controlled.  Continue with Lipitor.  We will check labs later this year    3)   Psoriatic arthritis.  Persistent.  Follow up with Rheumatology continue with Otezla and gabapentin under their direction  4) interstitial cystitis.  Follow-up with urology as planned     As long as he does well, I will see him back in 6 months or sooner if needed  "

## 2023-04-20 ENCOUNTER — OFFICE VISIT (OUTPATIENT)
Dept: PAIN MEDICINE | Facility: CLINIC | Age: 28
End: 2023-04-20
Payer: COMMERCIAL

## 2023-04-20 VITALS
BODY MASS INDEX: 32.1 KG/M2 | SYSTOLIC BLOOD PRESSURE: 116 MMHG | DIASTOLIC BLOOD PRESSURE: 72 MMHG | HEIGHT: 73 IN | HEART RATE: 56 BPM | WEIGHT: 242.19 LBS

## 2023-04-20 DIAGNOSIS — L40.50 PSORIATIC ARTHRITIS: ICD-10-CM

## 2023-04-20 DIAGNOSIS — N30.10 IC (INTERSTITIAL CYSTITIS): ICD-10-CM

## 2023-04-20 DIAGNOSIS — R30.0 DYSURIA: ICD-10-CM

## 2023-04-20 DIAGNOSIS — M06.9 RHEUMATOID ARTHRITIS INVOLVING MULTIPLE SITES, UNSPECIFIED WHETHER RHEUMATOID FACTOR PRESENT: ICD-10-CM

## 2023-04-20 DIAGNOSIS — R39.89 BLADDER PAIN: Primary | ICD-10-CM

## 2023-04-20 DIAGNOSIS — N32.89 BLADDER SPASM: ICD-10-CM

## 2023-04-20 PROCEDURE — 3078F PR MOST RECENT DIASTOLIC BLOOD PRESSURE < 80 MM HG: ICD-10-PCS | Mod: CPTII,S$GLB,,

## 2023-04-20 PROCEDURE — 99214 OFFICE O/P EST MOD 30 MIN: CPT | Mod: S$GLB,,,

## 2023-04-20 PROCEDURE — 3008F PR BODY MASS INDEX (BMI) DOCUMENTED: ICD-10-PCS | Mod: CPTII,S$GLB,,

## 2023-04-20 PROCEDURE — 3008F BODY MASS INDEX DOCD: CPT | Mod: CPTII,S$GLB,,

## 2023-04-20 PROCEDURE — 99214 PR OFFICE/OUTPT VISIT, EST, LEVL IV, 30-39 MIN: ICD-10-PCS | Mod: S$GLB,,,

## 2023-04-20 PROCEDURE — 3074F SYST BP LT 130 MM HG: CPT | Mod: CPTII,S$GLB,,

## 2023-04-20 PROCEDURE — 99999 PR PBB SHADOW E&M-EST. PATIENT-LVL III: ICD-10-PCS | Mod: PBBFAC,,,

## 2023-04-20 PROCEDURE — 3074F PR MOST RECENT SYSTOLIC BLOOD PRESSURE < 130 MM HG: ICD-10-PCS | Mod: CPTII,S$GLB,,

## 2023-04-20 PROCEDURE — 99999 PR PBB SHADOW E&M-EST. PATIENT-LVL III: CPT | Mod: PBBFAC,,,

## 2023-04-20 PROCEDURE — 1159F MED LIST DOCD IN RCRD: CPT | Mod: CPTII,S$GLB,,

## 2023-04-20 PROCEDURE — 3078F DIAST BP <80 MM HG: CPT | Mod: CPTII,S$GLB,,

## 2023-04-20 PROCEDURE — 1159F PR MEDICATION LIST DOCUMENTED IN MEDICAL RECORD: ICD-10-PCS | Mod: CPTII,S$GLB,,

## 2023-04-20 NOTE — PROGRESS NOTES
This note was completed with dictation software and grammatical errors may exist.    No chief complaint on file.       HPI: Jm Chaparro is a 27 y.o. year old male patient who has a past medical history of Abnormal thyroid function test, ADD (attention deficit disorder), Anxiety disorder, Asperger's disorder, Deformity of both feet, Dyscalculia, Dysgraphia, Dyslexia, Eczema, Fatty liver, GERD (gastroesophageal reflux disease), History of migraine headaches, Hypertension, Interstitial cystitis (chronic), Irritable bowel syndrome, Nephrolithiasis, PONV (postoperative nausea and vomiting), Psoriasis, Psoriatic arthritis, Refusal of blood transfusion for reasons of conscience, Seasonal allergies, and Special needs assessment. He presents in referral from No ref. provider found for abdominal and bladder pain.  Returns with his mom for follow-up with continued bladder pain.  He recently trialed a sacral nerve stimulator without significant relief.  He is also undergone Botox injections in his bladder with improvement of his pain.  On average she was previously taking 2 tramadol as a day worse now since the Botox he has been taking 1.5.  Overall he is found improvement with the Botox and is scheduled to have a repeat injection in May.  He reports some continued pain with is psoriatic arthritis but overall tolerable.  He is also reporting some intermittent numbness in his right forearm and into his right pinky that is intermittent.  He continues to take gabapentin 300 mg t.i.d. with some relief.      Previous history:  The patient's mother was present with him at the visit today who also helps with most of the history.  She reports that in March, 2015 she had gone out of town on a business trip and her son accompanying her.  Unfortunately he came down with cold and virus symptoms at the time and began developing severe abdominal pain.  She went to the emergency department at that time in [place], they were  told he was having viral symptoms.  She returned to the [facility] in  and had followed up with several other physicians that year.  She reports that initially he was having intermittent pain on and off, would have pain for a week and then it would disappear without cause.  He then developed diarrhea and constipation at times, he was seen at [facility] in [place] in 2015 and diagnosed with IBS with constipation and diarrhea.  They recommend an antidepressant that he tried, no relief with this.  Shortly after he began having renal calculi as well and needed to present to the emergency department.  He had seen neurology, Dr. Larry and reports that he required stone removal, does not sound like he has had lithotripsy.  He has seen Dr. Dickson, and now Nephrology, Dr. Enriquez.  He has seen Gastroenterology, Dr. Shaver and had undergone colonoscopy with no significant findings.  He has been tried on multiple medications over time as discussed below without any benefit.  He continues to have frequent abdominal pain that is often worse with caffeine or other foods, worse with activity.  He reports that he gets some relief with a bowel movement.  He still has been passing small sand like renal calculi, they report that his urine turns dark when he is about to pass a stone and he often has pain throughout the bladder and urethra after passing a stone.    He has a history of rheumatoid arthritis, has been seeing a rheumatologist, reports pain throughout his entire spine, bilateral hips, shoulders, knees, feet.  He does well with being in a bath with hot water.    Pain intervention history:  No injections    Spine surgeries:  None    Antineuropathics: Gabapentin 300mg three times daily, was given this for inflammatory joint pain??  NSAIDs: Mobic 15, no benefit  Physical therapy:  Has done some physical therapy, water therapy in the past with some improvement but things worsened when he did land-based therapy and  "stretching  Antidepressants: Fluoxetine 40mg  Muscle relaxers:  Opioids:  The patient reports having benefit from tramadol in the past  Antiplatelets/Anticoagulants:  From Dr. Vyas note:  Prozac 40mg po qam, adderall xr 20mg po qam (very rare use), lunesta 2mg po qhs PRN insomnia  Past Psych Meds: adderall ("robot child"), focalin ("zombie"), strattera (angry), zoloft (as a child- ineffective), lexapro (ineffective) topomax ("migraines"), celexa (overly sedating but effective), remeron (ineffective- wgt gain), atarax, elavil (ineffective for pain), ritalin (inc'd bp)     ROS:  He reports fatigue, weight gain, itching, color change, headaches head trauma, ear pain, constipation, diarrhea, stomach pain, nausea, flank pain, urinary urgency and frequency, painful urination, joint stiffness, joint swelling, back pain, difficulty sleeping and anxiety.  Balance of review of systems is negative.    Lab Results   Component Value Date    HGBA1C 5.2 08/29/2022       Lab Results   Component Value Date    WBC 10.24 08/29/2022    HGB 15.5 08/29/2022    HCT 44.0 08/29/2022    MCV 94 08/29/2022     08/29/2022             Past Medical History:   Diagnosis Date    Abnormal thyroid function test 09/11/2017    ADD (attention deficit disorder)     Anxiety disorder     Asperger's disorder     (AUTISM)    Deformity of both feet     Dyscalculia     Dysgraphia     Dyslexia     Eczema     Fatty liver     GERD (gastroesophageal reflux disease)     History of migraine headaches     Hypertension     Interstitial cystitis (chronic)     Irritable bowel syndrome     Nephrolithiasis     2014    PONV (postoperative nausea and vomiting)     Psoriasis     Psoriatic arthritis     Refusal of blood transfusion for reasons of conscience     Seasonal allergies     Special needs assessment        Past Surgical History:   Procedure Laterality Date    BIOPSY OF BLADDER  1/3/2022    Procedure: BIOPSY, BLADDER;  Surgeon: Derek Dickson MD;  " Location: [facility] OR;  Service: Urology;;    COLONOSCOPY  ~2015    [facility]; told IBS    COLONOSCOPY  08/10/2015    Dr. Shaver; sent for scanning: unremarkable findings, no specimens collected    COLONOSCOPY N/A 2/18/2019    Procedure: COLONOSCOPY;  Surgeon: Puma Preston Jr., MD;  Location: [facility];  Service: Endoscopy;  Laterality: N/A;    CYSTOSCOPY  1/11/2023    Procedure: CYSTOSCOPY;  Surgeon: Trino Cain MD;  Location: [facility] OR 1ST FLR;  Service: Urology;;    CYSTOSCOPY,WITH BOTULINUM TOXIN INJECTION N/A 2/9/2023    Procedure: CYSTOSCOPY,WITH BOTULINUM TOXIN INJECTION;  Surgeon: Trino Cain MD;  Location: [facility] OR;  Service: Urology;  Laterality: N/A;    INSERTION, NEUROSTIMULATOR, TEMPORARY, SACRAL N/A 1/24/2023    Procedure: INSERTION, NEUROSTIMULATOR, TEMPORARY, SACRAL;  Surgeon: Trino Cain MD;  Location: [facility] OR 2ND FLR;  Service: Urology;  Laterality: N/A;  1 hr 45 mins    REMOVAL OF ELECTRODE LEAD OF SACRAL NERVE STIMULATOR N/A 2/9/2023    Procedure: REMOVAL, ELECTRODE LEAD, SACRAL NERVE STIMULATOR;  Surgeon: Trino Cain MD;  Location: [facility] OR;  Service: Urology;  Laterality: N/A;  1 hr    UPPER GASTROINTESTINAL ENDOSCOPY  05/14/2012    Dr. Preston    UPPER GASTROINTESTINAL ENDOSCOPY  07/17/2015    Dr. Shaevr, sent for scanning: normal esophagus- dilated & biopsied, findings WNL, biopsies taken from z-line, stomach and duodenum; biopsy: duodenum WNL, antrum reactive gastropathy, negative for h pylori or int. metaplasia, GEJ WNL, negative for EOE & cotton's esophagus    ureteral stone extraction      basket extraction       Social History     Socioeconomic History    Marital status: Single   Tobacco Use    Smoking status: Never    Smokeless tobacco: Never   Substance and Sexual Activity    Alcohol use: No    Drug use: No    Sexual activity: Never     Social Determinants of Health     Financial Resource Strain: Unknown    Difficulty of Paying Living Expenses: Patient  "refused   Food Insecurity: Unknown    Worried About Running Out of Food in the Last Year: Patient refused    Ran Out of Food in the Last Year: Patient refused   Transportation Needs: Unmet Transportation Needs    Lack of Transportation (Medical): Yes    Lack of Transportation (Non-Medical): Yes   Physical Activity: Inactive    Days of Exercise per Week: 0 days    Minutes of Exercise per Session: 0 min   Stress: Stress Concern Present    Feeling of Stress : To some extent   Social Connections: Unknown    Frequency of Communication with Friends and Family: Twice a week    Frequency of Social Gatherings with Friends and Family: Twice a week    Active Member of Clubs or Organizations: Yes    Attends Club or Organization Meetings: More than 4 times per year    Marital Status: Never    Housing Stability: Unknown    Unable to Pay for Housing in the Last Year: Patient refused    Number of Places Lived in the Last Year: 1    Unstable Housing in the Last Year: No         Medications/Allergies: See med card    Vitals:    23 0831   BP: 116/72   Pulse: (!) 56   Weight: 109.8 kg (242 lb 2.8 oz)   Height: 6' 1" (1.854 m)   PainSc:   4   PainLoc: Hip       Body mass index is 31.95 kg/m².    Physical exam:  Gen: A and O x3, pleasant, well-groomed  Skin: No rashes or obvious lesions  HEENT: PERRLA, no obvious deformities on ears or in canals. Trachea midline.  CVS: Regular rate and rhythm, normal palpable pulses.  Resp:No increased work of breathing, symmetrical chest rise.  Abdomen: Soft, NT/ND.  Musculoskeletal:  Moving all extremities equally    Upper extremity strength:  5/5 bilaterally  Lower extremity strength:  5/5 bilaterally      Imagin22 CT renal protocol:  Liver normal enhancement with no focal lesion.  Gallbladder, pancreas, stomach, spleen, adrenal glands normal.  Kidneys normal size and contour with no nephrolithiasis, hydronephrosis, or ureteral obstruction.  Aorta tapers normally.  No bowel " obstruction, ascites, inflammatory change.  Appendix normal.  Bladder and rectum normal.  No significant bladder wall thickening evident.  Bones are intact.  Lung bases clear.    Assessment:  Jm Chaparro is a 27 y.o. year old male patient who has a past medical history of Abnormal thyroid function test, ADD (attention deficit disorder), Anxiety disorder, Asperger's disorder, Deformity of both feet, Dyscalculia, Dysgraphia, Dyslexia, Eczema, Fatty liver, GERD (gastroesophageal reflux disease), History of migraine headaches, Hypertension, Interstitial cystitis (chronic), Irritable bowel syndrome, Nephrolithiasis, PONV (postoperative nausea and vomiting), Psoriasis, Psoriatic arthritis, Refusal of blood transfusion for reasons of conscience, Seasonal allergies, and Special needs assessment. He presents in referral from Dr. Derek Dickson for bladder pain.    1. Bladder pain        2. IC (interstitial cystitis)        3. Rheumatoid arthritis involving multiple sites, unspecified whether rheumatoid factor present        4. Bladder spasm        5. Psoriatic arthritis        6. Dysuria              Plan:  1. At this time it seems that he is having improvement with his bladder pain and spasms with the Botox injections.  He is scheduled to have another repeat injection in May.  2. Hopefully, with 2nd injection he will have added relief.  Since 1st injection he has somewhat decreased his tramadol intake from on average 2 a day to 1.5.  3. I believe some of the numbness in his in his likely from cubital tunnel and discussed conservative treatments for it today.  If worsens can refer him to hand specialist.  4. Refill for tramadol sent to Dr. Gonzalez today for approval. I have reviewed the [place] Board of Pharmacy website and there are no abberancies.    5. Follow-up in 3 months or sooner if needed.

## 2023-04-30 ENCOUNTER — PATIENT MESSAGE (OUTPATIENT)
Dept: SURGERY | Facility: HOSPITAL | Age: 28
End: 2023-04-30
Payer: COMMERCIAL

## 2023-05-02 ENCOUNTER — ANESTHESIA EVENT (OUTPATIENT)
Dept: SURGERY | Facility: HOSPITAL | Age: 28
End: 2023-05-02
Payer: COMMERCIAL

## 2023-05-02 NOTE — PRE-PROCEDURE INSTRUCTIONS
Following instructions given via phone:    On the day of surgery please report to [facility] located at [address] [place] [address].  Please park in the lot in front of the building and enter at the main entrance. Proceed to the second floor for registration.    Arrival time: 0545    You may not drive home after your procedure. You may not leave alone in an uber, taxi, etc.  You must be discharged to a responsible adult.  If possible, please have your visitor &/or ride home stay during your visit.   The surgeon should speak with your visitors after your procedure.  All visitors must be 18 years of age or older. Please limit your visitors to max of 2 people.  Have visitors bring snacks &/or lunch as the facility only has drink vending machines.    No food, no drink after midnight.  Take the following medications the morning of your procedure: see med list, take with water    If applicable, do not shave the surgical site 5 days prior to your procedure.  Shower the night before and the morning of your procedure with antibacterial soap.  Do not apply any products to your skin nor your hair after you shower the morning of your procedure.  Wear loose, comfortable clothing.  No jewelry. No contacts. Bring glasses if necessary.  If you have dentures, bring a case.  Leave all valuables at home.

## 2023-05-02 NOTE — ANESTHESIA PREPROCEDURE EVALUATION
2023  Jm Chaparro is a 27 y.o., male.      Pre-op Assessment    I have reviewed the Patient Summary Reports.     I have reviewed the Nursing Notes. I have reviewed the NPO Status.      Review of Systems  Anesthesia Hx:  Denies Family Hx of Anesthesia complications. (aunt  of fentanyl )  Personal Hx of Anesthesia complications, Post-Operative Nausea/Vomiting   Cardiovascular:   Exercise tolerance: good Hypertension, well controlled  Hypertension    Pulmonary:   Sleep Apnea, CPAP Pt not tolerating CPAP   Renal/:   Chronic Renal Disease  Kidney Function/Disease    Hepatic/GI:                                                                                                                2023  Jm Chaparro is a 27 y.o., male.      Pre-op Assessment    I have reviewed the Patient Summary Reports.     I have reviewed the Nursing Notes. I have reviewed the NPO Status.      Review of Systems  Anesthesia Hx:  Denies Family Hx of Anesthesia complications. (aunt  of fentanyl )  Personal Hx of Anesthesia complications, Post-Operative Nausea/Vomiting   Cardiovascular:   Exercise tolerance: good Hypertension, well controlled  Hypertension    Pulmonary:   Sleep Apnea, CPAP Pt not tolerating CPAP   Renal/:   Chronic Renal Disease  Kidney Function/Disease    Hepatic/GI:   GERD Liver Disease, (fatty)  Liver Disease    Endocrine:  Thyroid Disease Hypothyroidism    Dermatological:  eczema  Psych:   Psychiatric History (ADHD  Autism) anxiety          Physical Exam    Airway:  Mallampati: II   Mouth Opening: Normal  TM Distance: Normal  Tongue: Large  Neck ROM: Normal ROM    Dental:  Intact        Anesthesia Plan  Type of Anesthesia, risks & benefits discussed:    Anesthesia Type: Gen ETT  Intra-op Monitoring Plan: Standard ASA Monitors  Post Op Pain Control Plan: multimodal  analgesia and IV/PO Opioids PRN  ASA Score: 2  Day of Surgery Review of History & Physical: H&P Update referred to the surgeon/provider.I have interviewed and examined the patient. I have reviewed the patient's H&P dated: There are no significant changes. H&P completed by Anesthesiologist.    Ready For Surgery From Anesthesia Perspective.     .                                                                                                                 2023  Jm Chaparro is a 27 y.o., male.      Pre-op Assessment    I have reviewed the Patient Summary Reports.     I have reviewed the Nursing Notes. I have reviewed the NPO Status.      Review of Systems  Anesthesia Hx:  Denies Family Hx of Anesthesia complications. (aunt  of fentanyl )  Personal Hx of Anesthesia complications, Post-Operative Nausea/Vomiting   Cardiovascular:   Exercise tolerance: good Hypertension, well controlled  Hypertension    Pulmonary:   Sleep Apnea, CPAP Pt not tolerating CPAP   Renal/:   Chronic Renal Disease  Kidney Function/Disease    Hepatic/GI:   GERD Liver Disease, (fatty)  Liver Disease    Endocrine:  Thyroid Disease Hypothyroidism    Dermatological:  eczema  Psych:   Psychiatric History (ADHD  Autism) anxiety          Physical Exam    Airway:  Mallampati: II   Mouth Opening: Normal  TM Distance: Normal  Tongue: Large  Neck ROM: Normal ROM    Dental:  Intact        Anesthesia Plan  Type of Anesthesia, risks & benefits discussed:    Anesthesia Type: Gen ETT  Intra-op Monitoring Plan: Standard ASA Monitors  Post Op Pain Control Plan: multimodal analgesia and IV/PO Opioids PRN  ASA Score: 2  Day of Surgery Review of History & Physical: H&P Update referred to the surgeon/provider.I have interviewed and examined the patient. I have reviewed the patient's H&P dated: There are no significant changes. H&P completed by Anesthesiologist.    Ready For Surgery From Anesthesia Perspective.     .        .                                                                                                                  05/02/2023  Jm Chaparro is a 27 y.o., male.      Pre-op Assessment    I have reviewed the Patient Summary Reports.    I have reviewed the NPO Status.   I have reviewed the Medications.     Review of Systems  Anesthesia Hx:  Denies Family Hx of Anesthesia complications.  Personal Hx of Anesthesia complications, Post-Operative Nausea/Vomiting   Hematology/Oncology:  Hematology Normal   Oncology Normal     EENT/Dental:EENT/Dental Normal   Cardiovascular:   Hypertension Dysrhythmias (palpitations) NYHA Classification II    Pulmonary:   Sleep Apnea    Renal/:   Chronic Renal Disease renal calculi    Hepatic/GI:   GERD Liver Disease, (fatty liver)    Musculoskeletal:  Musculoskeletal Normal    Neurological:   Headaches asperger  ADHD   Dermatological:   psoriasis   Psych:   Psychiatric History anxiety depression      27 y.o. year old male patient who has a past medical history of Abnormal thyroid function test, ADD (attention deficit disorder), Anxiety disorder, Asperger's disorder, Deformity of both feet, Dyscalculia, Dysgraphia, Dyslexia, Eczema, Fatty liver, GERD (gastroesophageal reflux disease), History of migraine headaches, Hypertension, Interstitial cystitis (chronic), Irritable bowel syndrome, Nephrolithiasis, PONV (postoperative nausea and vomiting), Psoriasis, Psoriatic arthritis, Refusal of blood transfusion for reasons of conscience, Seasonal allergies,    Physical Exam  General: Well nourished, Cooperative, Alert and Oriented    Airway:  Mallampati: II   Mouth Opening: < 3 cm  TM Distance: Normal  Tongue: Normal  Neck ROM: Normal ROM    Dental:  Intact        Anesthesia Plan  Type of Anesthesia, risks & benefits discussed:    Anesthesia Type: Gen Natural Airway  Intra-op Monitoring Plan: Standard ASA Monitors  Post Op Pain Control Plan: multimodal analgesia and IV/PO Opioids PRN  Induction:   IV  Informed Consent: Informed consent signed with the Patient and all parties understand the risks and agree with anesthesia plan.  All questions answered.   ASA Score: 3  Day of Surgery Review of History & Physical: H&P Update referred to the surgeon/provider.I have interviewed and examined the patient. I have reviewed the patient's H&P dated: H&P completed by Anesthesiologist.    Ready For Surgery From Anesthesia Perspective.     .

## 2023-05-03 ENCOUNTER — ANESTHESIA (OUTPATIENT)
Dept: SURGERY | Facility: HOSPITAL | Age: 28
End: 2023-05-03
Payer: COMMERCIAL

## 2023-05-03 ENCOUNTER — PATIENT MESSAGE (OUTPATIENT)
Dept: SURGERY | Facility: HOSPITAL | Age: 28
End: 2023-05-03
Payer: COMMERCIAL

## 2023-05-03 ENCOUNTER — HOSPITAL ENCOUNTER (OUTPATIENT)
Facility: HOSPITAL | Age: 28
Discharge: HOME | End: 2023-05-03
Attending: UROLOGY | Admitting: UROLOGY
Payer: COMMERCIAL

## 2023-05-03 VITALS
RESPIRATION RATE: 19 BRPM | TEMPERATURE: 99 F | HEART RATE: 55 BPM | SYSTOLIC BLOOD PRESSURE: 126 MMHG | DIASTOLIC BLOOD PRESSURE: 83 MMHG | OXYGEN SATURATION: 96 %

## 2023-05-03 DIAGNOSIS — R35.0 URINARY FREQUENCY: ICD-10-CM

## 2023-05-03 DIAGNOSIS — R39.15 URINARY URGENCY: Primary | ICD-10-CM

## 2023-05-03 DIAGNOSIS — N39.41 URGE INCONTINENCE: ICD-10-CM

## 2023-05-03 PROCEDURE — D9220A PRA ANESTHESIA: Mod: ANES,,, | Performed by: ANESTHESIOLOGY

## 2023-05-03 PROCEDURE — 25000003 PHARM REV CODE 250: Performed by: NURSE ANESTHETIST, CERTIFIED REGISTERED

## 2023-05-03 PROCEDURE — 37000008 HC ANESTHESIA 1ST 15 MINUTES: Performed by: UROLOGY

## 2023-05-03 PROCEDURE — 36000707: Performed by: UROLOGY

## 2023-05-03 PROCEDURE — 71000033 HC RECOVERY, INTIAL HOUR: Performed by: UROLOGY

## 2023-05-03 PROCEDURE — 94761 N-INVAS EAR/PLS OXIMETRY MLT: CPT

## 2023-05-03 PROCEDURE — 63600175 PHARM REV CODE 636 W HCPCS: Performed by: ANESTHESIOLOGY

## 2023-05-03 PROCEDURE — 87086 URINE CULTURE/COLONY COUNT: CPT | Performed by: UROLOGY

## 2023-05-03 PROCEDURE — 25000003 PHARM REV CODE 250: Performed by: UROLOGY

## 2023-05-03 PROCEDURE — 99900035 HC TECH TIME PER 15 MIN (STAT)

## 2023-05-03 PROCEDURE — 52287 CYSTOSCOPY CHEMODENERVATION: CPT | Mod: ,,, | Performed by: UROLOGY

## 2023-05-03 PROCEDURE — 63600175 PHARM REV CODE 636 W HCPCS: Mod: JZ | Performed by: UROLOGY

## 2023-05-03 PROCEDURE — D9220A PRA ANESTHESIA: ICD-10-PCS | Mod: ANES,,, | Performed by: ANESTHESIOLOGY

## 2023-05-03 PROCEDURE — 63600175 PHARM REV CODE 636 W HCPCS: Performed by: NURSE ANESTHETIST, CERTIFIED REGISTERED

## 2023-05-03 PROCEDURE — 71000015 HC POSTOP RECOV 1ST HR: Performed by: UROLOGY

## 2023-05-03 PROCEDURE — 36000706: Performed by: UROLOGY

## 2023-05-03 PROCEDURE — 37000009 HC ANESTHESIA EA ADD 15 MINS: Performed by: UROLOGY

## 2023-05-03 PROCEDURE — D9220A PRA ANESTHESIA: ICD-10-PCS | Mod: CRNA,,, | Performed by: NURSE ANESTHETIST, CERTIFIED REGISTERED

## 2023-05-03 PROCEDURE — D9220A PRA ANESTHESIA: Mod: CRNA,,, | Performed by: NURSE ANESTHETIST, CERTIFIED REGISTERED

## 2023-05-03 PROCEDURE — 71000016 HC POSTOP RECOV ADDL HR: Performed by: UROLOGY

## 2023-05-03 PROCEDURE — 25000003 PHARM REV CODE 250: Performed by: ANESTHESIOLOGY

## 2023-05-03 PROCEDURE — 52287 PR CYSTOURETHROSCOPY WITH INJ FOR CHEMODENERVATION: ICD-10-PCS | Mod: ,,, | Performed by: UROLOGY

## 2023-05-03 RX ORDER — CIPROFLOXACIN 2 MG/ML
400 INJECTION, SOLUTION INTRAVENOUS ONCE
Status: DISCONTINUED | OUTPATIENT
Start: 2023-05-03 | End: 2023-05-03 | Stop reason: HOSPADM

## 2023-05-03 RX ORDER — PROPOFOL 10 MG/ML
VIAL (ML) INTRAVENOUS CONTINUOUS PRN
Status: DISCONTINUED | OUTPATIENT
Start: 2023-05-03 | End: 2023-05-03

## 2023-05-03 RX ORDER — MIDAZOLAM HYDROCHLORIDE 1 MG/ML
INJECTION, SOLUTION INTRAMUSCULAR; INTRAVENOUS
Status: DISCONTINUED | OUTPATIENT
Start: 2023-05-03 | End: 2023-05-03

## 2023-05-03 RX ORDER — LIDOCAINE HYDROCHLORIDE 10 MG/ML
1 INJECTION, SOLUTION EPIDURAL; INFILTRATION; INTRACAUDAL; PERINEURAL ONCE AS NEEDED
Status: DISCONTINUED | OUTPATIENT
Start: 2023-05-03 | End: 2023-05-03 | Stop reason: HOSPADM

## 2023-05-03 RX ORDER — MORPHINE SULFATE 2 MG/ML
2 INJECTION, SOLUTION INTRAMUSCULAR; INTRAVENOUS ONCE
Status: DISCONTINUED | OUTPATIENT
Start: 2023-05-03 | End: 2023-05-03 | Stop reason: HOSPADM

## 2023-05-03 RX ORDER — FENTANYL CITRATE 50 UG/ML
INJECTION, SOLUTION INTRAMUSCULAR; INTRAVENOUS
Status: DISCONTINUED | OUTPATIENT
Start: 2023-05-03 | End: 2023-05-03

## 2023-05-03 RX ORDER — SULFAMETHOXAZOLE AND TRIMETHOPRIM 800; 160 MG/1; MG/1
1 TABLET ORAL 2 TIMES DAILY
Qty: 4 TABLET | Refills: 0 | Status: SHIPPED | OUTPATIENT
Start: 2023-05-03 | End: 2023-05-05

## 2023-05-03 RX ORDER — LIDOCAINE HYDROCHLORIDE 20 MG/ML
INJECTION INTRAVENOUS
Status: DISCONTINUED | OUTPATIENT
Start: 2023-05-03 | End: 2023-05-03

## 2023-05-03 RX ORDER — DIAZEPAM 5 MG/1
5 TABLET ORAL EVERY 6 HOURS PRN
Status: DISCONTINUED | OUTPATIENT
Start: 2023-05-03 | End: 2023-05-03 | Stop reason: HOSPADM

## 2023-05-03 RX ORDER — CLINDAMYCIN PHOSPHATE 600 MG/50ML
600 INJECTION, SOLUTION INTRAVENOUS ONCE
Status: DISCONTINUED | OUTPATIENT
Start: 2023-05-03 | End: 2023-05-03 | Stop reason: HOSPADM

## 2023-05-03 RX ORDER — LIDOCAINE HYDROCHLORIDE 20 MG/ML
JELLY TOPICAL
Status: DISCONTINUED | OUTPATIENT
Start: 2023-05-03 | End: 2023-05-03 | Stop reason: HOSPADM

## 2023-05-03 RX ORDER — ACETAMINOPHEN 500 MG
1000 TABLET ORAL
Status: COMPLETED | OUTPATIENT
Start: 2023-05-03 | End: 2023-05-03

## 2023-05-03 RX ORDER — CLINDAMYCIN PHOSPHATE 900 MG/50ML
INJECTION, SOLUTION INTRAVENOUS
Status: DISCONTINUED | OUTPATIENT
Start: 2023-05-03 | End: 2023-05-03

## 2023-05-03 RX ORDER — PHENAZOPYRIDINE HYDROCHLORIDE 200 MG/1
200 TABLET, FILM COATED ORAL ONCE
Status: COMPLETED | OUTPATIENT
Start: 2023-05-03 | End: 2023-05-03

## 2023-05-03 RX ORDER — PHENAZOPYRIDINE HYDROCHLORIDE 100 MG/1
100 TABLET, FILM COATED ORAL 3 TIMES DAILY PRN
Qty: 15 TABLET | Refills: 0 | Status: SHIPPED | OUTPATIENT
Start: 2023-05-03 | End: 2023-05-08

## 2023-05-03 RX ORDER — PROPOFOL 10 MG/ML
VIAL (ML) INTRAVENOUS
Status: DISCONTINUED | OUTPATIENT
Start: 2023-05-03 | End: 2023-05-03

## 2023-05-03 RX ORDER — ONDANSETRON 2 MG/ML
4 INJECTION INTRAMUSCULAR; INTRAVENOUS ONCE AS NEEDED
Status: DISCONTINUED | OUTPATIENT
Start: 2023-05-03 | End: 2023-05-03 | Stop reason: HOSPADM

## 2023-05-03 RX ORDER — MORPHINE SULFATE 2 MG/ML
2 INJECTION, SOLUTION INTRAMUSCULAR; INTRAVENOUS ONCE
Status: COMPLETED | OUTPATIENT
Start: 2023-05-03 | End: 2023-05-03

## 2023-05-03 RX ORDER — ACETAMINOPHEN AND CODEINE PHOSPHATE 300; 30 MG/1; MG/1
1 TABLET ORAL EVERY 6 HOURS PRN
Qty: 12 TABLET | Refills: 0 | Status: SHIPPED | OUTPATIENT
Start: 2023-05-03 | End: 2023-05-06

## 2023-05-03 RX ORDER — HYDROCODONE BITARTRATE AND ACETAMINOPHEN 5; 325 MG/1; MG/1
1 TABLET ORAL ONCE AS NEEDED
Status: DISCONTINUED | OUTPATIENT
Start: 2023-05-03 | End: 2023-05-03 | Stop reason: HOSPADM

## 2023-05-03 RX ORDER — MORPHINE SULFATE 2 MG/ML
INJECTION, SOLUTION INTRAMUSCULAR; INTRAVENOUS
Status: DISCONTINUED
Start: 2023-05-03 | End: 2023-05-03 | Stop reason: HOSPADM

## 2023-05-03 RX ORDER — SODIUM CHLORIDE 9 MG/ML
INJECTION, SOLUTION INTRAVENOUS CONTINUOUS
Status: DISCONTINUED | OUTPATIENT
Start: 2023-05-03 | End: 2023-05-03 | Stop reason: HOSPADM

## 2023-05-03 RX ADMIN — SODIUM CHLORIDE: 0.9 INJECTION, SOLUTION INTRAVENOUS at 07:05

## 2023-05-03 RX ADMIN — PHENAZOPYRIDINE 200 MG: 200 TABLET ORAL at 09:05

## 2023-05-03 RX ADMIN — ACETAMINOPHEN 1000 MG: 500 TABLET ORAL at 06:05

## 2023-05-03 RX ADMIN — FENTANYL CITRATE 25 MCG: 50 INJECTION, SOLUTION INTRAMUSCULAR; INTRAVENOUS at 08:05

## 2023-05-03 RX ADMIN — PROPOFOL 175 MCG/KG/MIN: 10 INJECTION, EMULSION INTRAVENOUS at 08:05

## 2023-05-03 RX ADMIN — DIAZEPAM 5 MG: 5 TABLET ORAL at 06:05

## 2023-05-03 RX ADMIN — Medication 30 MG: at 08:05

## 2023-05-03 RX ADMIN — LIDOCAINE HYDROCHLORIDE 25 MG: 20 INJECTION INTRAVENOUS at 08:05

## 2023-05-03 RX ADMIN — MORPHINE SULFATE 2 MG: 2 INJECTION, SOLUTION INTRAMUSCULAR; INTRAVENOUS at 09:05

## 2023-05-03 RX ADMIN — TACROLIMUS 600 MG: 0.5 CAPSULE ORAL at 08:05

## 2023-05-03 RX ADMIN — MIDAZOLAM HYDROCHLORIDE 2 MG: 1 INJECTION, SOLUTION INTRAMUSCULAR; INTRAVENOUS at 07:05

## 2023-05-03 NOTE — ANESTHESIA POSTPROCEDURE EVALUATION
Anesthesia Post Evaluation    Patient: Jm Chaparro    Procedure(s) Performed: Procedure(s) (LRB):  CYSTOSCOPY,WITH BOTULINUM TOXIN INJECTION (N/A)    Final Anesthesia Type: general      Patient location during evaluation: PACU  Patient participation: Yes- Able to Participate  Level of consciousness: awake and alert  Post-procedure vital signs: reviewed and stable  Pain management: adequate    PONV status at discharge: No PONV  Anesthetic complications: no      Cardiovascular status: blood pressure returned to baseline  Respiratory status: unassisted, spontaneous ventilation and room air  Hydration status: euvolemic  Follow-up not needed.          Vitals Value Taken Time   /83 05/03/23 1030   Temp 37.1 °C (98.8 °F) 05/03/23 1030   Pulse 55 05/03/23 1030   Resp 19 05/03/23 1030   SpO2 96 % 05/03/23 1030         Event Time   Out of Recovery 09:15:00         Pain/Antonio Score: Pain Rating Prior to Med Admin: 9 (5/3/2023 10:30 AM)  Pain Rating Post Med Admin: 3 (5/3/2023 10:30 AM)  Antonio Score: 10 (5/3/2023 10:15 AM)

## 2023-05-03 NOTE — PLAN OF CARE
Pt arrived to recovery 18 via stretcher per OR team. Bedside report received. Pt attached to bedside monitor. VSS. Pt sedated post procedure. Pt on 8L of oxygen via simple face mask; OPA and NPA present; pt's oxygen sats 96%. Pt IV access saline locked and flushes without difficulty.Warm blanket applied. Will continue to monitor.

## 2023-05-03 NOTE — H&P
[facility] Department of U[facility]        History and Physical     5/3/2023     Referred by:  Jasmeet Benito MD     HPI: Jm Chaparro is a very pleasant 27 y.o. male who returns to our department referred for evaluation of urinary frequency of several years duration. He reports bother is associated with urinary daytime frequency (10-12x daily), with nocturia (3-4x per night) and with urgency that results in urinary incontinence occasionally . He reports no stress urinary incontinence associated with exertion. He does not require daily pad use.  He has decreased overall fluid intake.  He reports urinary frequency is day and night but more predominant during the day. Patient reports urgency is independent of position. His urinary frequency is largely driven by pain.      He had Botox injected after failed staged test of SNM 3 weeks ago. He notes improvement but not resolution. He is still voidng 10-15x per day, but has more good days since Botox. He has no voiding difficulty and PVR today is 36 mL.      His previous evaluations were reviewed in detail today. He has been on elmiron but stopped after no benefit. He has been on Myrbetriq and ditropan without improvement. He was previously offered bladder instillations but could not tolerate catheterization in the office.  He has seen pain management. His cystoscopy report from January 2022 reports an area that was biopsied showing only chronic inflammation. I'm not clear if this may have represented a hunner lesion. His symptoms did not improve following that procedure.      Numerous urine cultures without evidence of infection.      Previous Therapies  Behavioral Therapy: (fluid/dietary modification timed voiding/bladder training) -  provided no benefit  Medication:  oral oxybutynin ER 15 mg (>1 year) (provided no benefit)  Myrbetriq 50 mg daily (5 months) (provided no benefit)   Elmiron 100 mg TID  (>1 year) (no improvement)  Bladder instillations - did not  tolerate  SNM - No improvement with staged testing  Botox - partial improvement from 100 units     A review of 10+ systems was conducted with pertinent positive and negative findings documented in HPI with all other systems reviewed and negative.     Past medical, family, surgical and social history reviewed as documented in chart with pertinent positive medical, family, surgical and social history detailed in HPI.     Exam Findings:     Const: no acute distress, conversant and alert  Eyes: anicteric, extraocular muscles intact  ENMT: normocephalic, Nl oral membranes  Cardio: no cyanosis, nl cap refill  Pulm: no tachypnea; no resp distress  Musc: no laceration, no tenderness  Neuro: alert; oriented x 3  Skin: warm, dry; no petichiae  Psych: no anxiety; normal speech      Assessment/Plan:     Urinary Frequency  (established, not meeting goals): Will plan to reinject with 200 units 3 months from prior Botox. Urogesic rx sent today.

## 2023-05-03 NOTE — PLAN OF CARE
Pt and mother given discharge instructions at the bedside. All questions answered at the bedside.  Pt verbalizes understanding. Pt VSS. Pt tolerated 2 cups of sprite and water; please refer to flow sheet. Pt IV access removed; bleeding controlled. Pt dressed per self. Pt wheelchair transport provided. Will continue to monitor.

## 2023-05-03 NOTE — TRANSFER OF CARE
Anesthesia Transfer of Care Note    Patient: Jm Chaparro    Procedure(s) Performed: Procedure(s) (LRB):  CYSTOSCOPY,WITH BOTULINUM TOXIN INJECTION (N/A)    Patient location: PACU    Anesthesia Type: general    Post pain: adequate analgesia    Post assessment: no apparent anesthetic complications    Post vital signs: stable    Level of consciousness: sedated    Nausea/Vomiting: no nausea/vomiting    Complications: none    Transfer of care protocol was followed      Last vitals:   Visit Vitals  /80 (BP Location: Right arm, Patient Position: Lying)   Pulse (!) 58   Temp 36.4 °C (97.5 °F) (Temporal)   Resp 18   SpO2 98%

## 2023-05-03 NOTE — DISCHARGE SUMMARY
[facility]  Discharge Note  Short Stay    Procedure(s) (LRB):  CYSTOSCOPY,WITH BOTULINUM TOXIN INJECTION (N/A)      OUTCOME: Patient tolerated treatment/procedure well without complication and is now ready for discharge.    DISPOSITION: Home or Self Care    FINAL DIAGNOSIS:  Urgency incontinence    FOLLOWUP: In clinic    DISCHARGE INSTRUCTIONS:  Take prescribed antibiotics    TIME SPENT ON DISCHARGE: 15 minutes

## 2023-05-03 NOTE — OP NOTE
Cystoscopy Operative Note  5/3/2023    Preoperative Diagnosis:   Refractory Urgency Incontinence    Postoperative Diagnosis:  Refractory Urgency Incontinence    Procedure:  Cystoscopy with injection of Botulinum Toxin (200 units)    Attending Surgeon: Trino Cain MD    Anesthesia: Monitored Anesthesia Care    EBL: <5 mL    Complications: None    Findings: Normal bladder and urethra    Drains: None    Reason for procedure: Jm Chaparro is a very pleasant 27 y.o. male who presented with a history of  refractory urgency incontinence responsive to Botox, last injection was more than 3 months ago  and was scheduled for cystoscopy for evaluation and management.    Procedure in Detail:  Informed consent was obtained by explaining all risks and benefits of the procedure to the patient in detail.  After informed consent, the patient was brought to the suite where Monitored Anesthesia Carewas administered prior to initiation of the invasive procedure. Appropriate perioperative antibiotics were given within 30 minutes of beginning the procedure. A formal timeout was performed prior to the procedure. The patient was gently placed in lithotomy position with all pressure points padded. Bilateral sequential compression devices were applied and activated. The patient was prepped and draped in standard fashion.    A 19-Estonian rigid cystoscope was passed per urethra into the bladder. Inspection of the bladder demonstrated a normal bladder. We then injected 200 units of onabotulinum toxin A (Botox) using a dedicated needle ([email or web address]) set to a depth of 2 mm. A total of 30 injections were used throughout the bladder including the bladder base and trigone.     He tolerated the procedure well and was transferred in stable condition to the recovery room.     We will plan to see him back in 3 months for continued evaluation.

## 2023-05-04 LAB — BACTERIA UR CULT: NO GROWTH

## 2023-05-19 DIAGNOSIS — F90.0 ADHD (ATTENTION DEFICIT HYPERACTIVITY DISORDER), INATTENTIVE TYPE: ICD-10-CM

## 2023-05-20 RX ORDER — DEXTROAMPHETAMINE SACCHARATE, AMPHETAMINE ASPARTATE, DEXTROAMPHETAMINE SULFATE AND AMPHETAMINE SULFATE 2.5; 2.5; 2.5; 2.5 MG/1; MG/1; MG/1; MG/1
10 TABLET ORAL DAILY
Qty: 30 TABLET | Refills: 0 | Status: SHIPPED | OUTPATIENT
Start: 2023-05-20 | End: 2023-06-19 | Stop reason: SDUPTHER

## 2023-05-24 ENCOUNTER — OFFICE VISIT (OUTPATIENT)
Dept: OTOLARYNGOLOGY | Facility: CLINIC | Age: 28
End: 2023-05-24
Payer: COMMERCIAL

## 2023-05-24 ENCOUNTER — CLINICAL SUPPORT (OUTPATIENT)
Dept: AUDIOLOGY | Facility: CLINIC | Age: 28
End: 2023-05-24
Payer: COMMERCIAL

## 2023-05-24 VITALS
HEART RATE: 69 BPM | BODY MASS INDEX: 31.79 KG/M2 | SYSTOLIC BLOOD PRESSURE: 100 MMHG | TEMPERATURE: 98 F | HEIGHT: 73 IN | DIASTOLIC BLOOD PRESSURE: 72 MMHG | WEIGHT: 239.88 LBS | RESPIRATION RATE: 16 BRPM

## 2023-05-24 DIAGNOSIS — Z01.10 NORMAL HEARING EXAM: Primary | ICD-10-CM

## 2023-05-24 DIAGNOSIS — H93.19 TINNITUS, UNSPECIFIED LATERALITY: ICD-10-CM

## 2023-05-24 DIAGNOSIS — H61.23 EXCESSIVE CERUMEN IN EAR CANAL, BILATERAL: Primary | ICD-10-CM

## 2023-05-24 PROCEDURE — 3074F PR MOST RECENT SYSTOLIC BLOOD PRESSURE < 130 MM HG: ICD-10-PCS | Mod: CPTII,S$GLB,, | Performed by: OTOLARYNGOLOGY

## 2023-05-24 PROCEDURE — 1160F PR REVIEW ALL MEDS BY PRESCRIBER/CLIN PHARMACIST DOCUMENTED: ICD-10-PCS | Mod: CPTII,S$GLB,, | Performed by: OTOLARYNGOLOGY

## 2023-05-24 PROCEDURE — 99999 PR PBB SHADOW E&M-EST. PATIENT-LVL I: CPT | Mod: PBBFAC,,, | Performed by: AUDIOLOGIST

## 2023-05-24 PROCEDURE — 99999 PR PBB SHADOW E&M-EST. PATIENT-LVL IV: ICD-10-PCS | Mod: PBBFAC,,, | Performed by: OTOLARYNGOLOGY

## 2023-05-24 PROCEDURE — 3078F DIAST BP <80 MM HG: CPT | Mod: CPTII,S$GLB,, | Performed by: OTOLARYNGOLOGY

## 2023-05-24 PROCEDURE — 92556 PR SPEECH AUDIOMETRY, COMPLETE: ICD-10-PCS | Mod: S$GLB,,, | Performed by: AUDIOLOGIST

## 2023-05-24 PROCEDURE — 92552 PR PURE TONE AUDIOMETRY, AIR: ICD-10-PCS | Mod: S$GLB,,, | Performed by: AUDIOLOGIST

## 2023-05-24 PROCEDURE — 3008F BODY MASS INDEX DOCD: CPT | Mod: CPTII,S$GLB,, | Performed by: OTOLARYNGOLOGY

## 2023-05-24 PROCEDURE — 92567 TYMPANOMETRY: CPT | Mod: S$GLB,,, | Performed by: AUDIOLOGIST

## 2023-05-24 PROCEDURE — 99213 PR OFFICE/OUTPT VISIT, EST, LEVL III, 20-29 MIN: ICD-10-PCS | Mod: S$GLB,,, | Performed by: OTOLARYNGOLOGY

## 2023-05-24 PROCEDURE — 99213 OFFICE O/P EST LOW 20 MIN: CPT | Mod: S$GLB,,, | Performed by: OTOLARYNGOLOGY

## 2023-05-24 PROCEDURE — 3078F PR MOST RECENT DIASTOLIC BLOOD PRESSURE < 80 MM HG: ICD-10-PCS | Mod: CPTII,S$GLB,, | Performed by: OTOLARYNGOLOGY

## 2023-05-24 PROCEDURE — 99999 PR PBB SHADOW E&M-EST. PATIENT-LVL I: ICD-10-PCS | Mod: PBBFAC,,, | Performed by: AUDIOLOGIST

## 2023-05-24 PROCEDURE — 3008F PR BODY MASS INDEX (BMI) DOCUMENTED: ICD-10-PCS | Mod: CPTII,S$GLB,, | Performed by: OTOLARYNGOLOGY

## 2023-05-24 PROCEDURE — 92556 SPEECH AUDIOMETRY COMPLETE: CPT | Mod: S$GLB,,, | Performed by: AUDIOLOGIST

## 2023-05-24 PROCEDURE — 1160F RVW MEDS BY RX/DR IN RCRD: CPT | Mod: CPTII,S$GLB,, | Performed by: OTOLARYNGOLOGY

## 2023-05-24 PROCEDURE — 1159F PR MEDICATION LIST DOCUMENTED IN MEDICAL RECORD: ICD-10-PCS | Mod: CPTII,S$GLB,, | Performed by: OTOLARYNGOLOGY

## 2023-05-24 PROCEDURE — 3074F SYST BP LT 130 MM HG: CPT | Mod: CPTII,S$GLB,, | Performed by: OTOLARYNGOLOGY

## 2023-05-24 PROCEDURE — 1159F MED LIST DOCD IN RCRD: CPT | Mod: CPTII,S$GLB,, | Performed by: OTOLARYNGOLOGY

## 2023-05-24 PROCEDURE — 92567 PR TYMPA2METRY: ICD-10-PCS | Mod: S$GLB,,, | Performed by: AUDIOLOGIST

## 2023-05-24 PROCEDURE — 99999 PR PBB SHADOW E&M-EST. PATIENT-LVL IV: CPT | Mod: PBBFAC,,, | Performed by: OTOLARYNGOLOGY

## 2023-05-24 PROCEDURE — 92552 PURE TONE AUDIOMETRY AIR: CPT | Mod: S$GLB,,, | Performed by: AUDIOLOGIST

## 2023-05-24 NOTE — PROGRESS NOTES
Jm Chaparro was seen 05/24/2023 for an audiological evaluation. Pertinent complains today include tinnitus. Pt denies history of loud noise exposure and denies early onset of genetic family history of hearing loss. Otoscopy revealed no cerumen in both ears. The tympanic membrane was visualized AU prior to proceeding with the hearing testing.     Results reveal normal hearing from 250-8000Hz bilaterally.    Speech Reception Thresholds were  5 dBHL for the right ear and 10 dBHL for the left ear.    Word recognition scores were excellent bilaterally.   Tympanograms were Type A for the right ear and Type A for the left ear.    Audiogram results were reviewed in detail with patient and all questions were answered. Results will be reviewed by the referring provider at the completion of this note. Recommend repeat hearing testing if problems arise and bilateral hearing protection with either muffs or in-ear protection in loud noises. All complaints were addressed during this visit to the patient's satisfaction.

## 2023-05-24 NOTE — PROGRESS NOTES
[facility]    Subjective:      Patient: Jm Chaparro Patient PCP: China Pierre MD         :  1995     Sex:  male      MRN:  [id number]          Date of Visit: 2023      Chief Complaint: Tinnitus (Audiogram done today. Ringing and low sounds, sound high pitched. ) and Cerumen Impaction (Dark brown thick wax R ear)      Patient ID: Jm Chaparro is a 27 y.o. male lifelong NON-smoker with a history of wax related ear symptoms including myringitis eating systemic and otic antibiotic therapy seeing 2 of my partners in the previous 2-3 years (notes reviewed) self-referred for tinnitus and thick dark brown wax.  Audiogram completed today with excellent thresholds at all frequencies but a slight left 8 kilohertz asymmetry both still within normal limits.  Type a tympanograms.          Review of Systems     Past Medical History  He has a past medical history of Abnormal thyroid function test, ADD (attention deficit disorder), Anxiety disorder, Asperger's disorder, Deformity of both feet, Dyscalculia, Dysgraphia, Dyslexia, Eczema, Fatty liver, GERD (gastroesophageal reflux disease), History of migraine headaches, Hypertension, Interstitial cystitis (chronic), Irritable bowel syndrome, Nephrolithiasis, PONV (postoperative nausea and vomiting), Psoriasis, Psoriatic arthritis, Refusal of blood transfusion for reasons of conscience, Seasonal allergies, Sleep apnea, and Special needs assessment.    Family / Surgical / Social History  His family history includes Anesthesia problems in his maternal aunt; Clotting disorder in his maternal grandmother; Diabetes in his maternal grandfather and maternal grandmother; Interstitial cystitis in his maternal aunt, maternal grandmother, and mother; Lupus in his maternal grandmother and mother; Nephrolithiasis in his maternal grandfather.    Past Surgical History:   Procedure Laterality Date    BIOPSY OF BLADDER  1/3/2022    Procedure: BIOPSY,  BLADDER;  Surgeon: Derek Dickson MD;  Location: [facility] OR;  Service: Urology;;    COLONOSCOPY  ~2015    [facility]; told IBS    COLONOSCOPY  08/10/2015    Dr. Shaver; sent for scanning: unremarkable findings, no specimens collected    COLONOSCOPY N/A 2/18/2019    Procedure: COLONOSCOPY;  Surgeon: Puma Preston Jr., MD;  Location: [facility] ENDO;  Service: Endoscopy;  Laterality: N/A;    CYSTOSCOPY  1/11/2023    Procedure: CYSTOSCOPY;  Surgeon: Trino Cain MD;  Location: [facility] OR 1ST FLR;  Service: Urology;;    CYSTOSCOPY,WITH BOTULINUM TOXIN INJECTION N/A 2/9/2023    Procedure: CYSTOSCOPY,WITH BOTULINUM TOXIN INJECTION;  Surgeon: Trino Cain MD;  Location: [facility] OR;  Service: Urology;  Laterality: N/A;    CYSTOSCOPY,WITH BOTULINUM TOXIN INJECTION N/A 5/3/2023    Procedure: CYSTOSCOPY,WITH BOTULINUM TOXIN INJECTION;  Surgeon: Trino Cain MD;  Location: [facility] OR;  Service: Urology;  Laterality: N/A;  30 min    INSERTION, NEUROSTIMULATOR, TEMPORARY, SACRAL N/A 1/24/2023    Procedure: INSERTION, NEUROSTIMULATOR, TEMPORARY, SACRAL;  Surgeon: Trino Cain MD;  Location: [facility] OR 2ND FLR;  Service: Urology;  Laterality: N/A;  1 hr 45 mins    REMOVAL OF ELECTRODE LEAD OF SACRAL NERVE STIMULATOR N/A 2/9/2023    Procedure: REMOVAL, ELECTRODE LEAD, SACRAL NERVE STIMULATOR;  Surgeon: Trino Cain MD;  Location: [facility] OR;  Service: Urology;  Laterality: N/A;  1 hr    UPPER GASTROINTESTINAL ENDOSCOPY  05/14/2012    Dr. Preston    UPPER GASTROINTESTINAL ENDOSCOPY  07/17/2015    Dr. Shaver, sent for scanning: normal esophagus- dilated & biopsied, findings WNL, biopsies taken from z-line, stomach and duodenum; biopsy: duodenum WNL, antrum reactive gastropathy, negative for h pylori or int. metaplasia, GEJ WNL, negative for EOE & cotton's esophagus    ureteral stone extraction      basket extraction       Social History     Tobacco Use    Smoking status: Never    Smokeless tobacco: Never    Substance and Sexual Activity    Alcohol use: No    Drug use: No    Sexual activity: Never       Medications  He has a current medication list which includes the following prescription(s): otezla, atorvastatin, dextroamphetamine-amphetamine, fluoxetine, gabapentin, methen-sod phos-meth blue-hyos, metoprolol succinate, prevident 5000 booster plus, tramadol, ubidecarenone, vitamin d2, and alprazolam, and the following Facility-Administered Medications: electrolyte-s (ph 7.4).      Allergies  Review of patient's allergies indicates:   Allergen Reactions    Penicillins Anaphylaxis    Caffeine      Rapid heart beat    Ciprofloxacin Itching and Rash       All medications, allergies, and past history have been reviewed.    Objective:      Vitals:  Vitals - 1 value per visit 5/3/2023 5/24/2023 5/24/2023   SYSTOLIC 126 - 100   DIASTOLIC 83 - 72   Pulse 55 - 69   Temp 98.8 - 98   Resp 19 - 16   SPO2 96 - -   Weight (lb) - - 239.86   Weight (kg) - - 108.8   Height - - 73   BMI (Calculated) - - 31.7   VISIT REPORT - - -   Pain Score  - 0 -   Some encounter information is confidential and restricted. Go to Review Flowsheets activity to see all data.   Some recent data might be hidden       Body surface area is 2.37 meters squared.    Physical Exam:    GENERAL  APPEARANCE -  alert, appears stated age, and cooperative  BARRIER(S) TO COMMUNICATION -  none VOICE - appropriate for age and gender    INTEGUMENTARY  no suspicious head and neck lesions    HEENT  HEAD: Normocephalic, without obvious abnormality, atraumatic  FACE: INSPECTION - Symmetric, no signs of trauma, no suspicious lesion(s)  STRENGTH - facial symmetry    EYES  Normal occular alignment and mobility with no visible nystagmus at rest    EARS/NOSE/MOUTH/THROAT  EARS  PINNAE AND EXTERNAL EARS - no suspicious lesion OTOSCOPIC EXAM (surgical microscopy was used for visualization/instrumentation): EAR EXAM - minimal normal orange wax at meatus, spots of darker wax mid  canal cleared with suction  HEARING - grossly intact to voice/finger rub    NOSE AND SINUSES  EXTERNAL NOSE - Grossly normal for age/sex      CHEST AND LUNG   INSPECTION & AUSCULTATION - normal effort, no stridor    NEUROLOGIC  MENTAL STATUS - alert, interactive, psychomotor retardation, flat affect CRANIAL NERVES - normal        Procedure(s):  Cerumen removal performed.  See procedure note.    Labs:  WBC   Date Value Ref Range Status   08/29/2022 10.24 3.90 - 12.70 K/uL Final     Hemoglobin   Date Value Ref Range Status   08/29/2022 15.5 14.0 - 18.0 g/dL Final     Platelets   Date Value Ref Range Status   08/29/2022 269 150 - 450 K/uL Final     Creatinine   Date Value Ref Range Status   01/06/2023 1.0 0.5 - 1.4 mg/dL Final     TSH   Date Value Ref Range Status   08/29/2022 1.510 0.400 - 4.000 uIU/mL Final     Glucose   Date Value Ref Range Status   01/06/2023 88 70 - 110 mg/dL Final     Hemoglobin A1C   Date Value Ref Range Status   08/29/2022 5.2 4.0 - 5.6 % Final     Comment:     ADA Screening Guidelines:  5.7-6.4%  Consistent with prediabetes  >or=6.5%  Consistent with diabetes    High levels of fetal hemoglobin interfere with the HbA1C  assay. Heterozygous hemoglobin variants (HbS, HgC, etc)do  not significantly interfere with this assay.   However, presence of multiple variants may affect accuracy.           Assessment:      Problem List Items Addressed This Visit    None  Visit Diagnoses       Excessive cerumen in ear canal, bilateral    -  Primary                 Plan:      Routine ear care discussed at length.  Discussed using Cerumenex/Debrox monthly to every 3 months or coming in as needed when wax builds up beyond what is controllable with routine care as outlined.

## 2023-06-16 ENCOUNTER — OFFICE VISIT (OUTPATIENT)
Dept: UROLOGY | Facility: CLINIC | Age: 28
End: 2023-06-16
Payer: COMMERCIAL

## 2023-06-16 VITALS
SYSTOLIC BLOOD PRESSURE: 120 MMHG | HEART RATE: 57 BPM | DIASTOLIC BLOOD PRESSURE: 80 MMHG | HEIGHT: 73 IN | BODY MASS INDEX: 31.73 KG/M2 | WEIGHT: 239.44 LBS

## 2023-06-16 DIAGNOSIS — N32.81 OAB (OVERACTIVE BLADDER): Primary | ICD-10-CM

## 2023-06-16 PROCEDURE — 3079F DIAST BP 80-89 MM HG: CPT | Mod: CPTII,S$GLB,, | Performed by: UROLOGY

## 2023-06-16 PROCEDURE — 99999 PR PBB SHADOW E&M-EST. PATIENT-LVL III: CPT | Mod: PBBFAC,,, | Performed by: UROLOGY

## 2023-06-16 PROCEDURE — 3008F BODY MASS INDEX DOCD: CPT | Mod: CPTII,S$GLB,, | Performed by: UROLOGY

## 2023-06-16 PROCEDURE — 3074F SYST BP LT 130 MM HG: CPT | Mod: CPTII,S$GLB,, | Performed by: UROLOGY

## 2023-06-16 PROCEDURE — 1159F MED LIST DOCD IN RCRD: CPT | Mod: CPTII,S$GLB,, | Performed by: UROLOGY

## 2023-06-16 PROCEDURE — 1159F PR MEDICATION LIST DOCUMENTED IN MEDICAL RECORD: ICD-10-PCS | Mod: CPTII,S$GLB,, | Performed by: UROLOGY

## 2023-06-16 PROCEDURE — 99214 OFFICE O/P EST MOD 30 MIN: CPT | Mod: S$GLB,,, | Performed by: UROLOGY

## 2023-06-16 PROCEDURE — 3079F PR MOST RECENT DIASTOLIC BLOOD PRESSURE 80-89 MM HG: ICD-10-PCS | Mod: CPTII,S$GLB,, | Performed by: UROLOGY

## 2023-06-16 PROCEDURE — 3008F PR BODY MASS INDEX (BMI) DOCUMENTED: ICD-10-PCS | Mod: CPTII,S$GLB,, | Performed by: UROLOGY

## 2023-06-16 PROCEDURE — 99214 PR OFFICE/OUTPT VISIT, EST, LEVL IV, 30-39 MIN: ICD-10-PCS | Mod: S$GLB,,, | Performed by: UROLOGY

## 2023-06-16 PROCEDURE — 3074F PR MOST RECENT SYSTOLIC BLOOD PRESSURE < 130 MM HG: ICD-10-PCS | Mod: CPTII,S$GLB,, | Performed by: UROLOGY

## 2023-06-16 PROCEDURE — 99999 PR PBB SHADOW E&M-EST. PATIENT-LVL III: ICD-10-PCS | Mod: PBBFAC,,, | Performed by: UROLOGY

## 2023-06-16 NOTE — PROGRESS NOTES
[facility]        Urology Return Note     6/16/2023     Referred by:  Jasmeet Bneito MD     HPI: Jm Chaparro is a very pleasant 27 y.o. male who returns to our department referred for evaluation of urinary frequency of several years duration. He reports bother is associated with urinary daytime frequency (10-12x daily), with nocturia (3-4x per night) and with urgency that results in urinary incontinence occasionally . He reports no stress urinary incontinence associated with exertion. He does not require daily pad use.  He has decreased overall fluid intake.  He reports urinary frequency is day and night but more predominant during the day. Patient reports urgency is independent of position. His urinary frequency is largely driven by pain.      He had Botox injected after failed staged test of SNM. He had no voiding difficulty following in 100 units of Botox but little improvement in symptoms.      His previous evaluations were reviewed in detail today. He has been on elmiron but stopped after no benefit. He has been on Myrbetriq and ditropan without improvement. He was previously offered bladder instillations but could not tolerate catheterization in the office.  He has seen pain management. His cystoscopy report from January 2022 reports an area that was biopsied showing only chronic inflammation. I'm not clear if this may have represented a hunner lesion. His symptoms did not improve following that procedure.      Numerous urine cultures without evidence of infection.      Previous Therapies  Behavioral Therapy: (fluid/dietary modification timed voiding/bladder training) -  provided no benefit  Medication:  oral oxybutynin ER 15 mg (>1 year) (provided no benefit)  Myrbetriq 50 mg daily (5 months) (provided no benefit)   Elmiron 100 mg TID  (>1 year) (no improvement)  Bladder instillations - did not tolerate  SNM - No improvement with staged testing  Botox - partial improvement  from 100 units     Since he last visit (200 units injection 5 weeks ago) he has noted decreased frequency. However, he has considerable voiding dysfunction, straining to empty. PVR today is 3 mL).   A review of 10+ systems was conducted with pertinent positive and negative findings documented in HPI with all other systems reviewed and negative.     Past medical, family, surgical and social history reviewed as documented in chart with pertinent positive medical, family, surgical and social history detailed in HPI.     Exam Findings:     Const: no acute distress, conversant and alert  Eyes: anicteric, extraocular muscles intact  ENMT: normocephalic, Nl oral membranes  Cardio: no cyanosis, nl cap refill  Pulm: no tachypnea; no resp distress  Musc: no laceration, no tenderness  Neuro: alert; oriented x 3  Skin: warm, dry; no petichiae  Psych: no anxiety; normal speech      Assessment/Plan:     Urinary Frequency  (established, not meeting goals): Improved frequency though considerable voiding difficulty with 200 units injected. He is emptying well enough to avoid catheterization but would decrease to 150 units if re-injecting in the future.

## 2023-06-19 ENCOUNTER — OFFICE VISIT (OUTPATIENT)
Dept: PSYCHIATRY | Facility: CLINIC | Age: 28
End: 2023-06-19
Payer: COMMERCIAL

## 2023-06-19 VITALS
HEART RATE: 79 BPM | SYSTOLIC BLOOD PRESSURE: 114 MMHG | DIASTOLIC BLOOD PRESSURE: 77 MMHG | WEIGHT: 239.75 LBS | HEIGHT: 73 IN | BODY MASS INDEX: 31.78 KG/M2

## 2023-06-19 DIAGNOSIS — F90.0 ADHD (ATTENTION DEFICIT HYPERACTIVITY DISORDER), INATTENTIVE TYPE: ICD-10-CM

## 2023-06-19 DIAGNOSIS — F40.10 SOCIAL ANXIETY DISORDER: ICD-10-CM

## 2023-06-19 DIAGNOSIS — F41.1 GENERALIZED ANXIETY DISORDER: ICD-10-CM

## 2023-06-19 DIAGNOSIS — F41.1 GAD (GENERALIZED ANXIETY DISORDER): ICD-10-CM

## 2023-06-19 DIAGNOSIS — F84.0 AUTISM SPECTRUM DISORDER: ICD-10-CM

## 2023-06-19 DIAGNOSIS — F40.10 SOCIAL ANXIETY DISORDER: Primary | ICD-10-CM

## 2023-06-19 PROCEDURE — 3078F DIAST BP <80 MM HG: CPT | Mod: CPTII,S$GLB,, | Performed by: PSYCHOLOGIST

## 2023-06-19 PROCEDURE — 99214 PR OFFICE/OUTPT VISIT, EST, LEVL IV, 30-39 MIN: ICD-10-PCS | Mod: S$GLB,,, | Performed by: PSYCHOLOGIST

## 2023-06-19 PROCEDURE — 90833 PR PSYCHOTHERAPY W/PATIENT W/E&M, 30 MIN (ADD ON): ICD-10-PCS | Mod: S$GLB,,, | Performed by: PSYCHOLOGIST

## 2023-06-19 PROCEDURE — 99214 OFFICE O/P EST MOD 30 MIN: CPT | Mod: S$GLB,,, | Performed by: PSYCHOLOGIST

## 2023-06-19 PROCEDURE — 99999 PR PBB SHADOW E&M-EST. PATIENT-LVL IV: ICD-10-PCS | Mod: PBBFAC,,, | Performed by: PSYCHOLOGIST

## 2023-06-19 PROCEDURE — 1159F PR MEDICATION LIST DOCUMENTED IN MEDICAL RECORD: ICD-10-PCS | Mod: CPTII,S$GLB,, | Performed by: PSYCHOLOGIST

## 2023-06-19 PROCEDURE — 1159F MED LIST DOCD IN RCRD: CPT | Mod: CPTII,S$GLB,, | Performed by: PSYCHOLOGIST

## 2023-06-19 PROCEDURE — 99999 PR PBB SHADOW E&M-EST. PATIENT-LVL IV: CPT | Mod: PBBFAC,,, | Performed by: PSYCHOLOGIST

## 2023-06-19 PROCEDURE — 3074F PR MOST RECENT SYSTOLIC BLOOD PRESSURE < 130 MM HG: ICD-10-PCS | Mod: CPTII,S$GLB,, | Performed by: PSYCHOLOGIST

## 2023-06-19 PROCEDURE — 3008F BODY MASS INDEX DOCD: CPT | Mod: CPTII,S$GLB,, | Performed by: PSYCHOLOGIST

## 2023-06-19 PROCEDURE — 90833 PSYTX W PT W E/M 30 MIN: CPT | Mod: S$GLB,,, | Performed by: PSYCHOLOGIST

## 2023-06-19 PROCEDURE — 3074F SYST BP LT 130 MM HG: CPT | Mod: CPTII,S$GLB,, | Performed by: PSYCHOLOGIST

## 2023-06-19 PROCEDURE — 3078F PR MOST RECENT DIASTOLIC BLOOD PRESSURE < 80 MM HG: ICD-10-PCS | Mod: CPTII,S$GLB,, | Performed by: PSYCHOLOGIST

## 2023-06-19 PROCEDURE — 3008F PR BODY MASS INDEX (BMI) DOCUMENTED: ICD-10-PCS | Mod: CPTII,S$GLB,, | Performed by: PSYCHOLOGIST

## 2023-06-19 NOTE — PROGRESS NOTES
"Outpatient Psychiatry Follow-Up Visit    Clinical Status of Patient: Outpatient (Ambulatory)  06/19/2023     Chief Complaint: 26 y/o male presenting with his mother today for a follow-up.       Interval History and Content of Current Session:  Interim Events/Subjective Report/Content of Current Session:  follow-up appointment.    Pt is a 26 y/o male with past psychiatric hx of anxiety, ADHD, ASD who presents for follow-up treatment. Pt reported seeing his niece for the first time. Reported mixed feelings as it triggered sadness associated with previous relationships. Pt also noted some increase in anxiety the past two weeks. Has taken 1/2 tab of alprazolam once per week during that time. Identified effective coping with deep breathing and distraction. Charges have not been concluded as of yet.     Past Psychiatric hx: adderall ("robot child"), focalin ("zombie"), strattera (angry), zoloft (as a child- ineffective), lexapro (ineffective), topomax ("migraines"), celexa (overly sedating but effective), remeron (ineffective- wgt gain), atarax, elavil (ineffective for pain), ritalin (inc'd bp)     Past Medical hx:   Past Medical History:   Diagnosis Date    Abnormal thyroid function test 09/11/2017    ADD (attention deficit disorder)     Anxiety disorder     Asperger's disorder     (AUTISM)    Deformity of both feet     Dyscalculia     Dysgraphia     Dyslexia     Eczema     Fatty liver     GERD (gastroesophageal reflux disease)     History of migraine headaches     Hypertension     Interstitial cystitis (chronic)     Irritable bowel syndrome     Nephrolithiasis     2014    PONV (postoperative nausea and vomiting)     Psoriasis     Psoriatic arthritis     Refusal of blood transfusion for reasons of conscience     Seasonal allergies     Sleep apnea     does not like CPAP    Special needs assessment         Interim hx:  Medication changes last visit: None  Anxiety: mild  Depression: stable     Denies suicidal/homicidal " ideations.  Denies hopelessness/worthlessness.    Denies auditory/visual hallucinations      Alcohol: Pt denied  Drug: Pt denied  Caffeine: not assessed  Tobacco: Pt denied      Review of Systems   PSYCHIATRIC: Pertinent items are noted in the narrative.        CONSTITUTIONAL: weight stable    Past Medical, Family and Social History: The patient's past medical, family and social history have been reviewed and updated as appropriate within the electronic medical record. See encounter notes.     Current Psychiatric Medication:  Prozac 40mg po qam, Adderall 10 mg pt qam (PRN about once a month), alprazolam 0.25 mg PRN (cut in half - very infrequently).     Compliance: yes      Side effects: Pt denies     Risk Parameters:  Patient reports no suicidal ideation  Patient reports no homicidal ideation  Patient reports no self-injurious behavior  Patient reports no violent behavior     Exam (detailed: at least 9 elements; comprehensive: all 15 elements)   Constitutional  Vitals:  Most recent vital signs, dated less than 90 days prior to this appointment, were reviewed. Pulse:  [79]   BP: (114)/(77)       General:  unremarkable, age appropriate, casual attire, good eye contact, good rapport       Musculoskeletal  Muscle Strength/Tone:  no flaccidity, no tremor    Gait & Station:  normal      Psychiatric                       Speech:  normal tone, normal rate, rhythm, and volume   Mood & Affect:   Depressed, anxious         Thought Process:   Goal directed; Linear    Associations:   intact   Thought Content:   No SI/HI, delusions, or paranoia, no AV/VH   Insight & Judgement:   Good, adequate to circumstances   Orientation:   grossly intact; alert and oriented x 4    Memory:  intact for content of interview    Language:  grossly intact, can repeat    Attention Span  : Grossly intact for content of interview   Fund of Knowledge:   intact and appropriate to age and level of education        Assessment and Diagnosis  "  Status/Progress: Based on the examination today, the patient's problem(s) is/are adequately controlled.  New problems have not been presented today. Co-morbidities are not complicating management of the primary condition. There are no active rule-out diagnoses for this patient at this time.      Impression: Pt continues to be somewhat stable with medication plan. Noted a slight increase in anxiety and sadness, which appear to be transient. Discussed coping strategies and techniques for unresolved feelings. Will continue as is for now and monitor moving forward.     Diagnosis:   1) Social Anxiety Disorder  2) Generalized Anxiety Disorder  3) Attention Deficit Hyperactivity Disorder  4) Autism Spectrum Disorder (Level "requiring substantial support")  Intervention/Counseling/Treatment Plan   Medication Management:      1. Prozac 40mg po qam    2. Adderall 10 mg po qam (PRN about once a month)    3. alprazolam 0.25 mg PRN (cut in half - very infrequently).     4. Call to report any worsening of symptoms or problems with the medication. Pt instructed to go to ER with thoughts of harming self, others     5. Patient given contact # for psychotherapists at [facility] and also instructed to check with insurance for list of providers.     Psychotherapy: 20 minutes  Target symptoms: sadness, anxiety  Why chosen therapy is appropriate versus another modality: CBT used; relevant to diagnosis, patient responds to this modality  Outcome monitoring methods: self-report, observation  Therapeutic intervention type: Cognitive Behavioral Therapy, coping, unresolved feelings letter  Topics discussed/themes: building skills sets for symptom management, symptom recognition, nutrition, exercise  The patient's response to the intervention is accepting  Patient's response to treatment is: good.   The patient's progress toward treatment goals: improving     Return to clinic: 3 months    -Cognitive-Behavioral/Supportive therapy and " psychoeducation provided  -R/B/SE's of medications discussed with the pt who expresses understanding and chooses to take medications as prescribed.   -Pt instructed to call clinic, 911 or go to nearest emergency room if sxs worsen or pt is in   crisis. The pt expresses understanding.    Jp Hines, PhD, MP     Antidepressant/Antianxiety Medication Initiation:  Patient informed of risks, benefits, and potential side effects of medication and accepts informed consent.  Common side effects include nausea, fatigue, headache, insomnia., Specifically discussed the possibility of new or worsening suicidal thoughts/depression.  Patient instructed to stop the medication immediately and seek urgent treatment if this occurs. Patient instructed not to abruptly discontinue medication without physician guidance except in cases of sudden onset or worsening of SI.       Stimulant Medication Initiation:  Patient advised of risks, benefits, and side effects of medication and accepts informed consent.  Common side effects include insomnia, irritability, jittery feeling, dry mouth, and agitation/hostility., Patient advised of potential addictive nature of medication and controlled substance classification.  Instructed to safeguard medication as no early refills can be given for lost or stolen medications.       Benzodiazepine Initiation:  Patient advised of the risks, benefits, and common side effects of medication and has accepted informed consent.  Common side effects include drowsiness, impaired coordination, possible memory loss., Patient advised NOT to operate a vehicle or machinery untiil they are sure how the medication will affec them.  Client also advised of danger of mixing this medication with alcohol., Patient advised of potential addictive nature of medication and need to safeguard medication as no early refills for lost or stolen medications can be authorized.

## 2023-06-20 RX ORDER — DEXTROAMPHETAMINE SACCHARATE, AMPHETAMINE ASPARTATE, DEXTROAMPHETAMINE SULFATE AND AMPHETAMINE SULFATE 2.5; 2.5; 2.5; 2.5 MG/1; MG/1; MG/1; MG/1
10 TABLET ORAL DAILY
Qty: 30 TABLET | Refills: 0 | Status: SHIPPED | OUTPATIENT
Start: 2023-06-20 | End: 2023-07-24 | Stop reason: SDUPTHER

## 2023-06-20 RX ORDER — ALPRAZOLAM 0.25 MG/1
0.25 TABLET ORAL DAILY PRN
Qty: 15 TABLET | Refills: 0 | Status: SHIPPED | OUTPATIENT
Start: 2023-06-20 | End: 2024-01-08 | Stop reason: SDUPTHER

## 2023-06-20 NOTE — TELEPHONE ENCOUNTER
[facility] PATIENT   Last fill for xanax 9/12/22   Last fill for adderall 5/20/23   Lov 6/19/23   Nov 9/25/23

## 2023-07-24 ENCOUNTER — OFFICE VISIT (OUTPATIENT)
Dept: PAIN MEDICINE | Facility: CLINIC | Age: 28
End: 2023-07-24
Payer: COMMERCIAL

## 2023-07-24 VITALS
HEIGHT: 73 IN | WEIGHT: 239.44 LBS | HEART RATE: 64 BPM | BODY MASS INDEX: 31.73 KG/M2 | SYSTOLIC BLOOD PRESSURE: 103 MMHG | DIASTOLIC BLOOD PRESSURE: 74 MMHG

## 2023-07-24 DIAGNOSIS — M79.641 BILATERAL HAND PAIN: Primary | ICD-10-CM

## 2023-07-24 DIAGNOSIS — M79.642 BILATERAL HAND PAIN: Primary | ICD-10-CM

## 2023-07-24 DIAGNOSIS — R29.3 POOR POSTURE: ICD-10-CM

## 2023-07-24 DIAGNOSIS — N30.10 IC (INTERSTITIAL CYSTITIS): ICD-10-CM

## 2023-07-24 DIAGNOSIS — R29.898 MUSCULAR DECONDITIONING: ICD-10-CM

## 2023-07-24 DIAGNOSIS — L40.50 PSORIATIC ARTHRITIS: ICD-10-CM

## 2023-07-24 DIAGNOSIS — F90.0 ADHD (ATTENTION DEFICIT HYPERACTIVITY DISORDER), INATTENTIVE TYPE: ICD-10-CM

## 2023-07-24 PROCEDURE — 3008F BODY MASS INDEX DOCD: CPT | Mod: CPTII,S$GLB,, | Performed by: PHYSICIAN ASSISTANT

## 2023-07-24 PROCEDURE — 99999 PR PBB SHADOW E&M-EST. PATIENT-LVL V: ICD-10-PCS | Mod: PBBFAC,,, | Performed by: PHYSICIAN ASSISTANT

## 2023-07-24 PROCEDURE — 99999 PR PBB SHADOW E&M-EST. PATIENT-LVL V: CPT | Mod: PBBFAC,,, | Performed by: PHYSICIAN ASSISTANT

## 2023-07-24 PROCEDURE — 99214 OFFICE O/P EST MOD 30 MIN: CPT | Mod: S$GLB,,, | Performed by: PHYSICIAN ASSISTANT

## 2023-07-24 PROCEDURE — 3074F PR MOST RECENT SYSTOLIC BLOOD PRESSURE < 130 MM HG: ICD-10-PCS | Mod: CPTII,S$GLB,, | Performed by: PHYSICIAN ASSISTANT

## 2023-07-24 PROCEDURE — 1160F RVW MEDS BY RX/DR IN RCRD: CPT | Mod: CPTII,S$GLB,, | Performed by: PHYSICIAN ASSISTANT

## 2023-07-24 PROCEDURE — 3078F DIAST BP <80 MM HG: CPT | Mod: CPTII,S$GLB,, | Performed by: PHYSICIAN ASSISTANT

## 2023-07-24 PROCEDURE — 99214 PR OFFICE/OUTPT VISIT, EST, LEVL IV, 30-39 MIN: ICD-10-PCS | Mod: S$GLB,,, | Performed by: PHYSICIAN ASSISTANT

## 2023-07-24 PROCEDURE — 3074F SYST BP LT 130 MM HG: CPT | Mod: CPTII,S$GLB,, | Performed by: PHYSICIAN ASSISTANT

## 2023-07-24 PROCEDURE — 1159F MED LIST DOCD IN RCRD: CPT | Mod: CPTII,S$GLB,, | Performed by: PHYSICIAN ASSISTANT

## 2023-07-24 PROCEDURE — 1159F PR MEDICATION LIST DOCUMENTED IN MEDICAL RECORD: ICD-10-PCS | Mod: CPTII,S$GLB,, | Performed by: PHYSICIAN ASSISTANT

## 2023-07-24 PROCEDURE — 3078F PR MOST RECENT DIASTOLIC BLOOD PRESSURE < 80 MM HG: ICD-10-PCS | Mod: CPTII,S$GLB,, | Performed by: PHYSICIAN ASSISTANT

## 2023-07-24 PROCEDURE — 3008F PR BODY MASS INDEX (BMI) DOCUMENTED: ICD-10-PCS | Mod: CPTII,S$GLB,, | Performed by: PHYSICIAN ASSISTANT

## 2023-07-24 PROCEDURE — 1160F PR REVIEW ALL MEDS BY PRESCRIBER/CLIN PHARMACIST DOCUMENTED: ICD-10-PCS | Mod: CPTII,S$GLB,, | Performed by: PHYSICIAN ASSISTANT

## 2023-07-24 NOTE — PROGRESS NOTES
This note was completed with dictation software and grammatical errors may exist.    Chief Complaint   Patient presents with    Hand Pain        HPI: Jm Chaparro is a 27 y.o. year old male patient who has a past medical history of Abnormal thyroid function test, ADD (attention deficit disorder), Anxiety disorder, Asperger's disorder, Deformity of both feet, Dyscalculia, Dysgraphia, Dyslexia, Eczema, Fatty liver, GERD (gastroesophageal reflux disease), History of migraine headaches, Hypertension, Interstitial cystitis (chronic), Irritable bowel syndrome, Nephrolithiasis, PONV (postoperative nausea and vomiting), Psoriasis, Psoriatic arthritis, Refusal of blood transfusion for reasons of conscience, Seasonal allergies, Sleep apnea, and Special needs assessment. He presents in referral from No ref. provider found for abdominal and bladder pain.  He returns in follow-up today with generalized joint pain, bilateral hand pain and bladder pain.  He is accompanied by his mother.  He has been taking 1/2 tramadol in the morning and 1 in the evening with some relief.  He does report some worsening bilateral hand pain.  He denies numbness.  He is having difficulty with posture.  He does have some joint pain with taking a hot bath.  He is unable to exercise in a swimming pool unless it is salt water but does not have access to this.      Previous history:  The patient's mother was present with him at the visit today who also helps with most of the history.  She reports that in March, 2015 she had gone out of town on a business trip and her son accompanying her.  Unfortunately he came down with cold and virus symptoms at the time and began developing severe abdominal pain.  She went to the emergency department at that time in [place], they were told he was having viral symptoms.  She returned to the [facility] and had followed up with several other physicians that year.  She reports that initially he was having  intermittent pain on and off, would have pain for a week and then it would disappear without cause.  He then developed diarrhea and constipation at times, he was seen at [facility] in [place] in 2015 and diagnosed with IBS with constipation and diarrhea.  They recommend an antidepressant that he tried, no relief with this.  Shortly after he began having renal calculi as well and needed to present to the emergency department.  He had seen neurology, Dr. Larry and reports that he required stone removal, does not sound like he has had lithotripsy.  He has seen Dr. Dickson, and now Nephrology, Dr. Enriquez.  He has seen Gastroenterology, Dr. Shaver and had undergone colonoscopy with no significant findings.  He has been tried on multiple medications over time as discussed below without any benefit.  He continues to have frequent abdominal pain that is often worse with caffeine or other foods, worse with activity.  He reports that he gets some relief with a bowel movement.  He still has been passing small sand like renal calculi, they report that his urine turns dark when he is about to pass a stone and he often has pain throughout the bladder and urethra after passing a stone.    He has a history of rheumatoid arthritis, has been seeing a rheumatologist, reports pain throughout his entire spine, bilateral hips, shoulders, knees, feet.  He does well with being in a bath with hot water.    Pain intervention history:  No injections    Spine surgeries:  None    Antineuropathics: Gabapentin 300mg three times daily, was given this for inflammatory joint pain??  NSAIDs: Mobic 15, no benefit  Physical therapy:  Has done some physical therapy, water therapy in the past with some improvement but things worsened when he did land-based therapy and stretching  Antidepressants: Fluoxetine 40mg  Muscle relaxers:  Opioids:  The patient reports having benefit from tramadol in the past  Antiplatelets/Anticoagulants:  From Dr. Vyas  "note:  Prozac 40mg po qam, adderall xr 20mg po qam (very rare use), lunesta 2mg po qhs PRN insomnia  Past Psych Meds: adderall ("robot child"), focalin ("zombie"), strattera (angry), zoloft (as a child- ineffective), lexapro (ineffective) topomax ("migraines"), celexa (overly sedating but effective), remeron (ineffective- wgt gain), atarax, elavil (ineffective for pain), ritalin (inc'd bp)     ROS:  He reports fatigue, weight gain, itching, color change, headaches head trauma, ear pain, constipation, diarrhea, stomach pain, nausea, flank pain, urinary urgency and frequency, painful urination, joint stiffness, joint swelling, back pain, difficulty sleeping and anxiety.  Balance of review of systems is negative.    Lab Results   Component Value Date    HGBA1C 5.2 08/29/2022       Lab Results   Component Value Date    WBC 10.24 08/29/2022    HGB 15.5 08/29/2022    HCT 44.0 08/29/2022    MCV 94 08/29/2022     08/29/2022             Past Medical History:   Diagnosis Date    Abnormal thyroid function test 09/11/2017    ADD (attention deficit disorder)     Anxiety disorder     Asperger's disorder     (AUTISM)    Deformity of both feet     Dyscalculia     Dysgraphia     Dyslexia     Eczema     Fatty liver     GERD (gastroesophageal reflux disease)     History of migraine headaches     Hypertension     Interstitial cystitis (chronic)     Irritable bowel syndrome     Nephrolithiasis     2014    PONV (postoperative nausea and vomiting)     Psoriasis     Psoriatic arthritis     Refusal of blood transfusion for reasons of conscience     Seasonal allergies     Sleep apnea     does not like CPAP    Special needs assessment        Past Surgical History:   Procedure Laterality Date    BIOPSY OF BLADDER  1/3/2022    Procedure: BIOPSY, BLADDER;  Surgeon: Derek Dickson MD;  Location: [facility] OR;  Service: Urology;;    COLONOSCOPY  ~2015    [facility]; told IBS    COLONOSCOPY  08/10/2015    Dr. Shaver; sent for scanning: " unremarkable findings, no specimens collected    COLONOSCOPY N/A 2/18/2019    Procedure: COLONOSCOPY;  Surgeon: Puma Preston Jr., MD;  Location: [facility];  Service: Endoscopy;  Laterality: N/A;    CYSTOSCOPY  1/11/2023    Procedure: CYSTOSCOPY;  Surgeon: Trino Cain MD;  Location: [facility] OR 1ST FLR;  Service: Urology;;    CYSTOSCOPY,WITH BOTULINUM TOXIN INJECTION N/A 2/9/2023    Procedure: CYSTOSCOPY,WITH BOTULINUM TOXIN INJECTION;  Surgeon: Trino Cain MD;  Location: [facility] OR;  Service: Urology;  Laterality: N/A;    CYSTOSCOPY,WITH BOTULINUM TOXIN INJECTION N/A 5/3/2023    Procedure: CYSTOSCOPY,WITH BOTULINUM TOXIN INJECTION;  Surgeon: Trino Cain MD;  Location: [facility] OR;  Service: Urology;  Laterality: N/A;  30 min    INSERTION, NEUROSTIMULATOR, TEMPORARY, SACRAL N/A 1/24/2023    Procedure: INSERTION, NEUROSTIMULATOR, TEMPORARY, SACRAL;  Surgeon: Trino Cain MD;  Location: [facility] OR 2ND FLR;  Service: Urology;  Laterality: N/A;  1 hr 45 mins    REMOVAL OF ELECTRODE LEAD OF SACRAL NERVE STIMULATOR N/A 2/9/2023    Procedure: REMOVAL, ELECTRODE LEAD, SACRAL NERVE STIMULATOR;  Surgeon: Trino Cain MD;  Location: [facility] OR;  Service: Urology;  Laterality: N/A;  1 hr    UPPER GASTROINTESTINAL ENDOSCOPY  05/14/2012    Dr. Preston    UPPER GASTROINTESTINAL ENDOSCOPY  07/17/2015    Dr. Shaver, sent for scanning: normal esophagus- dilated & biopsied, findings WNL, biopsies taken from z-line, stomach and duodenum; biopsy: duodenum WNL, antrum reactive gastropathy, negative for h pylori or int. metaplasia, GEJ WNL, negative for EOE & cotton's esophagus    ureteral stone extraction      basket extraction       Social History     Socioeconomic History    Marital status: Single   Tobacco Use    Smoking status: Never    Smokeless tobacco: Never   Substance and Sexual Activity    Alcohol use: No    Drug use: No    Sexual activity: Never     Social Determinants of Health     Financial  "Resource Strain: Unknown    Difficulty of Paying Living Expenses: Patient refused   Food Insecurity: Unknown    Worried About Running Out of Food in the Last Year: Patient refused    Ran Out of Food in the Last Year: Patient refused   Transportation Needs: Unmet Transportation Needs    Lack of Transportation (Medical): Yes    Lack of Transportation (Non-Medical): Yes   Physical Activity: Inactive    Days of Exercise per Week: 0 days    Minutes of Exercise per Session: 0 min   Stress: Stress Concern Present    Feeling of Stress : To some extent   Social Connections: Unknown    Frequency of Communication with Friends and Family: Twice a week    Frequency of Social Gatherings with Friends and Family: Twice a week    Active Member of Clubs or Organizations: Yes    Attends Club or Organization Meetings: More than 4 times per year    Marital Status: Never    Housing Stability: Unknown    Unable to Pay for Housing in the Last Year: Patient refused    Number of Places Lived in the Last Year: 1    Unstable Housing in the Last Year: No         Medications/Allergies: See med card    Vitals:    23 0813   BP: 103/74   Pulse: 64   Weight: 108.6 kg (239 lb 6.7 oz)   Height: 6' 1" (1.854 m)   PainSc:   6   PainLoc: Hand       Body mass index is 31.59 kg/m².    Physical exam:  Gen: A and O x3, pleasant, well-groomed  Skin: No rashes or obvious lesions  HEENT: PERRLA, no obvious deformities on ears or in canals. Trachea midline.  CVS: Regular rate and rhythm, normal palpable pulses.  Resp:No increased work of breathing, symmetrical chest rise.  Abdomen: Soft, NT/ND.  Musculoskeletal:  Moving all extremities equally    Upper extremity strength:  5/5 bilaterally  Lower extremity strength:  5/5 bilaterally      Imagin22 CT renal protocol:  Liver normal enhancement with no focal lesion.  Gallbladder, pancreas, stomach, spleen, adrenal glands normal.  Kidneys normal size and contour with no nephrolithiasis, " hydronephrosis, or ureteral obstruction.  Aorta tapers normally.  No bowel obstruction, ascites, inflammatory change.  Appendix normal.  Bladder and rectum normal.  No significant bladder wall thickening evident.  Bones are intact.  Lung bases clear.    Assessment:  Jm Chaparro is a 27 y.o. year old male patient who has a past medical history of Abnormal thyroid function test, ADD (attention deficit disorder), Anxiety disorder, Asperger's disorder, Deformity of both feet, Dyscalculia, Dysgraphia, Dyslexia, Eczema, Fatty liver, GERD (gastroesophageal reflux disease), History of migraine headaches, Hypertension, Interstitial cystitis (chronic), Irritable bowel syndrome, Nephrolithiasis, PONV (postoperative nausea and vomiting), Psoriasis, Psoriatic arthritis, Refusal of blood transfusion for reasons of conscience, Seasonal allergies, and Special needs assessment. He presents in referral from Dr. Derek Dickson for bladder pain.    1. Bilateral hand pain  Ambulatory referral/consult to Orthopedics      2. Psoriatic arthritis  Ambulatory referral/consult to Physical/Occupational Therapy      3. Muscular deconditioning  Ambulatory referral/consult to Physical/Occupational Therapy      4. Poor posture  Ambulatory referral/consult to Physical/Occupational Therapy      5. IC (interstitial cystitis)              Plan:  1. Dr. Gonzalez recently refilled tramadol 50 mg. I have reviewed the [place] Board of Pharmacy website and there are no abberancies.    2. I placed a referral to orthopedics for his bilateral hand pain.    3. I placed a referral to dynamic physical therapy to work on gentle stretching and light exercise to help with posture and mobility.    4. Follow-up in 3 months or sooner as needed.

## 2023-07-25 ENCOUNTER — TELEPHONE (OUTPATIENT)
Dept: UROLOGY | Facility: CLINIC | Age: 28
End: 2023-07-25
Payer: COMMERCIAL

## 2023-07-25 ENCOUNTER — LAB VISIT (OUTPATIENT)
Dept: LAB | Facility: HOSPITAL | Age: 28
End: 2023-07-25
Attending: UROLOGY
Payer: COMMERCIAL

## 2023-07-25 DIAGNOSIS — M79.641 BILATERAL HAND PAIN: Primary | ICD-10-CM

## 2023-07-25 DIAGNOSIS — M79.642 BILATERAL HAND PAIN: Primary | ICD-10-CM

## 2023-07-25 DIAGNOSIS — N32.81 OAB (OVERACTIVE BLADDER): ICD-10-CM

## 2023-07-25 PROCEDURE — 87086 URINE CULTURE/COLONY COUNT: CPT | Performed by: UROLOGY

## 2023-07-25 RX ORDER — DEXTROAMPHETAMINE SACCHARATE, AMPHETAMINE ASPARTATE, DEXTROAMPHETAMINE SULFATE AND AMPHETAMINE SULFATE 2.5; 2.5; 2.5; 2.5 MG/1; MG/1; MG/1; MG/1
10 TABLET ORAL DAILY
Qty: 30 TABLET | Refills: 0 | Status: SHIPPED | OUTPATIENT
Start: 2023-07-25 | End: 2023-08-28 | Stop reason: SDUPTHER

## 2023-07-25 NOTE — TELEPHONE ENCOUNTER
----- Message from Trino Cain MD sent at 7/25/2023  9:05 AM CDT -----  Regarding: RE: Medical Advice  Contact: pt mom loraine  I can place a urine culture order.     ----- Message -----  From: Merari Pepper MA  Sent: 7/25/2023   8:37 AM CDT  To: Trino Cain MD  Subject: Medical Advice                                   I spoke to pt's mother Eddy. They state that he's having some problems within the last 3 days. They state he has not had any fever, but he is hot the last 3 days. He stated that he is having burning while he urinates and the burning goes from his bladder into his belly. He states that it hurts to urinate. He states that he has not seen any blood in his urine, but it's hard to tell because the medicine he is on makes his urine blue. I informed his mother that we usually recommend the pt go to urgent care, but she states she would rather not bring him there. I told her I would inform you of what's going on with the pt.  ----- Message -----  From: Shyla Renata  Sent: 7/25/2023   8:07 AM CDT  To: Ant Jameson Staff    Type: Needs Medical Advice  Who Called:  pt mom/loraine  Symptoms (please be specific):  pt is feeling worse  How long has patient had these symptoms:  last 3 days  Pharmacy name and phone #:    [id number] [place] [address] & [address] [address]  Phone: [phone] Fax: [phone]    [facility] [place] [id number] [facility] [address]  Phone: [phone] Fax: [phone]   .  Best Call Back Number: [phone].  Additional Information: pt's mom would like a call back to discuss she states he has taken a turn for the worse please call

## 2023-07-25 NOTE — TELEPHONE ENCOUNTER
Spoke to pt's mother Nancie, she states that he is having some urinary issues. States that he does not have fever. He has been feeling hot and not getting up except to go to the restroom. He's having some burning when he urinates and the burning goes from his bladder up into his stomach. Pt's mother said she would rather not take him to urgent care if at all possible.    Per Dr Cain he put in urine culture orders.     I did call pt's mother back to inform her that the Dr put the orders in. No answer and LVM informing pt she can take pt to any [facility] to have the culture done. Will call back.

## 2023-07-26 ENCOUNTER — OFFICE VISIT (OUTPATIENT)
Dept: ORTHOPEDICS | Facility: CLINIC | Age: 28
End: 2023-07-26
Payer: COMMERCIAL

## 2023-07-26 ENCOUNTER — HOSPITAL ENCOUNTER (OUTPATIENT)
Dept: RADIOLOGY | Facility: HOSPITAL | Age: 28
Discharge: HOME | End: 2023-07-26
Attending: ORTHOPAEDIC SURGERY
Payer: COMMERCIAL

## 2023-07-26 VITALS — BODY MASS INDEX: 31.68 KG/M2 | WEIGHT: 239 LBS | HEIGHT: 73 IN

## 2023-07-26 DIAGNOSIS — M79.642 BILATERAL HAND PAIN: ICD-10-CM

## 2023-07-26 DIAGNOSIS — M79.641 BILATERAL HAND PAIN: ICD-10-CM

## 2023-07-26 LAB
BACTERIA UR CULT: NORMAL
BACTERIA UR CULT: NORMAL

## 2023-07-26 PROCEDURE — 73110 XR WRIST COMPLETE 3 VIEWS BILATERAL: ICD-10-PCS | Mod: 26,,, | Performed by: RADIOLOGY

## 2023-07-26 PROCEDURE — 73130 X-RAY EXAM OF HAND: CPT | Mod: 26,,, | Performed by: RADIOLOGY

## 2023-07-26 PROCEDURE — 73110 X-RAY EXAM OF WRIST: CPT | Mod: 26,,, | Performed by: RADIOLOGY

## 2023-07-26 PROCEDURE — 3008F BODY MASS INDEX DOCD: CPT | Mod: CPTII,S$GLB,, | Performed by: ORTHOPAEDIC SURGERY

## 2023-07-26 PROCEDURE — 99204 PR OFFICE/OUTPT VISIT, NEW, LEVL IV, 45-59 MIN: ICD-10-PCS | Mod: S$GLB,,, | Performed by: ORTHOPAEDIC SURGERY

## 2023-07-26 PROCEDURE — 99999 PR PBB SHADOW E&M-EST. PATIENT-LVL III: ICD-10-PCS | Mod: PBBFAC,,, | Performed by: ORTHOPAEDIC SURGERY

## 2023-07-26 PROCEDURE — 73130 X-RAY EXAM OF HAND: CPT | Mod: TC,50,PO

## 2023-07-26 PROCEDURE — 73130 XR HAND COMPLETE 3 VIEWS BILATERAL: ICD-10-PCS | Mod: 26,,, | Performed by: RADIOLOGY

## 2023-07-26 PROCEDURE — 1159F PR MEDICATION LIST DOCUMENTED IN MEDICAL RECORD: ICD-10-PCS | Mod: CPTII,S$GLB,, | Performed by: ORTHOPAEDIC SURGERY

## 2023-07-26 PROCEDURE — 1160F PR REVIEW ALL MEDS BY PRESCRIBER/CLIN PHARMACIST DOCUMENTED: ICD-10-PCS | Mod: CPTII,S$GLB,, | Performed by: ORTHOPAEDIC SURGERY

## 2023-07-26 PROCEDURE — 99204 OFFICE O/P NEW MOD 45 MIN: CPT | Mod: S$GLB,,, | Performed by: ORTHOPAEDIC SURGERY

## 2023-07-26 PROCEDURE — 1159F MED LIST DOCD IN RCRD: CPT | Mod: CPTII,S$GLB,, | Performed by: ORTHOPAEDIC SURGERY

## 2023-07-26 PROCEDURE — 99999 PR PBB SHADOW E&M-EST. PATIENT-LVL III: CPT | Mod: PBBFAC,,, | Performed by: ORTHOPAEDIC SURGERY

## 2023-07-26 PROCEDURE — 1160F RVW MEDS BY RX/DR IN RCRD: CPT | Mod: CPTII,S$GLB,, | Performed by: ORTHOPAEDIC SURGERY

## 2023-07-26 PROCEDURE — 3008F PR BODY MASS INDEX (BMI) DOCUMENTED: ICD-10-PCS | Mod: CPTII,S$GLB,, | Performed by: ORTHOPAEDIC SURGERY

## 2023-07-26 PROCEDURE — 73110 X-RAY EXAM OF WRIST: CPT | Mod: TC,50,PO

## 2023-07-26 NOTE — PROGRESS NOTES
27 years old bilateral hand pain for 2 years time no trauma stiffness and popping.  Compression gloves provide some relief has difficulty grasping and grabbing objects.  Patient lives with mother and plays a lot of video games and does model kits.  Patient does report a history of psoriatic arthritis    Exam shows pleasant male here today with his mother no outward signs of interested the hands full motion strength negative Tinel's at the wrist he is able to elicit a popping sensation when moving his joints MCP and PIP joints of both of his hands and fingers    X-rays negative for acute injury     Assessment:  Arthralgia bilateral hands     Plan:  Reassurance symptomatic care, consideration of occupational therapy, up as needed    Patient seen as a consult from Óscar Crum, communication via epic

## 2023-07-27 ENCOUNTER — PATIENT MESSAGE (OUTPATIENT)
Dept: UROLOGY | Facility: CLINIC | Age: 28
End: 2023-07-27
Payer: COMMERCIAL

## 2023-07-27 RX ORDER — NITROFURANTOIN 25; 75 MG/1; MG/1
100 CAPSULE ORAL 2 TIMES DAILY
Qty: 14 CAPSULE | Refills: 0 | Status: SHIPPED | OUTPATIENT
Start: 2023-07-27 | End: 2023-08-03

## 2023-08-04 ENCOUNTER — TELEPHONE (OUTPATIENT)
Dept: UROLOGY | Facility: CLINIC | Age: 28
End: 2023-08-04
Payer: COMMERCIAL

## 2023-08-04 ENCOUNTER — OFFICE VISIT (OUTPATIENT)
Dept: UROLOGY | Facility: CLINIC | Age: 28
End: 2023-08-04
Payer: COMMERCIAL

## 2023-08-04 VITALS
DIASTOLIC BLOOD PRESSURE: 75 MMHG | HEART RATE: 70 BPM | HEIGHT: 73 IN | WEIGHT: 239.88 LBS | SYSTOLIC BLOOD PRESSURE: 108 MMHG | BODY MASS INDEX: 31.79 KG/M2

## 2023-08-04 DIAGNOSIS — R30.0 DYSURIA: Primary | ICD-10-CM

## 2023-08-04 PROCEDURE — 87086 URINE CULTURE/COLONY COUNT: CPT | Performed by: UROLOGY

## 2023-08-04 PROCEDURE — 99214 PR OFFICE/OUTPT VISIT, EST, LEVL IV, 30-39 MIN: ICD-10-PCS | Mod: S$GLB,,, | Performed by: UROLOGY

## 2023-08-04 PROCEDURE — 99214 OFFICE O/P EST MOD 30 MIN: CPT | Mod: S$GLB,,, | Performed by: UROLOGY

## 2023-08-04 PROCEDURE — 1159F MED LIST DOCD IN RCRD: CPT | Mod: CPTII,S$GLB,, | Performed by: UROLOGY

## 2023-08-04 PROCEDURE — 1159F PR MEDICATION LIST DOCUMENTED IN MEDICAL RECORD: ICD-10-PCS | Mod: CPTII,S$GLB,, | Performed by: UROLOGY

## 2023-08-04 PROCEDURE — 3008F PR BODY MASS INDEX (BMI) DOCUMENTED: ICD-10-PCS | Mod: CPTII,S$GLB,, | Performed by: UROLOGY

## 2023-08-04 PROCEDURE — 3074F SYST BP LT 130 MM HG: CPT | Mod: CPTII,S$GLB,, | Performed by: UROLOGY

## 2023-08-04 PROCEDURE — 99999 PR PBB SHADOW E&M-EST. PATIENT-LVL III: CPT | Mod: PBBFAC,,, | Performed by: UROLOGY

## 2023-08-04 PROCEDURE — 3008F BODY MASS INDEX DOCD: CPT | Mod: CPTII,S$GLB,, | Performed by: UROLOGY

## 2023-08-04 PROCEDURE — 3078F PR MOST RECENT DIASTOLIC BLOOD PRESSURE < 80 MM HG: ICD-10-PCS | Mod: CPTII,S$GLB,, | Performed by: UROLOGY

## 2023-08-04 PROCEDURE — 3074F PR MOST RECENT SYSTOLIC BLOOD PRESSURE < 130 MM HG: ICD-10-PCS | Mod: CPTII,S$GLB,, | Performed by: UROLOGY

## 2023-08-04 PROCEDURE — 3078F DIAST BP <80 MM HG: CPT | Mod: CPTII,S$GLB,, | Performed by: UROLOGY

## 2023-08-04 PROCEDURE — 99999 PR PBB SHADOW E&M-EST. PATIENT-LVL III: ICD-10-PCS | Mod: PBBFAC,,, | Performed by: UROLOGY

## 2023-08-04 NOTE — TELEPHONE ENCOUNTER
Called pt to inform him that he can be seen today 8/4/23 at 2:00 pm by Dr Cain. No answer, LVM letting pt know. Will call back if they have not called back by 10:00 am.

## 2023-08-04 NOTE — TELEPHONE ENCOUNTER
[facility] to inform pt's mother I scheduled him for 2 pm today.    ----- Message from Ezequiel Dill sent at 8/4/2023  8:22 AM CDT -----  Regarding: confirming 2pm appt  Contact: pt mother  Pt mother calling to confirm 2pm appt for today       Confirmed contact below:  Contact Name:Jm Chaparro  Phone Number: [phone]

## 2023-08-06 LAB — BACTERIA UR CULT: NO GROWTH

## 2023-08-06 NOTE — PROGRESS NOTES
[facility]        Urology Return Note     8/6/2023     Referred by:  Jasmeet Benito MD     HPI: Jm Chaparro is a very pleasant 27 y.o. male who returns to our department referred for evaluation of urinary frequency of several years duration. He reports bother is associated with urinary daytime frequency (10-12x daily), with nocturia (3-4x per night) and with urgency that results in urinary incontinence occasionally . He reports no stress urinary incontinence associated with exertion. He does not require daily pad use.  He has decreased overall fluid intake.  He reports urinary frequency is day and night but more predominant during the day. Patient reports urgency is independent of position. His urinary frequency is largely driven by pain.      He had Botox injected after failed staged test of SNM. He had no voiding difficulty following in 100 units of Botox but little improvement in symptoms.      His previous evaluations were reviewed in detail today. He has been on elmiron but stopped after no benefit. He has been on Myrbetriq and ditropan without improvement. He was previously offered bladder instillations but could not tolerate catheterization in the office.  He has seen pain management. His cystoscopy report from January 2022 reports an area that was biopsied showing only chronic inflammation. I'm not clear if this may have represented a hunner lesion. His symptoms did not improve following that procedure.      Numerous urine cultures without evidence of infection.      Previous Therapies  Behavioral Therapy: (fluid/dietary modification timed voiding/bladder training) -  provided no benefit  Medication:  oral oxybutynin ER 15 mg (>1 year) (provided no benefit)  Myrbetriq 50 mg daily (5 months) (provided no benefit)   Elmiron 100 mg TID  (>1 year) (no improvement)  Bladder instillations - did not tolerate  SNM - No improvement with staged testing  Botox - partial improvement from  100 units     Since he last visit (200 units injection 12 weeks ago) he has noted decreased frequency. However, he has considerable voiding dysfunction, straining to empty. PVR today is 3 mL. Over the last week, he has noted dysuria. A previous culture appeared contaminated. He returns today for a catheterized specimen. I obtained this with in and out catheterization.   A review of 10+ systems was conducted with pertinent positive and negative findings documented in HPI with all other systems reviewed and negative.     Past medical, family, surgical and social history reviewed as documented in chart with pertinent positive medical, family, surgical and social history detailed in HPI.     Exam Findings:     Const: no acute distress, conversant and alert  Eyes: anicteric, extraocular muscles intact  ENMT: normocephalic, Nl oral membranes  Cardio: no cyanosis, nl cap refill  Pulm: no tachypnea; no resp distress  Musc: no laceration, no tenderness  Neuro: alert; oriented x 3  Skin: warm, dry; no petichiae  Psych: no anxiety; normal speech      Assessment/Plan:     Urinary Frequency  (established, not meeting goals): Improved frequency though considerable voiding difficulty with 200 units injected. He is emptying well enough to avoid catheterization but would decrease to 150 units if re-injecting in the future. Will send urine today for culture.

## 2023-08-07 ENCOUNTER — LAB VISIT (OUTPATIENT)
Dept: LAB | Facility: HOSPITAL | Age: 28
End: 2023-08-07
Attending: FAMILY MEDICINE
Payer: COMMERCIAL

## 2023-08-07 DIAGNOSIS — E78.5 HYPERLIPIDEMIA, UNSPECIFIED HYPERLIPIDEMIA TYPE: ICD-10-CM

## 2023-08-07 DIAGNOSIS — I10 HYPERTENSION, ESSENTIAL: ICD-10-CM

## 2023-08-07 LAB
ALBUMIN SERPL BCP-MCNC: 3.9 G/DL (ref 3.5–5.2)
ALP SERPL-CCNC: 88 U/L (ref 55–135)
ALT SERPL W/O P-5'-P-CCNC: 64 U/L (ref 10–44)
ANION GAP SERPL CALC-SCNC: 11 MMOL/L (ref 8–16)
AST SERPL-CCNC: 39 U/L (ref 10–40)
BASOPHILS # BLD AUTO: 0.1 K/UL (ref 0–0.2)
BASOPHILS NFR BLD: 0.9 % (ref 0–1.9)
BILIRUB SERPL-MCNC: 0.4 MG/DL (ref 0.1–1)
BUN SERPL-MCNC: 15 MG/DL (ref 6–20)
CALCIUM SERPL-MCNC: 9.8 MG/DL (ref 8.7–10.5)
CHLORIDE SERPL-SCNC: 106 MMOL/L (ref 95–110)
CHOLEST SERPL-MCNC: 173 MG/DL (ref 120–199)
CHOLEST/HDLC SERPL: 4.6 {RATIO} (ref 2–5)
CO2 SERPL-SCNC: 25 MMOL/L (ref 23–29)
CREAT SERPL-MCNC: 1.1 MG/DL (ref 0.5–1.4)
DIFFERENTIAL METHOD: ABNORMAL
EOSINOPHIL # BLD AUTO: 0.3 K/UL (ref 0–0.5)
EOSINOPHIL NFR BLD: 2.8 % (ref 0–8)
ERYTHROCYTE [DISTWIDTH] IN BLOOD BY AUTOMATED COUNT: 13.6 % (ref 11.5–14.5)
EST. GFR  (NO RACE VARIABLE): >60 ML/MIN/1.73 M^2
ESTIMATED AVG GLUCOSE: 111 MG/DL (ref 68–131)
GLUCOSE SERPL-MCNC: 92 MG/DL (ref 70–110)
HBA1C MFR BLD: 5.5 % (ref 4–5.6)
HCT VFR BLD AUTO: 48.5 % (ref 40–54)
HDLC SERPL-MCNC: 38 MG/DL (ref 40–75)
HDLC SERPL: 22 % (ref 20–50)
HGB BLD-MCNC: 16.1 G/DL (ref 14–18)
IMM GRANULOCYTES # BLD AUTO: 0.05 K/UL (ref 0–0.04)
IMM GRANULOCYTES NFR BLD AUTO: 0.4 % (ref 0–0.5)
LDLC SERPL CALC-MCNC: 95.6 MG/DL (ref 63–159)
LYMPHOCYTES # BLD AUTO: 4.6 K/UL (ref 1–4.8)
LYMPHOCYTES NFR BLD: 41.3 % (ref 18–48)
MCH RBC QN AUTO: 31.7 PG (ref 27–31)
MCHC RBC AUTO-ENTMCNC: 33.2 G/DL (ref 32–36)
MCV RBC AUTO: 96 FL (ref 82–98)
MONOCYTES # BLD AUTO: 0.8 K/UL (ref 0.3–1)
MONOCYTES NFR BLD: 6.7 % (ref 4–15)
NEUTROPHILS # BLD AUTO: 5.3 K/UL (ref 1.8–7.7)
NEUTROPHILS NFR BLD: 47.9 % (ref 38–73)
NONHDLC SERPL-MCNC: 135 MG/DL
NRBC BLD-RTO: 0 /100 WBC
PLATELET # BLD AUTO: 265 K/UL (ref 150–450)
PMV BLD AUTO: 12.1 FL (ref 9.2–12.9)
POTASSIUM SERPL-SCNC: 4.2 MMOL/L (ref 3.5–5.1)
PROT SERPL-MCNC: 7.2 G/DL (ref 6–8.4)
RBC # BLD AUTO: 5.08 M/UL (ref 4.6–6.2)
SODIUM SERPL-SCNC: 142 MMOL/L (ref 136–145)
TRIGL SERPL-MCNC: 197 MG/DL (ref 30–150)
TSH SERPL DL<=0.005 MIU/L-ACNC: 1.35 UIU/ML (ref 0.4–4)
WBC # BLD AUTO: 11.12 K/UL (ref 3.9–12.7)

## 2023-08-07 PROCEDURE — 80053 COMPREHEN METABOLIC PANEL: CPT | Performed by: FAMILY MEDICINE

## 2023-08-07 PROCEDURE — 85025 COMPLETE CBC W/AUTO DIFF WBC: CPT | Performed by: FAMILY MEDICINE

## 2023-08-07 PROCEDURE — 83036 HEMOGLOBIN GLYCOSYLATED A1C: CPT | Performed by: FAMILY MEDICINE

## 2023-08-07 PROCEDURE — 84443 ASSAY THYROID STIM HORMONE: CPT | Performed by: FAMILY MEDICINE

## 2023-08-07 PROCEDURE — 36415 COLL VENOUS BLD VENIPUNCTURE: CPT | Mod: PN | Performed by: FAMILY MEDICINE

## 2023-08-07 PROCEDURE — 80061 LIPID PANEL: CPT | Performed by: FAMILY MEDICINE

## 2023-08-08 NOTE — ANESTHESIA POSTPROCEDURE EVALUATION
Anesthesia Post Evaluation    Patient: Jm Chaparro    Procedure(s) Performed: Procedure(s) (LRB):  INSERTION, NEUROSTIMULATOR, TEMPORARY, SACRAL (N/A)    Final Anesthesia Type: general      Patient location during evaluation: PACU  Patient participation: Yes- Able to Participate  Level of consciousness: awake and alert  Post-procedure vital signs: reviewed and stable  Pain control: Pain has been treated.  Airway patency: patent    PONV status: PONV absent or treated.  Anesthetic complications: no      Cardiovascular status: hemodynamically stable  Respiratory status: spontaneous ventilation  Hydration status: euvolemic  Follow-up not needed.          Vitals Value Taken Time   /77 01/24/23 1102   Temp 36.6 °C (97.9 °F) 01/24/23 0900   Pulse 72 01/24/23 1116   Resp 19 01/24/23 1115   SpO2 94 % 01/24/23 1116   Vitals shown include unvalidated device data.      No case tracking events are documented in the log.      Pain/Antonio Score: Pain Rating Prior to Med Admin: 5 (1/24/2023 10:42 AM)  Antonio Score: 10 (1/24/2023  9:45 AM)         O-Z Flap Text: The defect edges were debeveled with a #15 scalpel blade.  Given the location of the defect, shape of the defect and the proximity to free margins an O-Z flap was deemed most appropriate.  Using a sterile surgical marker, an appropriate transposition flap was drawn incorporating the defect and placing the expected incisions within the relaxed skin tension lines where possible. The area thus outlined was incised deep to adipose tissue with a #15 scalpel blade.  The skin margins were undermined to an appropriate distance in all directions utilizing iris scissors.

## 2023-08-09 ENCOUNTER — PATIENT MESSAGE (OUTPATIENT)
Dept: PSYCHIATRY | Facility: CLINIC | Age: 28
End: 2023-08-09
Payer: COMMERCIAL

## 2023-08-11 ENCOUNTER — PATIENT MESSAGE (OUTPATIENT)
Dept: FAMILY MEDICINE | Facility: CLINIC | Age: 28
End: 2023-08-11
Payer: COMMERCIAL

## 2023-08-21 RX ORDER — METHENAMINE, SODIUM PHOSPHATE, MONOBASIC, METHYLENE BLUE, AND HYOSCYAMINE SULFATE 81.6; 40.8; 10.8; .12 MG/1; MG/1; MG/1; MG/1
TABLET, COATED ORAL
Qty: 60 TABLET | Refills: 4 | Status: SHIPPED | OUTPATIENT
Start: 2023-08-21

## 2023-08-23 ENCOUNTER — PATIENT MESSAGE (OUTPATIENT)
Dept: PSYCHIATRY | Facility: CLINIC | Age: 28
End: 2023-08-23
Payer: COMMERCIAL

## 2023-08-28 DIAGNOSIS — F90.0 ADHD (ATTENTION DEFICIT HYPERACTIVITY DISORDER), INATTENTIVE TYPE: ICD-10-CM

## 2023-08-28 RX ORDER — DEXTROAMPHETAMINE SACCHARATE, AMPHETAMINE ASPARTATE, DEXTROAMPHETAMINE SULFATE AND AMPHETAMINE SULFATE 2.5; 2.5; 2.5; 2.5 MG/1; MG/1; MG/1; MG/1
10 TABLET ORAL DAILY
Qty: 30 TABLET | Refills: 0 | Status: SHIPPED | OUTPATIENT
Start: 2023-08-28 | End: 2023-10-02 | Stop reason: SDUPTHER

## 2023-08-31 RX ORDER — METOPROLOL SUCCINATE 50 MG/1
50 TABLET, EXTENDED RELEASE ORAL 2 TIMES DAILY
Qty: 180 TABLET | Refills: 1 | Status: SHIPPED | OUTPATIENT
Start: 2023-08-31 | End: 2023-12-09 | Stop reason: SDUPTHER

## 2023-08-31 NOTE — TELEPHONE ENCOUNTER
No care due was identified.  [facility] Embedded Care Due Messages. Reference number: [id number].   8/31/2023 5:44:45 AM CDT

## 2023-08-31 NOTE — TELEPHONE ENCOUNTER
Refill Decision Note   Jm Chaparro  is requesting a refill authorization.  Brief Assessment and Rationale for Refill:  Approve     Medication Therapy Plan:         Comments:     Note composed:12:18 PM 08/31/2023

## 2023-09-07 ENCOUNTER — PATIENT MESSAGE (OUTPATIENT)
Dept: PSYCHIATRY | Facility: CLINIC | Age: 28
End: 2023-09-07
Payer: COMMERCIAL

## 2023-09-18 ENCOUNTER — PATIENT MESSAGE (OUTPATIENT)
Dept: RHEUMATOLOGY | Facility: CLINIC | Age: 28
End: 2023-09-18
Payer: COMMERCIAL

## 2023-09-18 ENCOUNTER — OFFICE VISIT (OUTPATIENT)
Dept: UROLOGY | Facility: CLINIC | Age: 28
End: 2023-09-18
Payer: COMMERCIAL

## 2023-09-18 ENCOUNTER — TELEPHONE (OUTPATIENT)
Dept: RHEUMATOLOGY | Facility: CLINIC | Age: 28
End: 2023-09-18
Payer: COMMERCIAL

## 2023-09-18 VITALS — BODY MASS INDEX: 31.91 KG/M2 | WEIGHT: 240.75 LBS | HEIGHT: 73 IN

## 2023-09-18 DIAGNOSIS — N32.81 OAB (OVERACTIVE BLADDER): Primary | ICD-10-CM

## 2023-09-18 LAB — POC RESIDUAL URINE VOLUME: 125 ML (ref 0–100)

## 2023-09-18 PROCEDURE — 99999 PR PBB SHADOW E&M-EST. PATIENT-LVL III: CPT | Mod: PBBFAC,,, | Performed by: UROLOGY

## 2023-09-18 PROCEDURE — 99214 PR OFFICE/OUTPT VISIT, EST, LEVL IV, 30-39 MIN: ICD-10-PCS | Mod: S$GLB,,, | Performed by: UROLOGY

## 2023-09-18 PROCEDURE — 3008F PR BODY MASS INDEX (BMI) DOCUMENTED: ICD-10-PCS | Mod: CPTII,S$GLB,, | Performed by: UROLOGY

## 2023-09-18 PROCEDURE — 3044F PR MOST RECENT HEMOGLOBIN A1C LEVEL <7.0%: ICD-10-PCS | Mod: CPTII,S$GLB,, | Performed by: UROLOGY

## 2023-09-18 PROCEDURE — 3044F HG A1C LEVEL LT 7.0%: CPT | Mod: CPTII,S$GLB,, | Performed by: UROLOGY

## 2023-09-18 PROCEDURE — 3008F BODY MASS INDEX DOCD: CPT | Mod: CPTII,S$GLB,, | Performed by: UROLOGY

## 2023-09-18 PROCEDURE — 99999 PR PBB SHADOW E&M-EST. PATIENT-LVL III: ICD-10-PCS | Mod: PBBFAC,,, | Performed by: UROLOGY

## 2023-09-18 PROCEDURE — 51798 US URINE CAPACITY MEASURE: CPT | Mod: S$GLB,,, | Performed by: UROLOGY

## 2023-09-18 PROCEDURE — 51798 POCT BLADDER SCAN: ICD-10-PCS | Mod: S$GLB,,, | Performed by: UROLOGY

## 2023-09-18 PROCEDURE — 1159F PR MEDICATION LIST DOCUMENTED IN MEDICAL RECORD: ICD-10-PCS | Mod: CPTII,S$GLB,, | Performed by: UROLOGY

## 2023-09-18 PROCEDURE — 1159F MED LIST DOCD IN RCRD: CPT | Mod: CPTII,S$GLB,, | Performed by: UROLOGY

## 2023-09-18 PROCEDURE — 99214 OFFICE O/P EST MOD 30 MIN: CPT | Mod: S$GLB,,, | Performed by: UROLOGY

## 2023-09-18 NOTE — PROGRESS NOTES
[facility]        Urology Return Note     9/18/2023     Referred by:  Jasmeet Benito MD     HPI: Jm Chaparro is a very pleasant 28 y.o. male who returns to our department referred for evaluation of urinary frequency of several years duration. Initially, bother was associated with urinary daytime frequency (10-12x daily), with nocturia (3-4x per night) and with urgency that results in urinary incontinence occasionally . He reports no stress urinary incontinence associated with exertion. He does not require daily pad use.  He has decreased overall fluid intake.  He reports urinary frequency is day and night but more predominant during the day. Patient reports urgency is independent of position. His urinary frequency is largely driven by pain.      He had Botox injected after failed staged test of SNM. He had no voiding difficulty following in 100 units of Botox but little improvement in symptoms.      His previous evaluations were reviewed in detail today. He has been on elmiron but stopped after no benefit. He has been on Myrbetriq and ditropan without improvement. He was previously offered bladder instillations but could not tolerate catheterization in the office.  He has seen pain management. His cystoscopy report from January 2022 reports an area that was biopsied showing only chronic inflammation. I'm not clear if this may have represented a hunner lesion. His symptoms did not improve following that procedure.      Numerous urine cultures without evidence of infection.      Previous Therapies  Behavioral Therapy: (fluid/dietary modification timed voiding/bladder training) -  provided no benefit  Medication:  oral oxybutynin ER 15 mg (>1 year) (provided no benefit)  Myrbetriq 50 mg daily (5 months) (provided no benefit)   Elmiron 100 mg TID  (>1 year) (no improvement)  Bladder instillations - did not tolerate  SNM - No improvement with staged testing  Botox - partial improvement  from 100 units     Since he last visit (200 units injection 4 months ago) he continues to strain to empty, though this is improving. PVR was 125 mL today, though I'm not sure this was a true PVR and was much lower a month ago.     A review of 10+ systems was conducted with pertinent positive and negative findings documented in HPI with all other systems reviewed and negative.     Past medical, family, surgical and social history reviewed as documented in chart with pertinent positive medical, family, surgical and social history detailed in HPI.     Exam Findings:     Const: no acute distress, conversant and alert  Eyes: anicteric, extraocular muscles intact  ENMT: normocephalic, Nl oral membranes  Cardio: no cyanosis, nl cap refill  Pulm: no tachypnea; no resp distress  Musc: no laceration, no tenderness  Neuro: alert; oriented x 3  Skin: warm, dry; no petichiae  Psych: no anxiety; normal speech      Assessment/Plan:     Urinary Frequency  (established, not meeting goals): Improved frequency though considerable voiding difficulty with 200 units injected. He is emptying well enough to avoid catheterization but would decrease to 150 units if re-injecting in the future. I would wait longer before reinjecting. His symptoms have not returned to baseline and he is still having voiding difficulty. They will message me in a month with an update. May inject then or wait until 6 months if still with voiding difficulty.

## 2023-09-18 NOTE — TELEPHONE ENCOUNTER
Sent message in the portal, needing referral before making appt   ----- Message from Anjuclay Trujillo Jose sent at 9/18/2023 11:30 AM CDT -----  Type:  Sooner Apoointment Request    Caller is requesting a sooner appointment.  Caller declined first available appointment listed below.  Caller will not accept being placed on the waitlist and is requesting a message be sent to doctor.  Name of Caller:Pt  When is the first available appointment?N/A  Would the patient rather a call back or a response via [facility]? Call  Best Call Back Number:[phone]

## 2023-09-20 ENCOUNTER — TELEPHONE (OUTPATIENT)
Dept: FAMILY MEDICINE | Facility: CLINIC | Age: 28
End: 2023-09-20
Payer: COMMERCIAL

## 2023-09-20 DIAGNOSIS — L40.50 PSORIATIC ARTHRITIS: Primary | ICD-10-CM

## 2023-09-20 RX ORDER — APREMILAST 30 MG/1
30 TABLET, FILM COATED ORAL DAILY
Qty: 30 TABLET | Refills: 5 | Status: CANCELLED | OUTPATIENT
Start: 2023-09-20

## 2023-10-01 NOTE — PROGRESS NOTES
"Outpatient Psychiatry Follow-Up Visit    Clinical Status of Patient: Outpatient (Ambulatory)  10/02/2023     Chief Complaint: 26 y/o male presenting with his mother today for a follow-up.       Interval History and Content of Current Session:  Interim Events/Subjective Report/Content of Current Session:  follow-up appointment.    Pt is a 26 y/o male with past psychiatric hx of anxiety, ADHD, ASD who presents for follow-up treatment. Pt's mother reported that charges have been dropped and record will be expunged. Pt discussed being relieved and celebrated this outcome. Pt noted that he visited sister in [place] and greatly enjoyed it. Pt stated that his 88 y/o grandmother moved in with him and family. Noted some challenges adjusting to this but doing ok. Has been having some face/head pain and was referred to neuro. Pt stated that he is having some difficulty with sleep, possibly due to perseverations. Was using OTC diphenhydramine but recently switched to OTC melatonin due to PCP recommendations. Pt's mother reported occasional difficulties maintaining attention. Noted that psychostimulants were increased in the past leading to tolerance.     Past Psychiatric hx: adderall ("robot child"), focalin ("zombie"), strattera (angry), zoloft (as a child- ineffective), lexapro (ineffective), topomax ("migraines"), celexa (overly sedating but effective), remeron (ineffective- wgt gain), atarax, elavil (ineffective for pain), ritalin (inc'd bp)     Past Medical hx:   Past Medical History:   Diagnosis Date    Abnormal thyroid function test 09/11/2017    ADD (attention deficit disorder)     Anxiety disorder     Asperger's disorder     (AUTISM)    Deformity of both feet     Dyscalculia     Dysgraphia     Dyslexia     Eczema     Fatty liver     GERD (gastroesophageal reflux disease)     History of migraine headaches     Hypertension     Interstitial cystitis (chronic)     Irritable bowel syndrome     Nephrolithiasis     2014    " PONV (postoperative nausea and vomiting)     Psoriasis     Psoriatic arthritis     Refusal of blood transfusion for reasons of conscience     Seasonal allergies     Sleep apnea     does not like CPAP    Special needs assessment         Interim hx:  Medication changes last visit: None  Anxiety: mild  Depression: stable     Denies suicidal/homicidal ideations.  Denies hopelessness/worthlessness.    Denies auditory/visual hallucinations      Alcohol: Pt denied  Drug: Pt denied  Caffeine: not assessed  Tobacco: Pt denied      Review of Systems   PSYCHIATRIC: Pertinent items are noted in the narrative.        CONSTITUTIONAL: weight stable    Past Medical, Family and Social History: The patient's past medical, family and social history have been reviewed and updated as appropriate within the electronic medical record. See encounter notes.     Current Psychiatric Medication:  Prozac 40mg po qam, Adderall 10 mg pt qam (PRN about once a month), alprazolam 0.25 mg PRN (cut in half - very infrequently).     Compliance: yes      Side effects: Pt denies     Risk Parameters:  Patient reports no suicidal ideation  Patient reports no homicidal ideation  Patient reports no self-injurious behavior  Patient reports no violent behavior     Exam (detailed: at least 9 elements; comprehensive: all 15 elements)   Constitutional  Vitals:  Most recent vital signs, dated less than 90 days prior to this appointment, were reviewed. BP: ()/()   Arterial Line BP: ()/()       General:  unremarkable, age appropriate, casual attire, good eye contact, good rapport       Musculoskeletal  Muscle Strength/Tone:  no flaccidity, no tremor    Gait & Station:  normal      Psychiatric                       Speech:  normal tone, normal rate, rhythm, and volume   Mood & Affect:   Depressed, anxious         Thought Process:   Goal directed; Linear    Associations:   intact   Thought Content:   No SI/HI, delusions, or paranoia, no AV/VH   Insight & Judgement:    "Good, adequate to circumstances   Orientation:   grossly intact; alert and oriented x 4    Memory:  intact for content of interview    Language:  grossly intact, can repeat    Attention Span  : Grossly intact for content of interview   Fund of Knowledge:   intact and appropriate to age and level of education        Assessment and Diagnosis   Status/Progress: Based on the examination today, the patient's problem(s) is/are adequately controlled.  New problems have not been presented today. Co-morbidities are not complicating management of the primary condition. There are no active rule-out diagnoses for this patient at this time.      Impression: Pt continues to be stable with medication plan. Some sleep concerns, possibly due to perseverations. Pt appears open to trying melatonin. Will explore increase fluoxetine if no response. Will continue as is for now and monitor moving forward.     Diagnosis:   1) Social Anxiety Disorder  2) Generalized Anxiety Disorder  3) Attention Deficit Hyperactivity Disorder  4) Autism Spectrum Disorder (Level "requiring substantial support")  Intervention/Counseling/Treatment Plan   Medication Management:      1. Prozac 40mg po qam    2. Adderall 10 mg po qam    3. alprazolam 0.25 mg PRN (cut in half - very infrequently).     4. Call to report any worsening of symptoms or problems with the medication. Pt instructed to go to ER with thoughts of harming self, others     5. Patient given contact # for psychotherapists at [facility] and also instructed to check with insurance for list of providers.     Psychotherapy: none  Target symptoms:   Why chosen therapy is appropriate versus another modality: CBT used; relevant to diagnosis, patient responds to this modality  Outcome monitoring methods: self-report, observation  Therapeutic intervention type: Cognitive Behavioral Therapy, coping, unresolved feelings letter  Topics discussed/themes: building skills sets for symptom management, " symptom recognition, nutrition, exercise  The patient's response to the intervention is accepting  Patient's response to treatment is: good.   The patient's progress toward treatment goals: improving     Return to clinic: 3 months    -Cognitive-Behavioral/Supportive therapy and psychoeducation provided  -R/B/SE's of medications discussed with the pt who expresses understanding and chooses to take medications as prescribed.   -Pt instructed to call clinic, 911 or go to nearest emergency room if sxs worsen or pt is in   crisis. The pt expresses understanding.    Jp Hines, PhD, MP     Antidepressant/Antianxiety Medication Initiation:  Patient informed of risks, benefits, and potential side effects of medication and accepts informed consent.  Common side effects include nausea, fatigue, headache, insomnia., Specifically discussed the possibility of new or worsening suicidal thoughts/depression.  Patient instructed to stop the medication immediately and seek urgent treatment if this occurs. Patient instructed not to abruptly discontinue medication without physician guidance except in cases of sudden onset or worsening of SI.       Stimulant Medication Initiation:  Patient advised of risks, benefits, and side effects of medication and accepts informed consent.  Common side effects include insomnia, irritability, jittery feeling, dry mouth, and agitation/hostility., Patient advised of potential addictive nature of medication and controlled substance classification.  Instructed to safeguard medication as no early refills can be given for lost or stolen medications.       Benzodiazepine Initiation:  Patient advised of the risks, benefits, and common side effects of medication and has accepted informed consent.  Common side effects include drowsiness, impaired coordination, possible memory loss., Patient advised NOT to operate a vehicle or machinery untiil they are sure how the medication will affec them.  Client also  advised of danger of mixing this medication with alcohol., Patient advised of potential addictive nature of medication and need to safeguard medication as no early refills for lost or stolen medications can be authorized.

## 2023-10-02 ENCOUNTER — OFFICE VISIT (OUTPATIENT)
Dept: PSYCHIATRY | Facility: CLINIC | Age: 28
End: 2023-10-02
Payer: COMMERCIAL

## 2023-10-02 ENCOUNTER — OFFICE VISIT (OUTPATIENT)
Dept: FAMILY MEDICINE | Facility: CLINIC | Age: 28
End: 2023-10-02
Payer: COMMERCIAL

## 2023-10-02 VITALS
SYSTOLIC BLOOD PRESSURE: 108 MMHG | TEMPERATURE: 98 F | HEART RATE: 68 BPM | HEIGHT: 73 IN | RESPIRATION RATE: 18 BRPM | BODY MASS INDEX: 31.51 KG/M2 | WEIGHT: 237.75 LBS | DIASTOLIC BLOOD PRESSURE: 78 MMHG | OXYGEN SATURATION: 97 %

## 2023-10-02 VITALS
HEART RATE: 62 BPM | HEIGHT: 73 IN | SYSTOLIC BLOOD PRESSURE: 122 MMHG | WEIGHT: 239.88 LBS | DIASTOLIC BLOOD PRESSURE: 82 MMHG | BODY MASS INDEX: 31.79 KG/M2

## 2023-10-02 DIAGNOSIS — R79.89 ELEVATED LFTS: ICD-10-CM

## 2023-10-02 DIAGNOSIS — F90.0 ADHD (ATTENTION DEFICIT HYPERACTIVITY DISORDER), INATTENTIVE TYPE: ICD-10-CM

## 2023-10-02 DIAGNOSIS — R51.9 CHRONIC NONINTRACTABLE HEADACHE, UNSPECIFIED HEADACHE TYPE: ICD-10-CM

## 2023-10-02 DIAGNOSIS — F41.1 GENERALIZED ANXIETY DISORDER: ICD-10-CM

## 2023-10-02 DIAGNOSIS — L40.50 PSORIATIC ARTHRITIS: Primary | ICD-10-CM

## 2023-10-02 DIAGNOSIS — I10 HYPERTENSION, ESSENTIAL: ICD-10-CM

## 2023-10-02 DIAGNOSIS — F40.10 SOCIAL ANXIETY DISORDER: Primary | ICD-10-CM

## 2023-10-02 DIAGNOSIS — G89.29 CHRONIC NONINTRACTABLE HEADACHE, UNSPECIFIED HEADACHE TYPE: ICD-10-CM

## 2023-10-02 DIAGNOSIS — F84.0 AUTISM SPECTRUM DISORDER REQUIRING SUBSTANTIAL SUPPORT (LEVEL 2): ICD-10-CM

## 2023-10-02 PROCEDURE — 3044F PR MOST RECENT HEMOGLOBIN A1C LEVEL <7.0%: ICD-10-PCS | Mod: CPTII,S$GLB,, | Performed by: FAMILY MEDICINE

## 2023-10-02 PROCEDURE — 3008F PR BODY MASS INDEX (BMI) DOCUMENTED: ICD-10-PCS | Mod: CPTII,S$GLB,, | Performed by: FAMILY MEDICINE

## 2023-10-02 PROCEDURE — 99214 OFFICE O/P EST MOD 30 MIN: CPT | Mod: S$GLB,,, | Performed by: FAMILY MEDICINE

## 2023-10-02 PROCEDURE — 3008F BODY MASS INDEX DOCD: CPT | Mod: CPTII,S$GLB,, | Performed by: FAMILY MEDICINE

## 2023-10-02 PROCEDURE — 99214 PR OFFICE/OUTPT VISIT, EST, LEVL IV, 30-39 MIN: ICD-10-PCS | Mod: S$GLB,,, | Performed by: FAMILY MEDICINE

## 2023-10-02 PROCEDURE — 99214 OFFICE O/P EST MOD 30 MIN: CPT | Mod: S$GLB,,, | Performed by: PSYCHOLOGIST

## 2023-10-02 PROCEDURE — 3074F PR MOST RECENT SYSTOLIC BLOOD PRESSURE < 130 MM HG: ICD-10-PCS | Mod: CPTII,S$GLB,, | Performed by: FAMILY MEDICINE

## 2023-10-02 PROCEDURE — 3074F SYST BP LT 130 MM HG: CPT | Mod: CPTII,S$GLB,, | Performed by: PSYCHOLOGIST

## 2023-10-02 PROCEDURE — 3074F SYST BP LT 130 MM HG: CPT | Mod: CPTII,S$GLB,, | Performed by: FAMILY MEDICINE

## 2023-10-02 PROCEDURE — 3044F PR MOST RECENT HEMOGLOBIN A1C LEVEL <7.0%: ICD-10-PCS | Mod: CPTII,S$GLB,, | Performed by: PSYCHOLOGIST

## 2023-10-02 PROCEDURE — 3044F HG A1C LEVEL LT 7.0%: CPT | Mod: CPTII,S$GLB,, | Performed by: PSYCHOLOGIST

## 2023-10-02 PROCEDURE — 3074F PR MOST RECENT SYSTOLIC BLOOD PRESSURE < 130 MM HG: ICD-10-PCS | Mod: CPTII,S$GLB,, | Performed by: PSYCHOLOGIST

## 2023-10-02 PROCEDURE — 99214 PR OFFICE/OUTPT VISIT, EST, LEVL IV, 30-39 MIN: ICD-10-PCS | Mod: S$GLB,,, | Performed by: PSYCHOLOGIST

## 2023-10-02 PROCEDURE — 3079F DIAST BP 80-89 MM HG: CPT | Mod: CPTII,S$GLB,, | Performed by: PSYCHOLOGIST

## 2023-10-02 PROCEDURE — 3008F PR BODY MASS INDEX (BMI) DOCUMENTED: ICD-10-PCS | Mod: CPTII,S$GLB,, | Performed by: PSYCHOLOGIST

## 2023-10-02 PROCEDURE — 99999 PR PBB SHADOW E&M-EST. PATIENT-LVL IV: ICD-10-PCS | Mod: PBBFAC,,, | Performed by: PSYCHOLOGIST

## 2023-10-02 PROCEDURE — 3078F PR MOST RECENT DIASTOLIC BLOOD PRESSURE < 80 MM HG: ICD-10-PCS | Mod: CPTII,S$GLB,, | Performed by: FAMILY MEDICINE

## 2023-10-02 PROCEDURE — 3044F HG A1C LEVEL LT 7.0%: CPT | Mod: CPTII,S$GLB,, | Performed by: FAMILY MEDICINE

## 2023-10-02 PROCEDURE — 1159F MED LIST DOCD IN RCRD: CPT | Mod: CPTII,S$GLB,, | Performed by: PSYCHOLOGIST

## 2023-10-02 PROCEDURE — 99999 PR PBB SHADOW E&M-EST. PATIENT-LVL IV: CPT | Mod: PBBFAC,,, | Performed by: PSYCHOLOGIST

## 2023-10-02 PROCEDURE — 3078F DIAST BP <80 MM HG: CPT | Mod: CPTII,S$GLB,, | Performed by: FAMILY MEDICINE

## 2023-10-02 PROCEDURE — 3079F PR MOST RECENT DIASTOLIC BLOOD PRESSURE 80-89 MM HG: ICD-10-PCS | Mod: CPTII,S$GLB,, | Performed by: PSYCHOLOGIST

## 2023-10-02 PROCEDURE — 3008F BODY MASS INDEX DOCD: CPT | Mod: CPTII,S$GLB,, | Performed by: PSYCHOLOGIST

## 2023-10-02 PROCEDURE — 1159F PR MEDICATION LIST DOCUMENTED IN MEDICAL RECORD: ICD-10-PCS | Mod: CPTII,S$GLB,, | Performed by: PSYCHOLOGIST

## 2023-10-02 RX ORDER — APREMILAST 30 MG/1
30 TABLET, FILM COATED ORAL 2 TIMES DAILY
Qty: 180 TABLET | Refills: 1 | Status: ACTIVE | OUTPATIENT
Start: 2023-10-02 | End: 2023-10-11 | Stop reason: SDUPTHER

## 2023-10-02 RX ORDER — DEXTROAMPHETAMINE SACCHARATE, AMPHETAMINE ASPARTATE, DEXTROAMPHETAMINE SULFATE AND AMPHETAMINE SULFATE 2.5; 2.5; 2.5; 2.5 MG/1; MG/1; MG/1; MG/1
10 TABLET ORAL DAILY
Qty: 30 TABLET | Refills: 0 | Status: SHIPPED | OUTPATIENT
Start: 2023-10-02 | End: 2023-10-31 | Stop reason: SDUPTHER

## 2023-10-02 RX ORDER — ERGOCALCIFEROL 1.25 MG/1
50000 CAPSULE ORAL
Qty: 12 CAPSULE | Refills: 2 | Status: SHIPPED | OUTPATIENT
Start: 2023-10-02

## 2023-10-02 NOTE — PROGRESS NOTES
Subjective:       Patient ID: Jm Chaparro is a 28 y.o. male.    Chief Complaint: Follow-up    HPI  The patient is a 28-year-old autistic male who is here today with his mom.  They have no acute questions or concerns regarding his health other than rheumatologic issues.    Today we discussed the followin)  Psoriatic arthritis.  His rheumatologist is no longer accepting his insurance.  I have submitted a new referral but they have not been able to find anybody that will see his insurance.  Mom is working with the insurance company on this issue.  Currently he is taking Otezla and gabapentin which are working well for the joint pain associated with this psoriatic arthritis.  His skin psoriasis has been more difficult to control and is a particular problem in his scalp.  Previously he has tried methotrexate which resulted in incomplete response and Humira which caused nausea  2) facial pain.  Recently he has been complaining of pain in his face.  His mom wonders if this is his way of complaining about headache.  Mom has given him Nurtec which gives him complete relief of his facial pain.  Mom has also given him Imitrex which caused him to be wired and Tylenol and Motrin which gave some but not complete relief.  Is up-to-date with the dentist.  He complains of this facial pain 2 to 3 times a month.  He does occasionally have nausea and light sensitivity with this facial pain.  (Of note, he always has sound sensitivity even at baseline.)  He tells me that his headaches start into the jaw and then go into his cheek eye head and face.   3)  Hypertension.  Currently he is taking Toprol 50 mg at night and 25 mg in the morning.  His blood pressure is 108/78   4)  Hyperlipidemia.  His recent total cholesterol was 173, triglycerides were 197, and his LDL was 95.  He is taking Lipitor consistently.    Of note, he is following regularly with the urologist who is doing Botox injections which have really  helped    Review of Systems   Constitutional:  Negative for appetite change, chills, diaphoresis, fatigue, fever and unexpected weight change.   HENT:  Negative for congestion, ear pain, postnasal drip, rhinorrhea, sinus pressure, sneezing, sore throat and trouble swallowing.    Eyes:  Negative for pain, discharge and visual disturbance.   Respiratory:  Negative for cough, chest tightness, shortness of breath and wheezing.    Cardiovascular:  Negative for chest pain, palpitations and leg swelling.   Gastrointestinal:  Negative for abdominal distention, abdominal pain, blood in stool, constipation, diarrhea, nausea and vomiting.   Skin:  Negative for rash.         Objective:      Physical Exam  Constitutional:       General: He is not in acute distress.     Appearance: Normal appearance. He is well-developed.   HENT:      Head: Normocephalic and atraumatic.      Right Ear: Hearing, tympanic membrane, ear canal and external ear normal.      Left Ear: Hearing, tympanic membrane, ear canal and external ear normal.      Nose: Nose normal.      Mouth/Throat:      Mouth: No oral lesions.      Pharynx: No oropharyngeal exudate or posterior oropharyngeal erythema.   Eyes:      General: Lids are normal. No scleral icterus.     Extraocular Movements: Extraocular movements intact.      Conjunctiva/sclera: Conjunctivae normal.      Pupils: Pupils are equal, round, and reactive to light.   Neck:      Thyroid: No thyroid mass or thyromegaly.      Vascular: No carotid bruit.   Cardiovascular:      Rate and Rhythm: Normal rate and regular rhythm. No extrasystoles are present.     Chest Wall: PMI is not displaced.      Heart sounds: Normal heart sounds. No murmur heard.     No gallop.   Pulmonary:      Effort: Pulmonary effort is normal. No accessory muscle usage or respiratory distress.      Breath sounds: Normal breath sounds.   Abdominal:      General: Bowel sounds are normal. There is no abdominal bruit.      Palpations: Abdomen  "is soft.      Tenderness: There is no abdominal tenderness. There is no rebound.   Musculoskeletal:      Cervical back: Normal range of motion and neck supple.   Lymphadenopathy:      Head:      Right side of head: No submental or submandibular adenopathy.      Left side of head: No submental or submandibular adenopathy.      Cervical:      Right cervical: No superficial, deep or posterior cervical adenopathy.     Left cervical: No superficial, deep or posterior cervical adenopathy.      Upper Body:      Right upper body: No supraclavicular adenopathy.      Left upper body: No supraclavicular adenopathy.   Skin:     General: Skin is warm and dry.   Neurological:      Mental Status: He is alert and oriented to person, place, and time.       Blood pressure 108/78, pulse 68, temperature 98.2 °F (36.8 °C), resp. rate 18, height 6' 1" (1.854 m), weight 107.9 kg (237 lb 12.3 oz), SpO2 97 %.Body mass index is 31.37 kg/m².              A/P:  1)  Psoriatic arthritis.  Fairly well controlled.  They will continue to work to find the rheumatologist who will accept his insurance.  I am going to refill the Otezla and gabapentin    2) facial pain.  New.  We are going to check a sinus x-ray to make sure this is normal.  He has already been cleared by the dentist.  If this is normal, I recommend he start seeing his mom's neurologist and a referral was provided for that neurologist  3)  Hypertension.  Currently well controlled.  Continue with Toprol  4)  Hyperlipidemia.  Well controlled.  Continue with Lipitor.  Recheck labs in 1 year 5. Elevated LFTs.  Likely due to fatty liver.  He is going to work on diet exercise weight loss.  We will check LFTs again in 2 months.    As long as he does well, I will see him back in 4 months or sooner if needed  "

## 2023-10-04 ENCOUNTER — HOSPITAL ENCOUNTER (OUTPATIENT)
Dept: RADIOLOGY | Facility: HOSPITAL | Age: 28
Discharge: HOME | End: 2023-10-04
Attending: FAMILY MEDICINE
Payer: COMMERCIAL

## 2023-10-04 DIAGNOSIS — G89.29 CHRONIC NONINTRACTABLE HEADACHE, UNSPECIFIED HEADACHE TYPE: ICD-10-CM

## 2023-10-04 DIAGNOSIS — R51.9 CHRONIC NONINTRACTABLE HEADACHE, UNSPECIFIED HEADACHE TYPE: ICD-10-CM

## 2023-10-04 PROCEDURE — 70220 X-RAY EXAM OF SINUSES: CPT | Mod: 26,,, | Performed by: RADIOLOGY

## 2023-10-04 PROCEDURE — 70220 X-RAY EXAM OF SINUSES: CPT | Mod: TC,PN

## 2023-10-04 PROCEDURE — 70220 XR SINUSES MIN 3 VIEWS: ICD-10-PCS | Mod: 26,,, | Performed by: RADIOLOGY

## 2023-10-05 ENCOUNTER — PATIENT MESSAGE (OUTPATIENT)
Dept: FAMILY MEDICINE | Facility: CLINIC | Age: 28
End: 2023-10-05
Payer: COMMERCIAL

## 2023-10-05 DIAGNOSIS — L40.50 PSORIATIC ARTHRITIS: ICD-10-CM

## 2023-10-07 ENCOUNTER — PATIENT MESSAGE (OUTPATIENT)
Dept: PSYCHIATRY | Facility: CLINIC | Age: 28
End: 2023-10-07
Payer: COMMERCIAL

## 2023-10-09 DIAGNOSIS — F41.1 GAD (GENERALIZED ANXIETY DISORDER): ICD-10-CM

## 2023-10-09 RX ORDER — FLUOXETINE HYDROCHLORIDE 40 MG/1
40 CAPSULE ORAL DAILY
Qty: 90 CAPSULE | Refills: 3 | Status: SHIPPED | OUTPATIENT
Start: 2023-10-09

## 2023-10-11 NOTE — TELEPHONE ENCOUNTER
It looks like this will have to be printed and faxed.  I am not able to pull up that pharmacy.  Please advise.    Fax [phone]

## 2023-10-11 NOTE — TELEPHONE ENCOUNTER
No care due was identified.  [facility] Embedded Care Due Messages. Reference number: [id number].   10/11/2023 1:10:43 PM CDT

## 2023-10-12 RX ORDER — APREMILAST 30 MG/1
30 TABLET, FILM COATED ORAL 2 TIMES DAILY
Qty: 180 TABLET | Refills: 1 | OUTPATIENT
Start: 2023-10-12

## 2023-10-13 ENCOUNTER — PATIENT MESSAGE (OUTPATIENT)
Dept: FAMILY MEDICINE | Facility: CLINIC | Age: 28
End: 2023-10-13
Payer: COMMERCIAL

## 2023-10-18 ENCOUNTER — PATIENT MESSAGE (OUTPATIENT)
Dept: FAMILY MEDICINE | Facility: CLINIC | Age: 28
End: 2023-10-18
Payer: COMMERCIAL

## 2023-10-18 ENCOUNTER — PATIENT MESSAGE (OUTPATIENT)
Dept: UROLOGY | Facility: CLINIC | Age: 28
End: 2023-10-18
Payer: COMMERCIAL

## 2023-10-31 ENCOUNTER — OFFICE VISIT (OUTPATIENT)
Dept: PAIN MEDICINE | Facility: CLINIC | Age: 28
End: 2023-10-31
Payer: COMMERCIAL

## 2023-10-31 VITALS
HEIGHT: 73 IN | WEIGHT: 241.38 LBS | SYSTOLIC BLOOD PRESSURE: 124 MMHG | HEART RATE: 65 BPM | BODY MASS INDEX: 31.99 KG/M2 | DIASTOLIC BLOOD PRESSURE: 86 MMHG

## 2023-10-31 DIAGNOSIS — M79.642 BILATERAL HAND PAIN: Primary | ICD-10-CM

## 2023-10-31 DIAGNOSIS — M06.9 RHEUMATOID ARTHRITIS INVOLVING MULTIPLE SITES, UNSPECIFIED WHETHER RHEUMATOID FACTOR PRESENT: ICD-10-CM

## 2023-10-31 DIAGNOSIS — M79.641 BILATERAL HAND PAIN: Primary | ICD-10-CM

## 2023-10-31 DIAGNOSIS — N30.10 IC (INTERSTITIAL CYSTITIS): ICD-10-CM

## 2023-10-31 DIAGNOSIS — L40.50 PSORIATIC ARTHRITIS: ICD-10-CM

## 2023-10-31 DIAGNOSIS — F90.0 ADHD (ATTENTION DEFICIT HYPERACTIVITY DISORDER), INATTENTIVE TYPE: ICD-10-CM

## 2023-10-31 DIAGNOSIS — R29.898 MUSCULAR DECONDITIONING: ICD-10-CM

## 2023-10-31 DIAGNOSIS — R39.89 BLADDER PAIN: ICD-10-CM

## 2023-10-31 DIAGNOSIS — K58.2 IRRITABLE BOWEL SYNDROME WITH BOTH CONSTIPATION AND DIARRHEA: ICD-10-CM

## 2023-10-31 DIAGNOSIS — R29.3 POOR POSTURE: ICD-10-CM

## 2023-10-31 PROCEDURE — 99999 PR PBB SHADOW E&M-EST. PATIENT-LVL IV: CPT | Mod: PBBFAC,,, | Performed by: PHYSICIAN ASSISTANT

## 2023-10-31 PROCEDURE — 3074F SYST BP LT 130 MM HG: CPT | Mod: CPTII,S$GLB,, | Performed by: PHYSICIAN ASSISTANT

## 2023-10-31 PROCEDURE — 3008F BODY MASS INDEX DOCD: CPT | Mod: CPTII,S$GLB,, | Performed by: PHYSICIAN ASSISTANT

## 2023-10-31 PROCEDURE — 1160F RVW MEDS BY RX/DR IN RCRD: CPT | Mod: CPTII,S$GLB,, | Performed by: PHYSICIAN ASSISTANT

## 2023-10-31 PROCEDURE — 3008F PR BODY MASS INDEX (BMI) DOCUMENTED: ICD-10-PCS | Mod: CPTII,S$GLB,, | Performed by: PHYSICIAN ASSISTANT

## 2023-10-31 PROCEDURE — 99214 PR OFFICE/OUTPT VISIT, EST, LEVL IV, 30-39 MIN: ICD-10-PCS | Mod: S$GLB,,, | Performed by: PHYSICIAN ASSISTANT

## 2023-10-31 PROCEDURE — 3044F PR MOST RECENT HEMOGLOBIN A1C LEVEL <7.0%: ICD-10-PCS | Mod: CPTII,S$GLB,, | Performed by: PHYSICIAN ASSISTANT

## 2023-10-31 PROCEDURE — 99999 PR PBB SHADOW E&M-EST. PATIENT-LVL IV: ICD-10-PCS | Mod: PBBFAC,,, | Performed by: PHYSICIAN ASSISTANT

## 2023-10-31 PROCEDURE — 1160F PR REVIEW ALL MEDS BY PRESCRIBER/CLIN PHARMACIST DOCUMENTED: ICD-10-PCS | Mod: CPTII,S$GLB,, | Performed by: PHYSICIAN ASSISTANT

## 2023-10-31 PROCEDURE — 1159F MED LIST DOCD IN RCRD: CPT | Mod: CPTII,S$GLB,, | Performed by: PHYSICIAN ASSISTANT

## 2023-10-31 PROCEDURE — 3074F PR MOST RECENT SYSTOLIC BLOOD PRESSURE < 130 MM HG: ICD-10-PCS | Mod: CPTII,S$GLB,, | Performed by: PHYSICIAN ASSISTANT

## 2023-10-31 PROCEDURE — 3079F PR MOST RECENT DIASTOLIC BLOOD PRESSURE 80-89 MM HG: ICD-10-PCS | Mod: CPTII,S$GLB,, | Performed by: PHYSICIAN ASSISTANT

## 2023-10-31 PROCEDURE — 1159F PR MEDICATION LIST DOCUMENTED IN MEDICAL RECORD: ICD-10-PCS | Mod: CPTII,S$GLB,, | Performed by: PHYSICIAN ASSISTANT

## 2023-10-31 PROCEDURE — 3079F DIAST BP 80-89 MM HG: CPT | Mod: CPTII,S$GLB,, | Performed by: PHYSICIAN ASSISTANT

## 2023-10-31 PROCEDURE — 99214 OFFICE O/P EST MOD 30 MIN: CPT | Mod: S$GLB,,, | Performed by: PHYSICIAN ASSISTANT

## 2023-10-31 PROCEDURE — 3044F HG A1C LEVEL LT 7.0%: CPT | Mod: CPTII,S$GLB,, | Performed by: PHYSICIAN ASSISTANT

## 2023-10-31 RX ORDER — DEXTROAMPHETAMINE SACCHARATE, AMPHETAMINE ASPARTATE, DEXTROAMPHETAMINE SULFATE AND AMPHETAMINE SULFATE 2.5; 2.5; 2.5; 2.5 MG/1; MG/1; MG/1; MG/1
10 TABLET ORAL DAILY
Qty: 30 TABLET | Refills: 0 | Status: SHIPPED | OUTPATIENT
Start: 2023-10-31 | End: 2023-12-09 | Stop reason: SDUPTHER

## 2023-11-05 NOTE — PROGRESS NOTES
This note was completed with dictation software and grammatical errors may exist.    Chief Complaint   Patient presents with    Low-back Pain     Side back left        HPI: Jm Chaparro is a 28 y.o. year old male patient who has a past medical history of ADD (attention deficit disorder), Anxiety disorder, Asperger's disorder, Deformity of both feet, Dyscalculia, Dysgraphia, Dyslexia, Eczema, Fatty liver, GERD (gastroesophageal reflux disease), History of migraine headaches, Hypertension, Interstitial cystitis (chronic), Irritable bowel syndrome, Nephrolithiasis, PONV (postoperative nausea and vomiting), Psoriasis, Psoriatic arthritis, Refusal of blood transfusion for reasons of conscience, Seasonal allergies, Sleep apnea, and Special needs assessment. He presents in referral from No ref. provider found for abdominal and bladder pain.  He returns in follow-up today with joint pain, hand pain, bladder pain.  He had been doing well but has some left flank pain due to kidney stone.  Otherwise, he has been participating in physical therapy at [facility] and reports significant improvement in his strength in his hands, some improvement in his pain.  And continues to take 1/2 tramadol in the morning and 1 in the evening with relief of his pain.  He will be seeing a new rheumatologist in January.    Previous history:  The patient's mother was present with him at the visit today who also helps with most of the history.  She reports that in March, 2015 she had gone out of town on a business trip and her son accompanying her.  Unfortunately he came down with cold and virus symptoms at the time and began developing severe abdominal pain.  She went to the emergency department at that time in [place], they were told he was having viral symptoms.  She returned to the [facility] in  and had followed up with several other physicians that year.  She reports that initially he was having intermittent pain on and off, would have  pain for a week and then it would disappear without cause.  He then developed diarrhea and constipation at times, he was seen at [facility] in [place] in 2015 and diagnosed with IBS with constipation and diarrhea.  They recommend an antidepressant that he tried, no relief with this.  Shortly after he began having renal calculi as well and needed to present to the emergency department.  He had seen neurology, Dr. Larry and reports that he required stone removal, does not sound like he has had lithotripsy.  He has seen Dr. Dickson, and now Nephrology, Dr. Enriquez.  He has seen Gastroenterology, Dr. Shaver and had undergone colonoscopy with no significant findings.  He has been tried on multiple medications over time as discussed below without any benefit.  He continues to have frequent abdominal pain that is often worse with caffeine or other foods, worse with activity.  He reports that he gets some relief with a bowel movement.  He still has been passing small sand like renal calculi, they report that his urine turns dark when he is about to pass a stone and he often has pain throughout the bladder and urethra after passing a stone.    He has a history of rheumatoid arthritis, has been seeing a rheumatologist, reports pain throughout his entire spine, bilateral hips, shoulders, knees, feet.  He does well with being in a bath with hot water.    Pain intervention history:  No injections    Spine surgeries:  None    Antineuropathics: Gabapentin 300mg three times daily, was given this for inflammatory joint pain??  NSAIDs: Mobic 15, no benefit  Physical therapy:  Has done some physical therapy, water therapy in the past with some improvement but things worsened when he did land-based therapy and stretching  Antidepressants: Fluoxetine 40mg  Muscle relaxers:  Opioids:  The patient reports having benefit from tramadol in the past  Antiplatelets/Anticoagulants:  From Dr. Vyas note:  Prozac 40mg po qam, adderall xr  "20mg po qam (very rare use), lunesta 2mg po qhs PRN insomnia  Past Psych Meds: adderall ("robot child"), focalin ("zombie"), strattera (angry), zoloft (as a child- ineffective), lexapro (ineffective) topomax ("migraines"), celexa (overly sedating but effective), remeron (ineffective- wgt gain), atarax, elavil (ineffective for pain), ritalin (inc'd bp)     ROS:  He reports fatigue, weight gain, itching, color change, headaches head trauma, ear pain, constipation, diarrhea, stomach pain, nausea, flank pain, urinary urgency and frequency, painful urination, joint stiffness, joint swelling, back pain, difficulty sleeping and anxiety.  Balance of review of systems is negative.    Lab Results   Component Value Date    HGBA1C 5.5 08/07/2023       Lab Results   Component Value Date    WBC 11.12 08/07/2023    HGB 16.1 08/07/2023    HCT 48.5 08/07/2023    MCV 96 08/07/2023     08/07/2023             Past Medical History:   Diagnosis Date    ADD (attention deficit disorder)     Anxiety disorder     Asperger's disorder     (AUTISM)    Deformity of both feet     Dyscalculia     Dysgraphia     Dyslexia     Eczema     Fatty liver     GERD (gastroesophageal reflux disease)     History of migraine headaches     Hypertension     Interstitial cystitis (chronic)     Irritable bowel syndrome     Nephrolithiasis     2014    PONV (postoperative nausea and vomiting)     Psoriasis     Psoriatic arthritis     Refusal of blood transfusion for reasons of conscience     Seasonal allergies     Sleep apnea     does not like CPAP    Special needs assessment        Past Surgical History:   Procedure Laterality Date    BIOPSY OF BLADDER  1/3/2022    Procedure: BIOPSY, BLADDER;  Surgeon: Derek Dickson MD;  Location: [facility] OR;  Service: Urology;;    COLONOSCOPY  ~2015    [facility]; told IBS    COLONOSCOPY  08/10/2015    Dr. Shaver; sent for scanning: unremarkable findings, no specimens collected    COLONOSCOPY N/A 2/18/2019    Procedure: " COLONOSCOPY;  Surgeon: Puma Preston Jr., MD;  Location: [facility] ENDO;  Service: Endoscopy;  Laterality: N/A;    CYSTOSCOPY  1/11/2023    Procedure: CYSTOSCOPY;  Surgeon: Trino Cain MD;  Location: [facility] OR 1ST FLR;  Service: Urology;;    CYSTOSCOPY,WITH BOTULINUM TOXIN INJECTION N/A 2/9/2023    Procedure: CYSTOSCOPY,WITH BOTULINUM TOXIN INJECTION;  Surgeon: Trino Cain MD;  Location: [facility] OR;  Service: Urology;  Laterality: N/A;    CYSTOSCOPY,WITH BOTULINUM TOXIN INJECTION N/A 5/3/2023    Procedure: CYSTOSCOPY,WITH BOTULINUM TOXIN INJECTION;  Surgeon: Trino Cain MD;  Location: [facility] OR;  Service: Urology;  Laterality: N/A;  30 min    INSERTION, NEUROSTIMULATOR, TEMPORARY, SACRAL N/A 1/24/2023    Procedure: INSERTION, NEUROSTIMULATOR, TEMPORARY, SACRAL;  Surgeon: Trino Cain MD;  Location: [facility] OR 2ND FLR;  Service: Urology;  Laterality: N/A;  1 hr 45 mins    REMOVAL OF ELECTRODE LEAD OF SACRAL NERVE STIMULATOR N/A 2/9/2023    Procedure: REMOVAL, ELECTRODE LEAD, SACRAL NERVE STIMULATOR;  Surgeon: Trino Cain MD;  Location: [facility] OR;  Service: Urology;  Laterality: N/A;  1 hr    UPPER GASTROINTESTINAL ENDOSCOPY  05/14/2012    Dr. Preston    UPPER GASTROINTESTINAL ENDOSCOPY  07/17/2015    Dr. Shaver, sent for scanning: normal esophagus- dilated & biopsied, findings WNL, biopsies taken from z-line, stomach and duodenum; biopsy: duodenum WNL, antrum reactive gastropathy, negative for h pylori or int. metaplasia, GEJ WNL, negative for EOE & cotton's esophagus    ureteral stone extraction      basket extraction       Social History     Socioeconomic History    Marital status: Single   Tobacco Use    Smoking status: Never    Smokeless tobacco: Never   Substance and Sexual Activity    Alcohol use: No    Drug use: No    Sexual activity: Never     Social Determinants of Health     Financial Resource Strain: Low Risk  (10/1/2023)    Overall Financial Resource Strain (CARDIA)      "Difficulty of Paying Living Expenses: Not very hard   Food Insecurity: No Food Insecurity (10/1/2023)    Hunger Vital Sign     Worried About Running Out of Food in the Last Year: Never true     Ran Out of Food in the Last Year: Never true   Recent Concern: Food Insecurity - Food Insecurity Present (9/13/2023)    Hunger Vital Sign     Worried About Running Out of Food in the Last Year: Sometimes true     Ran Out of Food in the Last Year: Never true   Transportation Needs: No Transportation Needs (10/1/2023)    PRAPARE - Transportation     Lack of Transportation (Medical): No     Lack of Transportation (Non-Medical): No   Physical Activity: Insufficiently Active (10/1/2023)    Exercise Vital Sign     Days of Exercise per Week: 1 day     Minutes of Exercise per Session: 40 min   Stress: Stress Concern Present (10/1/2023)    [employer] [place] [employer] - Occupational Stress Questionnaire     Feeling of Stress : To some extent   Social Connections: Unknown (10/1/2023)    Social Connection and Isolation Panel [NHANES]     Frequency of Communication with Friends and Family: Twice a week     Frequency of Social Gatherings with Friends and Family: Once a week     Active Member of Clubs or Organizations: No     Attends Club or Organization Meetings: Patient refused     Marital Status: Never    Housing Stability: Unknown (10/1/2023)    Housing Stability Vital Sign     Unable to Pay for Housing in the Last Year: Patient refused     Number of Places Lived in the Last Year: 1     Unstable Housing in the Last Year: No         Medications/Allergies: See med card    Vitals:    10/31/23 1005   BP: 124/86   Pulse: 65   Weight: 109.5 kg (241 lb 6.5 oz)   Height: 6' 1" (1.854 m)   PainSc:   9   PainLoc: Back       Body mass index is 31.85 kg/m².    Physical exam:  Gen: A and O x3, pleasant, well-groomed  Skin: No rashes or obvious lesions  HEENT: PERRLA, no obvious deformities on ears or in canals. Trachea " midline.  CVS: Regular rate and rhythm, normal palpable pulses.  Resp:No increased work of breathing, symmetrical chest rise.  Abdomen: Soft, NT/ND.  Musculoskeletal:  Moving all extremities equally    Upper extremity strength:  5/5 bilaterally  Lower extremity strength:  5/5 bilaterally      Imagin22 CT renal protocol:  Liver normal enhancement with no focal lesion.  Gallbladder, pancreas, stomach, spleen, adrenal glands normal.  Kidneys normal size and contour with no nephrolithiasis, hydronephrosis, or ureteral obstruction.  Aorta tapers normally.  No bowel obstruction, ascites, inflammatory change.  Appendix normal.  Bladder and rectum normal.  No significant bladder wall thickening evident.  Bones are intact.  Lung bases clear.    Assessment:  Jm Chaparro is a 27 y.o. year old male patient who has a past medical history of Abnormal thyroid function test, ADD (attention deficit disorder), Anxiety disorder, Asperger's disorder, Deformity of both feet, Dyscalculia, Dysgraphia, Dyslexia, Eczema, Fatty liver, GERD (gastroesophageal reflux disease), History of migraine headaches, Hypertension, Interstitial cystitis (chronic), Irritable bowel syndrome, Nephrolithiasis, PONV (postoperative nausea and vomiting), Psoriasis, Psoriatic arthritis, Refusal of blood transfusion for reasons of conscience, Seasonal allergies, and Special needs assessment. He presents in referral from Dr. Derek Dickson for bladder pain.    1. Bilateral hand pain        2. Psoriatic arthritis        3. Muscular deconditioning        4. Poor posture        5. IC (interstitial cystitis)        6. Bladder pain        7. Rheumatoid arthritis involving multiple sites, unspecified whether rheumatoid factor present        8. Irritable bowel syndrome with both constipation and diarrhea              Plan:  1. Dr. Gonzalez recently refilled tramadol 50 mg. I have reviewed the [place] Board of Pharmacy website and there are no  angie.    2. Continue physical therapy at [facility].  3. He will see a new rheumatologist in January.    4. Follow-up in 3 months or sooner as needed.

## 2023-12-01 ENCOUNTER — TELEPHONE (OUTPATIENT)
Dept: UROLOGY | Facility: CLINIC | Age: 28
End: 2023-12-01
Payer: COMMERCIAL

## 2023-12-01 DIAGNOSIS — N39.41 URGENCY INCONTINENCE: Primary | ICD-10-CM

## 2023-12-04 ENCOUNTER — LAB VISIT (OUTPATIENT)
Dept: LAB | Facility: HOSPITAL | Age: 28
End: 2023-12-04
Attending: FAMILY MEDICINE
Payer: COMMERCIAL

## 2023-12-04 DIAGNOSIS — R79.89 ELEVATED LFTS: ICD-10-CM

## 2023-12-04 LAB
ALBUMIN SERPL BCP-MCNC: 4 G/DL (ref 3.5–5.2)
ALP SERPL-CCNC: 85 U/L (ref 55–135)
ALT SERPL W/O P-5'-P-CCNC: 55 U/L (ref 10–44)
AST SERPL-CCNC: 36 U/L (ref 10–40)
BILIRUB DIRECT SERPL-MCNC: 0.2 MG/DL (ref 0.1–0.3)
BILIRUB SERPL-MCNC: 0.5 MG/DL (ref 0.1–1)
PROT SERPL-MCNC: 7.1 G/DL (ref 6–8.4)

## 2023-12-04 PROCEDURE — 36415 COLL VENOUS BLD VENIPUNCTURE: CPT | Mod: PN | Performed by: FAMILY MEDICINE

## 2023-12-04 PROCEDURE — 80076 HEPATIC FUNCTION PANEL: CPT | Performed by: FAMILY MEDICINE

## 2023-12-09 DIAGNOSIS — F90.0 ADHD (ATTENTION DEFICIT HYPERACTIVITY DISORDER), INATTENTIVE TYPE: ICD-10-CM

## 2023-12-09 NOTE — TELEPHONE ENCOUNTER
No care due was identified.  Health [facility] Embedded Care Due Messages. Reference number: [id number].   12/09/2023 12:40:30 PM CST

## 2023-12-10 ENCOUNTER — PATIENT MESSAGE (OUTPATIENT)
Dept: FAMILY MEDICINE | Facility: CLINIC | Age: 28
End: 2023-12-10
Payer: COMMERCIAL

## 2023-12-11 RX ORDER — METOPROLOL SUCCINATE 50 MG/1
50 TABLET, EXTENDED RELEASE ORAL 2 TIMES DAILY
Qty: 180 TABLET | Refills: 3 | Status: SHIPPED | OUTPATIENT
Start: 2023-12-11 | End: 2024-03-18

## 2023-12-11 RX ORDER — DEXTROAMPHETAMINE SACCHARATE, AMPHETAMINE ASPARTATE, DEXTROAMPHETAMINE SULFATE AND AMPHETAMINE SULFATE 2.5; 2.5; 2.5; 2.5 MG/1; MG/1; MG/1; MG/1
10 TABLET ORAL DAILY
Qty: 30 TABLET | Refills: 0 | Status: SHIPPED | OUTPATIENT
Start: 2023-12-11 | End: 2024-01-08 | Stop reason: SDUPTHER

## 2023-12-11 NOTE — TELEPHONE ENCOUNTER
Refill Decision Note   Jm Chaparro  is requesting a refill authorization.  Brief Assessment and Rationale for Refill:  Approve     Medication Therapy Plan:         Comments:     Note composed:10:56 AM 12/11/2023

## 2023-12-19 NOTE — PRE-PROCEDURE INSTRUCTIONS
The following was discussed with pt's mother/caregiver via phone and sent to pt portal. Mother verbalized understanding. No concerns voiced. All questions answered and satisfied.    Dear Jm ,    You are scheduled for a procedure with Dr. Cain on 12/20/2023. Your scheduled arrival time is 9:23am.  This arrival time is roughly 2 hours before your anticipated procedure time to allow sufficient time for pre-op.  Please wear comfortable clothes.  This procedure will take place at the [facility] at the corner of [facility] and [facility].  [facility] to [facility].  The address is:    [address] [place] [address].  [place] [id number]    After entering the building, you will proceed to the second floor where you can check in with registration. You should take any medications that you routinely take for blood pressure (other than those listed below), heart medications, thyroid, cholesterol, etc.     If you wear contact lenses, please wear glasses to your procedure.    Your fasting instructions are as follow:  Nothing to eat after midnight tonight, 12/19/2023. You may drink clear liquids (such as water, apple juice) up until 2 hours prior to your arrival time. You MUST have a responsible adult to bring you home.      The evening prior to your procedure and the morning of, please hold the following medications:  -Aspirin and Aspirin-containing products (Goody's powder, Excedrin)  -NSAIDs (Advil, Ibuprofen, Aleve, Diclofenac)  -Vitamins/Supplements  -Herbal remedies/Teas  -Stimulants (Adderall, Vyvanse, Adipex)  -Diabetic medication (Please bring with you day of procedure)  -OTELA  -ADDERALL  -TRAMADOL  -UROGESTIC-BLUE  -VITAMIN D    -May take Tylenol      The evening prior to your procedure and the morning of your procedure, take a shower using antibacterial soap (ex: Hibiclens or Dial antibacterial soap). DO NOT apply deodorant, lotion, cologne, or anything else  to the skin. Wear loose, comfortable fitting clothing. Do not wear jewelry or bring any valuables with you. If you wear dentures or contacts, please bring your case with you or leave them at home. Use and bring any inhalers that you may have.    If you have any procedure-specific questions, please call your surgeon's office. Any other questions, don't hesitate to call at [phone].    Thanks,  TASIA [place]  Pre-Admit Dept [facility]

## 2023-12-20 ENCOUNTER — ANESTHESIA (OUTPATIENT)
Dept: SURGERY | Facility: HOSPITAL | Age: 28
End: 2023-12-20
Payer: COMMERCIAL

## 2023-12-20 ENCOUNTER — HOSPITAL ENCOUNTER (OUTPATIENT)
Facility: HOSPITAL | Age: 28
Discharge: HOME | End: 2023-12-20
Attending: UROLOGY | Admitting: UROLOGY
Payer: COMMERCIAL

## 2023-12-20 ENCOUNTER — ANESTHESIA EVENT (OUTPATIENT)
Dept: SURGERY | Facility: HOSPITAL | Age: 28
End: 2023-12-20
Payer: COMMERCIAL

## 2023-12-20 VITALS
DIASTOLIC BLOOD PRESSURE: 61 MMHG | HEART RATE: 71 BPM | WEIGHT: 238.31 LBS | TEMPERATURE: 98 F | BODY MASS INDEX: 31.59 KG/M2 | RESPIRATION RATE: 20 BRPM | HEIGHT: 73 IN | SYSTOLIC BLOOD PRESSURE: 100 MMHG | OXYGEN SATURATION: 98 %

## 2023-12-20 DIAGNOSIS — N39.41 URINARY INCONTINENCE, URGE: Primary | ICD-10-CM

## 2023-12-20 PROCEDURE — 63600175 PHARM REV CODE 636 W HCPCS: Mod: JZ,JG | Performed by: UROLOGY

## 2023-12-20 PROCEDURE — 99900035 HC TECH TIME PER 15 MIN (STAT)

## 2023-12-20 PROCEDURE — 25000003 PHARM REV CODE 250: Performed by: NURSE ANESTHETIST, CERTIFIED REGISTERED

## 2023-12-20 PROCEDURE — D9220A PRA ANESTHESIA: ICD-10-PCS | Mod: ,,, | Performed by: NURSE ANESTHETIST, CERTIFIED REGISTERED

## 2023-12-20 PROCEDURE — 36000707: Performed by: UROLOGY

## 2023-12-20 PROCEDURE — 71000015 HC POSTOP RECOV 1ST HR: Performed by: UROLOGY

## 2023-12-20 PROCEDURE — 52287 CYSTOSCOPY CHEMODENERVATION: CPT | Mod: ,,, | Performed by: UROLOGY

## 2023-12-20 PROCEDURE — 52287 PR CYSTOURETHROSCOPY WITH INJ FOR CHEMODENERVATION: ICD-10-PCS | Mod: ,,, | Performed by: UROLOGY

## 2023-12-20 PROCEDURE — 63600175 PHARM REV CODE 636 W HCPCS: Performed by: NURSE ANESTHETIST, CERTIFIED REGISTERED

## 2023-12-20 PROCEDURE — D9220A PRA ANESTHESIA: Mod: ,,, | Performed by: NURSE ANESTHETIST, CERTIFIED REGISTERED

## 2023-12-20 PROCEDURE — 36000706: Performed by: UROLOGY

## 2023-12-20 PROCEDURE — 71000033 HC RECOVERY, INTIAL HOUR: Performed by: UROLOGY

## 2023-12-20 PROCEDURE — 37000009 HC ANESTHESIA EA ADD 15 MINS: Performed by: UROLOGY

## 2023-12-20 PROCEDURE — 37000008 HC ANESTHESIA 1ST 15 MINUTES: Performed by: UROLOGY

## 2023-12-20 PROCEDURE — 94761 N-INVAS EAR/PLS OXIMETRY MLT: CPT

## 2023-12-20 RX ORDER — ATORVASTATIN CALCIUM 20 MG/1
20 TABLET, FILM COATED ORAL
Qty: 90 TABLET | Refills: 2 | Status: SHIPPED | OUTPATIENT
Start: 2023-12-20

## 2023-12-20 RX ORDER — SODIUM CHLORIDE 9 MG/ML
INJECTION, SOLUTION INTRAVENOUS CONTINUOUS
Status: DISCONTINUED | OUTPATIENT
Start: 2023-12-20 | End: 2023-12-20 | Stop reason: HOSPADM

## 2023-12-20 RX ORDER — HYDROMORPHONE HYDROCHLORIDE 1 MG/ML
0.5 INJECTION, SOLUTION INTRAMUSCULAR; INTRAVENOUS; SUBCUTANEOUS EVERY 5 MIN PRN
Status: DISCONTINUED | OUTPATIENT
Start: 2023-12-20 | End: 2023-12-20 | Stop reason: HOSPADM

## 2023-12-20 RX ORDER — ONDANSETRON 2 MG/ML
4 INJECTION INTRAMUSCULAR; INTRAVENOUS ONCE AS NEEDED
Status: DISCONTINUED | OUTPATIENT
Start: 2023-12-20 | End: 2023-12-20 | Stop reason: HOSPADM

## 2023-12-20 RX ORDER — SODIUM CHLORIDE 0.9 % (FLUSH) 0.9 %
10 SYRINGE (ML) INJECTION
Status: DISCONTINUED | OUTPATIENT
Start: 2023-12-20 | End: 2023-12-20 | Stop reason: HOSPADM

## 2023-12-20 RX ORDER — MIDAZOLAM HYDROCHLORIDE 1 MG/ML
INJECTION INTRAMUSCULAR; INTRAVENOUS
Status: DISCONTINUED | OUTPATIENT
Start: 2023-12-20 | End: 2023-12-20

## 2023-12-20 RX ORDER — KETOROLAC TROMETHAMINE 10 MG/1
10 TABLET, FILM COATED ORAL EVERY 6 HOURS PRN
Qty: 12 TABLET | Refills: 0 | Status: SHIPPED | OUTPATIENT
Start: 2023-12-20

## 2023-12-20 RX ORDER — DEXAMETHASONE SODIUM PHOSPHATE 4 MG/ML
INJECTION, SOLUTION INTRA-ARTICULAR; INTRALESIONAL; INTRAMUSCULAR; INTRAVENOUS; SOFT TISSUE
Status: DISCONTINUED | OUTPATIENT
Start: 2023-12-20 | End: 2023-12-20

## 2023-12-20 RX ORDER — FENTANYL CITRATE 50 UG/ML
INJECTION, SOLUTION INTRAMUSCULAR; INTRAVENOUS
Status: DISCONTINUED | OUTPATIENT
Start: 2023-12-20 | End: 2023-12-20

## 2023-12-20 RX ORDER — LIDOCAINE HYDROCHLORIDE 20 MG/ML
INJECTION INTRAVENOUS
Status: DISCONTINUED | OUTPATIENT
Start: 2023-12-20 | End: 2023-12-20

## 2023-12-20 RX ORDER — SULFAMETHOXAZOLE AND TRIMETHOPRIM 800; 160 MG/1; MG/1
1 TABLET ORAL 2 TIMES DAILY
Qty: 6 TABLET | Refills: 0 | Status: SHIPPED | OUTPATIENT
Start: 2023-12-20 | End: 2023-12-23

## 2023-12-20 RX ORDER — ONDANSETRON 2 MG/ML
INJECTION INTRAMUSCULAR; INTRAVENOUS
Status: DISCONTINUED | OUTPATIENT
Start: 2023-12-20 | End: 2023-12-20

## 2023-12-20 RX ORDER — SODIUM CHLORIDE, SODIUM LACTATE, POTASSIUM CHLORIDE, CALCIUM CHLORIDE 600; 310; 30; 20 MG/100ML; MG/100ML; MG/100ML; MG/100ML
INJECTION, SOLUTION INTRAVENOUS CONTINUOUS PRN
Status: DISCONTINUED | OUTPATIENT
Start: 2023-12-20 | End: 2023-12-20

## 2023-12-20 RX ORDER — KETAMINE HYDROCHLORIDE 50 MG/ML
INJECTION, SOLUTION INTRAMUSCULAR; INTRAVENOUS
Status: DISCONTINUED | OUTPATIENT
Start: 2023-12-20 | End: 2023-12-20

## 2023-12-20 RX ORDER — PROPOFOL 10 MG/ML
VIAL (ML) INTRAVENOUS
Status: DISCONTINUED | OUTPATIENT
Start: 2023-12-20 | End: 2023-12-20

## 2023-12-20 RX ADMIN — SODIUM CHLORIDE, SODIUM LACTATE, POTASSIUM CHLORIDE, AND CALCIUM CHLORIDE: 600; 310; 30; 20 INJECTION, SOLUTION INTRAVENOUS at 01:12

## 2023-12-20 RX ADMIN — LIDOCAINE HYDROCHLORIDE 50 MG: 20 INJECTION INTRAVENOUS at 01:12

## 2023-12-20 RX ADMIN — PROPOFOL 50 MG: 10 INJECTION, EMULSION INTRAVENOUS at 01:12

## 2023-12-20 RX ADMIN — MIDAZOLAM HYDROCHLORIDE 2 MG: 1 INJECTION INTRAMUSCULAR; INTRAVENOUS at 01:12

## 2023-12-20 RX ADMIN — KETAMINE HYDROCHLORIDE 50 MG: 50 INJECTION, SOLUTION INTRAMUSCULAR; INTRAVENOUS at 01:12

## 2023-12-20 RX ADMIN — FENTANYL CITRATE 100 MCG: 50 INJECTION, SOLUTION INTRAMUSCULAR; INTRAVENOUS at 01:12

## 2023-12-20 RX ADMIN — ONDANSETRON 4 MG: 2 INJECTION INTRAMUSCULAR; INTRAVENOUS at 01:12

## 2023-12-20 RX ADMIN — GLYCOPYRROLATE 0.2 MG: 0.2 INJECTION, SOLUTION INTRAMUSCULAR; INTRAVENOUS at 01:12

## 2023-12-20 RX ADMIN — DEXAMETHASONE SODIUM PHOSPHATE 4 MG: 4 INJECTION INTRA-ARTICULAR; INTRALESIONAL; INTRAMUSCULAR; INTRAVENOUS; SOFT TISSUE at 01:12

## 2023-12-20 NOTE — TELEPHONE ENCOUNTER
No care due was identified.  Health [facility] Embedded Care Due Messages. Reference number: [id number].   12/20/2023 3:47:27 PM CST

## 2023-12-20 NOTE — PLAN OF CARE
Discharge instructions given and explained to patient and family with verbalization of understanding all instructions. Prescription given and explained next time and doses of each medication. Patient ambulated to restroom. Patient voided prior to DC. Instructions for recovery given with all questions answered appropriately. Patients v/s stable, denies n/v and tolerating po, denies pain, IV removed, and family at bedside for patient discharge home.

## 2023-12-20 NOTE — OP NOTE
Cystoscopy Operative Note  12/20/2023    Preoperative Diagnosis:   Refractory Urgency Incontinence    Postoperative Diagnosis:  Refractory Urgency Incontinence    Procedure:  Cystoscopy with injection of Botulinum Toxin (150 units)    Attending Surgeon: Trino Cain MD    Anesthesia: Monitored Anesthesia Care    EBL: <5 mL    Complications: None    Findings: Normal bladder and urethra    Drains: None    Reason for procedure: Jm Chaparro is a very pleasant 28 y.o. male who presented with a history of  refractory urgency incontinence responsive to Botox, last injection was more than 3 months ago  and was scheduled for cystoscopy for evaluation and management.    Procedure in Detail:  Informed consent was obtained by explaining all risks and benefits of the procedure to the patient in detail.  After informed consent, the patient was brought to the suite where Monitored Anesthesia Carewas administered prior to initiation of the invasive procedure. Appropriate perioperative antibiotics were given within 30 minutes of beginning the procedure. A formal timeout was performed prior to the procedure. The patient was gently placed in lithotomy position with all pressure points padded. Bilateral sequential compression devices were applied and activated. The patient was prepped and draped in standard fashion.    A 19-Wallisian rigid cystoscope was passed per urethra into the bladder. Inspection of the bladder demonstrated a normal bladder. We then injected 150 units of onabotulinum toxin A (Botox) using a dedicated needle (VoxPopMe) set to a depth of 2 mm. A total of 30 injections were used throughout the bladder including the bladder base and excluding the trigone.     He tolerated the procedure well and was transferred in stable condition to the recovery room.     We will plan to see him back in 3 months for continued evaluation.

## 2023-12-20 NOTE — H&P
[facility] ([facility])  Urology  History & Physical    Patient Name: Jm Chaparro  MRN: [id number]  Admission Date: 12/20/2023  Code Status: Prior   Attending Provider: Trino Cain MD   Primary Care Physician: China Pierre MD  Principal Problem:<principal problem not specified>    Subjective:     HPI:  Jm Chaparro is a very pleasant 28 y.o. male who returns to our department referred for evaluation of urinary frequency of several years duration. Initially, bother was associated with urinary daytime frequency (10-12x daily), with nocturia (3-4x per night) and with urgency that results in urinary incontinence occasionally . He reports no stress urinary incontinence associated with exertion. He does not require daily pad use.  He has decreased overall fluid intake.  He reports urinary frequency is day and night but more predominant during the day. Patient reports urgency is independent of position. His urinary frequency is largely driven by pain.      He had Botox injected after failed staged test of SNM. He had no voiding difficulty following in 100 units of Botox but little improvement in symptoms.      His previous evaluations were reviewed in detail today. He has been on elmiron but stopped after no benefit. He has been on Myrbetriq and ditropan without improvement. He was previously offered bladder instillations but could not tolerate catheterization in the office.  He has seen pain management. His cystoscopy report from January 2022 reports an area that was biopsied showing only chronic inflammation. I'm not clear if this may have represented a hunner lesion. His symptoms did not improve following that procedure.      Numerous urine cultures without evidence of infection.      Previous Therapies  Behavioral Therapy: (fluid/dietary modification timed voiding/bladder training) -  provided no benefit  Medication:  oral oxybutynin ER 15 mg (>1 year)  (provided no benefit)  Myrbetriq 50 mg daily (5 months) (provided no benefit)   Elmiron 100 mg TID  (>1 year) (no improvement)  Bladder instillations - did not tolerate  SNM - No improvement with staged testing  Botox - partial improvement from 100 units     Since he last visit (200 units injection 4 months ago) he continues to strain to empty, though this is improving. PVR was 125 mL today, though I'm not sure this was a true PVR and was much lower a month ago.     Past Medical History:   Diagnosis Date    ADD (attention deficit disorder)     Anxiety disorder     Asperger's disorder     (AUTISM)    Deformity of both feet     Dyscalculia     Dysgraphia     Dyslexia     Eczema     Fatty liver     GERD (gastroesophageal reflux disease)     History of migraine headaches     Hypertension     Interstitial cystitis (chronic)     Irritable bowel syndrome     Nephrolithiasis     2014    PONV (postoperative nausea and vomiting)     Psoriasis     Psoriatic arthritis     Refusal of blood transfusion for reasons of conscience     Seasonal allergies     Sleep apnea     does not like CPAP    Special needs assessment        Past Surgical History:   Procedure Laterality Date    BIOPSY OF BLADDER  1/3/2022    Procedure: BIOPSY, BLADDER;  Surgeon: Derek Dickson MD;  Location: [facility] OR;  Service: Urology;;    COLONOSCOPY  ~2015    [facility]; told IBS    COLONOSCOPY  08/10/2015    Dr. Shaver; sent for scanning: unremarkable findings, no specimens collected    COLONOSCOPY N/A 2/18/2019    Procedure: COLONOSCOPY;  Surgeon: Puma Preston Jr., MD;  Location: [facility];  Service: Endoscopy;  Laterality: N/A;    CYSTOSCOPY  1/11/2023    Procedure: CYSTOSCOPY;  Surgeon: Trino Cain MD;  Location: [address];  Service: Urology;;    CYSTOSCOPY,WITH BOTULINUM TOXIN INJECTION N/A 2/9/2023    Procedure: CYSTOSCOPY,WITH BOTULINUM TOXIN INJECTION;  Surgeon: Trino Cain MD;  Location: [facility] OR;  Service: Urology;   Laterality: N/A;    CYSTOSCOPY,WITH BOTULINUM TOXIN INJECTION N/A 5/3/2023    Procedure: CYSTOSCOPY,WITH BOTULINUM TOXIN INJECTION;  Surgeon: Trino Cain MD;  Location: [facility] OR;  Service: Urology;  Laterality: N/A;  30 min    INSERTION, NEUROSTIMULATOR, TEMPORARY, SACRAL N/A 1/24/2023    Procedure: INSERTION, NEUROSTIMULATOR, TEMPORARY, SACRAL;  Surgeon: Trino Cain MD;  [facility];  Service: Urology;  Laterality: N/A;  1 hr 45 mins    REMOVAL OF ELECTRODE LEAD OF SACRAL NERVE STIMULATOR N/A 2/9/2023    Procedure: REMOVAL, ELECTRODE LEAD, SACRAL NERVE STIMULATOR;  Surgeon: Trino Cain MD;  Location: [facility] OR;  Service: Urology;  Laterality: N/A;  1 hr    UPPER GASTROINTESTINAL ENDOSCOPY  05/14/2012    Dr. Preston    UPPER GASTROINTESTINAL ENDOSCOPY  07/17/2015    Dr. Shaver, sent for scanning: normal esophagus- dilated & biopsied, findings WNL, biopsies taken from z-line, stomach and duodenum; biopsy: duodenum WNL, antrum reactive gastropathy, negative for h pylori or int. metaplasia, GEJ WNL, negative for EOE & cotton's esophagus    ureteral stone extraction      basket extraction       Review of patient's allergies indicates:   Allergen Reactions    Penicillins Anaphylaxis    Caffeine      Rapid heart beat    Ciprofloxacin Itching and Rash       Family History       Problem Relation (Age of Onset)    Anesthesia problems Maternal Aunt    Clotting disorder Maternal Grandmother    Diabetes Maternal Grandmother, Maternal Grandfather    Interstitial cystitis Mother, Maternal Grandmother, Maternal Aunt    Lupus Mother, Maternal Grandmother    Nephrolithiasis Maternal Grandfather            Tobacco Use    Smoking status: Never    Smokeless tobacco: Never   Substance and Sexual Activity    Alcohol use: No    Drug use: No    Sexual activity: Never       Review of Systems   Constitutional:  Negative for activity change, fatigue, fever and unexpected weight change.   HENT:  Negative  "for sore throat and trouble swallowing.    Eyes:  Negative for pain and discharge.   Respiratory:  Negative for chest tightness and shortness of breath.    Cardiovascular:  Negative for chest pain and palpitations.   Gastrointestinal:  Negative for abdominal pain, constipation, diarrhea, nausea and vomiting.   Genitourinary:         As per HPI   Musculoskeletal:  Negative for back pain and gait problem.   Skin:  Negative for rash and wound.   Neurological:  Negative for seizures and numbness.   Psychiatric/Behavioral:  Negative for hallucinations. The patient is not nervous/anxious.        Objective:     Temp:  [97.5 °F (36.4 °C)] 97.5 °F (36.4 °C)  Pulse:  [63] 63  Resp:  [16] 16  SpO2:  [96 %-97 %] 96 %  BP: (121)/(82) 121/82     Body mass index is 31.44 kg/m².    No intake/output data recorded.       Drains       None                     Physical Exam  Vitals and nursing note reviewed.   Constitutional:       Appearance: Normal appearance.   HENT:      Head: Atraumatic.      Nose: Nose normal.   Eyes:      Extraocular Movements: Extraocular movements intact.      Pupils: Pupils are equal, round, and reactive to light.   Cardiovascular:      Rate and Rhythm: Normal rate.   Pulmonary:      Effort: Pulmonary effort is normal.   Abdominal:      General: Abdomen is flat. There is no distension.      Tenderness: There is no abdominal tenderness. There is no right CVA tenderness or left CVA tenderness.   Musculoskeletal:         General: Normal range of motion.      Cervical back: Normal range of motion.   Skin:     Coloration: Skin is not jaundiced.   Neurological:      General: No focal deficit present.      Mental Status: He is alert and oriented to person, place, and time.   Psychiatric:         Mood and Affect: Mood normal.         Behavior: Behavior normal.          Significant Labs:    BMP:  No results for input(s): "NA", "K", "CL", "CO2", "BUN", "CREATININE", "LABGLOM", "GLUCOSE", "CALCIUM" in the last 168 " "hours.    CBC:  No results for input(s): "WBC", "HGB", "HCT", "PLT" in the last 168 hours.    All pertinent labs results from the past 24 hours have been reviewed.    Significant Imaging:  All pertinent imaging results/findings from the past 24 hours have been reviewed.                Assessment and Plan:     Urge incontinence  All benefits, risks, and alternatives were explained to the patient in great detail. All patient questions were addressed and the patient voiced clear understanding of our discussion. The patient has elected to undergo intravesical botox injections (150u).    DVT ppx: b/l SCD's    The patient denies all recent n/v/d, fevers, chills, and illnesses.           VTE Risk Mitigation (From admission, onward)      None            Michael Lobo MD  Urology  [facility]  "

## 2023-12-20 NOTE — DISCHARGE INSTRUCTIONS

## 2023-12-20 NOTE — ANESTHESIA POSTPROCEDURE EVALUATION
Anesthesia Post Evaluation    Patient: Jm Chaparro    Procedure(s) Performed: Procedure(s) (LRB):  CYSTOSCOPY,WITH BOTULINUM TOXIN INJECTION (N/A)    Final Anesthesia Type: general      Patient location during evaluation: PACU  Patient participation: Yes- Able to Participate  Level of consciousness: awake and alert  Post-procedure vital signs: reviewed and stable  Pain management: adequate  Airway patency: patent    PONV status at discharge: No PONV  Anesthetic complications: no      Cardiovascular status: blood pressure returned to baseline  Respiratory status: unassisted  Hydration status: euvolemic  Follow-up not needed.              Vitals Value Taken Time   /61 12/20/23 1500   Temp 36.8 °C (98.3 °F) 12/20/23 1404   Pulse 71 12/20/23 1500   Resp 20 12/20/23 1500   SpO2 98 % 12/20/23 1500         Event Time   Out of Recovery 14:45:00         Pain/Antonio Score: Antonio Score: 10 (12/20/2023  2:45 PM)

## 2023-12-20 NOTE — HPI
Jm Chaparro is a very pleasant 28 y.o. male who returns to our department referred for evaluation of urinary frequency of several years duration. Initially, bother was associated with urinary daytime frequency (10-12x daily), with nocturia (3-4x per night) and with urgency that results in urinary incontinence occasionally . He reports no stress urinary incontinence associated with exertion. He does not require daily pad use.  He has decreased overall fluid intake.  He reports urinary frequency is day and night but more predominant during the day. Patient reports urgency is independent of position. His urinary frequency is largely driven by pain.      He had Botox injected after failed staged test of SNM. He had no voiding difficulty following in 100 units of Botox but little improvement in symptoms.      His previous evaluations were reviewed in detail today. He has been on elmiron but stopped after no benefit. He has been on Myrbetriq and ditropan without improvement. He was previously offered bladder instillations but could not tolerate catheterization in the office.  He has seen pain management. His cystoscopy report from January 2022 reports an area that was biopsied showing only chronic inflammation. I'm not clear if this may have represented a hunner lesion. His symptoms did not improve following that procedure.      Numerous urine cultures without evidence of infection.      Previous Therapies  Behavioral Therapy: (fluid/dietary modification timed voiding/bladder training) -  provided no benefit  Medication:  oral oxybutynin ER 15 mg (>1 year) (provided no benefit)  Myrbetriq 50 mg daily (5 months) (provided no benefit)   Elmiron 100 mg TID  (>1 year) (no improvement)  Bladder instillations - did not tolerate  SNM - No improvement with staged testing  Botox - partial improvement from 100 units     Since he last visit (200 units injection 4 months ago) he continues to strain to empty, though  this is improving. PVR was 125 mL today, though I'm not sure this was a true PVR and was much lower a month ago.

## 2023-12-20 NOTE — TRANSFER OF CARE
"Anesthesia Transfer of Care Note    Patient: Jm Chaparro    Procedure(s) Performed: Procedure(s) (LRB):  CYSTOSCOPY,WITH BOTULINUM TOXIN INJECTION (N/A)    Patient location: PACU    Anesthesia Type: MAC    Transport from OR: Transported from OR on room air with adequate spontaneous ventilation    Post pain: adequate analgesia    Post assessment: no apparent anesthetic complications    Post vital signs: stable    Level of consciousness: responds to stimulation, awake and alert    Nausea/Vomiting: no nausea/vomiting    Complications: none    Transfer of care protocol was followed      Last vitals: Visit Vitals  /82 (BP Location: Right arm, Patient Position: Lying)   Pulse 63   Temp 36.4 °C (97.5 °F) (Temporal)   Resp 16   Ht 6' 1" (1.854 m)   Wt 108.1 kg (238 lb 5.1 oz)   SpO2 96%   BMI 31.44 kg/m²     "

## 2023-12-20 NOTE — PLAN OF CARE
Chart reviewed. Preop nursing care completed per orders. Safe surgery checklist complete. Pt denies any open wounds cuts or sores.Pt denies any metal in body.Family at bedside and belongings to locker 9. Waiting for surgical consent, anesthesia consent, H&P, admit order prior to surgery. Pt AAOX3, VSS on room air. Pt toileted, Bed locked in lowest position, Call light within reach. Pt denies any needs at this time. Will continue to monitor.

## 2023-12-20 NOTE — DISCHARGE SUMMARY
[facility] ([facility]  Discharge Note  Short Stay    Procedure(s) (LRB):  CYSTOSCOPY,WITH BOTULINUM TOXIN INJECTION (N/A)      OUTCOME: Patient tolerated treatment/procedure well without complication and is now ready for discharge.    DISPOSITION: Home or Self Care    FINAL DIAGNOSIS:  Urge incontinence    FOLLOWUP: In clinic    DISCHARGE INSTRUCTIONS:    Discharge Procedure Orders   No dressing needed     Notify your health care provider if you experience any of the following:  temperature >100.4     Notify your health care provider if you experience any of the following:  persistent nausea and vomiting or diarrhea     Notify your health care provider if you experience any of the following:  severe uncontrolled pain     Notify your health care provider if you experience any of the following:  difficulty breathing or increased cough     Notify your health care provider if you experience any of the following:  severe persistent headache     Notify your health care provider if you experience any of the following:  worsening rash     Notify your health care provider if you experience any of the following:  persistent dizziness, light-headedness, or visual disturbances     Activity as tolerated        TIME SPENT ON DISCHARGE: 15 minutes

## 2023-12-20 NOTE — SUBJECTIVE & OBJECTIVE
Past Medical History:   Diagnosis Date    ADD (attention deficit disorder)     Anxiety disorder     Asperger's disorder     (AUTISM)    Deformity of both feet     Dyscalculia     Dysgraphia     Dyslexia     Eczema     Fatty liver     GERD (gastroesophageal reflux disease)     History of migraine headaches     Hypertension     Interstitial cystitis (chronic)     Irritable bowel syndrome     Nephrolithiasis     2014    PONV (postoperative nausea and vomiting)     Psoriasis     Psoriatic arthritis     Refusal of blood transfusion for reasons of conscience     Seasonal allergies     Sleep apnea     does not like CPAP    Special needs assessment        Past Surgical History:   Procedure Laterality Date    BIOPSY OF BLADDER  1/3/2022    Procedure: BIOPSY, BLADDER;  Surgeon: Derek Dickson MD;  Location: [facility] OR;  Service: Urology;;    COLONOSCOPY  ~2015    [facility]; told IBS    COLONOSCOPY  08/10/2015    Dr. Shaver; sent for scanning: unremarkable findings, no specimens collected    COLONOSCOPY N/A 2/18/2019    Procedure: COLONOSCOPY;  Surgeon: Puma Preston Jr., MD;  Location: [facility];  Service: Endoscopy;  Laterality: N/A;    CYSTOSCOPY  1/11/2023    Procedure: CYSTOSCOPY;  Surgeon: Trino Cain MD;  [facility];  Service: Urology;;    CYSTOSCOPY,WITH BOTULINUM TOXIN INJECTION N/A 2/9/2023    Procedure: CYSTOSCOPY,WITH BOTULINUM TOXIN INJECTION;  Surgeon: Trino Cain MD;  Location: [facility] OR;  Service: Urology;  Laterality: N/A;    CYSTOSCOPY,WITH BOTULINUM TOXIN INJECTION N/A 5/3/2023    Procedure: CYSTOSCOPY,WITH BOTULINUM TOXIN INJECTION;  Surgeon: Trino Cain MD;  Location: [facility] OR;  Service: Urology;  Laterality: N/A;  30 min    INSERTION, NEUROSTIMULATOR, TEMPORARY, SACRAL N/A 1/24/2023    Procedure: INSERTION, NEUROSTIMULATOR, TEMPORARY, SACRAL;  Surgeon: Trino Cain MD;  Location: [facility] OR 2ND FLR;  Service: Urology;  Laterality: N/A;  1 hr 45 mins     REMOVAL OF ELECTRODE LEAD OF SACRAL NERVE STIMULATOR N/A 2/9/2023    Procedure: REMOVAL, ELECTRODE LEAD, SACRAL NERVE STIMULATOR;  Surgeon: Trino Cain MD;  Location: [facility] OR;  Service: Urology;  Laterality: N/A;  1 hr    UPPER GASTROINTESTINAL ENDOSCOPY  05/14/2012    Dr. Preston    UPPER GASTROINTESTINAL ENDOSCOPY  07/17/2015    Dr. Shaver, sent for scanning: normal esophagus- dilated & biopsied, findings WNL, biopsies taken from z-line, stomach and duodenum; biopsy: duodenum WNL, antrum reactive gastropathy, negative for h pylori or int. metaplasia, GEJ WNL, negative for EOE & cotton's esophagus    ureteral stone extraction      basket extraction       Review of patient's allergies indicates:   Allergen Reactions    Penicillins Anaphylaxis    Caffeine      Rapid heart beat    Ciprofloxacin Itching and Rash       Family History       Problem Relation (Age of Onset)    Anesthesia problems Maternal Aunt    Clotting disorder Maternal Grandmother    Diabetes Maternal Grandmother, Maternal Grandfather    Interstitial cystitis Mother, Maternal Grandmother, Maternal Aunt    Lupus Mother, Maternal Grandmother    Nephrolithiasis Maternal Grandfather            Tobacco Use    Smoking status: Never    Smokeless tobacco: Never   Substance and Sexual Activity    Alcohol use: No    Drug use: No    Sexual activity: Never       Review of Systems   Constitutional:  Negative for activity change, fatigue, fever and unexpected weight change.   HENT:  Negative for sore throat and trouble swallowing.    Eyes:  Negative for pain and discharge.   Respiratory:  Negative for chest tightness and shortness of breath.    Cardiovascular:  Negative for chest pain and palpitations.   Gastrointestinal:  Negative for abdominal pain, constipation, diarrhea, nausea and vomiting.   Genitourinary:         As per HPI   Musculoskeletal:  Negative for back pain and gait problem.   Skin:  Negative for rash and wound.   Neurological:  Negative for  "seizures and numbness.   Psychiatric/Behavioral:  Negative for hallucinations. The patient is not nervous/anxious.        Objective:     Temp:  [97.5 °F (36.4 °C)] 97.5 °F (36.4 °C)  Pulse:  [63] 63  Resp:  [16] 16  SpO2:  [96 %-97 %] 96 %  BP: (121)/(82) 121/82     Body mass index is 31.44 kg/m².    No intake/output data recorded.       Drains       None                     Physical Exam  Vitals and nursing note reviewed.   Constitutional:       Appearance: Normal appearance.   HENT:      Head: Atraumatic.      Nose: Nose normal.   Eyes:      Extraocular Movements: Extraocular movements intact.      Pupils: Pupils are equal, round, and reactive to light.   Cardiovascular:      Rate and Rhythm: Normal rate.   Pulmonary:      Effort: Pulmonary effort is normal.   Abdominal:      General: Abdomen is flat. There is no distension.      Tenderness: There is no abdominal tenderness. There is no right CVA tenderness or left CVA tenderness.   Musculoskeletal:         General: Normal range of motion.      Cervical back: Normal range of motion.   Skin:     Coloration: Skin is not jaundiced.   Neurological:      General: No focal deficit present.      Mental Status: He is alert and oriented to person, place, and time.   Psychiatric:         Mood and Affect: Mood normal.         Behavior: Behavior normal.          Significant Labs:    BMP:  No results for input(s): "NA", "K", "CL", "CO2", "BUN", "CREATININE", "LABGLOM", "GLUCOSE", "CALCIUM" in the last 168 hours.    CBC:  No results for input(s): "WBC", "HGB", "HCT", "PLT" in the last 168 hours.    All pertinent labs results from the past 24 hours have been reviewed.    Significant Imaging:  All pertinent imaging results/findings from the past 24 hours have been reviewed.              "

## 2023-12-20 NOTE — ANESTHESIA PREPROCEDURE EVALUATION
12/20/2023  Jm Chaparro is a 28 y.o., male for CYSTOSCOPY,WITH BOTULINUM TOXIN INJECTION   Pre-op Assessment       I have reviewed the Medications.     Review of Systems  Anesthesia Hx:             Denies Family Hx of Anesthesia complications.     Cardiovascular:     Hypertension                                        Pulmonary:        Sleep Apnea                Renal/:  Chronic Renal Disease                Hepatic/GI:     GERD Liver Disease,            Psych:  Psychiatric History                  Physical Exam  General: Well nourished    Airway:  Mallampati: II   TM Distance: Normal  Neck ROM: Normal ROM    Dental:  Intact    Chest/Lungs:  Clear to auscultation    Heart:  Rate: Normal  Rhythm: Regular Rhythm        Anesthesia Plan  Type of Anesthesia, risks & benefits discussed:    Anesthesia Type: Gen Natural Airway  Intra-op Monitoring Plan: Standard ASA Monitors  Post Op Pain Control Plan: multimodal analgesia  Informed Consent: Informed consent signed with the Patient and all parties understand the risks and agree with anesthesia plan.  All questions answered.   ASA Score: 2    Ready For Surgery From Anesthesia Perspective.     .

## 2023-12-20 NOTE — PROGRESS NOTES
Dr. Wharton and Dr. Cain notified that patient ate a bite of gingerbread this morning at 0700 with his medication. Decision was made to wait 6 hours after eating to perform procedure. Patient and mother updated and notified of procedure time change and understands plan.

## 2023-12-20 NOTE — ASSESSMENT & PLAN NOTE
All benefits, risks, and alternatives were explained to the patient in great detail. All patient questions were addressed and the patient voiced clear understanding of our discussion. The patient has elected to undergo intravesical botox injections (150u).    DVT ppx: b/l SCD's    The patient denies all recent n/v/d, fevers, chills, and illnesses.

## 2023-12-21 NOTE — TELEPHONE ENCOUNTER
Refill Decision Note   Jm Chaparro  is requesting a refill authorization.  Brief Assessment and Rationale for Refill:  Approve     Medication Therapy Plan:         Comments:     Note composed:6:07 PM 12/20/2023

## 2024-01-04 ENCOUNTER — PATIENT MESSAGE (OUTPATIENT)
Dept: PSYCHIATRY | Facility: CLINIC | Age: 29
End: 2024-01-04
Payer: COMMERCIAL

## 2024-01-08 ENCOUNTER — OFFICE VISIT (OUTPATIENT)
Dept: PSYCHIATRY | Facility: CLINIC | Age: 29
End: 2024-01-08
Payer: COMMERCIAL

## 2024-01-08 VITALS
HEART RATE: 80 BPM | WEIGHT: 238.44 LBS | HEIGHT: 73 IN | BODY MASS INDEX: 31.6 KG/M2 | DIASTOLIC BLOOD PRESSURE: 90 MMHG | SYSTOLIC BLOOD PRESSURE: 124 MMHG

## 2024-01-08 DIAGNOSIS — F40.10 SOCIAL ANXIETY DISORDER: ICD-10-CM

## 2024-01-08 DIAGNOSIS — F84.0 AUTISM SPECTRUM DISORDER REQUIRING SUBSTANTIAL SUPPORT (LEVEL 2): Primary | ICD-10-CM

## 2024-01-08 DIAGNOSIS — F41.1 GENERALIZED ANXIETY DISORDER: ICD-10-CM

## 2024-01-08 DIAGNOSIS — F90.0 ADHD (ATTENTION DEFICIT HYPERACTIVITY DISORDER), INATTENTIVE TYPE: ICD-10-CM

## 2024-01-08 PROCEDURE — 1159F MED LIST DOCD IN RCRD: CPT | Mod: CPTII,S$GLB,, | Performed by: PSYCHOLOGIST

## 2024-01-08 PROCEDURE — 99214 OFFICE O/P EST MOD 30 MIN: CPT | Mod: S$GLB,,, | Performed by: PSYCHOLOGIST

## 2024-01-08 PROCEDURE — 99999 PR PBB SHADOW E&M-EST. PATIENT-LVL III: CPT | Mod: PBBFAC,,, | Performed by: PSYCHOLOGIST

## 2024-01-08 PROCEDURE — 3008F BODY MASS INDEX DOCD: CPT | Mod: CPTII,S$GLB,, | Performed by: PSYCHOLOGIST

## 2024-01-08 PROCEDURE — 3074F SYST BP LT 130 MM HG: CPT | Mod: CPTII,S$GLB,, | Performed by: PSYCHOLOGIST

## 2024-01-08 PROCEDURE — 3080F DIAST BP >= 90 MM HG: CPT | Mod: CPTII,S$GLB,, | Performed by: PSYCHOLOGIST

## 2024-01-08 RX ORDER — DEXTROAMPHETAMINE SACCHARATE, AMPHETAMINE ASPARTATE, DEXTROAMPHETAMINE SULFATE AND AMPHETAMINE SULFATE 2.5; 2.5; 2.5; 2.5 MG/1; MG/1; MG/1; MG/1
10 TABLET ORAL DAILY
Qty: 30 TABLET | Refills: 0 | Status: SHIPPED | OUTPATIENT
Start: 2024-01-08 | End: 2024-02-19 | Stop reason: SDUPTHER

## 2024-01-08 RX ORDER — ALPRAZOLAM 0.25 MG/1
0.25 TABLET ORAL DAILY PRN
Qty: 15 TABLET | Refills: 0 | Status: SHIPPED | OUTPATIENT
Start: 2024-01-08 | End: 2024-02-07

## 2024-01-08 NOTE — PROGRESS NOTES
"Outpatient Psychiatry Follow-Up Visit    Clinical Status of Patient: Outpatient (Ambulatory)  01/08/2024     Chief Complaint: 26 y/o male presenting with his mother today for a follow-up.       Interval History and Content of Current Session:  Interim Events/Subjective Report/Content of Current Session:  follow-up appointment.    Pt is a 26 y/o male with past psychiatric hx of anxiety, ADHD, ASD who presents for follow-up treatment. Pt's mother reported that he is doing well. Mood is stable. Some anxiety during the holidays with visitors and took 1/2 tab of alprazolam. Has been increasing exercise with positive results. Will be traveling to [place] in the summer and is excited. Pt's mother noted that Adderall is not allowed to be taken into the county.     Past Psychiatric hx: adderall ("robot child"), focalin ("zombie"), strattera (angry), zoloft (as a child- ineffective), lexapro (ineffective), topomax ("migraines"), celexa (overly sedating but effective), remeron (ineffective- wgt gain), atarax, elavil (ineffective for pain), ritalin (inc'd bp)     Past Medical hx:   Past Medical History:   Diagnosis Date    ADD (attention deficit disorder)     Anxiety disorder     Asperger's disorder     (AUTISM)    Deformity of both feet     Dyscalculia     Dysgraphia     Dyslexia     Eczema     Fatty liver     GERD (gastroesophageal reflux disease)     History of migraine headaches     Hypertension     Interstitial cystitis (chronic)     Irritable bowel syndrome     Nephrolithiasis     2014    PONV (postoperative nausea and vomiting)     Psoriasis     Psoriatic arthritis     Refusal of blood transfusion for reasons of conscience     Seasonal allergies     Sleep apnea     does not like CPAP    Special needs assessment         Interim hx:  Medication changes last visit: None  Anxiety: mild  Depression: stable     Denies suicidal/homicidal ideations.  Denies hopelessness/worthlessness.    Denies auditory/visual hallucinations    "   Alcohol: Pt denied  Drug: Pt denied  Caffeine: not assessed  Tobacco: Pt denied      Review of Systems   PSYCHIATRIC: Pertinent items are noted in the narrative.        CONSTITUTIONAL: weight stable    Past Medical, Family and Social History: The patient's past medical, family and social history have been reviewed and updated as appropriate within the electronic medical record. See encounter notes.     Current Psychiatric Medication:  Prozac 40mg po qam, Adderall 10 mg pt qam (PRN about once a month), alprazolam 0.25 mg PRN (cut in half - very infrequently).     Compliance: yes      Side effects: Pt denies     Risk Parameters:  Patient reports no suicidal ideation  Patient reports no homicidal ideation  Patient reports no self-injurious behavior  Patient reports no violent behavior     Exam (detailed: at least 9 elements; comprehensive: all 15 elements)   Constitutional  Vitals:  Most recent vital signs, dated less than 90 days prior to this appointment, were reviewed. Pulse:  [80]   BP: (124)/(90)       General:  unremarkable, age appropriate, casual attire, good eye contact, good rapport       Musculoskeletal  Muscle Strength/Tone:  no flaccidity, no tremor    Gait & Station:  normal      Psychiatric                       Speech:  normal tone, normal rate, rhythm, and volume   Mood & Affect:   stable         Thought Process:   Goal directed; Linear    Associations:   intact   Thought Content:   No SI/HI, delusions, or paranoia, no AV/VH   Insight & Judgement:   Good, adequate to circumstances   Orientation:   grossly intact; alert and oriented x 4    Memory:  intact for content of interview    Language:  grossly intact, can repeat    Attention Span  : Grossly intact for content of interview   Fund of Knowledge:   intact and appropriate to age and level of education        Assessment and Diagnosis   Status/Progress: Based on the examination today, the patient's problem(s) is/are adequately controlled.  New  "problems have not been presented today. Co-morbidities are not complicating management of the primary condition. There are no active rule-out diagnoses for this patient at this time.      Impression: Pt continues to be stable with medication plan. Sleep has improved from previous visit. Discussed plan to taper Adderall prior to trip to [facility] to prevent acute discontinuation effects. Will continue with medication plan as is for now and monitor moving forward.     Diagnosis:   1) Social Anxiety Disorder  2) Generalized Anxiety Disorder  3) Attention Deficit Hyperactivity Disorder  4) Autism Spectrum Disorder (Level "requiring substantial support")  Intervention/Counseling/Treatment Plan   Medication Management:      1. Prozac 40mg po qam    2. Adderall 10 mg po qam    3. alprazolam 0.25 mg PRN (cut in half - very infrequently).     4. Call to report any worsening of symptoms or problems with the medication. Pt instructed to go to ER with thoughts of harming self, others     5. Patient given contact # for psychotherapists at [facility] and also instructed to check with insurance for list of providers.     Psychotherapy: none  Target symptoms:   Why chosen therapy is appropriate versus another modality: CBT used; relevant to diagnosis, patient responds to this modality  Outcome monitoring methods: self-report, observation  Therapeutic intervention type: Cognitive Behavioral Therapy, coping, unresolved feelings letter  Topics discussed/themes: building skills sets for symptom management, symptom recognition, nutrition, exercise  The patient's response to the intervention is accepting  Patient's response to treatment is: good.   The patient's progress toward treatment goals: improving     Return to clinic: 3 months    -Cognitive-Behavioral/Supportive therapy and psychoeducation provided  -R/B/SE's of medications discussed with the pt who expresses understanding and chooses to take medications as prescribed.   -Pt " instructed to call clinic, 911 or go to nearest emergency room if sxs worsen or pt is in   crisis. The pt expresses understanding.    Jp Hines, PhD, MP     Antidepressant/Antianxiety Medication Initiation:  Patient informed of risks, benefits, and potential side effects of medication and accepts informed consent.  Common side effects include nausea, fatigue, headache, insomnia., Specifically discussed the possibility of new or worsening suicidal thoughts/depression.  Patient instructed to stop the medication immediately and seek urgent treatment if this occurs. Patient instructed not to abruptly discontinue medication without physician guidance except in cases of sudden onset or worsening of SI.       Stimulant Medication Initiation:  Patient advised of risks, benefits, and side effects of medication and accepts informed consent.  Common side effects include insomnia, irritability, jittery feeling, dry mouth, and agitation/hostility., Patient advised of potential addictive nature of medication and controlled substance classification.  Instructed to safeguard medication as no early refills can be given for lost or stolen medications.       Benzodiazepine Initiation:  Patient advised of the risks, benefits, and common side effects of medication and has accepted informed consent.  Common side effects include drowsiness, impaired coordination, possible memory loss., Patient advised NOT to operate a vehicle or machinery untiil they are sure how the medication will affec them.  Client also advised of danger of mixing this medication with alcohol., Patient advised of potential addictive nature of medication and need to safeguard medication as no early refills for lost or stolen medications can be authorized.

## 2024-01-19 ENCOUNTER — OFFICE VISIT (OUTPATIENT)
Dept: RHEUMATOLOGY | Facility: CLINIC | Age: 29
End: 2024-01-19
Payer: COMMERCIAL

## 2024-01-19 VITALS
WEIGHT: 237.88 LBS | SYSTOLIC BLOOD PRESSURE: 116 MMHG | BODY MASS INDEX: 31.53 KG/M2 | HEART RATE: 79 BPM | HEIGHT: 73 IN | DIASTOLIC BLOOD PRESSURE: 80 MMHG

## 2024-01-19 DIAGNOSIS — L40.50 PSORIATIC ARTHRITIS: ICD-10-CM

## 2024-01-19 DIAGNOSIS — L40.9 SCALP PSORIASIS: ICD-10-CM

## 2024-01-19 DIAGNOSIS — L40.9 PSORIASIS: Primary | ICD-10-CM

## 2024-01-19 PROCEDURE — 3074F SYST BP LT 130 MM HG: CPT | Mod: CPTII,S$GLB,, | Performed by: STUDENT IN AN ORGANIZED HEALTH CARE EDUCATION/TRAINING PROGRAM

## 2024-01-19 PROCEDURE — 1159F MED LIST DOCD IN RCRD: CPT | Mod: CPTII,S$GLB,, | Performed by: STUDENT IN AN ORGANIZED HEALTH CARE EDUCATION/TRAINING PROGRAM

## 2024-01-19 PROCEDURE — 99204 OFFICE O/P NEW MOD 45 MIN: CPT | Mod: S$GLB,,, | Performed by: STUDENT IN AN ORGANIZED HEALTH CARE EDUCATION/TRAINING PROGRAM

## 2024-01-19 PROCEDURE — 3008F BODY MASS INDEX DOCD: CPT | Mod: CPTII,S$GLB,, | Performed by: STUDENT IN AN ORGANIZED HEALTH CARE EDUCATION/TRAINING PROGRAM

## 2024-01-19 PROCEDURE — 99999 PR PBB SHADOW E&M-EST. PATIENT-LVL IV: CPT | Mod: PBBFAC,,, | Performed by: STUDENT IN AN ORGANIZED HEALTH CARE EDUCATION/TRAINING PROGRAM

## 2024-01-19 PROCEDURE — 3079F DIAST BP 80-89 MM HG: CPT | Mod: CPTII,S$GLB,, | Performed by: STUDENT IN AN ORGANIZED HEALTH CARE EDUCATION/TRAINING PROGRAM

## 2024-01-19 RX ORDER — CLOBETASOL PROPIONATE 0.46 MG/ML
SOLUTION TOPICAL 2 TIMES DAILY PRN
Qty: 25 ML | Refills: 2 | Status: SHIPPED | OUTPATIENT
Start: 2024-01-19

## 2024-01-19 RX ORDER — RIZATRIPTAN BENZOATE 10 MG/1
10 TABLET, ORALLY DISINTEGRATING ORAL DAILY PRN
COMMUNITY
Start: 2023-10-26

## 2024-01-19 NOTE — PROGRESS NOTES
RHEUMATOLOGY OUTPATIENT CLINIC NOTE    1/19/2024    Attending Rheumatologist: Samantha Pace  Primary Care Provider: China Pierre MD   Physician Requesting Consultation: China Pierre MD  [id number] [address]  Chief Complaint/Reason For Consultation:  Psoriasis, Fatigue (/), and Joint Pain      Subjective:       HPI  Jm Chaparro is a 28 y.o. White male with IBS, anxiety, autism, who comes to transfer care for management of PsA    He comes with his mother Nancie     He has diagnosis of psoriasis since age 13, then started having inflammatory joint pain in his 20s.  Reports that he has tried Humira in the past but did not tolerate due to upset stomach and worsened IBS.  Does not recall any other biologics.  Never been on methotrexate due to history of fatty liver.  Has been on Otezla for the past 3 years.  It appears that he did not tolerate 1 tablet BID due worsened IBS so he has been taking it only once a day per his previous rheumatologist.     He has been doing overall well with his regimen. Notes mild flaring of psoriasis in scalp at times. He uses multiple OTC shampoos which control it overall. Has dry skin especially during winter time.     Review of Systems   Constitutional:  Negative for fever and unexpected weight change.   HENT:  Negative for mouth sores and trouble swallowing.    Eyes:  Negative for redness.   Respiratory:  Negative for cough and shortness of breath.    Cardiovascular:  Negative for chest pain.   Gastrointestinal:  Positive for constipation and diarrhea.   Genitourinary:  Negative for genital sores.   Integumentary:  Negative for rash.   Neurological:  Positive for headaches.   Hematological:  Does not bruise/bleed easily.        Chronic comorbid conditions affecting medical decision making today:  Past Medical History:   Diagnosis Date    ADD (attention deficit disorder)     Allergy     Anxiety disorder     Asperger's disorder      (AUTISM)    Deformity of both feet     Dyscalculia     Dysgraphia     Dyslexia     Eczema     Fatty liver     GERD (gastroesophageal reflux disease)     History of migraine headaches     Hypertension     Interstitial cystitis (chronic)     Irritable bowel syndrome     Nephrolithiasis     2014    PONV (postoperative nausea and vomiting)     Psoriasis     Psoriatic arthritis     Refusal of blood transfusion for reasons of conscience     Seasonal allergies     Sleep apnea     does not like CPAP    Special needs assessment      Past Surgical History:   Procedure Laterality Date    BIOPSY OF BLADDER  1/3/2022    Procedure: BIOPSY, BLADDER;  Surgeon: Derek Dickson MD;  Location: [facility] OR;  Service: Urology;;    COLONOSCOPY  ~2015    [facility]; told IBS    COLONOSCOPY  08/10/2015    Dr. Shaver; sent for scanning: unremarkable findings, no specimens collected    COLONOSCOPY N/A 2/18/2019    Procedure: COLONOSCOPY;  Surgeon: Puma Preston Jr., MD;  Location: [facility];  Service: Endoscopy;  Laterality: N/A;    CYSTOSCOPY  1/11/2023    Procedure: CYSTOSCOPY;  Surgeon: Trino Cain MD;  Location: [address];  Service: Urology;;    CYSTOSCOPY,WITH BOTULINUM TOXIN INJECTION N/A 2/9/2023    Procedure: CYSTOSCOPY,WITH BOTULINUM TOXIN INJECTION;  Surgeon: Trino Cain MD;  Location: [facility] OR;  Service: Urology;  Laterality: N/A;    CYSTOSCOPY,WITH BOTULINUM TOXIN INJECTION N/A 5/3/2023    Procedure: CYSTOSCOPY,WITH BOTULINUM TOXIN INJECTION;  Surgeon: Trino Cain MD;  Location: [facility] OR;  Service: Urology;  Laterality: N/A;  30 min    CYSTOSCOPY,WITH BOTULINUM TOXIN INJECTION N/A 12/20/2023    Procedure: CYSTOSCOPY,WITH BOTULINUM TOXIN INJECTION;  Surgeon: Trino Cain MD;  Location: [facility] OR;  Service: Urology;  Laterality: N/A;  40 min   150 units    INSERTION, NEUROSTIMULATOR, TEMPORARY, SACRAL N/A 1/24/2023    Procedure: INSERTION, NEUROSTIMULATOR, TEMPORARY, SACRAL;  Surgeon: Trino  JAQUELINE Cain MD;  Location: [facility] OR 2ND FLR;  Service: Urology;  Laterality: N/A;  1 hr 45 mins    REMOVAL OF ELECTRODE LEAD OF SACRAL NERVE STIMULATOR N/A 2/9/2023    Procedure: REMOVAL, ELECTRODE LEAD, SACRAL NERVE STIMULATOR;  Surgeon: Trino Cain MD;  Location: [facility] OR;  Service: Urology;  Laterality: N/A;  1 hr    UPPER GASTROINTESTINAL ENDOSCOPY  05/14/2012    Dr. Preston    UPPER GASTROINTESTINAL ENDOSCOPY  07/17/2015    Dr. Shaver, sent for scanning: normal esophagus- dilated & biopsied, findings WNL, biopsies taken from z-line, stomach and duodenum; biopsy: duodenum WNL, antrum reactive gastropathy, negative for h pylori or int. metaplasia, GEJ WNL, negative for EOE & cotton's esophagus    ureteral stone extraction      basket extraction     Family History   Problem Relation Age of Onset    Lupus Mother     Interstitial cystitis Mother     Diabetes Maternal Grandmother     Lupus Maternal Grandmother     Clotting disorder Maternal Grandmother     Interstitial cystitis Maternal Grandmother     Diabetes Maternal Grandfather     Nephrolithiasis Maternal Grandfather         staghorn    Anesthesia problems Maternal Aunt     Interstitial cystitis Maternal Aunt     Colon cancer Neg Hx     Crohn's disease Neg Hx     Ulcerative colitis Neg Hx     Celiac disease Neg Hx     Esophageal cancer Neg Hx     Stomach cancer Neg Hx     Allergic rhinitis Neg Hx     Allergies Neg Hx     Angioedema Neg Hx     Asthma Neg Hx     Atopy Neg Hx     Eczema Neg Hx     Immunodeficiency Neg Hx     Rhinitis Neg Hx     Urticaria Neg Hx      Social History     Substance and Sexual Activity   Alcohol Use No     Social History     Tobacco Use   Smoking Status Never   Smokeless Tobacco Never     Social History     Substance and Sexual Activity   Drug Use No       Current Outpatient Medications:     ALPRAZolam (XANAX) 0.25 MG tablet, Take 1 tablet (0.25 mg total) by mouth daily as needed for Anxiety., Disp: 15 tablet, Rfl: 0     apremilast (OTEZLA) 30 mg Tab, Take 1 tablet (30 mg total) by mouth 2 (two) times a day., Disp: 180 tablet, Rfl: 1    atorvastatin (LIPITOR) 20 MG tablet, TAKE 1 TABLET(20 MG) BY MOUTH EVERY DAY, Disp: 90 tablet, Rfl: 2    dextroamphetamine-amphetamine (ADDERALL) 10 mg Tab, Take 1 tablet (10 mg total) by mouth once daily., Disp: 30 tablet, Rfl: 0    FLUoxetine 40 MG capsule, Take 1 capsule (40 mg total) by mouth once daily., Disp: 90 capsule, Rfl: 3    gabapentin (NEURONTIN) 300 MG capsule, Take 300 mg by mouth 3 (three) times daily., Disp: , Rfl:     ketorolac (TORADOL) 10 mg tablet, Take 1 tablet (10 mg total) by mouth every 6 (six) hours as needed for Pain., Disp: 12 tablet, Rfl: 0    metoprolol succinate (TOPROL-XL) 50 MG 24 hr tablet, Take 1 tablet (50 mg total) by mouth 2 (two) times daily., Disp: 180 tablet, Rfl: 3    PREVIDENT 5000 BOOSTER PLUS 1.1 % Pste, USE AS DIRECTED, Disp: , Rfl:     rizatriptan (MAXALT-MLT) 10 MG disintegrating tablet, Take 10 mg by mouth daily as needed., Disp: , Rfl:     semaglutide (OZEMPIC) 1 mg/dose (4 mg/3 mL), Inject 0.5 mg into the skin., Disp: , Rfl:     traMADoL (ULTRAM) 50 mg tablet, Take 1 tablet (50 mg total) by mouth every 12 (twelve) hours as needed for Pain. Medically necessary for more than 7 days, ICD 10: G89.4, Disp: 55 tablet, Rfl: 2    ubidecarenone (CO Q-10 ORAL), Take by mouth once daily., Disp: , Rfl:     UROGESIC-BLUE 81.6-40.8-0.12 mg Tab, TAKE 1 TABLET BY MOUTH THREE TIMES DAILY AS NEEDED FOR DYSURIA, Disp: 60 tablet, Rfl: 4    VITAMIN D2 1,250 mcg (50,000 unit) capsule, Take 1 capsule (50,000 Units total) by mouth every 7 days., Disp: 12 capsule, Rfl: 2  No current facility-administered medications for this visit.    Facility-Administered Medications Ordered in Other Visits:     electrolyte-S (ISOLYTE), , Intravenous, Continuous, Dale Petit MD     Objective:         Vitals:    01/19/24 1026   BP: 116/80   Pulse: 79     Physical Exam   Constitutional:  "He is oriented to person, place, and time. He appears well-developed.   HENT:   Head: Normocephalic.   Pulmonary/Chest: Effort normal.   Musculoskeletal:      Cervical back: Neck supple.      Comments: No synovitis on examination    Lymphadenopathy:     He has no cervical adenopathy.   Neurological: He is alert and oriented to person, place, and time.   Skin: No rash noted.   Dry skin   Mild flaky areas in the scalp with no overt plaque     Vitals reviewed.        Reviewed old and all outside pertinent medical records available.    All lab results personally reviewed and interpreted by me.  Lab Results   Component Value Date    WBC 11.12 08/07/2023    HGB 16.1 08/07/2023    HCT 48.5 08/07/2023    MCV 96 08/07/2023    MCH 31.7 (H) 08/07/2023    MCHC 33.2 08/07/2023    RDW 13.6 08/07/2023     08/07/2023    MPV 12.1 08/07/2023       Lab Results   Component Value Date     08/07/2023    K 4.2 08/07/2023     08/07/2023    CO2 25 08/07/2023    GLU 92 08/07/2023    BUN 15 08/07/2023    CALCIUM 9.8 08/07/2023    PROT 7.1 12/04/2023    ALBUMIN 4.0 12/04/2023    BILITOT 0.5 12/04/2023    AST 36 12/04/2023    ALKPHOS 85 12/04/2023    ALT 55 (H) 12/04/2023       Lab Results   Component Value Date    COLORU Yellow 10/25/2022    APPEARANCEUA Cloudy (A) 01/16/2022    SPECGRAV >=1.030 10/25/2022    PHUR 5.5 10/25/2022    PROTEINUA 1+ (A) 01/16/2022    GLUCOSEU NEGATIVE 12/06/2021    KETONESU Negative 10/25/2022    LEUKOCYTESUR Trace (A) 01/16/2022    NITRITE Negative 10/25/2022    UROBILINOGEN 0.2 10/25/2022       Lab Results   Component Value Date    CRP 1.4 06/13/2020       Lab Results   Component Value Date    RF <10.0 07/09/2016    SEDRATE 10 06/13/2020    CCPANTIBODIE <0.5 07/09/2016       No components found for: "25OHVITDTOT", "99STUFIW4", "31OEYLUM4", "METHODNOTE"    Lab Results   Component Value Date    URICACID 5.8 10/10/2017       Lab Results   Component Value Date    HEPCAB Negative 10/10/2017 "       Imaging:  All imaging reviewed and independently interpreted by me.         ASSESSMENT / PLAN:     Jm Chaparro is a 28 y.o. White male with  IBS, anxiety, autism, who comes to transfer care for management of PsA    1. Psoriatic arthritis  -Stable  -No synovitis on examination   -Continue otezla 30 mg QD for now. May consider trying to increase to BID and assess response  -Can consider try another TNF in the future. Has IBS so will need to be careful with IL17 but not total contraindication     2. Psoriasis  -Overall stable with minor flares on the scalp  -Start clobetasol topical lotion in the scalp as needed  -advised him on daily moisturizers and avoid hot showers especially during the winter time    No follow-ups on file.    Method of contact with patient concerns: [facility]    Disclaimer:  This note is prepared using voice recognition software and as such is likely to have errors and has not been proof read. Please contact me for questions.     Time spent: 45 minutes in face to face discussion concerning diagnosis, prognosis, review of lab and test results, benefits of treatment as well as management of disease, counseling of patient and coordination of care between various health care providers.  Greater than half the time spent was used for coordination of care and counseling of patient.    Samantha Pace M.D.  Rheumatology  [facility]

## 2024-01-26 ENCOUNTER — PATIENT MESSAGE (OUTPATIENT)
Dept: UROLOGY | Facility: CLINIC | Age: 29
End: 2024-01-26
Payer: COMMERCIAL

## 2024-01-26 ENCOUNTER — OFFICE VISIT (OUTPATIENT)
Dept: UROLOGY | Facility: CLINIC | Age: 29
End: 2024-01-26
Payer: COMMERCIAL

## 2024-01-26 DIAGNOSIS — R39.89 BLADDER PAIN: Primary | ICD-10-CM

## 2024-01-26 PROCEDURE — 99214 OFFICE O/P EST MOD 30 MIN: CPT | Mod: 95,,, | Performed by: UROLOGY

## 2024-01-26 NOTE — PROGRESS NOTES
[facility]        Urology Return Note     1/26/2024     Referred by:  Jasmeet Benito MD     HPI: Jm Chaparro is a very pleasant 28 y.o. male who returns to our department referred for evaluation of urinary frequency of several years duration. Initially, bother was associated with urinary daytime frequency (10-12x daily), with nocturia (3-4x per night) and with urgency that results in urinary incontinence occasionally . He reports no stress urinary incontinence associated with exertion. He does not require daily pad use.  He has decreased overall fluid intake.  He reports urinary frequency is day and night but more predominant during the day. Patient reports urgency is independent of position. His urinary frequency is largely driven by pain.      He had Botox injected after failed staged test of SNM. He had voiding difficulty following in 200 units of Botox. Last month we decreased to 150 units. He reports no voiding difficulty. Frequency is now about 4-6x per 24 hrs with nocturia decreased to 1. No UUI. However, he feels some pain with a full bladder since injection.      . His cystoscopy report from January 2022 reports an area that was biopsied showing only chronic inflammation. I'm not clear if this may have represented a hunner lesion. His symptoms did not improve following that procedure. However, the two cystoscopies done for Botox did not demonstrate Hunner lesions and no fulgurations were done.      Numerous urine cultures without evidence of infection.      Previous Therapies  Behavioral Therapy: (fluid/dietary modification timed voiding/bladder training) -  provided no benefit  Medication:  oral oxybutynin ER 15 mg (>1 year) (provided no benefit)  Myrbetriq 50 mg daily (5 months) (provided no benefit)   Elmiron 100 mg TID  (>1 year) (no improvement)  Bladder instillations - did not tolerate  SNM - No improvement with staged testing  Botox - improvement with 150 units     A  review of 10+ systems was conducted with pertinent positive and negative findings documented in HPI with all other systems reviewed and negative.     Past medical, family, surgical and social history reviewed as documented in chart with pertinent positive medical, family, surgical and social history detailed in HPI.     Exam Findings:     Const: no acute distress, conversant and alert  Eyes: anicteric, extraocular muscles intact  ENMT: normocephalic, Nl oral membranes  Cardio: no cyanosis, nl cap refill  Pulm: no tachypnea; no resp distress  Musc: no laceration, no tenderness  Neuro: alert; oriented x 3  Skin: warm, dry; no petichiae  Psych: no anxiety; normal speech      Assessment/Plan:     Urinary Frequency  (established, not meeting goals): Improved frequency though considerable voiding difficulty with 200 units injected. He is emptying well enough to avoid catheterization but we decreased to 150 for his injection last month. No voiding difficulty reported, unable to obtain an PVR with virtual visit. He will submit a urine specimen soon due to pain with a full bladder. No evidence of Hunner lesions last cystoscopy. Will see if urine culture is positive. If not, might benefit from pyridium or other therapy.     The patient location is: [facility]  The chief complaint leading to consultation is: urgency incontinence    Visit type: audiovisual    Face to Face time with patient: 8 minutes  15 minutes of total time spent on the encounter, which includes face to face time and non-face to face time preparing to see the patient (eg, review of tests), Obtaining and/or reviewing separately obtained history, Documenting clinical information in the electronic or other health record, Independently interpreting results (not separately reported) and communicating results to the patient/family/caregiver, or Care coordination (not separately reported).         Each patient to whom he or she provides medical services by telemedicine  is:  (1) informed of the relationship between the physician and patient and the respective role of any other health care provider with respect to management of the patient; and (2) notified that he or she may decline to receive medical services by telemedicine and may withdraw from such care at any time.    Notes:

## 2024-02-05 ENCOUNTER — OFFICE VISIT (OUTPATIENT)
Dept: PAIN MEDICINE | Facility: CLINIC | Age: 29
End: 2024-02-05
Payer: COMMERCIAL

## 2024-02-05 VITALS
WEIGHT: 235.81 LBS | DIASTOLIC BLOOD PRESSURE: 79 MMHG | HEART RATE: 72 BPM | BODY MASS INDEX: 31.25 KG/M2 | SYSTOLIC BLOOD PRESSURE: 127 MMHG | HEIGHT: 73 IN

## 2024-02-05 DIAGNOSIS — M79.642 BILATERAL HAND PAIN: ICD-10-CM

## 2024-02-05 DIAGNOSIS — L40.50 PSORIATIC ARTHRITIS: Primary | ICD-10-CM

## 2024-02-05 DIAGNOSIS — M06.9 RHEUMATOID ARTHRITIS INVOLVING MULTIPLE SITES, UNSPECIFIED WHETHER RHEUMATOID FACTOR PRESENT: ICD-10-CM

## 2024-02-05 DIAGNOSIS — N30.10 IC (INTERSTITIAL CYSTITIS): ICD-10-CM

## 2024-02-05 DIAGNOSIS — K58.2 IRRITABLE BOWEL SYNDROME WITH BOTH CONSTIPATION AND DIARRHEA: ICD-10-CM

## 2024-02-05 DIAGNOSIS — M79.641 BILATERAL HAND PAIN: ICD-10-CM

## 2024-02-05 DIAGNOSIS — R39.89 BLADDER PAIN: ICD-10-CM

## 2024-02-05 PROCEDURE — 1159F MED LIST DOCD IN RCRD: CPT | Mod: CPTII,S$GLB,, | Performed by: PHYSICIAN ASSISTANT

## 2024-02-05 PROCEDURE — 99999 PR PBB SHADOW E&M-EST. PATIENT-LVL IV: CPT | Mod: PBBFAC,,, | Performed by: PHYSICIAN ASSISTANT

## 2024-02-05 PROCEDURE — 3074F SYST BP LT 130 MM HG: CPT | Mod: CPTII,S$GLB,, | Performed by: PHYSICIAN ASSISTANT

## 2024-02-05 PROCEDURE — 3008F BODY MASS INDEX DOCD: CPT | Mod: CPTII,S$GLB,, | Performed by: PHYSICIAN ASSISTANT

## 2024-02-05 PROCEDURE — 1160F RVW MEDS BY RX/DR IN RCRD: CPT | Mod: CPTII,S$GLB,, | Performed by: PHYSICIAN ASSISTANT

## 2024-02-05 PROCEDURE — 99214 OFFICE O/P EST MOD 30 MIN: CPT | Mod: S$GLB,,, | Performed by: PHYSICIAN ASSISTANT

## 2024-02-05 PROCEDURE — 3078F DIAST BP <80 MM HG: CPT | Mod: CPTII,S$GLB,, | Performed by: PHYSICIAN ASSISTANT

## 2024-02-05 RX ORDER — TRAMADOL HYDROCHLORIDE 50 MG/1
50 TABLET ORAL EVERY 12 HOURS PRN
Qty: 55 TABLET | Refills: 2 | Status: SHIPPED | OUTPATIENT
Start: 2024-02-05 | End: 2024-04-08 | Stop reason: SDUPTHER

## 2024-02-05 NOTE — PROGRESS NOTES
This note was completed with dictation software and grammatical errors may exist.    Chief Complaint   Patient presents with    Hand Pain     Body pain        HPI: Jm Chaparro is a 28 y.o. year old male patient who has a past medical history of ADD (attention deficit disorder), Allergy, Anxiety disorder, Asperger's disorder, Deformity of both feet, Dyscalculia, Dysgraphia, Dyslexia, Eczema, Fatty liver, GERD (gastroesophageal reflux disease), History of migraine headaches, Hypertension, Interstitial cystitis (chronic), Irritable bowel syndrome, Nephrolithiasis, PONV (postoperative nausea and vomiting), Psoriasis, Psoriatic arthritis, Refusal of blood transfusion for reasons of conscience, Seasonal allergies, Sleep apnea, and Special needs assessment. He presents in referral from No ref. provider found for abdominal and bladder pain.  He returns in follow-up today with multiple pain complaints including joint pain, hand pain, bladder pain.  His hand pain has improved somewhat with physical therapy and he does not get as much locking up.  He continues to take tramadol with relief.  He also has a new rheumatologist and was prescribed a new topical medication for psoriasis.  Overall he is doing well and mentions that he will be traveling to [facility] this summer.      Previous history:  The patient's mother was present with him at the visit today who also helps with most of the history.  She reports that in March, 2015 she had gone out of town on a business trip and her son accompanying her.  Unfortunately he came down with cold and virus symptoms at the time and began developing severe abdominal pain.  She went to the emergency department at that time in [place], they were told he was having viral symptoms.  She returned to [facility] and had followed up with several other physicians that year.  She reports that initially he was having intermittent pain on and off, would have pain for a week and then it  would disappear without cause.  He then developed diarrhea and constipation at times, he was seen at [facility] in [place] in 2015 and diagnosed with IBS with constipation and diarrhea.  They recommend an antidepressant that he tried, no relief with this.  Shortly after he began having renal calculi as well and needed to present to the emergency department.  He had seen neurology, Dr. Larry and reports that he required stone removal, does not sound like he has had lithotripsy.  He has seen Dr. Dickson, and now Nephrology, Dr. Enriquez.  He has seen Gastroenterology, Dr. Shaver and had undergone colonoscopy with no significant findings.  He has been tried on multiple medications over time as discussed below without any benefit.  He continues to have frequent abdominal pain that is often worse with caffeine or other foods, worse with activity.  He reports that he gets some relief with a bowel movement.  He still has been passing small sand like renal calculi, they report that his urine turns dark when he is about to pass a stone and he often has pain throughout the bladder and urethra after passing a stone.    He has a history of rheumatoid arthritis, has been seeing a rheumatologist, reports pain throughout his entire spine, bilateral hips, shoulders, knees, feet.  He does well with being in a bath with hot water.    Pain intervention history:  No injections    Spine surgeries:  None    Antineuropathics: Gabapentin 300mg three times daily, was given this for inflammatory joint pain??  NSAIDs: Mobic 15, no benefit  Physical therapy:  Has done some physical therapy, water therapy in the past with some improvement but things worsened when he did land-based therapy and stretching  Antidepressants: Fluoxetine 40mg  Muscle relaxers:  Opioids:  The patient reports having benefit from tramadol in the past  Antiplatelets/Anticoagulants:  From Dr. Vyas note:  Prozac 40mg po qam, adderall xr 20mg po qam (very rare use),  "lunesta 2mg po qhs PRN insomnia  Past Psych Meds: adderall ("robot child"), focalin ("zombie"), strattera (angry), zoloft (as a child- ineffective), lexapro (ineffective) topomax ("migraines"), celexa (overly sedating but effective), remeron (ineffective- wgt gain), atarax, elavil (ineffective for pain), ritalin (inc'd bp)     ROS:  He reports fatigue, weight gain, itching, color change, headaches head trauma, ear pain, constipation, diarrhea, stomach pain, nausea, flank pain, urinary urgency and frequency, painful urination, joint stiffness, joint swelling, back pain, difficulty sleeping and anxiety.  Balance of review of systems is negative.    Lab Results   Component Value Date    HGBA1C 5.5 08/07/2023       Lab Results   Component Value Date    WBC 11.12 08/07/2023    HGB 16.1 08/07/2023    HCT 48.5 08/07/2023    MCV 96 08/07/2023     08/07/2023       Past Medical History:   Diagnosis Date    ADD (attention deficit disorder)     Allergy     Anxiety disorder     Asperger's disorder     (AUTISM)    Deformity of both feet     Dyscalculia     Dysgraphia     Dyslexia     Eczema     Fatty liver     GERD (gastroesophageal reflux disease)     History of migraine headaches     Hypertension     Interstitial cystitis (chronic)     Irritable bowel syndrome     Nephrolithiasis     2014    PONV (postoperative nausea and vomiting)     Psoriasis     Psoriatic arthritis     Refusal of blood transfusion for reasons of conscience     Seasonal allergies     Sleep apnea     does not like CPAP    Special needs assessment        Past Surgical History:   Procedure Laterality Date    BIOPSY OF BLADDER  1/3/2022    Procedure: BIOPSY, BLADDER;  Surgeon: Derek Dickson MD;  Location: [facility] OR;  Service: Urology;;    COLONOSCOPY  ~2015    [facility]; told IBS    COLONOSCOPY  08/10/2015    Dr. Shaver; sent for scanning: unremarkable findings, no specimens collected    COLONOSCOPY N/A 2/18/2019    Procedure: COLONOSCOPY;  Surgeon: " Puma Preston Jr., MD;  Location: [facility] ENDO;  Service: Endoscopy;  Laterality: N/A;    CYSTOSCOPY  1/11/2023    Procedure: CYSTOSCOPY;  Surgeon: Trino Cain MD;  Location: [facility] OR 1ST FLR;  Service: Urology;;    CYSTOSCOPY,WITH BOTULINUM TOXIN INJECTION N/A 2/9/2023    Procedure: CYSTOSCOPY,WITH BOTULINUM TOXIN INJECTION;  Surgeon: Trino Cain MD;  Location: [facility] OR;  Service: Urology;  Laterality: N/A;    CYSTOSCOPY,WITH BOTULINUM TOXIN INJECTION N/A 5/3/2023    Procedure: CYSTOSCOPY,WITH BOTULINUM TOXIN INJECTION;  Surgeon: Trino Cain MD;  Location: [facility] OR;  Service: Urology;  Laterality: N/A;  30 min    CYSTOSCOPY,WITH BOTULINUM TOXIN INJECTION N/A 12/20/2023    Procedure: CYSTOSCOPY,WITH BOTULINUM TOXIN INJECTION;  Surgeon: Trino Cain MD;  Location: [facility] OR;  Service: Urology;  Laterality: N/A;  40 min   150 units    INSERTION, NEUROSTIMULATOR, TEMPORARY, SACRAL N/A 1/24/2023    Procedure: INSERTION, NEUROSTIMULATOR, TEMPORARY, SACRAL;  Surgeon: Trino Cain MD;  Location: [facility] OR 2ND FLR;  Service: Urology;  Laterality: N/A;  1 hr 45 mins    REMOVAL OF ELECTRODE LEAD OF SACRAL NERVE STIMULATOR N/A 2/9/2023    Procedure: REMOVAL, ELECTRODE LEAD, SACRAL NERVE STIMULATOR;  Surgeon: Trino Cain MD;  Location: [facility] OR;  Service: Urology;  Laterality: N/A;  1 hr    UPPER GASTROINTESTINAL ENDOSCOPY  05/14/2012    Dr. Preston    UPPER GASTROINTESTINAL ENDOSCOPY  07/17/2015    Dr. Shaver, sent for scanning: normal esophagus- dilated & biopsied, findings WNL, biopsies taken from z-line, stomach and duodenum; biopsy: duodenum WNL, antrum reactive gastropathy, negative for h pylori or int. metaplasia, GEJ WNL, negative for EOE & cotton's esophagus    ureteral stone extraction      basket extraction       Social History     Socioeconomic History    Marital status: Single   Tobacco Use    Smoking status: Never    Smokeless tobacco: Never   Substance and Sexual  "Activity    Alcohol use: No    Drug use: No    Sexual activity: Never     Social Determinants of Health     Financial Resource Strain: Low Risk  (10/1/2023)    Overall Financial Resource Strain (CARDIA)     Difficulty of Paying Living Expenses: Not very hard   Food Insecurity: No Food Insecurity (10/1/2023)    Hunger Vital Sign     Worried About Running Out of Food in the Last Year: Never true     Ran Out of Food in the Last Year: Never true   Recent Concern: Food Insecurity - Food Insecurity Present (9/13/2023)    Hunger Vital Sign     Worried About Running Out of Food in the Last Year: Sometimes true     Ran Out of Food in the Last Year: Never true   Transportation Needs: No Transportation Needs (10/1/2023)    PRAPARE - Transportation     Lack of Transportation (Medical): No     Lack of Transportation (Non-Medical): No   Physical Activity: Insufficiently Active (10/1/2023)    Exercise Vital Sign     Days of Exercise per Week: 1 day     Minutes of Exercise per Session: 40 min   Stress: Stress Concern Present (10/1/2023)    [employer] - Occupational Stress Questionnaire     Feeling of Stress : To some extent   Social Connections: Unknown (10/1/2023)    Social Connection and Isolation Panel [NHANES]     Frequency of Communication with Friends and Family: Twice a week     Frequency of Social Gatherings with Friends and Family: Once a week     Active Member of Clubs or Organizations: No     Attends Club or Organization Meetings: Patient declined     Marital Status: Never    Housing Stability: Unknown (10/1/2023)    Housing Stability Vital Sign     Unable to Pay for Housing in the Last Year: Patient refused     Number of Places Lived in the Last Year: 1     Unstable Housing in the Last Year: No       Medications/Allergies: See med card    Vitals:    02/05/24 1053   BP: 127/79   Pulse: 72   Weight: 106.9 kg (235 lb 12.5 oz)   Height: 6' 1" (1.854 m)   PainSc:   6   PainLoc: Hand "       Body mass index is 31.11 kg/m².    Physical exam:  Gen: A and O x3, pleasant, well-groomed  Skin: No rashes or obvious lesions  HEENT: PERRLA, no obvious deformities on ears or in canals. Trachea midline.  CVS: Regular rate and rhythm, normal palpable pulses.  Resp:No increased work of breathing, symmetrical chest rise.  Abdomen: Soft, NT/ND.  Musculoskeletal:  Moving all extremities equally    Upper extremity strength:  5/5 bilaterally  Lower extremity strength:  5/5 bilaterally      Imagin22 CT renal protocol:  Liver normal enhancement with no focal lesion.  Gallbladder, pancreas, stomach, spleen, adrenal glands normal.  Kidneys normal size and contour with no nephrolithiasis, hydronephrosis, or ureteral obstruction.  Aorta tapers normally.  No bowel obstruction, ascites, inflammatory change.  Appendix normal.  Bladder and rectum normal.  No significant bladder wall thickening evident.  Bones are intact.  Lung bases clear.    Assessment:  Jm Chaparro is a 28 y.o. year old male patient who has a past medical history of Abnormal thyroid function test, ADD (attention deficit disorder), Anxiety disorder, Asperger's disorder, Deformity of both feet, Dyscalculia, Dysgraphia, Dyslexia, Eczema, Fatty liver, GERD (gastroesophageal reflux disease), History of migraine headaches, Hypertension, Interstitial cystitis (chronic), Irritable bowel syndrome, Nephrolithiasis, PONV (postoperative nausea and vomiting), Psoriasis, Psoriatic arthritis, Refusal of blood transfusion for reasons of conscience, Seasonal allergies, and Special needs assessment. He presents in referral from Dr. Derek Dickson for bladder pain.    1. Bilateral hand pain        2. Psoriatic arthritis        3. Bladder pain        4. IC (interstitial cystitis)        5. Rheumatoid arthritis involving multiple sites, unspecified whether rheumatoid factor present        6. Irritable bowel syndrome with both constipation and  diarrhea              Plan:  1. Dr. Gonzalez recently refilled tramadol 50 mg. I have reviewed the [place] Board of [employer]acy website and there are no abberancies.    2. He completed physical therapy at [facility].  3. Follow-up in 3 months or sooner as needed.

## 2024-02-16 ENCOUNTER — TELEPHONE (OUTPATIENT)
Dept: FAMILY MEDICINE | Facility: CLINIC | Age: 29
End: 2024-02-16
Payer: COMMERCIAL

## 2024-02-16 NOTE — TELEPHONE ENCOUNTER
Called dentist office since we have not received the request.  Was advised that it has not been completed from his visit today yet.  Will be sent to us once completed.  Fax number given to office.

## 2024-02-16 NOTE — TELEPHONE ENCOUNTER
----- Message from Susana Albert sent at 2/16/2024  2:25 PM CST -----  Contact: walk in  Mom, Ms. Canada, [facility] sent over, possibly faxed, a medical release for pt to have have his tooth pulled.  Scheduled for Tuesday otherwise the next appt is not until March 19th.  Asking to complete asap.  Pls call mom if any issues.  [phone]

## 2024-02-16 NOTE — TELEPHONE ENCOUNTER
----- Message from Abby Piper sent at 2/16/2024  2:20 PM CST -----  Regarding: Needs return call  Type: Needs Medical Advice  Who Called:  [facility] [place]    Best Call Back Number: [phone] email: [email or web address]  Additional Information: Pt needs oral extractions and pt is needing clearance to be okay to get sedation please call the office to confirm. For the sedations she come on Tuesday next weekand does not come back unti lnext month so they were trying to get this taken care of asap

## 2024-02-19 DIAGNOSIS — F90.0 ADHD (ATTENTION DEFICIT HYPERACTIVITY DISORDER), INATTENTIVE TYPE: ICD-10-CM

## 2024-02-20 RX ORDER — DEXTROAMPHETAMINE SACCHARATE, AMPHETAMINE ASPARTATE, DEXTROAMPHETAMINE SULFATE AND AMPHETAMINE SULFATE 2.5; 2.5; 2.5; 2.5 MG/1; MG/1; MG/1; MG/1
10 TABLET ORAL DAILY
Qty: 30 TABLET | Refills: 0 | Status: SHIPPED | OUTPATIENT
Start: 2024-02-20 | End: 2024-03-25 | Stop reason: SDUPTHER

## 2024-03-18 ENCOUNTER — OFFICE VISIT (OUTPATIENT)
Dept: FAMILY MEDICINE | Facility: CLINIC | Age: 29
End: 2024-03-18
Payer: COMMERCIAL

## 2024-03-18 VITALS
HEART RATE: 64 BPM | HEIGHT: 73 IN | DIASTOLIC BLOOD PRESSURE: 72 MMHG | SYSTOLIC BLOOD PRESSURE: 102 MMHG | OXYGEN SATURATION: 98 % | BODY MASS INDEX: 31.18 KG/M2 | RESPIRATION RATE: 16 BRPM | WEIGHT: 235.25 LBS | TEMPERATURE: 97 F

## 2024-03-18 DIAGNOSIS — E78.5 HYPERLIPIDEMIA, UNSPECIFIED HYPERLIPIDEMIA TYPE: ICD-10-CM

## 2024-03-18 DIAGNOSIS — I10 HYPERTENSION, ESSENTIAL: Primary | ICD-10-CM

## 2024-03-18 DIAGNOSIS — R00.2 PALPITATIONS: ICD-10-CM

## 2024-03-18 LAB
OHS QRS DURATION: 86 MS
OHS QTC CALCULATION: 426 MS

## 2024-03-18 PROCEDURE — 93005 ELECTROCARDIOGRAM TRACING: CPT | Mod: S$GLB,,, | Performed by: FAMILY MEDICINE

## 2024-03-18 PROCEDURE — 93010 ELECTROCARDIOGRAM REPORT: CPT | Mod: S$GLB,,, | Performed by: INTERNAL MEDICINE

## 2024-03-18 PROCEDURE — 1159F MED LIST DOCD IN RCRD: CPT | Mod: CPTII,S$GLB,, | Performed by: FAMILY MEDICINE

## 2024-03-18 PROCEDURE — 99214 OFFICE O/P EST MOD 30 MIN: CPT | Mod: S$GLB,,, | Performed by: FAMILY MEDICINE

## 2024-03-18 PROCEDURE — 1160F RVW MEDS BY RX/DR IN RCRD: CPT | Mod: CPTII,S$GLB,, | Performed by: FAMILY MEDICINE

## 2024-03-18 PROCEDURE — 3074F SYST BP LT 130 MM HG: CPT | Mod: CPTII,S$GLB,, | Performed by: FAMILY MEDICINE

## 2024-03-18 PROCEDURE — 3008F BODY MASS INDEX DOCD: CPT | Mod: CPTII,S$GLB,, | Performed by: FAMILY MEDICINE

## 2024-03-18 PROCEDURE — 3078F DIAST BP <80 MM HG: CPT | Mod: CPTII,S$GLB,, | Performed by: FAMILY MEDICINE

## 2024-03-18 RX ORDER — METOPROLOL SUCCINATE 25 MG/1
25 TABLET, EXTENDED RELEASE ORAL DAILY
Qty: 60 TABLET | Refills: 2 | Status: SHIPPED | OUTPATIENT
Start: 2024-03-18 | End: 2025-03-18

## 2024-03-18 NOTE — PROGRESS NOTES
Subjective:       Patient ID: Jm Chaparro is a 28 y.o. male.    Chief Complaint: Follow-up    HPI  The patient is a 28-year-old with Asperger's who is here today for follow up with his mom.  Overall, he is doing well.  He has recently been approved to have a home aide and will have the home aide for 30 hours a week.  Once the home aide starts, they are going to go to the gym together and do other activities around the house and community.  Of note, he and his mom are going to be going on a trip to [place] in May which there looking forward to    Today we discussed the followin)  Hypertension.  Today's blood pressure is 102/72.  He is currently taking Toprol XL 50 mg twice a day.  At home his blood pressures are consistently less than 120/80  2) psoriatic arthritis.  He has established with his new rheumatologist.  He is currently taking Otezla and Temovate  3) hyperlipidemia.  He is taking his Lipitor consistently.  His cholesterol will be due in August.      He does continue to follow regularly with his neurologist (with [facility]), Rheumatology, Urology, Psychiatry and Nephrology    Review of systems is remarkable for episodes rapid heart rates.  He seems to notice this night.  When he feels his rapid heart rates, he does feel anxious any shortness chest.    Review of Systems   Constitutional:  Negative for appetite change, chills, diaphoresis, fatigue, fever and unexpected weight change.   HENT:  Negative for congestion, ear pain, postnasal drip, rhinorrhea, sinus pressure, sneezing, sore throat and trouble swallowing.    Eyes:  Negative for pain, discharge and visual disturbance.   Respiratory:  Negative for cough, chest tightness, shortness of breath and wheezing.    Cardiovascular:  Positive for palpitations. Negative for chest pain and leg swelling.   Gastrointestinal:  Negative for abdominal distention, abdominal pain, blood in stool, constipation, diarrhea, nausea and vomiting.   Skin:   Negative for rash.         Objective:      Physical Exam  Constitutional:       General: He is not in acute distress.     Appearance: Normal appearance. He is well-developed.   HENT:      Head: Normocephalic and atraumatic.      Right Ear: Hearing, tympanic membrane, ear canal and external ear normal.      Left Ear: Hearing, tympanic membrane, ear canal and external ear normal.      Nose: Nose normal.      Mouth/Throat:      Mouth: No oral lesions.      Pharynx: No oropharyngeal exudate or posterior oropharyngeal erythema.   Eyes:      General: Lids are normal. No scleral icterus.     Extraocular Movements: Extraocular movements intact.      Conjunctiva/sclera: Conjunctivae normal.      Pupils: Pupils are equal, round, and reactive to light.   Neck:      Thyroid: No thyroid mass or thyromegaly.      Vascular: No carotid bruit.   Cardiovascular:      Rate and Rhythm: Normal rate and regular rhythm. No extrasystoles are present.     Chest Wall: PMI is not displaced.      Heart sounds: Normal heart sounds. No murmur heard.     No gallop.   Pulmonary:      Effort: Pulmonary effort is normal. No accessory muscle usage or respiratory distress.      Breath sounds: Normal breath sounds.   Abdominal:      General: Bowel sounds are normal. There is no abdominal bruit.      Palpations: Abdomen is soft.      Tenderness: There is no abdominal tenderness. There is no rebound.   Musculoskeletal:      Cervical back: Normal range of motion and neck supple.   Lymphadenopathy:      Head:      Right side of head: No submental or submandibular adenopathy.      Left side of head: No submental or submandibular adenopathy.      Cervical:      Right cervical: No superficial, deep or posterior cervical adenopathy.     Left cervical: No superficial, deep or posterior cervical adenopathy.      Upper Body:      Right upper body: No supraclavicular adenopathy.      Left upper body: No supraclavicular adenopathy.   Skin:     General: Skin is warm  "and dry.   Neurological:      Mental Status: He is alert and oriented to person, place, and time.       Blood pressure 102/72, pulse 64, temperature 97.3 °F (36.3 °C), temperature source Temporal, resp. rate 16, height 6' 1" (1.854 m), weight 106.7 kg (235 lb 3.7 oz), SpO2 98 %.Body mass index is 31.03 kg/m².          A/P:  1)  Hypertension.  Well controlled and possibly over controlled.  We are going to decrease the Toprol-XL to 25 mg twice a day.  If his blood pressures at home are consistently higher than 135/85, mom will let me know    2) psoriatic arthritis.  Stable.  Follow up with Rheumatology continue with medication under their direction  3) hyperlipidemia.  Previously well controlled.  Continue with Lipitor.  We will check fasting lipid profile this summer  4) palpitations.  New.  We will check an EKG today and arrange a Holter monitor.  Of note, he did have a cardiac workup in 2016 and 2018 for similar symptoms     As long as he does well, I will see him back in 5 months or sooner if needed  "

## 2024-03-23 ENCOUNTER — LAB VISIT (OUTPATIENT)
Dept: LAB | Facility: HOSPITAL | Age: 29
End: 2024-03-23
Attending: FAMILY MEDICINE
Payer: COMMERCIAL

## 2024-03-23 DIAGNOSIS — I10 HYPERTENSION, ESSENTIAL: ICD-10-CM

## 2024-03-23 DIAGNOSIS — R00.2 PALPITATIONS: ICD-10-CM

## 2024-03-23 LAB
ALBUMIN SERPL BCP-MCNC: 4 G/DL (ref 3.5–5.2)
ALP SERPL-CCNC: 96 U/L (ref 55–135)
ALT SERPL W/O P-5'-P-CCNC: 86 U/L (ref 10–44)
ANION GAP SERPL CALC-SCNC: 10 MMOL/L (ref 8–16)
AST SERPL-CCNC: 48 U/L (ref 10–40)
BASOPHILS # BLD AUTO: 0.08 K/UL (ref 0–0.2)
BASOPHILS NFR BLD: 0.8 % (ref 0–1.9)
BILIRUB SERPL-MCNC: 0.4 MG/DL (ref 0.1–1)
BUN SERPL-MCNC: 12 MG/DL (ref 6–20)
CALCIUM SERPL-MCNC: 9.9 MG/DL (ref 8.7–10.5)
CHLORIDE SERPL-SCNC: 106 MMOL/L (ref 95–110)
CHOLEST SERPL-MCNC: 164 MG/DL (ref 120–199)
CHOLEST/HDLC SERPL: 4.1 {RATIO} (ref 2–5)
CO2 SERPL-SCNC: 26 MMOL/L (ref 23–29)
CREAT SERPL-MCNC: 1.1 MG/DL (ref 0.5–1.4)
DIFFERENTIAL METHOD BLD: ABNORMAL
EOSINOPHIL # BLD AUTO: 0.2 K/UL (ref 0–0.5)
EOSINOPHIL NFR BLD: 2.4 % (ref 0–8)
ERYTHROCYTE [DISTWIDTH] IN BLOOD BY AUTOMATED COUNT: 13 % (ref 11.5–14.5)
EST. GFR  (NO RACE VARIABLE): >60 ML/MIN/1.73 M^2
ESTIMATED AVG GLUCOSE: 111 MG/DL (ref 68–131)
GLUCOSE SERPL-MCNC: 92 MG/DL (ref 70–110)
HBA1C MFR BLD: 5.5 % (ref 4–5.6)
HCT VFR BLD AUTO: 47.9 % (ref 40–54)
HDLC SERPL-MCNC: 40 MG/DL (ref 40–75)
HDLC SERPL: 24.4 % (ref 20–50)
HGB BLD-MCNC: 15.8 G/DL (ref 14–18)
IMM GRANULOCYTES # BLD AUTO: 0.03 K/UL (ref 0–0.04)
IMM GRANULOCYTES NFR BLD AUTO: 0.3 % (ref 0–0.5)
LDLC SERPL CALC-MCNC: 93.4 MG/DL (ref 63–159)
LYMPHOCYTES # BLD AUTO: 4.5 K/UL (ref 1–4.8)
LYMPHOCYTES NFR BLD: 44.4 % (ref 18–48)
MCH RBC QN AUTO: 31.7 PG (ref 27–31)
MCHC RBC AUTO-ENTMCNC: 33 G/DL (ref 32–36)
MCV RBC AUTO: 96 FL (ref 82–98)
MONOCYTES # BLD AUTO: 0.6 K/UL (ref 0.3–1)
MONOCYTES NFR BLD: 5.8 % (ref 4–15)
NEUTROPHILS # BLD AUTO: 4.7 K/UL (ref 1.8–7.7)
NEUTROPHILS NFR BLD: 46.3 % (ref 38–73)
NONHDLC SERPL-MCNC: 124 MG/DL
NRBC BLD-RTO: 0 /100 WBC
PLATELET # BLD AUTO: 266 K/UL (ref 150–450)
PMV BLD AUTO: 12.3 FL (ref 9.2–12.9)
POTASSIUM SERPL-SCNC: 4.2 MMOL/L (ref 3.5–5.1)
PROT SERPL-MCNC: 6.7 G/DL (ref 6–8.4)
RBC # BLD AUTO: 4.99 M/UL (ref 4.6–6.2)
SODIUM SERPL-SCNC: 142 MMOL/L (ref 136–145)
TRIGL SERPL-MCNC: 153 MG/DL (ref 30–150)
TSH SERPL DL<=0.005 MIU/L-ACNC: 1.02 UIU/ML (ref 0.4–4)
WBC # BLD AUTO: 10.07 K/UL (ref 3.9–12.7)

## 2024-03-23 PROCEDURE — 80061 LIPID PANEL: CPT | Performed by: FAMILY MEDICINE

## 2024-03-23 PROCEDURE — 36415 COLL VENOUS BLD VENIPUNCTURE: CPT | Mod: PO | Performed by: FAMILY MEDICINE

## 2024-03-23 PROCEDURE — 85025 COMPLETE CBC W/AUTO DIFF WBC: CPT | Performed by: FAMILY MEDICINE

## 2024-03-23 PROCEDURE — 80053 COMPREHEN METABOLIC PANEL: CPT | Performed by: FAMILY MEDICINE

## 2024-03-23 PROCEDURE — 84443 ASSAY THYROID STIM HORMONE: CPT | Performed by: FAMILY MEDICINE

## 2024-03-23 PROCEDURE — 83036 HEMOGLOBIN GLYCOSYLATED A1C: CPT | Performed by: FAMILY MEDICINE

## 2024-03-24 ENCOUNTER — PATIENT MESSAGE (OUTPATIENT)
Dept: FAMILY MEDICINE | Facility: CLINIC | Age: 29
End: 2024-03-24
Payer: COMMERCIAL

## 2024-03-24 DIAGNOSIS — R79.89 ELEVATED LFTS: Primary | ICD-10-CM

## 2024-03-25 DIAGNOSIS — F90.0 ADHD (ATTENTION DEFICIT HYPERACTIVITY DISORDER), INATTENTIVE TYPE: ICD-10-CM

## 2024-03-26 RX ORDER — DEXTROAMPHETAMINE SACCHARATE, AMPHETAMINE ASPARTATE, DEXTROAMPHETAMINE SULFATE AND AMPHETAMINE SULFATE 2.5; 2.5; 2.5; 2.5 MG/1; MG/1; MG/1; MG/1
10 TABLET ORAL DAILY
Qty: 30 TABLET | Refills: 0 | Status: SHIPPED | OUTPATIENT
Start: 2024-03-26 | End: 2024-04-29 | Stop reason: SDUPTHER

## 2024-04-04 ENCOUNTER — PATIENT MESSAGE (OUTPATIENT)
Dept: RHEUMATOLOGY | Facility: CLINIC | Age: 29
End: 2024-04-04
Payer: COMMERCIAL

## 2024-04-07 NOTE — PROGRESS NOTES
"Outpatient Psychiatry Follow-Up Visit    Clinical Status of Patient: Outpatient (Ambulatory)  04/08/2024     Chief Complaint: 26 y/o male presenting with his mother today for a follow-up.       Interval History and Content of Current Session:  Interim Events/Subjective Report/Content of Current Session:  follow-up appointment.    Pt is a 26 y/o male with past psychiatric hx of anxiety, ADHD, ASD who presents for follow-up treatment. Pt's mother reported that things are going well. Home healthcare aid will start next week. Continues to exercise and has lost about 6 pounds. Reports that ADHD symptoms are not managed well. Also noted some difficulty falling asleep and staying asleep. Stated that he naps through the day and watches TV in bed at night. Will be traveling to [place] in about 1 month.     Past Psychiatric hx: adderall ("robot child"), focalin ("zombie"), strattera (angry), zoloft (as a child- ineffective), lexapro (ineffective), topomax ("migraines"), celexa (overly sedating but effective), remeron (ineffective- wgt gain), atarax, elavil (ineffective for pain), ritalin (inc'd bp)     Past Medical hx:   Past Medical History:   Diagnosis Date    ADD (attention deficit disorder)     Allergy     Anxiety disorder     Asperger's disorder     (AUTISM)    Deformity of both feet     Dyscalculia     Dysgraphia     Dyslexia     Eczema     Elevated LFTs     Fib4 score 0.58 3/24    Fatty liver     GERD (gastroesophageal reflux disease)     History of migraine headaches     Hypertension     Interstitial cystitis (chronic)     Irritable bowel syndrome     Nephrolithiasis     2014    PONV (postoperative nausea and vomiting)     Psoriasis     Psoriatic arthritis     Refusal of blood transfusion for reasons of conscience     Seasonal allergies     Sleep apnea     does not like CPAP    Special needs assessment         Interim hx:  Medication changes last visit: None  Anxiety: mild  Depression: stable     Denies " suicidal/homicidal ideations.  Denies hopelessness/worthlessness.    Denies auditory/visual hallucinations      Alcohol: Pt denied  Drug: Pt denied  Caffeine: not assessed  Tobacco: Pt denied      Review of Systems   PSYCHIATRIC: Pertinent items are noted in the narrative.        CONSTITUTIONAL: weight stable    Past Medical, Family and Social History: The patient's past medical, family and social history have been reviewed and updated as appropriate within the electronic medical record. See encounter notes.     Current Psychiatric Medication:  Prozac 40mg po qam, Adderall 10 mg pt qam (PRN about once a month), alprazolam 0.25 mg PRN (cut in half - very infrequently).     Compliance: yes      Side effects: Pt denies     Risk Parameters:  Patient reports no suicidal ideation  Patient reports no homicidal ideation  Patient reports no self-injurious behavior  Patient reports no violent behavior     Exam (detailed: at least 9 elements; comprehensive: all 15 elements)   Constitutional  Vitals:  Most recent vital signs, dated less than 90 days prior to this appointment, were reviewed. BP: ()/()   Arterial Line BP: ()/()       General:  unremarkable, age appropriate, casual attire, good eye contact, good rapport       Musculoskeletal  Muscle Strength/Tone:  no flaccidity, no tremor    Gait & Station:  normal      Psychiatric                       Speech:  normal tone, normal rate, rhythm, and volume   Mood & Affect:   stable         Thought Process:   Goal directed; Linear    Associations:   intact   Thought Content:   No SI/HI, delusions, or paranoia, no AV/VH   Insight & Judgement:   Good, adequate to circumstances   Orientation:   grossly intact; alert and oriented x 4    Memory:  intact for content of interview    Language:  grossly intact, can repeat    Attention Span  : Grossly intact for content of interview   Fund of Knowledge:   intact and appropriate to age and level of education        Assessment and Diagnosis  "  Status/Progress: Based on the examination today, the patient's problem(s) is/are adequately controlled.  New problems have not been presented today. Co-morbidities are not complicating management of the primary condition. There are no active rule-out diagnoses for this patient at this time.      Impression: Pt continues to be relatively stable with medication plan. May need to increase dose of Adderall upon return from [facility]. Discussed sleep hygiene techniques for sleep and encouraged pt to practice. Will continue with medication plan as is for now and monitor moving forward.     Diagnosis:   1) Social Anxiety Disorder  2) Generalized Anxiety Disorder  3) Attention Deficit Hyperactivity Disorder  4) Autism Spectrum Disorder (Level "requiring substantial support")  Intervention/Counseling/Treatment Plan   Medication Management:      1. Prozac 40mg po qam    2. Adderall 10 mg po qam    3. alprazolam 0.25 mg PRN (cut in half - very infrequently).     4. Call to report any worsening of symptoms or problems with the medication. Pt instructed to go to ER with thoughts of harming self, others     5. Patient given contact # for psychotherapists at [facility] and also instructed to check with insurance for list of providers.     Psychotherapy: 20 min  Target symptoms: insomnia  Why chosen therapy is appropriate versus another modality: CBT used; relevant to diagnosis, patient responds to this modality  Outcome monitoring methods: self-report, observation  Therapeutic intervention type: Cognitive Behavioral Therapy, sleep hygiene  Topics discussed/themes: building skills sets for symptom management, symptom recognition, nutrition, exercise  The patient's response to the intervention is accepting  Patient's response to treatment is: good.   The patient's progress toward treatment goals: improving     Return to clinic: 2 months    -Cognitive-Behavioral/Supportive therapy and psychoeducation provided  -R/B/SE's of " medications discussed with the pt who expresses understanding and chooses to take medications as prescribed.   -Pt instructed to call clinic, 911 or go to nearest emergency room if sxs worsen or pt is in   crisis. The pt expresses understanding.    Jp Hines, PhD, MP     Antidepressant/Antianxiety Medication Initiation:  Patient informed of risks, benefits, and potential side effects of medication and accepts informed consent.  Common side effects include nausea, fatigue, headache, insomnia., Specifically discussed the possibility of new or worsening suicidal thoughts/depression.  Patient instructed to stop the medication immediately and seek urgent treatment if this occurs. Patient instructed not to abruptly discontinue medication without physician guidance except in cases of sudden onset or worsening of SI.       Stimulant Medication Initiation:  Patient advised of risks, benefits, and side effects of medication and accepts informed consent.  Common side effects include insomnia, irritability, jittery feeling, dry mouth, and agitation/hostility., Patient advised of potential addictive nature of medication and controlled substance classification.  Instructed to safeguard medication as no early refills can be given for lost or stolen medications.       Benzodiazepine Initiation:  Patient advised of the risks, benefits, and common side effects of medication and has accepted informed consent.  Common side effects include drowsiness, impaired coordination, possible memory loss., Patient advised NOT to operate a vehicle or machinery untiil they are sure how the medication will affec them.  Client also advised of danger of mixing this medication with alcohol., Patient advised of potential addictive nature of medication and need to safeguard medication as no early refills for lost or stolen medications can be authorized.

## 2024-04-08 ENCOUNTER — OFFICE VISIT (OUTPATIENT)
Dept: RHEUMATOLOGY | Facility: CLINIC | Age: 29
End: 2024-04-08
Payer: COMMERCIAL

## 2024-04-08 ENCOUNTER — OFFICE VISIT (OUTPATIENT)
Dept: PSYCHIATRY | Facility: CLINIC | Age: 29
End: 2024-04-08
Payer: COMMERCIAL

## 2024-04-08 VITALS
BODY MASS INDEX: 30.95 KG/M2 | HEIGHT: 73 IN | HEART RATE: 65 BPM | WEIGHT: 233.56 LBS | DIASTOLIC BLOOD PRESSURE: 81 MMHG | SYSTOLIC BLOOD PRESSURE: 119 MMHG

## 2024-04-08 VITALS
WEIGHT: 233.69 LBS | DIASTOLIC BLOOD PRESSURE: 81 MMHG | HEART RATE: 73 BPM | BODY MASS INDEX: 30.97 KG/M2 | HEIGHT: 73 IN | SYSTOLIC BLOOD PRESSURE: 112 MMHG

## 2024-04-08 DIAGNOSIS — F84.0 AUTISM SPECTRUM DISORDER REQUIRING SUBSTANTIAL SUPPORT (LEVEL 2): ICD-10-CM

## 2024-04-08 DIAGNOSIS — F90.0 ADHD (ATTENTION DEFICIT HYPERACTIVITY DISORDER), INATTENTIVE TYPE: ICD-10-CM

## 2024-04-08 DIAGNOSIS — L40.50 PSORIATIC ARTHRITIS: Primary | ICD-10-CM

## 2024-04-08 DIAGNOSIS — F40.10 SOCIAL ANXIETY DISORDER: Primary | ICD-10-CM

## 2024-04-08 DIAGNOSIS — F41.1 GENERALIZED ANXIETY DISORDER: ICD-10-CM

## 2024-04-08 DIAGNOSIS — L40.9 PSORIASIS: ICD-10-CM

## 2024-04-08 PROCEDURE — 3008F BODY MASS INDEX DOCD: CPT | Mod: CPTII,S$GLB,, | Performed by: STUDENT IN AN ORGANIZED HEALTH CARE EDUCATION/TRAINING PROGRAM

## 2024-04-08 PROCEDURE — 3079F DIAST BP 80-89 MM HG: CPT | Mod: CPTII,S$GLB,, | Performed by: PSYCHOLOGIST

## 2024-04-08 PROCEDURE — 99999 PR PBB SHADOW E&M-EST. PATIENT-LVL IV: CPT | Mod: PBBFAC,,, | Performed by: PSYCHOLOGIST

## 2024-04-08 PROCEDURE — 3074F SYST BP LT 130 MM HG: CPT | Mod: CPTII,S$GLB,, | Performed by: PSYCHOLOGIST

## 2024-04-08 PROCEDURE — 1159F MED LIST DOCD IN RCRD: CPT | Mod: CPTII,S$GLB,, | Performed by: STUDENT IN AN ORGANIZED HEALTH CARE EDUCATION/TRAINING PROGRAM

## 2024-04-08 PROCEDURE — 3044F HG A1C LEVEL LT 7.0%: CPT | Mod: CPTII,S$GLB,, | Performed by: PSYCHOLOGIST

## 2024-04-08 PROCEDURE — 90833 PSYTX W PT W E/M 30 MIN: CPT | Mod: S$GLB,,, | Performed by: PSYCHOLOGIST

## 2024-04-08 PROCEDURE — 99214 OFFICE O/P EST MOD 30 MIN: CPT | Mod: S$GLB,,, | Performed by: PSYCHOLOGIST

## 2024-04-08 PROCEDURE — 3074F SYST BP LT 130 MM HG: CPT | Mod: CPTII,S$GLB,, | Performed by: STUDENT IN AN ORGANIZED HEALTH CARE EDUCATION/TRAINING PROGRAM

## 2024-04-08 PROCEDURE — 3079F DIAST BP 80-89 MM HG: CPT | Mod: CPTII,S$GLB,, | Performed by: STUDENT IN AN ORGANIZED HEALTH CARE EDUCATION/TRAINING PROGRAM

## 2024-04-08 PROCEDURE — 99999 PR PBB SHADOW E&M-EST. PATIENT-LVL IV: CPT | Mod: PBBFAC,,, | Performed by: STUDENT IN AN ORGANIZED HEALTH CARE EDUCATION/TRAINING PROGRAM

## 2024-04-08 PROCEDURE — 3044F HG A1C LEVEL LT 7.0%: CPT | Mod: CPTII,S$GLB,, | Performed by: STUDENT IN AN ORGANIZED HEALTH CARE EDUCATION/TRAINING PROGRAM

## 2024-04-08 PROCEDURE — 99214 OFFICE O/P EST MOD 30 MIN: CPT | Mod: S$GLB,,, | Performed by: STUDENT IN AN ORGANIZED HEALTH CARE EDUCATION/TRAINING PROGRAM

## 2024-04-08 PROCEDURE — 1159F MED LIST DOCD IN RCRD: CPT | Mod: CPTII,S$GLB,, | Performed by: PSYCHOLOGIST

## 2024-04-08 PROCEDURE — 3008F BODY MASS INDEX DOCD: CPT | Mod: CPTII,S$GLB,, | Performed by: PSYCHOLOGIST

## 2024-04-08 RX ORDER — METHYLPREDNISOLONE 4 MG/1
TABLET ORAL
Qty: 21 EACH | Refills: 0 | Status: SHIPPED | OUTPATIENT
Start: 2024-04-08 | End: 2024-04-29

## 2024-04-08 RX ORDER — APREMILAST 30 MG/1
30 TABLET, FILM COATED ORAL 2 TIMES DAILY
Qty: 180 TABLET | Refills: 2 | Status: SHIPPED | OUTPATIENT
Start: 2024-04-08 | End: 2024-04-11 | Stop reason: SDUPTHER

## 2024-04-08 NOTE — PROGRESS NOTES
4/7/2024     9:50 AM   Rapid3 Question Responses and Scores   MDHAQ Score 1.2   Psychologic Score 4.4   Pain Score 6   When you awakened in the morning OVER THE LAST WEEK, did you feel stiff? Yes   If Yes, please indicate the number of hours until you are as limber as you will be for the day 6   Fatigue Score 6   Global Health Score 6   RAPID3 Score 5.33

## 2024-04-08 NOTE — PROGRESS NOTES
RHEUMATOLOGY OUTPATIENT CLINIC NOTE    4/8/2024    Attending Rheumatologist: Samantha Pace  Primary Care Provider: China Pierre MD   Physician Requesting Consultation: No referring provider defined for this encounter.  Chief Complaint/Reason For Consultation:  Psoriasis      Subjective:       HPI  Nimajodie Chaparro is a 28 y.o. White male with IBS, anxiety, autism, who comes to transfer care for management of PsA.     Interim history   -Last visit on 1/2024  -He comes with his mother Nancie  -he is doing well with mild scaly areas in scalp. Elbows and knee plaques are controlled.   -he reports intermittent pain in right knee without swelling  -will be leaving to [facility] next month.   -takes otezla BID without side effects    Disease history    He has diagnosis of psoriasis since age 13, then started having inflammatory joint pain in his 20s.  Reports that he has tried Humira in the past but did not tolerate due to upset stomach and worsened IBS.  Does not recall any other biologics.  Never been on methotrexate due to history of fatty liver.  Has been on Otezla for the past 3 years.  It appears that he did not tolerate 1 tablet BID due worsened IBS so he has been taking it only once a day per his previous rheumatologist.     He has been doing overall well with his regimen. Notes mild flaring of psoriasis in scalp at times. He uses multiple OTC shampoos which control it overall. Has dry skin especially during winter time.     Review of Systems   Constitutional:  Negative for fever and unexpected weight change.   HENT:  Negative for mouth sores and trouble swallowing.    Eyes:  Negative for redness.   Respiratory:  Negative for cough and shortness of breath.    Cardiovascular:  Negative for chest pain.   Gastrointestinal:  Positive for constipation and diarrhea.   Genitourinary:  Negative for genital sores.   Integumentary:  Negative for rash.   Neurological:  Positive for headaches.    Hematological:  Does not bruise/bleed easily.        Chronic comorbid conditions affecting medical decision making today:  Past Medical History:   Diagnosis Date    ADD (attention deficit disorder)     Allergy     Anxiety disorder     Asperger's disorder     (AUTISM)    Deformity of both feet     Dyscalculia     Dysgraphia     Dyslexia     Eczema     Elevated LFTs     Fib4 score 0.58 3/24    Fatty liver     GERD (gastroesophageal reflux disease)     History of migraine headaches     Hypertension     Interstitial cystitis (chronic)     Irritable bowel syndrome     Nephrolithiasis     2014    PONV (postoperative nausea and vomiting)     Psoriasis     Psoriatic arthritis     Refusal of blood transfusion for reasons of conscience     Seasonal allergies     Sleep apnea     does not like CPAP    Special needs assessment      Past Surgical History:   Procedure Laterality Date    BIOPSY OF BLADDER  1/3/2022    Procedure: BIOPSY, BLADDER;  Surgeon: Derek Dickson MD;  Location: [facility] OR;  Service: Urology;;    COLONOSCOPY  ~2015    [facility]; told IBS    COLONOSCOPY  08/10/2015    Dr. Shaver; sent for scanning: unremarkable findings, no specimens collected    COLONOSCOPY N/A 2/18/2019    Procedure: COLONOSCOPY;  Surgeon: Puma Preston Jr., MD;  Location: [facility];  Service: Endoscopy;  Laterality: N/A;    CYSTOSCOPY  1/11/2023    Procedure: CYSTOSCOPY;  Surgeon: Trino Cain MD;  Location: [address];  Service: Urology;;    CYSTOSCOPY,WITH BOTULINUM TOXIN INJECTION N/A 2/9/2023    Procedure: CYSTOSCOPY,WITH BOTULINUM TOXIN INJECTION;  Surgeon: Trino Cain MD;  [facility];  Service: Urology;  Laterality: N/A;    CYSTOSCOPY,WITH BOTULINUM TOXIN INJECTION N/A 5/3/2023    Procedure: CYSTOSCOPY,WITH BOTULINUM TOXIN INJECTION;  Surgeon: Trino Cain MD;  [facility];  Service: Urology;  Laterality: N/A;  30 min    CYSTOSCOPY,WITH BOTULINUM TOXIN INJECTION N/A 12/20/2023     Procedure: CYSTOSCOPY,WITH BOTULINUM TOXIN INJECTION;  Surgeon: Trino Cain MD;  Location: [facility] OR;  Service: Urology;  Laterality: N/A;  40 min   150 units    INSERTION, NEUROSTIMULATOR, TEMPORARY, SACRAL N/A 1/24/2023    Procedure: INSERTION, NEUROSTIMULATOR, TEMPORARY, SACRAL;  Surgeon: Trino Cain MD;  [facility];  Service: Urology;  Laterality: N/A;  1 hr 45 mins    REMOVAL OF ELECTRODE LEAD OF SACRAL NERVE STIMULATOR N/A 2/9/2023    Procedure: REMOVAL, ELECTRODE LEAD, SACRAL NERVE STIMULATOR;  Surgeon: Trino Cain MD;  Location: [facility] OR;  Service: Urology;  Laterality: N/A;  1 hr    UPPER GASTROINTESTINAL ENDOSCOPY  05/14/2012    Dr. Preston    UPPER GASTROINTESTINAL ENDOSCOPY  07/17/2015    Dr. Shaver, sent for scanning: normal esophagus- dilated & biopsied, findings WNL, biopsies taken from z-line, stomach and duodenum; biopsy: duodenum WNL, antrum reactive gastropathy, negative for h pylori or int. metaplasia, GEJ WNL, negative for EOE & cotton's esophagus    ureteral stone extraction      basket extraction     Family History   Problem Relation Age of Onset    Lupus Mother     Interstitial cystitis Mother     Diabetes Maternal Grandmother     Lupus Maternal Grandmother     Clotting disorder Maternal Grandmother     Interstitial cystitis Maternal Grandmother     Diabetes Maternal Grandfather     Nephrolithiasis Maternal Grandfather         staghorn    Anesthesia problems Maternal Aunt     Interstitial cystitis Maternal Aunt     Colon cancer Neg Hx     Crohn's disease Neg Hx     Ulcerative colitis Neg Hx     Celiac disease Neg Hx     Esophageal cancer Neg Hx     Stomach cancer Neg Hx     Allergic rhinitis Neg Hx     Allergies Neg Hx     Angioedema Neg Hx     Asthma Neg Hx     Atopy Neg Hx     Eczema Neg Hx     Immunodeficiency Neg Hx     Rhinitis Neg Hx     Urticaria Neg Hx      Social History     Substance and Sexual Activity   Alcohol Use No     Social History      Tobacco Use   Smoking Status Never   Smokeless Tobacco Never     Social History     Substance and Sexual Activity   Drug Use No       Current Outpatient Medications:     atorvastatin (LIPITOR) 20 MG tablet, TAKE 1 TABLET(20 MG) BY MOUTH EVERY DAY, Disp: 90 tablet, Rfl: 2    clobetasoL (TEMOVATE) 0.05 % external solution, Apply topically 2 (two) times daily as needed (scalp psoriasis)., Disp: 25 mL, Rfl: 2    dextroamphetamine-amphetamine (ADDERALL) 10 mg Tab, Take 1 tablet (10 mg total) by mouth once daily., Disp: 30 tablet, Rfl: 0    FLUoxetine 40 MG capsule, Take 1 capsule (40 mg total) by mouth once daily., Disp: 90 capsule, Rfl: 3    gabapentin (NEURONTIN) 300 MG capsule, Take 300 mg by mouth 3 (three) times daily., Disp: , Rfl:     ketorolac (TORADOL) 10 mg tablet, Take 1 tablet (10 mg total) by mouth every 6 (six) hours as needed for Pain., Disp: 12 tablet, Rfl: 0    metoprolol succinate (TOPROL-XL) 25 MG 24 hr tablet, Take 1 tablet (25 mg total) by mouth once daily., Disp: 60 tablet, Rfl: 2    rizatriptan (MAXALT-MLT) 10 MG disintegrating tablet, Take 10 mg by mouth daily as needed., Disp: , Rfl:     traMADoL (ULTRAM) 50 mg tablet, Take 1 tablet (50 mg total) by mouth every 12 (twelve) hours as needed for Pain. Medically necessary for more than 7 days, ICD 10: G89.4, Disp: 55 tablet, Rfl: 2    ubidecarenone (CO Q-10 ORAL), Take by mouth once daily., Disp: , Rfl:     VITAMIN D2 1,250 mcg (50,000 unit) capsule, Take 1 capsule (50,000 Units total) by mouth every 7 days., Disp: 12 capsule, Rfl: 2    ALPRAZolam (XANAX) 0.25 MG tablet, Take 1 tablet (0.25 mg total) by mouth daily as needed for Anxiety., Disp: 15 tablet, Rfl: 0    apremilast (OTEZLA) 30 mg Tab, Take 1 tablet (30 mg total) by mouth 2 (two) times a day., Disp: 180 tablet, Rfl: 2    methylPREDNISolone (MEDROL DOSEPACK) 4 mg tablet, use as directed, Disp: 21 each, Rfl: 0    PREVIDENT 5000 BOOSTER PLUS 1.1 % Pste, , Disp: , Rfl:     UROGESIC-BLUE  81.6-40.8-0.12 mg Tab, TAKE 1 TABLET BY MOUTH THREE TIMES DAILY AS NEEDED FOR DYSURIA, Disp: 60 tablet, Rfl: 4  No current facility-administered medications for this visit.    Facility-Administered Medications Ordered in Other Visits:     electrolyte-S (ISOLYTE), , Intravenous, Continuous, Dale Petit MD     Objective:         Vitals:    04/08/24 1534   BP: 112/81   Pulse: 73     Physical Exam   Constitutional: He is oriented to person, place, and time. He appears well-developed.   HENT:   Head: Normocephalic.   Pulmonary/Chest: Effort normal.   Musculoskeletal:      Cervical back: Neck supple.      Comments: No synovitis on examination    Lymphadenopathy:     He has no cervical adenopathy.   Neurological: He is alert and oriented to person, place, and time.   Skin: No rash noted.   Dry skin   Mild flaky areas in the scalp with no overt plaque     Vitals reviewed.        Reviewed old and all outside pertinent medical records available.    All lab results personally reviewed and interpreted by me.  Lab Results   Component Value Date    WBC 10.07 03/23/2024    HGB 15.8 03/23/2024    HCT 47.9 03/23/2024    MCV 96 03/23/2024    MCH 31.7 (H) 03/23/2024    MCHC 33.0 03/23/2024    RDW 13.0 03/23/2024     03/23/2024    MPV 12.3 03/23/2024       Lab Results   Component Value Date     03/23/2024    K 4.2 03/23/2024     03/23/2024    CO2 26 03/23/2024    GLU 92 03/23/2024    BUN 12 03/23/2024    CALCIUM 9.9 03/23/2024    PROT 6.7 03/23/2024    ALBUMIN 4.0 03/23/2024    BILITOT 0.4 03/23/2024    AST 48 (H) 03/23/2024    ALKPHOS 96 03/23/2024    ALT 86 (H) 03/23/2024       Lab Results   Component Value Date    COLORU Yellow 10/25/2022    APPEARANCEUA Cloudy (A) 01/16/2022    SPECGRAV >=1.030 10/25/2022    PHUR 5.5 10/25/2022    PROTEINUA 1+ (A) 01/16/2022    GLUCOSEU NEGATIVE 12/06/2021    KETONESU Negative 10/25/2022    LEUKOCYTESUR Trace (A) 01/16/2022    NITRITE Negative 10/25/2022    UROBILINOGEN 0.2  "10/25/2022       Lab Results   Component Value Date    CRP 1.4 06/13/2020       Lab Results   Component Value Date    RF <10.0 07/09/2016    SEDRATE 10 06/13/2020    CCPANTIBODIE <0.5 07/09/2016       No components found for: "25OHVITDTOT", "34CGLLKC2", "74RCRSAA2", "METHODNOTE"    Lab Results   Component Value Date    URICACID 5.8 10/10/2017       Lab Results   Component Value Date    HEPCAB Negative 10/10/2017       Imaging:  All imaging reviewed and independently interpreted by me.         ASSESSMENT / PLAN:     Jm Chaparro is a 28 y.o. White male with  IBS, anxiety, autism, who comes to transfer care for management of PsA    1. Psoriatic arthritis  -Stable  -No synovitis on examination   -Continue otezla 30 mg QD for now. May consider trying to increase to BID and assess response  -Can consider try another TNF in the future. Has IBS so will need to be careful with IL17 but not total contraindication     2. Psoriasis  -Overall stable with minor flares on the scalp  -Start clobetasol topical lotion in the scalp as needed  -advised him on daily moisturizers and avoid hot showers especially during the winter time  -advised him on the use of ketoconazole shampoo and H&S cooling mist.     Follow up in about 3 months (around 7/8/2024).    Method of contact with patient concerns: [facility]    Disclaimer:  This note is prepared using voice recognition software and as such is likely to have errors and has not been proof read. Please contact me for questions.     Time spent: 20 minutes in face to face discussion concerning diagnosis, prognosis, review of lab and test results, benefits of treatment as well as management of disease, counseling of patient and coordination of care between various health care providers.  Greater than half the time spent was used for coordination of care and counseling of patient.    Samantha Pace M.D.  Rheumatology  [facility]    "

## 2024-04-08 NOTE — LETTER
2024    Jm Chaparro  [address] [address]             [facility] - Rheumatology  [address] [place] [address]  [facility] [address]  Phone: [phone]  Fax: [phone] To Whom It May Concern:     Mr. Jm Chaparro ( 1995) currently takes the following medications for chronic medical conditions and has been taking them daily:       apremilast (OTEZLA) 30 mg Tab, Take 1 tablet (30 mg total) by mouth 2 (two) times a day., Disp: 180 tablet, Rfl: 1    atorvastatin (LIPITOR) 20 MG tablet, TAKE 1 TABLET(20 MG) BY MOUTH EVERY DAY, Disp: 90 tablet, Rfl: 2    clobetasoL (TEMOVATE) 0.05 % external solution, Apply topically 2 (two) times daily as needed (scalp psoriasis)., Disp: 25 mL, Rfl: 2    dextroamphetamine-amphetamine (ADDERALL) 10 mg Tab, Take 1 tablet (10 mg total) by mouth once daily., Disp: 30 tablet, Rfl: 0    FLUoxetine 40 MG capsule, Take 1 capsule (40 mg total) by mouth once daily., Disp: 90 capsule, Rfl: 3    gabapentin (NEURONTIN) 300 MG capsule, Take 300 mg by mouth 3 (three) times daily., Disp: , Rfl:     ketorolac (TORADOL) 10 mg tablet, Take 1 tablet (10 mg total) by mouth every 6 (six) hours as needed for Pain., Disp: 12 tablet, Rfl: 0    metoprolol succinate (TOPROL-XL) 25 MG 24 hr tablet, Take 1 tablet (25 mg total) by mouth once daily., Disp: 60 tablet, Rfl: 2    rizatriptan (MAXALT-MLT) 10 MG disintegrating tablet, Take 10 mg by mouth daily as needed., Disp: , Rfl:     traMADoL (ULTRAM) 50 mg tablet, Take 1 tablet (50 mg total) by mouth every 12 (twelve) hours as needed for Pain. Medically necessary for more than 7 days, ICD 10: G89.4, Disp: 55 tablet, Rfl: 2    ubidecarenone (CO Q-10 ORAL), Take by mouth once daily., Disp: , Rfl:     VITAMIN D2 1,250 mcg (50,000 unit) capsule, Take 1 capsule (50,000 Units total) by mouth every 7 days., Disp: 12 capsule, Rfl: 2    ALPRAZolam (XANAX) 0.25 MG tablet, Take 1 tablet (0.25 mg total)  by mouth daily as needed for Anxiety., Disp: 15 tablet, Rfl: 0    methylPREDNISolone (MEDROL DOSEPACK) 4 mg tablet, use as directed, Disp: 21 each, Rfl: 0    PREVIDENT 5000 BOOSTER PLUS 1.1 % Pste, , Disp: , Rfl:     UROGESIC-BLUE 81.6-40.8-0.12 mg Tab, TAKE 1 TABLET BY MOUTH THREE TIMES DAILY AS NEEDED FOR DYSURIA, Disp: 60 tablet, Rfl: 4            If you have any questions or concerns, please don't hesitate to call.    Sincerely,          Samantha Pace MD  Rheumatology

## 2024-04-09 ENCOUNTER — HOSPITAL ENCOUNTER (OUTPATIENT)
Dept: CARDIOLOGY | Facility: HOSPITAL | Age: 29
Discharge: HOME | End: 2024-04-09
Attending: FAMILY MEDICINE
Payer: COMMERCIAL

## 2024-04-09 DIAGNOSIS — R00.2 PALPITATIONS: ICD-10-CM

## 2024-04-09 DIAGNOSIS — I10 HYPERTENSION, ESSENTIAL: ICD-10-CM

## 2024-04-09 PROCEDURE — 93225 XTRNL ECG REC<48 HRS REC: CPT | Mod: PO

## 2024-04-09 PROCEDURE — 93227 XTRNL ECG REC<48 HR R&I: CPT | Mod: ,,, | Performed by: INTERNAL MEDICINE

## 2024-04-10 RX ORDER — TRAMADOL HYDROCHLORIDE 50 MG/1
50 TABLET ORAL EVERY 12 HOURS PRN
Qty: 55 TABLET | Refills: 2 | Status: SHIPPED | OUTPATIENT
Start: 2024-04-10 | End: 2024-05-07 | Stop reason: SDUPTHER

## 2024-04-11 ENCOUNTER — PATIENT MESSAGE (OUTPATIENT)
Dept: RHEUMATOLOGY | Facility: CLINIC | Age: 29
End: 2024-04-11
Payer: COMMERCIAL

## 2024-04-11 DIAGNOSIS — L40.50 PSORIATIC ARTHRITIS: ICD-10-CM

## 2024-04-11 RX ORDER — APREMILAST 30 MG/1
30 TABLET, FILM COATED ORAL 2 TIMES DAILY
Qty: 180 TABLET | Refills: 2 | Status: SHIPPED | OUTPATIENT
Start: 2024-04-11 | End: 2024-04-16 | Stop reason: SDUPTHER

## 2024-04-11 RX ORDER — APREMILAST 30 MG/1
30 TABLET, FILM COATED ORAL 2 TIMES DAILY
Qty: 180 TABLET | Refills: 2 | OUTPATIENT
Start: 2024-04-11 | End: 2024-04-11 | Stop reason: SDUPTHER

## 2024-04-16 DIAGNOSIS — L40.50 PSORIATIC ARTHRITIS: ICD-10-CM

## 2024-04-16 LAB
OHS CV EVENT MONITOR DAY: 0
OHS CV HOLTER LENGTH DECIMAL HOURS: 48
OHS CV HOLTER LENGTH HOURS: 48
OHS CV HOLTER LENGTH MINUTES: 0
OHS CV HOLTER SINUS AVERAGE HR: 74
OHS CV HOLTER SINUS MAX HR: 124
OHS CV HOLTER SINUS MIN HR: 51

## 2024-04-16 RX ORDER — APREMILAST 30 MG/1
30 TABLET, FILM COATED ORAL 2 TIMES DAILY
Qty: 180 TABLET | Refills: 2 | Status: SHIPPED | OUTPATIENT
Start: 2024-04-16 | End: 2024-04-18 | Stop reason: SDUPTHER

## 2024-04-17 ENCOUNTER — PATIENT MESSAGE (OUTPATIENT)
Dept: FAMILY MEDICINE | Facility: CLINIC | Age: 29
End: 2024-04-17
Payer: COMMERCIAL

## 2024-04-18 ENCOUNTER — TELEPHONE (OUTPATIENT)
Dept: RHEUMATOLOGY | Facility: CLINIC | Age: 29
End: 2024-04-18
Payer: COMMERCIAL

## 2024-04-18 DIAGNOSIS — L40.50 PSORIATIC ARTHRITIS: ICD-10-CM

## 2024-04-18 RX ORDER — APREMILAST 30 MG/1
30 TABLET, FILM COATED ORAL 2 TIMES DAILY
Qty: 180 TABLET | Refills: 2 | Status: ACTIVE | OUTPATIENT
Start: 2024-04-18

## 2024-04-18 NOTE — TELEPHONE ENCOUNTER
Spoke with [facility], they needed PA for Otezla. Called [employer] and they stated that they PA was approved from 10/02/23 - 10/02/24 PA # [id number]. Called [facility] back to inform them of this. Pharmacist stated that she would contact [employer] to see what the issue is because it wasn't allowing processing. Pharmacist called me back stating insurance will cover it if it is sent to a specialty pharmacy. [phone]. Informed Dr. Santos to send Rx to [facility].

## 2024-04-29 DIAGNOSIS — F90.0 ADHD (ATTENTION DEFICIT HYPERACTIVITY DISORDER), INATTENTIVE TYPE: ICD-10-CM

## 2024-04-30 RX ORDER — DEXTROAMPHETAMINE SACCHARATE, AMPHETAMINE ASPARTATE, DEXTROAMPHETAMINE SULFATE AND AMPHETAMINE SULFATE 2.5; 2.5; 2.5; 2.5 MG/1; MG/1; MG/1; MG/1
10 TABLET ORAL DAILY
Qty: 30 TABLET | Refills: 0 | Status: SHIPPED | OUTPATIENT
Start: 2024-04-30 | End: 2024-06-17 | Stop reason: SDUPTHER

## 2024-05-07 ENCOUNTER — OFFICE VISIT (OUTPATIENT)
Dept: PAIN MEDICINE | Facility: CLINIC | Age: 29
End: 2024-05-07
Payer: COMMERCIAL

## 2024-05-07 VITALS
HEIGHT: 73 IN | BODY MASS INDEX: 31.79 KG/M2 | DIASTOLIC BLOOD PRESSURE: 71 MMHG | SYSTOLIC BLOOD PRESSURE: 111 MMHG | HEART RATE: 75 BPM | WEIGHT: 239.88 LBS

## 2024-05-07 DIAGNOSIS — M79.642 BILATERAL HAND PAIN: ICD-10-CM

## 2024-05-07 DIAGNOSIS — N30.10 IC (INTERSTITIAL CYSTITIS): ICD-10-CM

## 2024-05-07 DIAGNOSIS — M79.641 BILATERAL HAND PAIN: ICD-10-CM

## 2024-05-07 DIAGNOSIS — L40.50 PSORIATIC ARTHRITIS: Primary | ICD-10-CM

## 2024-05-07 DIAGNOSIS — R29.898 MUSCULAR DECONDITIONING: ICD-10-CM

## 2024-05-07 DIAGNOSIS — R39.89 BLADDER PAIN: ICD-10-CM

## 2024-05-07 DIAGNOSIS — M06.9 RHEUMATOID ARTHRITIS INVOLVING MULTIPLE SITES, UNSPECIFIED WHETHER RHEUMATOID FACTOR PRESENT: ICD-10-CM

## 2024-05-07 PROCEDURE — 99214 OFFICE O/P EST MOD 30 MIN: CPT | Mod: S$GLB,,, | Performed by: PHYSICIAN ASSISTANT

## 2024-05-07 PROCEDURE — 3044F HG A1C LEVEL LT 7.0%: CPT | Mod: CPTII,S$GLB,, | Performed by: PHYSICIAN ASSISTANT

## 2024-05-07 PROCEDURE — 3078F DIAST BP <80 MM HG: CPT | Mod: CPTII,S$GLB,, | Performed by: PHYSICIAN ASSISTANT

## 2024-05-07 PROCEDURE — 1159F MED LIST DOCD IN RCRD: CPT | Mod: CPTII,S$GLB,, | Performed by: PHYSICIAN ASSISTANT

## 2024-05-07 PROCEDURE — 3074F SYST BP LT 130 MM HG: CPT | Mod: CPTII,S$GLB,, | Performed by: PHYSICIAN ASSISTANT

## 2024-05-07 PROCEDURE — 1160F RVW MEDS BY RX/DR IN RCRD: CPT | Mod: CPTII,S$GLB,, | Performed by: PHYSICIAN ASSISTANT

## 2024-05-07 PROCEDURE — 3008F BODY MASS INDEX DOCD: CPT | Mod: CPTII,S$GLB,, | Performed by: PHYSICIAN ASSISTANT

## 2024-05-07 PROCEDURE — 99999 PR PBB SHADOW E&M-EST. PATIENT-LVL IV: CPT | Mod: PBBFAC,,, | Performed by: PHYSICIAN ASSISTANT

## 2024-05-07 RX ORDER — TRAMADOL HYDROCHLORIDE 50 MG/1
50 TABLET ORAL EVERY 12 HOURS PRN
Qty: 55 TABLET | Refills: 2 | Status: SHIPPED | OUTPATIENT
Start: 2024-05-11

## 2024-05-07 NOTE — TELEPHONE ENCOUNTER
Pt seen in clinic. Please send Rx. I have reviewed the [place] Board of Pharmacy website and there are no abberancies.     PAST MEDICAL HISTORY:  Diverticulosis     Type 2 diabetes mellitus without complication, with long-term current use of insulin

## 2024-05-09 ENCOUNTER — PATIENT MESSAGE (OUTPATIENT)
Dept: PSYCHIATRY | Facility: CLINIC | Age: 29
End: 2024-05-09
Payer: COMMERCIAL

## 2024-05-13 NOTE — PROGRESS NOTES
This note was completed with dictation software and grammatical errors may exist.    Chief Complaint   Patient presents with    body pain    Joint Pain        HPI: Jm Chaparro is a 28 y.o. year old male patient who has a past medical history of ADD (attention deficit disorder), Allergy, Anxiety disorder, Asperger's disorder, Deformity of both feet, Dyscalculia, Dysgraphia, Dyslexia, Eczema, Elevated LFTs, Fatty liver, GERD (gastroesophageal reflux disease), History of migraine headaches, Hypertension, Interstitial cystitis (chronic), Irritable bowel syndrome, Nephrolithiasis, PONV (postoperative nausea and vomiting), Psoriasis, Psoriatic arthritis, Refusal of blood transfusion for reasons of conscience, Seasonal allergies, Sleep apnea, and Special needs assessment. He presents in referral from No ref. provider found for abdominal and bladder pain.  He returns in follow-up today with multiple pain complaints.  He continues to have abdominal pain, arthritic pain, bladder pain, neck pain.  He states that his bladder pain has been find lately in his neck pain is not often.  His hands have been better since he went to physical therapy.  He continues to take 1 tramadol in the evenings and sometimes he will take 1/2 tablet in mornings.  He is leaving for [place] on 05/29.    Previous history:  The patient's mother was present with him at the visit today who also helps with most of the history.  She reports that in March, 2015 she had gone out of town on a business trip and her son accompanying her.  Unfortunately he came down with cold and virus symptoms at the time and began developing severe abdominal pain.  She went to the emergency department at that time in [place], they were told he was having viral symptoms.  She returned to the [facility] and had followed up with several other physicians that year.  She reports that initially he was having intermittent pain on and off, would have pain for a week and  then it would disappear without cause.  He then developed diarrhea and constipation at times, he was seen at [facility] in [place] in 2015 and diagnosed with IBS with constipation and diarrhea.  They recommend an antidepressant that he tried, no relief with this.  Shortly after he began having renal calculi as well and needed to present to the emergency department.  He had seen neurology, Dr. Larry and reports that he required stone removal, does not sound like he has had lithotripsy.  He has seen Dr. Dickson, and now Nephrology, Dr. Enriquez.  He has seen Gastroenterology, Dr. Shaver and had undergone colonoscopy with no significant findings.  He has been tried on multiple medications over time as discussed below without any benefit.  He continues to have frequent abdominal pain that is often worse with caffeine or other foods, worse with activity.  He reports that he gets some relief with a bowel movement.  He still has been passing small sand like renal calculi, they report that his urine turns dark when he is about to pass a stone and he often has pain throughout the bladder and urethra after passing a stone.    He has a history of rheumatoid arthritis, has been seeing a rheumatologist, reports pain throughout his entire spine, bilateral hips, shoulders, knees, feet.  He does well with being in a bath with hot water.    Pain intervention history:  No injections    Spine surgeries:  None    Antineuropathics: Gabapentin 300mg three times daily, was given this for inflammatory joint pain??  NSAIDs: Mobic 15, no benefit  Physical therapy:  Has done some physical therapy, water therapy in the past with some improvement but things worsened when he did land-based therapy and stretching  Antidepressants: Fluoxetine 40mg  Muscle relaxers:  Opioids:  The patient reports having benefit from tramadol in the past  Antiplatelets/Anticoagulants:  From Dr. Vyas note:  Prozac 40mg po qam, adderall xr 20mg po qam (very rare  "use), lunesta 2mg po qhs PRN insomnia  Past Psych Meds: adderall ("robot child"), focalin ("zombie"), strattera (angry), zoloft (as a child- ineffective), lexapro (ineffective) topomax ("migraines"), celexa (overly sedating but effective), remeron (ineffective- wgt gain), atarax, elavil (ineffective for pain), ritalin (inc'd bp)     ROS:  He reports fatigue, weight gain, itching, color change, headaches head trauma, ear pain, constipation, diarrhea, stomach pain, nausea, flank pain, urinary urgency and frequency, painful urination, joint stiffness, joint swelling, back pain, difficulty sleeping and anxiety.  Balance of review of systems is negative.    Lab Results   Component Value Date    HGBA1C 5.5 03/23/2024       Lab Results   Component Value Date    WBC 10.07 03/23/2024    HGB 15.8 03/23/2024    HCT 47.9 03/23/2024    MCV 96 03/23/2024     03/23/2024       Past Medical History:   Diagnosis Date    ADD (attention deficit disorder)     Allergy     Anxiety disorder     Asperger's disorder     (AUTISM)    Deformity of both feet     Dyscalculia     Dysgraphia     Dyslexia     Eczema     Elevated LFTs     Fib4 score 0.58 3/24    Fatty liver     GERD (gastroesophageal reflux disease)     History of migraine headaches     Hypertension     Interstitial cystitis (chronic)     Irritable bowel syndrome     Nephrolithiasis     2014    PONV (postoperative nausea and vomiting)     Psoriasis     Psoriatic arthritis     Refusal of blood transfusion for reasons of conscience     Seasonal allergies     Sleep apnea     does not like CPAP    Special needs assessment        Past Surgical History:   Procedure Laterality Date    BIOPSY OF BLADDER  1/3/2022    Procedure: BIOPSY, BLADDER;  Surgeon: Derek Dickson MD;  Location: [facility] OR;  Service: Urology;;    COLONOSCOPY  ~2015    [facility]; told IBS    COLONOSCOPY  08/10/2015    Dr. Shaver; sent for scanning: unremarkable findings, no specimens collected    COLONOSCOPY " N/A 2/18/2019    Procedure: COLONOSCOPY;  Surgeon: Puma Preston Jr., MD;  Location: [facility] ENDO;  Service: Endoscopy;  Laterality: N/A;    CYSTOSCOPY  1/11/2023    Procedure: CYSTOSCOPY;  Surgeon: Trino Cain MD;  Location: [facility] OR 1ST FLR;  Service: Urology;;    CYSTOSCOPY,WITH BOTULINUM TOXIN INJECTION N/A 2/9/2023    Procedure: CYSTOSCOPY,WITH BOTULINUM TOXIN INJECTION;  Surgeon: Trino Cain MD;  Location: [facility] OR;  Service: Urology;  Laterality: N/A;    CYSTOSCOPY,WITH BOTULINUM TOXIN INJECTION N/A 5/3/2023    Procedure: CYSTOSCOPY,WITH BOTULINUM TOXIN INJECTION;  Surgeon: Trino Cain MD;  Location: [facility] OR;  Service: Urology;  Laterality: N/A;  30 min    CYSTOSCOPY,WITH BOTULINUM TOXIN INJECTION N/A 12/20/2023    Procedure: CYSTOSCOPY,WITH BOTULINUM TOXIN INJECTION;  Surgeon: Trino Cain MD;  Location: [facility] OR;  Service: Urology;  Laterality: N/A;  40 min   150 units    INSERTION, NEUROSTIMULATOR, TEMPORARY, SACRAL N/A 1/24/2023    Procedure: INSERTION, NEUROSTIMULATOR, TEMPORARY, SACRAL;  Surgeon: Trino Cain MD;  Location: [facility] OR 2ND FLR;  Service: Urology;  Laterality: N/A;  1 hr 45 mins    REMOVAL OF ELECTRODE LEAD OF SACRAL NERVE STIMULATOR N/A 2/9/2023    Procedure: REMOVAL, ELECTRODE LEAD, SACRAL NERVE STIMULATOR;  Surgeon: Trino Cain MD;  Location: [facility];  Service: Urology;  Laterality: N/A;  1 hr    UPPER GASTROINTESTINAL ENDOSCOPY  05/14/2012    Dr. Preston    UPPER GASTROINTESTINAL ENDOSCOPY  07/17/2015    Dr. Shaver, sent for scanning: normal esophagus- dilated & biopsied, findings WNL, biopsies taken from z-line, stomach and duodenum; biopsy: duodenum WNL, antrum reactive gastropathy, negative for h pylori or int. metaplasia, GEJ WNL, negative for EOE & cotton's esophagus    ureteral stone extraction      basket extraction       Social History     Socioeconomic History    Marital status: Single   Tobacco Use    Smoking status: Never     "Smokeless tobacco: Never   Substance and Sexual Activity    Alcohol use: No    Drug use: No    Sexual activity: Never     Social Determinants of Health     Financial Resource Strain: Low Risk  (3/15/2024)    Overall Financial Resource Strain (CARDIA)     Difficulty of Paying Living Expenses: Not very hard   Food Insecurity: No Food Insecurity (3/15/2024)    Hunger Vital Sign     Worried About Running Out of Food in the Last Year: Never true     Ran Out of Food in the Last Year: Never true   Transportation Needs: No Transportation Needs (3/15/2024)    PRAPARE - Transportation     Lack of Transportation (Medical): No     Lack of Transportation (Non-Medical): No   Physical Activity: Insufficiently Active (3/15/2024)    Exercise Vital Sign     Days of Exercise per Week: 1 day     Minutes of Exercise per Session: 20 min   Stress: Stress Concern Present (3/15/2024)    [facility] of Occupational Health - Occupational Stress Questionnaire     Feeling of Stress : To some extent   Housing Stability: Low Risk  (3/15/2024)    Housing Stability Vital Sign     Unable to Pay for Housing in the Last Year: No     Number of Places Lived in the Last Year: 1     Unstable Housing in the Last Year: No       Medications/Allergies: See med card    Vitals:    24 0916   BP: 111/71   Pulse: 75   Weight: 108.8 kg (239 lb 13.8 oz)   Height: 6' 1" (1.854 m)   PainSc:   5   PainLoc: Hand       Body mass index is 31.65 kg/m².    Physical exam:  Gen: A and O x3, pleasant, well-groomed  Skin: No rashes or obvious lesions  HEENT: PERRLA, no obvious deformities on ears or in canals. Trachea midline.  CVS: Regular rate and rhythm, normal palpable pulses.  Resp:No increased work of breathing, symmetrical chest rise.  Abdomen: Soft, NT/ND.  Musculoskeletal:  Moving all extremities equally    Upper extremity strength:  5/5 bilaterally  Lower extremity strength:  5/5 bilaterally      Imagin22 CT renal protocol:  Liver normal " enhancement with no focal lesion.  Gallbladder, pancreas, stomach, spleen, adrenal glands normal.  Kidneys normal size and contour with no nephrolithiasis, hydronephrosis, or ureteral obstruction.  Aorta tapers normally.  No bowel obstruction, ascites, inflammatory change.  Appendix normal.  Bladder and rectum normal.  No significant bladder wall thickening evident.  Bones are intact.  Lung bases clear.    Assessment:  Jm Chaparro is a 28 y.o. year old male patient who has a past medical history of Abnormal thyroid function test, ADD (attention deficit disorder), Anxiety disorder, Asperger's disorder, Deformity of both feet, Dyscalculia, Dysgraphia, Dyslexia, Eczema, Fatty liver, GERD (gastroesophageal reflux disease), History of migraine headaches, Hypertension, Interstitial cystitis (chronic), Irritable bowel syndrome, Nephrolithiasis, PONV (postoperative nausea and vomiting), Psoriasis, Psoriatic arthritis, Refusal of blood transfusion for reasons of conscience, Seasonal allergies, and Special needs assessment. He presents in referral from Dr. Derek Dickson for bladder pain.    1. Psoriatic arthritis        2. Bilateral hand pain        3. Bladder pain        4. IC (interstitial cystitis)        5. Rheumatoid arthritis involving multiple sites, unspecified whether rheumatoid factor present        6. Muscular deconditioning              Plan:  1. Dr. Gonzalez recently refilled tramadol 50 mg. I have reviewed the [place] Board of Pharmacy website and there are no abberancies.    2. We discussed the importance of continued home exercises.  3. Follow-up in 3 months or sooner as needed.

## 2024-05-27 ENCOUNTER — LAB VISIT (OUTPATIENT)
Dept: LAB | Facility: HOSPITAL | Age: 29
End: 2024-05-27
Attending: FAMILY MEDICINE
Payer: COMMERCIAL

## 2024-05-27 DIAGNOSIS — R79.89 ELEVATED LFTS: ICD-10-CM

## 2024-05-27 LAB
ALBUMIN SERPL BCP-MCNC: 3.9 G/DL (ref 3.5–5.2)
ALP SERPL-CCNC: 83 U/L (ref 55–135)
ALT SERPL W/O P-5'-P-CCNC: 45 U/L (ref 10–44)
AST SERPL-CCNC: 27 U/L (ref 10–40)
BILIRUB DIRECT SERPL-MCNC: 0.2 MG/DL (ref 0.1–0.3)
BILIRUB SERPL-MCNC: 0.5 MG/DL (ref 0.1–1)
PROT SERPL-MCNC: 7.1 G/DL (ref 6–8.4)

## 2024-05-27 PROCEDURE — 80076 HEPATIC FUNCTION PANEL: CPT | Performed by: FAMILY MEDICINE

## 2024-05-27 PROCEDURE — 36415 COLL VENOUS BLD VENIPUNCTURE: CPT | Mod: PN | Performed by: FAMILY MEDICINE

## 2024-05-28 ENCOUNTER — PATIENT MESSAGE (OUTPATIENT)
Dept: FAMILY MEDICINE | Facility: CLINIC | Age: 29
End: 2024-05-28
Payer: COMMERCIAL

## 2024-06-14 ENCOUNTER — PATIENT MESSAGE (OUTPATIENT)
Dept: FAMILY MEDICINE | Facility: CLINIC | Age: 29
End: 2024-06-14
Payer: COMMERCIAL

## 2024-06-14 RX ORDER — NIRMATRELVIR AND RITONAVIR 300-100 MG
KIT ORAL
Qty: 30 TABLET | Refills: 0 | Status: SHIPPED | OUTPATIENT
Start: 2024-06-14 | End: 2024-06-19

## 2024-06-17 ENCOUNTER — OFFICE VISIT (OUTPATIENT)
Dept: PSYCHIATRY | Facility: CLINIC | Age: 29
End: 2024-06-17
Payer: COMMERCIAL

## 2024-06-17 DIAGNOSIS — F41.1 GENERALIZED ANXIETY DISORDER: ICD-10-CM

## 2024-06-17 DIAGNOSIS — F84.0 AUTISM SPECTRUM DISORDER REQUIRING SUBSTANTIAL SUPPORT (LEVEL 2): ICD-10-CM

## 2024-06-17 DIAGNOSIS — F40.10 SOCIAL ANXIETY DISORDER: Primary | ICD-10-CM

## 2024-06-17 DIAGNOSIS — F90.0 ADHD (ATTENTION DEFICIT HYPERACTIVITY DISORDER), INATTENTIVE TYPE: ICD-10-CM

## 2024-06-17 PROCEDURE — 90833 PSYTX W PT W E/M 30 MIN: CPT | Mod: 95,,, | Performed by: PSYCHOLOGIST

## 2024-06-17 PROCEDURE — 1159F MED LIST DOCD IN RCRD: CPT | Mod: CPTII,95,, | Performed by: PSYCHOLOGIST

## 2024-06-17 PROCEDURE — 99214 OFFICE O/P EST MOD 30 MIN: CPT | Mod: 95,,, | Performed by: PSYCHOLOGIST

## 2024-06-17 PROCEDURE — 3044F HG A1C LEVEL LT 7.0%: CPT | Mod: CPTII,95,, | Performed by: PSYCHOLOGIST

## 2024-06-17 RX ORDER — DEXTROAMPHETAMINE SACCHARATE, AMPHETAMINE ASPARTATE, DEXTROAMPHETAMINE SULFATE AND AMPHETAMINE SULFATE 2.5; 2.5; 2.5; 2.5 MG/1; MG/1; MG/1; MG/1
10 TABLET ORAL DAILY
Qty: 30 TABLET | Refills: 0 | Status: SHIPPED | OUTPATIENT
Start: 2024-06-17

## 2024-06-17 NOTE — PROGRESS NOTES
"The patient location is:  [place], [place]  The patient location [place] is: [place]  The patient phone number is: [phone]  Visit type: Virtual visit with synchronous audio and video  Each patient to whom he or she provides medical services by telemedicine is:  (1) informed of the relationship between the physician and patient and the respective role of any other health care provider with respect to management of the patient; and (2) notified that he or she may decline to receive medical services by telemedicine and may withdraw from such care at any time.     Outpatient Psychiatry Follow-Up Visit    Clinical Status of Patient: Outpatient (Ambulatory)  2024     Chief Complaint: 28 y/o male presenting with his mother today for a follow-up.       Interval History and Content of Current Session:  Interim Events/Subjective Report/Content of Current Session:  follow-up appointment.    Pt is a 28 y/o male with past psychiatric hx of anxiety, ADHD, ASD who presents for follow-up treatment. Pt reported that he returned from trip to [facility] a few days ago. Enjoyed his trip. Was not feeling well and went to Urgent Care. Tested positive with COVID. Also has an ear infection. Pt reported that his grandmother  just before the trip. Some grief but coping effectively. Restarted the Adderall upon return at half dose.     Past Psychiatric hx: adderall ("robot child"), focalin ("zombie"), strattera (angry), zoloft (as a child- ineffective), lexapro (ineffective), topomax ("migraines"), celexa (overly sedating but effective), remeron (ineffective- wgt gain), atarax, elavil (ineffective for pain), ritalin (inc'd bp)     Past Medical hx:   Past Medical History:   Diagnosis Date    ADD (attention deficit disorder)     Allergy     Anxiety disorder     Asperger's disorder     (AUTISM)    Deformity of both feet     Dyscalculia     Dysgraphia     Dyslexia     Eczema     Elevated LFTs     Fib4 score 0.58 3/24    Fatty liver     " GERD (gastroesophageal reflux disease)     History of migraine headaches     Hypertension     Interstitial cystitis (chronic)     Irritable bowel syndrome     Nephrolithiasis     2014    PONV (postoperative nausea and vomiting)     Psoriasis     Psoriatic arthritis     Refusal of blood transfusion for reasons of conscience     Seasonal allergies     Sleep apnea     does not like CPAP    Special needs assessment         Interim hx:  Medication changes last visit: None  Anxiety: mild  Depression: stable     Denies suicidal/homicidal ideations.  Denies hopelessness/worthlessness.    Denies auditory/visual hallucinations      Alcohol: Pt denied  Drug: Pt denied  Caffeine: not assessed  Tobacco: Pt denied      Review of Systems   PSYCHIATRIC: Pertinent items are noted in the narrative.        CONSTITUTIONAL: weight stable    Past Medical, Family and Social History: The patient's past medical, family and social history have been reviewed and updated as appropriate within the electronic medical record. See encounter notes.     Current Psychiatric Medication:  Prozac 40mg po qam, Adderall 10 mg pt qam (PRN about once a month), alprazolam 0.25 mg PRN (cut in half - very infrequently).     Compliance: yes      Side effects: Pt denies     Risk Parameters:  Patient reports no suicidal ideation  Patient reports no homicidal ideation  Patient reports no self-injurious behavior  Patient reports no violent behavior     Exam (detailed: at least 9 elements; comprehensive: all 15 elements)   Constitutional  Vitals:  Most recent vital signs, dated less than 90 days prior to this appointment, were reviewed. BP: ()/()   Arterial Line BP: ()/()       General:  unremarkable, age appropriate, casual attire, good eye contact, good rapport       Musculoskeletal  Muscle Strength/Tone:  no flaccidity, no tremor    Gait & Station:  normal      Psychiatric                       Speech:  normal tone, normal rate, rhythm, and volume   Mood &  "Affect:   stable         Thought Process:   Goal directed; Linear    Associations:   intact   Thought Content:   No SI/HI, delusions, or paranoia, no AV/VH   Insight & Judgement:   Good, adequate to circumstances   Orientation:   grossly intact; alert and oriented x 4    Memory:  intact for content of interview    Language:  grossly intact, can repeat    Attention Span  : Grossly intact for content of interview   Fund of Knowledge:   intact and appropriate to age and level of education        Assessment and Diagnosis   Status/Progress: Based on the examination today, the patient's problem(s) is/are adequately controlled.  New problems have not been presented today. Co-morbidities are not complicating management of the primary condition. There are no active rule-out diagnoses for this patient at this time.      Impression: Pt continues to be relatively stable with medication plan. Experiencing some grief with death of grandmother but appears to be coping effectively at this time. Will continue with medication plan as is for now and monitor moving forward.     Diagnosis:   1) Social Anxiety Disorder  2) Generalized Anxiety Disorder  3) Attention Deficit Hyperactivity Disorder  4) Autism Spectrum Disorder (Level "requiring substantial support")  Intervention/Counseling/Treatment Plan   Medication Management:      1. Prozac 40mg po qam    2. Adderall 10 mg po qam    3. alprazolam 0.25 mg PRN (cut in half - very infrequently).     4. Call to report any worsening of symptoms or problems with the medication. Pt instructed to go to ER with thoughts of harming self, others     5. Patient given contact # for psychotherapists at [facility] and also instructed to check with insurance for list of providers.     Psychotherapy: 20 min  Target symptoms: grief  Why chosen therapy is appropriate versus another modality: CBT used; relevant to diagnosis, patient responds to this modality  Outcome monitoring methods: self-report, " observation  Therapeutic intervention type: Supportive Therapy  Topics discussed/themes: building skills sets for symptom management, symptom recognition, nutrition, exercise  The patient's response to the intervention is accepting  Patient's response to treatment is: good.   The patient's progress toward treatment goals: improving     Return to clinic: 3 months    -Cognitive-Behavioral/Supportive therapy and psychoeducation provided  -R/B/SE's of medications discussed with the pt who expresses understanding and chooses to take medications as prescribed.   -Pt instructed to call clinic, 911 or go to nearest emergency room if sxs worsen or pt is in   crisis. The pt expresses understanding.    Jp Hines, PhD, MP     Antidepressant/Antianxiety Medication Initiation:  Patient informed of risks, benefits, and potential side effects of medication and accepts informed consent.  Common side effects include nausea, fatigue, headache, insomnia., Specifically discussed the possibility of new or worsening suicidal thoughts/depression.  Patient instructed to stop the medication immediately and seek urgent treatment if this occurs. Patient instructed not to abruptly discontinue medication without physician guidance except in cases of sudden onset or worsening of SI.       Stimulant Medication Initiation:  Patient advised of risks, benefits, and side effects of medication and accepts informed consent.  Common side effects include insomnia, irritability, jittery feeling, dry mouth, and agitation/hostility., Patient advised of potential addictive nature of medication and controlled substance classification.  Instructed to safeguard medication as no early refills can be given for lost or stolen medications.       Benzodiazepine Initiation:  Patient advised of the risks, benefits, and common side effects of medication and has accepted informed consent.  Common side effects include drowsiness, impaired coordination, possible  memory loss., Patient advised NOT to operate a vehicle or machinery untiil they are sure how the medication will affec them.  Client also advised of danger of mixing this medication with alcohol., Patient advised of potential addictive nature of medication and need to safeguard medication as no early refills for lost or stolen medications can be authorized.

## 2024-07-08 ENCOUNTER — OFFICE VISIT (OUTPATIENT)
Dept: RHEUMATOLOGY | Facility: CLINIC | Age: 29
End: 2024-07-08
Payer: COMMERCIAL

## 2024-07-08 DIAGNOSIS — D84.821 DRUG-INDUCED IMMUNODEFICIENCY: Primary | ICD-10-CM

## 2024-07-08 DIAGNOSIS — L40.50 PSORIATIC ARTHRITIS: ICD-10-CM

## 2024-07-08 DIAGNOSIS — Z79.899 DRUG-INDUCED IMMUNODEFICIENCY: Primary | ICD-10-CM

## 2024-07-08 DIAGNOSIS — L40.9 PSORIASIS: ICD-10-CM

## 2024-07-08 DIAGNOSIS — E66.01 MORBID OBESITY: ICD-10-CM

## 2024-07-08 PROCEDURE — 3044F HG A1C LEVEL LT 7.0%: CPT | Mod: CPTII,95,, | Performed by: STUDENT IN AN ORGANIZED HEALTH CARE EDUCATION/TRAINING PROGRAM

## 2024-07-08 PROCEDURE — 99214 OFFICE O/P EST MOD 30 MIN: CPT | Mod: 95,,, | Performed by: STUDENT IN AN ORGANIZED HEALTH CARE EDUCATION/TRAINING PROGRAM

## 2024-07-08 NOTE — PROGRESS NOTES
RHEUMATOLOGY OUTPATIENT CLINIC NOTE    7/8/2024    Attending Rheumatologist: Samantha Pace  Primary Care Provider: China Pierre MD   Physician Requesting Consultation: No referring provider defined for this encounter.  Chief Complaint/Reason For Consultation:  No chief complaint on file.      The patient location is: home   The chief complaint leading to consultation is: PSA and psoriasis   Visit type: Virtual visit with synchronous audio and video  Total time spent with patient: 10 min    Each patient to whom he or she provides medical services by telemedicine is:  (1) informed of the relationship between the physician and patient and the respective role of any other health care provider with respect to management of the patient; and (2) notified that he or she may decline to receive medical services by telemedicine and may withdraw from such care at any time.     Subjective:       HPI  Jm Chaparro is a 28 y.o. White male with IBS, anxiety, autism, who comes to transfer care for management of PsA.     Interim history   -last visit on 4/2024  -requested virtual visit today due to mother being sick,   -He is on otezla BID w/o side effects   -Went to [place] recently and did not have any flares.   -he reports having a rash now in his back, that started a couple of days ago. He is using regular moisturizers and improving. Mother does not think it looks like psoriasis.   -doing overall very good.     Disease history    He has diagnosis of psoriasis since age 13, then started having inflammatory joint pain in his 20s.  Reports that he has tried Humira in the past but did not tolerate due to upset stomach and worsened IBS.  Does not recall any other biologics.  Never been on methotrexate due to history of fatty liver.  Has been on Otezla for the past 3 years.  It appears that he did not tolerate 1 tablet BID due worsened IBS so he has been taking it only once a day per his previous  rheumatologist.     He has been doing overall well with his regimen. Notes mild flaring of psoriasis in scalp at times. He uses multiple OTC shampoos which control it overall. Has dry skin especially during winter time.     Review of Systems   Constitutional:  Negative for fever and unexpected weight change.   HENT:  Negative for mouth sores and trouble swallowing.    Eyes:  Negative for redness.   Respiratory:  Negative for cough and shortness of breath.    Cardiovascular:  Negative for chest pain.   Gastrointestinal:  Positive for constipation and diarrhea.   Genitourinary:  Negative for genital sores.   Integumentary:  Positive for rash.   Neurological:  Positive for headaches.   Hematological:  Does not bruise/bleed easily.        Chronic comorbid conditions affecting medical decision making today:  Past Medical History:   Diagnosis Date    ADD (attention deficit disorder)     Allergy     Anxiety disorder     Asperger's disorder     (AUTISM)    Deformity of both feet     Dyscalculia     Dysgraphia     Dyslexia     Eczema     Elevated LFTs     Fib4 score 0.58 3/24    Fatty liver     GERD (gastroesophageal reflux disease)     History of migraine headaches     Hypertension     Interstitial cystitis (chronic)     Irritable bowel syndrome     Nephrolithiasis     2014    PONV (postoperative nausea and vomiting)     Psoriasis     Psoriatic arthritis     Refusal of blood transfusion for reasons of conscience     Seasonal allergies     Sleep apnea     does not like CPAP    Special needs assessment      Past Surgical History:   Procedure Laterality Date    BIOPSY OF BLADDER  1/3/2022    Procedure: BIOPSY, BLADDER;  Surgeon: Derek Dickson MD;  Location: [facility] OR;  Service: Urology;;    COLONOSCOPY  ~2015    [facility]; told IBS    COLONOSCOPY  08/10/2015    Dr. Shaver; sent for scanning: unremarkable findings, no specimens collected    COLONOSCOPY N/A 2/18/2019    Procedure: COLONOSCOPY;  Surgeon: Puma Preston  MD Jeny;  Location: [facility] ENDO;  Service: Endoscopy;  Laterality: N/A;    CYSTOSCOPY  1/11/2023    Procedure: CYSTOSCOPY;  Surgeon: Trino Cain MD;  Location: [facility] OR 1ST FLR;  Service: Urology;;    CYSTOSCOPY,WITH BOTULINUM TOXIN INJECTION N/A 2/9/2023    Procedure: CYSTOSCOPY,WITH BOTULINUM TOXIN INJECTION;  Surgeon: Trino Cain MD;  Location: [facility] OR;  Service: Urology;  Laterality: N/A;    CYSTOSCOPY,WITH BOTULINUM TOXIN INJECTION N/A 5/3/2023    Procedure: CYSTOSCOPY,WITH BOTULINUM TOXIN INJECTION;  Surgeon: Trino Cain MD;  Location: [facility] OR;  Service: Urology;  Laterality: N/A;  30 min    CYSTOSCOPY,WITH BOTULINUM TOXIN INJECTION N/A 12/20/2023    Procedure: CYSTOSCOPY,WITH BOTULINUM TOXIN INJECTION;  Surgeon: Trino Cain MD;  Location: [facility] OR;  Service: Urology;  Laterality: N/A;  40 min   150 units    INSERTION, NEUROSTIMULATOR, TEMPORARY, SACRAL N/A 1/24/2023    Procedure: INSERTION, NEUROSTIMULATOR, TEMPORARY, SACRAL;  Surgeon: Trino Cain MD;  Location: [facility] OR 2ND FLR;  Service: Urology;  Laterality: N/A;  1 hr 45 mins    REMOVAL OF ELECTRODE LEAD OF SACRAL NERVE STIMULATOR N/A 2/9/2023    Procedure: REMOVAL, ELECTRODE LEAD, SACRAL NERVE STIMULATOR;  Surgeon: Trino Cain MD;  Location: [facility] OR;  Service: Urology;  Laterality: N/A;  1 hr    UPPER GASTROINTESTINAL ENDOSCOPY  05/14/2012    Dr. Preston    UPPER GASTROINTESTINAL ENDOSCOPY  07/17/2015    Dr. Shaver, sent for scanning: normal esophagus- dilated & biopsied, findings WNL, biopsies taken from z-line, stomach and duodenum; biopsy: duodenum WNL, antrum reactive gastropathy, negative for h pylori or int. metaplasia, GEJ WNL, negative for EOE & cotton's esophagus    ureteral stone extraction      basket extraction     Family History   Problem Relation Name Age of Onset    Lupus Mother      Interstitial cystitis Mother      Diabetes Maternal Grandmother      Lupus Maternal Grandmother      Clotting  disorder Maternal Grandmother      Interstitial cystitis Maternal Grandmother      Diabetes Maternal Grandfather      Nephrolithiasis Maternal Grandfather          staghorn    Anesthesia problems Maternal Aunt      Interstitial cystitis Maternal Aunt      Colon cancer Neg Hx      Crohn's disease Neg Hx      Ulcerative colitis Neg Hx      Celiac disease Neg Hx      Esophageal cancer Neg Hx      Stomach cancer Neg Hx      Allergic rhinitis Neg Hx      Allergies Neg Hx      Angioedema Neg Hx      Asthma Neg Hx      Atopy Neg Hx      Eczema Neg Hx      Immunodeficiency Neg Hx      Rhinitis Neg Hx      Urticaria Neg Hx       Social History     Substance and Sexual Activity   Alcohol Use No     Social History     Tobacco Use   Smoking Status Never   Smokeless Tobacco Never     Social History     Substance and Sexual Activity   Drug Use No       Current Outpatient Medications:     ALPRAZolam (XANAX) 0.25 MG tablet, Take 1 tablet (0.25 mg total) by mouth daily as needed for Anxiety., Disp: 15 tablet, Rfl: 0    apremilast (OTEZLA) 30 mg Tab, Take 1 tablet (30 mg total) by mouth 2 (two) times a day., Disp: 180 tablet, Rfl: 2    atorvastatin (LIPITOR) 20 MG tablet, TAKE 1 TABLET(20 MG) BY MOUTH EVERY DAY, Disp: 90 tablet, Rfl: 2    clobetasoL (TEMOVATE) 0.05 % external solution, Apply topically 2 (two) times daily as needed (scalp psoriasis)., Disp: 25 mL, Rfl: 2    dextroamphetamine-amphetamine (ADDERALL) 10 mg Tab, Take 1 tablet (10 mg total) by mouth once daily., Disp: 30 tablet, Rfl: 0    FLUoxetine 40 MG capsule, Take 1 capsule (40 mg total) by mouth once daily., Disp: 90 capsule, Rfl: 3    gabapentin (NEURONTIN) 300 MG capsule, Take 300 mg by mouth 3 (three) times daily., Disp: , Rfl:     metoprolol succinate (TOPROL-XL) 25 MG 24 hr tablet, Take 1 tablet (25 mg total) by mouth once daily., Disp: 60 tablet, Rfl: 2    PREVIDENT 5000 BOOSTER PLUS 1.1 % Pste, , Disp: , Rfl:     rizatriptan (MAXALT-MLT) 10 MG disintegrating  tablet, Take 10 mg by mouth daily as needed., Disp: , Rfl:     traMADoL (ULTRAM) 50 mg tablet, Take 1 tablet (50 mg total) by mouth every 12 (twelve) hours as needed for Pain. Medically necessary for more than 7 days, ICD 10: G89.4, Disp: 55 tablet, Rfl: 2    ubidecarenone (CO Q-10 ORAL), Take by mouth once daily., Disp: , Rfl:     UROGESIC-BLUE 81.6-40.8-0.12 mg Tab, TAKE 1 TABLET BY MOUTH THREE TIMES DAILY AS NEEDED FOR DYSURIA, Disp: 60 tablet, Rfl: 4    VITAMIN D2 1,250 mcg (50,000 unit) capsule, Take 1 capsule (50,000 Units total) by mouth every 7 days., Disp: 12 capsule, Rfl: 2  No current facility-administered medications for this visit.    Facility-Administered Medications Ordered in Other Visits:     electrolyte-S (ISOLYTE), , Intravenous, Continuous, Dale Petit MD     Objective:         There were no vitals filed for this visit.    Physical Exam   Constitutional: He is oriented to person, place, and time. He appears well-developed.   HENT:   Head: Normocephalic.   Pulmonary/Chest: Effort normal.   Musculoskeletal:      Cervical back: Neck supple.      Comments: No synovitis on examination    Lymphadenopathy:     He has no cervical adenopathy.   Neurological: He is alert and oriented to person, place, and time.   Skin: No rash noted.   Dry skin   Mild flaky areas in the scalp with no overt plaque     Vitals reviewed.    Virtual visit 7/8: no rashes in face.     Reviewed old and all outside pertinent medical records available.    All lab results personally reviewed and interpreted by me.  Lab Results   Component Value Date    WBC 10.07 03/23/2024    HGB 15.8 03/23/2024    HCT 47.9 03/23/2024    MCV 96 03/23/2024    MCH 31.7 (H) 03/23/2024    MCHC 33.0 03/23/2024    RDW 13.0 03/23/2024     03/23/2024    MPV 12.3 03/23/2024       Lab Results   Component Value Date     03/23/2024    K 4.2 03/23/2024     03/23/2024    CO2 26 03/23/2024    GLU 92 03/23/2024    BUN 12 03/23/2024    CALCIUM  "9.9 03/23/2024    PROT 7.1 05/27/2024    ALBUMIN 3.9 05/27/2024    BILITOT 0.5 05/27/2024    AST 27 05/27/2024    ALKPHOS 83 05/27/2024    ALT 45 (H) 05/27/2024       Lab Results   Component Value Date    COLORU Yellow 10/25/2022    APPEARANCEUA Cloudy (A) 01/16/2022    SPECGRAV >=1.030 10/25/2022    PHUR 5.5 10/25/2022    PROTEINUA 1+ (A) 01/16/2022    GLUCOSEU NEGATIVE 12/06/2021    KETONESU Negative 10/25/2022    LEUKOCYTESUR Trace (A) 01/16/2022    NITRITE Negative 10/25/2022    UROBILINOGEN 0.2 10/25/2022       Lab Results   Component Value Date    CRP 1.4 06/13/2020       Lab Results   Component Value Date    RF <10.0 07/09/2016    SEDRATE 10 06/13/2020    CCPANTIBODIE <0.5 07/09/2016       No components found for: "25OHVITDTOT", "98RMFKTE4", "94WSWQPH9", "METHODNOTE"    Lab Results   Component Value Date    URICACID 5.8 10/10/2017       Lab Results   Component Value Date    HEPCAB Negative 10/10/2017       Imaging:  All imaging reviewed and independently interpreted by me.         ASSESSMENT / PLAN:     Jm Chaparro is a 28 y.o. White male with  IBS, anxiety, autism, who comes to transfer care for management of PsA    1. Psoriatic arthritis  -Stable  -No synovitis on examination during last visit. He reports doing well  -Continue otezla 30 mg QD for now. May consider trying to increase to BID and assess response  -Can consider try another TNF in the future. Has IBS so will need to be careful with IL17 but not total contraindication     2. Psoriasis  -Overall stable with minor flares on the scalp  -Continue using clobetasol topical lotion in the scalp as needed  -advised him on daily moisturizers and avoid hot showers especially during the winter time  -advised him on the use of ketoconazole shampoo and H&S cooling mist.     3. Drug induced immunodeficiency  - recent labs reviewed  - vaccines per guidelines   - immunosuppression/infectious precautions reinforced    4.Obesity  - would benefit from " decreasing at least 10% of body weight.  - recommended goal of losing 1 lb per week.  - consider nutritionist evaluation.  - would consider screening for MARIJA per PMD.    Follow up in about 3 months (around 10/8/2024).    Method of contact with patient concerns: [facility]    Disclaimer:  This note is prepared using voice recognition software and as such is likely to have errors and has not been proof read. Please contact me for questions.     Time spent: 15 minutes in face to face discussion concerning diagnosis, prognosis, review of lab and test results, benefits of treatment as well as management of disease, counseling of patient and coordination of care between various health care providers.  Greater than half the time spent was used for coordination of care and counseling of patient.    Samantha Pace M.D.  Rheumatology  [facility]

## 2024-07-25 ENCOUNTER — PATIENT MESSAGE (OUTPATIENT)
Dept: RHEUMATOLOGY | Facility: CLINIC | Age: 29
End: 2024-07-25
Payer: COMMERCIAL

## 2024-07-25 DIAGNOSIS — R39.89 BLADDER PAIN: ICD-10-CM

## 2024-07-25 DIAGNOSIS — N32.89 BLADDER SPASM: Primary | ICD-10-CM

## 2024-07-26 RX ORDER — ERGOCALCIFEROL 1.25 1/1
50000 CAPSULE ORAL
Qty: 12 CAPSULE | Refills: 2 | Status: SHIPPED | OUTPATIENT
Start: 2024-07-26

## 2024-07-26 RX ORDER — GABAPENTIN 300 MG/1
300 CAPSULE ORAL 3 TIMES DAILY
Status: CANCELLED | OUTPATIENT
Start: 2024-07-26

## 2024-07-26 NOTE — TELEPHONE ENCOUNTER
Care Due:                  Date            Visit Type   Department     Provider  --------------------------------------------------------------------------------                                EP -                              PRIMARY      [facility]    China Gallardonan  Last Visit: 03-      CARE ([facility])   MEDICINE       Anger                              EP -                              PRIMARY      [facility]    China Estrella Gabby  Next Visit: 08-      CARE ([facility])   MEDICINE       Anger                                                            Last  Test          Frequency    Reason                     Performed    Due Date  --------------------------------------------------------------------------------    Vitamin D...  12 months..  VITAMIN..................  Not Found    Overdue    [employer] Embedded Care Due Messages. Reference number: [id number].   7/25/2024 7:12:54 PM CDT

## 2024-07-29 ENCOUNTER — PATIENT MESSAGE (OUTPATIENT)
Dept: FAMILY MEDICINE | Facility: CLINIC | Age: 29
End: 2024-07-29
Payer: COMMERCIAL

## 2024-07-30 DIAGNOSIS — F90.0 ADHD (ATTENTION DEFICIT HYPERACTIVITY DISORDER), INATTENTIVE TYPE: ICD-10-CM

## 2024-07-30 RX ORDER — GABAPENTIN 300 MG/1
300 CAPSULE ORAL 3 TIMES DAILY
Qty: 90 CAPSULE | Refills: 0 | Status: SHIPPED | OUTPATIENT
Start: 2024-07-30 | End: 2024-08-29

## 2024-07-30 RX ORDER — DEXTROAMPHETAMINE SACCHARATE, AMPHETAMINE ASPARTATE, DEXTROAMPHETAMINE SULFATE AND AMPHETAMINE SULFATE 2.5; 2.5; 2.5; 2.5 MG/1; MG/1; MG/1; MG/1
10 TABLET ORAL DAILY
Qty: 30 TABLET | Refills: 0 | Status: SHIPPED | OUTPATIENT
Start: 2024-07-30

## 2024-08-12 ENCOUNTER — TELEPHONE (OUTPATIENT)
Dept: PAIN MEDICINE | Facility: CLINIC | Age: 29
End: 2024-08-12
Payer: COMMERCIAL

## 2024-08-15 ENCOUNTER — PATIENT MESSAGE (OUTPATIENT)
Dept: FAMILY MEDICINE | Facility: CLINIC | Age: 29
End: 2024-08-15

## 2024-08-15 ENCOUNTER — TELEPHONE (OUTPATIENT)
Dept: FAMILY MEDICINE | Facility: CLINIC | Age: 29
End: 2024-08-15

## 2024-08-15 ENCOUNTER — OFFICE VISIT (OUTPATIENT)
Dept: FAMILY MEDICINE | Facility: CLINIC | Age: 29
End: 2024-08-15
Payer: COMMERCIAL

## 2024-08-15 VITALS
HEART RATE: 88 BPM | SYSTOLIC BLOOD PRESSURE: 127 MMHG | HEIGHT: 73 IN | BODY MASS INDEX: 30.48 KG/M2 | WEIGHT: 230 LBS | DIASTOLIC BLOOD PRESSURE: 101 MMHG

## 2024-08-15 DIAGNOSIS — I10 HYPERTENSION, ESSENTIAL: Primary | ICD-10-CM

## 2024-08-15 DIAGNOSIS — R79.89 ELEVATED LFTS: ICD-10-CM

## 2024-08-15 DIAGNOSIS — E78.5 HYPERLIPIDEMIA, UNSPECIFIED HYPERLIPIDEMIA TYPE: ICD-10-CM

## 2024-08-15 PROCEDURE — 3044F HG A1C LEVEL LT 7.0%: CPT | Mod: CPTII,95,, | Performed by: FAMILY MEDICINE

## 2024-08-15 PROCEDURE — 1159F MED LIST DOCD IN RCRD: CPT | Mod: CPTII,95,, | Performed by: FAMILY MEDICINE

## 2024-08-15 PROCEDURE — 3080F DIAST BP >= 90 MM HG: CPT | Mod: CPTII,95,, | Performed by: FAMILY MEDICINE

## 2024-08-15 PROCEDURE — 99213 OFFICE O/P EST LOW 20 MIN: CPT | Mod: 95,,, | Performed by: FAMILY MEDICINE

## 2024-08-15 PROCEDURE — 3074F SYST BP LT 130 MM HG: CPT | Mod: CPTII,95,, | Performed by: FAMILY MEDICINE

## 2024-08-15 PROCEDURE — 1160F RVW MEDS BY RX/DR IN RCRD: CPT | Mod: CPTII,95,, | Performed by: FAMILY MEDICINE

## 2024-08-15 PROCEDURE — 3008F BODY MASS INDEX DOCD: CPT | Mod: CPTII,95,, | Performed by: FAMILY MEDICINE

## 2024-08-15 PROCEDURE — G2211 COMPLEX E/M VISIT ADD ON: HCPCS | Mod: 95,,, | Performed by: FAMILY MEDICINE

## 2024-08-15 RX ORDER — METOPROLOL SUCCINATE 50 MG/1
50 TABLET, EXTENDED RELEASE ORAL 2 TIMES DAILY
Qty: 60 TABLET | Refills: 1 | Status: SHIPPED | OUTPATIENT
Start: 2024-08-15 | End: 2025-08-15

## 2024-08-15 NOTE — PROGRESS NOTES
Subjective:       Patient ID: Jm Chaparro is a 28 y.o. male.    Chief Complaint: Follow-up    HPI  The patient is 28-year-old with Asperger's who is here today virtually for follow-up.  He had an in-person appointment but his mom is sick and could not bring him today.  He tells me that he is doing okay.  His grandmother recently  and that has been hard.  Since her death, he has been spending time with his grandfather.  He was supposed to get an aide for 30 hours a week but there was some mixup with the paperwork and they are now having to start this process over again.    Regarding his hypertension, today his blood pressure was 127/101.  He is taking Toprol-XL 25 mg twice a day.  He believes his blood pressure has been high because of the stress since his grandmother's death.    Regarding his hyperlipidemia, he is taking his Lipitor consistently.  His recent total cholesterol was 164 and his LDL was 93.    Regarding his fatty liver and elevated LFTs, he is trying to watch his diet.  In March, his AST was 48 and his ALT was 87.  In May his AST was 27 and his ALT was 45.    Of note, he has not been experiencing palpitations.    Review of Systems   Constitutional:  Negative for activity change and unexpected weight change.   HENT:  Negative for hearing loss, rhinorrhea and trouble swallowing.    Eyes:  Negative for discharge and visual disturbance.   Respiratory:  Negative for chest tightness and wheezing.    Cardiovascular:  Negative for chest pain and palpitations.   Gastrointestinal:  Positive for constipation and diarrhea. Negative for blood in stool and vomiting.   Endocrine: Negative for polydipsia and polyuria.   Genitourinary:  Positive for urgency. Negative for difficulty urinating and hematuria.   Musculoskeletal:  Positive for arthralgias and joint swelling. Negative for neck pain.   Neurological:  Positive for weakness and headaches.   Psychiatric/Behavioral:  Negative for confusion,  "dysphoric mood, self-injury, sleep disturbance and suicidal ideas. The patient is not nervous/anxious and is not hyperactive.          Objective:      Physical Exam  Constitutional:       General: He is not in acute distress.     Appearance: Normal appearance.   Neurological:      Mental Status: He is alert.   Psychiatric:         Attention and Perception: Attention and perception normal.         Mood and Affect: Mood and affect normal.         Speech: Speech normal.         Behavior: Behavior normal. Behavior is cooperative.         Thought Content: Thought content normal.         Cognition and Memory: Cognition and memory normal.         Judgment: Judgment normal.       Blood pressure (!) 127/101, pulse 88, height 6' 1" (1.854 m), weight 104.3 kg (230 lb).Body mass index is 30.34 kg/m².          A/P:  1) hypertension.  Suboptimally controlled.  We are going to increase his Toprol-XL to 50 mg twice a day.  His mom will let us know what his blood pressure and pulse are tomorrow.  2) hyperlipidemia.  Well controlled.  Continue with Lipitor.  Recheck labs next year   3) fatty liver with elevated LFTs.  Improved.  We will continue to monitor this and recheck labs next year    As long as he does well and his blood pressure improves with the higher dose of the Toprol, I will see him back in 6 months or sooner if needed         The patient location is: home  The chief complaint leading to consultation is Follow-up     Visit type: Virtual visit with synchronous audio and video    Each patient to whom he or she provides medical services by telemedicine is:  (1) informed of the relationship between the physician and patient and the respective role of any other health care provider with respect to management of the patient; and (2) notified that he or she may decline to receive medical services by telemedicine and may withdraw from such care at any time.    I spent 21 minutes on this encounter.  This time includes " face-to-face time and non-face to face time including previsit chart review, obtaining and/or reviewing separately obtained history, documenting clinical information in the electronic or other health record, independently interpreting results and communicating results to the patient/family/caregiver, or care coordinator.

## 2024-08-20 ENCOUNTER — OFFICE VISIT (OUTPATIENT)
Dept: PAIN MEDICINE | Facility: CLINIC | Age: 29
End: 2024-08-20
Payer: COMMERCIAL

## 2024-08-20 VITALS
SYSTOLIC BLOOD PRESSURE: 135 MMHG | DIASTOLIC BLOOD PRESSURE: 91 MMHG | WEIGHT: 233.94 LBS | HEIGHT: 73 IN | BODY MASS INDEX: 31 KG/M2 | HEART RATE: 70 BPM

## 2024-08-20 DIAGNOSIS — M79.641 BILATERAL HAND PAIN: ICD-10-CM

## 2024-08-20 DIAGNOSIS — M06.9 RHEUMATOID ARTHRITIS INVOLVING MULTIPLE SITES, UNSPECIFIED WHETHER RHEUMATOID FACTOR PRESENT: ICD-10-CM

## 2024-08-20 DIAGNOSIS — M79.642 BILATERAL HAND PAIN: ICD-10-CM

## 2024-08-20 DIAGNOSIS — R39.89 BLADDER PAIN: ICD-10-CM

## 2024-08-20 DIAGNOSIS — L40.50 PSORIATIC ARTHRITIS: Primary | ICD-10-CM

## 2024-08-20 DIAGNOSIS — N30.10 IC (INTERSTITIAL CYSTITIS): ICD-10-CM

## 2024-08-20 PROCEDURE — 3080F DIAST BP >= 90 MM HG: CPT | Mod: CPTII,S$GLB,,

## 2024-08-20 PROCEDURE — 3044F HG A1C LEVEL LT 7.0%: CPT | Mod: CPTII,S$GLB,,

## 2024-08-20 PROCEDURE — 99999 PR PBB SHADOW E&M-EST. PATIENT-LVL III: CPT | Mod: PBBFAC,,,

## 2024-08-20 PROCEDURE — 99214 OFFICE O/P EST MOD 30 MIN: CPT | Mod: S$GLB,,,

## 2024-08-20 PROCEDURE — 3008F BODY MASS INDEX DOCD: CPT | Mod: CPTII,S$GLB,,

## 2024-08-20 PROCEDURE — 3075F SYST BP GE 130 - 139MM HG: CPT | Mod: CPTII,S$GLB,,

## 2024-08-20 PROCEDURE — 1159F MED LIST DOCD IN RCRD: CPT | Mod: CPTII,S$GLB,,

## 2024-08-20 NOTE — PROGRESS NOTES
This note was completed with dictation software and grammatical errors may exist.    Chief Complaint   Patient presents with    Bladder Pain        HPI: Jm Chaparro is a 29 y.o. year old male patient who has a past medical history of ADD (attention deficit disorder), Allergy, Anxiety disorder, Asperger's disorder, Deformity of both feet, Dyscalculia, Dysgraphia, Dyslexia, Eczema, Elevated LFTs, Fatty liver, GERD (gastroesophageal reflux disease), History of migraine headaches, Hypertension, Interstitial cystitis (chronic), Irritable bowel syndrome, Nephrolithiasis, PONV (postoperative nausea and vomiting), Psoriasis, Psoriatic arthritis, Refusal of blood transfusion for reasons of conscience, Seasonal allergies, Sleep apnea, and Special needs assessment. He presents in referral from No ref. provider found for abdominal and bladder pain.  He returns for follow-up with his continued abdominal pain, generalized arthritic pain, bladder pain, hand pain.  Overall, at baseline, no new or significantly worsening pain, pain waxes and wanes.  He has completed formal physical therapy in the past and maintains his at-home PT directed exercises with added benefit.  He continues to take 1 tablet of tramadol in the evening and 1/2 tablet in the morning.  He denies any ill side effects or adverse events with this medication.  Recent vacation to [place], was very good..      Previous history:  The patient's mother was present with him at the visit today who also helps with most of the history.  She reports that in March, 2015 she had gone out of town on a business trip and her son accompanying her.  Unfortunately he came down with cold and virus symptoms at the time and began developing severe abdominal pain.  She went to the emergency department at that time in [place], they were told he was having viral symptoms.  She returned to the [facility] and had followed up with several other physicians that year.  She reports  that initially he was having intermittent pain on and off, would have pain for a week and then it would disappear without cause.  He then developed diarrhea and constipation at times, he was seen at [facility] in [place] in 2015 and diagnosed with IBS with constipation and diarrhea.  They recommend an antidepressant that he tried, no relief with this.  Shortly after he began having renal calculi as well and needed to present to the emergency department.  He had seen neurology, Dr. Larry and reports that he required stone removal, does not sound like he has had lithotripsy.  He has seen Dr. Dickson, and now Nephrology, Dr. Enriquez.  He has seen Gastroenterology, Dr. Shaver and had undergone colonoscopy with no significant findings.  He has been tried on multiple medications over time as discussed below without any benefit.  He continues to have frequent abdominal pain that is often worse with caffeine or other foods, worse with activity.  He reports that he gets some relief with a bowel movement.  He still has been passing small sand like renal calculi, they report that his urine turns dark when he is about to pass a stone and he often has pain throughout the bladder and urethra after passing a stone.    He has a history of rheumatoid arthritis, has been seeing a rheumatologist, reports pain throughout his entire spine, bilateral hips, shoulders, knees, feet.  He does well with being in a bath with hot water.    Pain intervention history:  No injections    Spine surgeries:  None    Antineuropathics: Gabapentin 300mg three times daily, was given this for inflammatory joint pain??  NSAIDs: Mobic 15, no benefit  Physical therapy:  Has done some physical therapy, water therapy in the past with some improvement but things worsened when he did land-based therapy and stretching  Antidepressants: Fluoxetine 40mg  Muscle relaxers:  Opioids:  The patient reports having benefit from tramadol in the  "past  Antiplatelets/Anticoagulants:  From Dr. Vyas note:  Prozac 40mg po qam, adderall xr 20mg po qam (very rare use), lunesta 2mg po qhs PRN insomnia  Past Psych Meds: adderall ("robot child"), focalin ("zombie"), strattera (angry), zoloft (as a child- ineffective), lexapro (ineffective) topomax ("migraines"), celexa (overly sedating but effective), remeron (ineffective- wgt gain), atarax, elavil (ineffective for pain), ritalin (inc'd bp)     ROS:  He reports fatigue, weight gain, itching, color change, headaches head trauma, ear pain, constipation, diarrhea, stomach pain, nausea, flank pain, urinary urgency and frequency, painful urination, joint stiffness, joint swelling, back pain, difficulty sleeping and anxiety.  Balance of review of systems is negative.    Lab Results   Component Value Date    HGBA1C 5.5 03/23/2024       Lab Results   Component Value Date    WBC 10.07 03/23/2024    HGB 15.8 03/23/2024    HCT 47.9 03/23/2024    MCV 96 03/23/2024     03/23/2024       Past Medical History:   Diagnosis Date    ADD (attention deficit disorder)     Allergy     Anxiety disorder     Asperger's disorder     (AUTISM)    Deformity of both feet     Dyscalculia     Dysgraphia     Dyslexia     Eczema     Elevated LFTs     Fib4 score 0.58 3/24    Fatty liver     GERD (gastroesophageal reflux disease)     History of migraine headaches     Hypertension     Interstitial cystitis (chronic)     Irritable bowel syndrome     Nephrolithiasis     2014    PONV (postoperative nausea and vomiting)     Psoriasis     Psoriatic arthritis     Refusal of blood transfusion for reasons of conscience     Seasonal allergies     Sleep apnea     does not like CPAP    Special needs assessment        Past Surgical History:   Procedure Laterality Date    BIOPSY OF BLADDER  1/3/2022    Procedure: BIOPSY, BLADDER;  Surgeon: Derek Dickson MD;  Location: [facility] OR;  Service: Urology;;    COLONOSCOPY  ~2015    [facility]; told IBS    " COLONOSCOPY  08/10/2015    Dr. Shaver; sent for scanning: unremarkable findings, no specimens collected    COLONOSCOPY N/A 2/18/2019    Procedure: COLONOSCOPY;  Surgeon: Puma Preston Jr., MD;  Location: [facility];  Service: Endoscopy;  Laterality: N/A;    CYSTOSCOPY  1/11/2023    Procedure: CYSTOSCOPY;  Surgeon: Trino Cain MD;  Location: [facility] OR 1ST FLR;  Service: Urology;;    CYSTOSCOPY,WITH BOTULINUM TOXIN INJECTION N/A 2/9/2023    Procedure: CYSTOSCOPY,WITH BOTULINUM TOXIN INJECTION;  Surgeon: Trino Cain MD;  Location: [facility] OR;  Service: Urology;  Laterality: N/A;    CYSTOSCOPY,WITH BOTULINUM TOXIN INJECTION N/A 5/3/2023    Procedure: CYSTOSCOPY,WITH BOTULINUM TOXIN INJECTION;  Surgeon: Trino Cain MD;  Location: [facility] OR;  Service: Urology;  Laterality: N/A;  30 min    CYSTOSCOPY,WITH BOTULINUM TOXIN INJECTION N/A 12/20/2023    Procedure: CYSTOSCOPY,WITH BOTULINUM TOXIN INJECTION;  Surgeon: Trino Cain MD;  Location: [facility] OR;  Service: Urology;  Laterality: N/A;  40 min   150 units    INSERTION, NEUROSTIMULATOR, TEMPORARY, SACRAL N/A 1/24/2023    Procedure: INSERTION, NEUROSTIMULATOR, TEMPORARY, SACRAL;  Surgeon: Trino Cain MD;  Location: [facility] OR 2ND FLR;  Service: Urology;  Laterality: N/A;  1 hr 45 mins    REMOVAL OF ELECTRODE LEAD OF SACRAL NERVE STIMULATOR N/A 2/9/2023    Procedure: REMOVAL, ELECTRODE LEAD, SACRAL NERVE STIMULATOR;  Surgeon: Trino Cain MD;  Location: [facility] OR;  Service: Urology;  Laterality: N/A;  1 hr    UPPER GASTROINTESTINAL ENDOSCOPY  05/14/2012    Dr. Preston    UPPER GASTROINTESTINAL ENDOSCOPY  07/17/2015    Dr. Shaver, sent for scanning: normal esophagus- dilated & biopsied, findings WNL, biopsies taken from z-line, stomach and duodenum; biopsy: duodenum WNL, antrum reactive gastropathy, negative for h pylori or int. metaplasia, GEJ WNL, negative for EOE & cotton's esophagus    ureteral stone extraction      basket extraction  "      Social History     Socioeconomic History    Marital status: Single   Tobacco Use    Smoking status: Never    Smokeless tobacco: Never   Substance and Sexual Activity    Alcohol use: No    Drug use: No    Sexual activity: Never     Social Determinants of Health     Financial Resource Strain: Low Risk  (3/15/2024)    Overall Financial Resource Strain (CARDIA)     Difficulty of Paying Living Expenses: Not very hard   Food Insecurity: No Food Insecurity (3/15/2024)    Hunger Vital Sign     Worried About Running Out of Food in the Last Year: Never true     Ran Out of Food in the Last Year: Never true   Transportation Needs: No Transportation Needs (3/15/2024)    PRAPARE - Transportation     Lack of Transportation (Medical): No     Lack of Transportation (Non-Medical): No   Physical Activity: Insufficiently Active (3/15/2024)    Exercise Vital Sign     Days of Exercise per Week: 1 day     Minutes of Exercise per Session: 20 min   Stress: Stress Concern Present (3/15/2024)    [employer] - Occupational Stress Questionnaire     Feeling of Stress : To some extent   Housing Stability: Low Risk  (3/15/2024)    Housing Stability Vital Sign     Unable to Pay for Housing in the Last Year: No     Number of Places Lived in the Last Year: 1     Unstable Housing in the Last Year: No       Medications/Allergies: See med card    Vitals:    08/20/24 1515   BP: (!) 135/91   Pulse: 70   Weight: 106.1 kg (233 lb 14.5 oz)   Height: 6' 1" (1.854 m)   PainSc: 0-No pain         Body mass index is 30.86 kg/m².    Physical exam:  Gen: A and O x3, pleasant, well-groomed  Skin: No rashes or obvious lesions  HEENT: PERRLA, no obvious deformities on ears or in canals. Trachea midline.  CVS: Regular rate and rhythm, normal palpable pulses.  Resp:No increased work of breathing, symmetrical chest rise.  Abdomen: Soft, NT/ND.  Musculoskeletal:  Moving all extremities equally    Upper extremity strength:  5/5 " bilaterally  Lower extremity strength:  5/5 bilaterally      Imagin22 CT renal protocol:  Liver normal enhancement with no focal lesion.  Gallbladder, pancreas, stomach, spleen, adrenal glands normal.  Kidneys normal size and contour with no nephrolithiasis, hydronephrosis, or ureteral obstruction.  Aorta tapers normally.  No bowel obstruction, ascites, inflammatory change.  Appendix normal.  Bladder and rectum normal.  No significant bladder wall thickening evident.  Bones are intact.  Lung bases clear.    Assessment:  Jm Chaparro is a 28 y.o. year old male patient who has a past medical history of Abnormal thyroid function test, ADD (attention deficit disorder), Anxiety disorder, Asperger's disorder, Deformity of both feet, Dyscalculia, Dysgraphia, Dyslexia, Eczema, Fatty liver, GERD (gastroesophageal reflux disease), History of migraine headaches, Hypertension, Interstitial cystitis (chronic), Irritable bowel syndrome, Nephrolithiasis, PONV (postoperative nausea and vomiting), Psoriasis, Psoriatic arthritis, Refusal of blood transfusion for reasons of conscience, Seasonal allergies, and Special needs assessment. He presents in referral from Dr. Derek Dickson for bladder pain.    1. Psoriatic arthritis        2. Bilateral hand pain        3. Bladder pain        4. IC (interstitial cystitis)        5. Rheumatoid arthritis involving multiple sites, unspecified whether rheumatoid factor present                Plan:  Refills for tramadol 50 mg sent to Dr. Gonzalez today for approval. I have reviewed the [place] Board of Pharmacy website and there are no abberancies.    He will continue with his at-home PT directed exercises.  Discussed if his pain worsens or he is not finding significant relief with this we can returned to formal physical therapy.  He also joined a gym but has not been able to attend due to social obligations.  Discussed this is also a good alternative/adjunctive  therapy.  Follow up in 3 months or sooner if needed.

## 2024-08-21 ENCOUNTER — PATIENT MESSAGE (OUTPATIENT)
Dept: FAMILY MEDICINE | Facility: CLINIC | Age: 29
End: 2024-08-21
Payer: COMMERCIAL

## 2024-08-21 VITALS — SYSTOLIC BLOOD PRESSURE: 119 MMHG | DIASTOLIC BLOOD PRESSURE: 72 MMHG

## 2024-08-21 RX ORDER — TRAMADOL HYDROCHLORIDE 50 MG/1
50 TABLET ORAL EVERY 12 HOURS PRN
Qty: 55 TABLET | Refills: 2 | Status: SHIPPED | OUTPATIENT
Start: 2024-08-21

## 2024-08-25 DIAGNOSIS — F90.0 ADHD (ATTENTION DEFICIT HYPERACTIVITY DISORDER), INATTENTIVE TYPE: ICD-10-CM

## 2024-08-26 RX ORDER — DEXTROAMPHETAMINE SACCHARATE, AMPHETAMINE ASPARTATE, DEXTROAMPHETAMINE SULFATE AND AMPHETAMINE SULFATE 2.5; 2.5; 2.5; 2.5 MG/1; MG/1; MG/1; MG/1
10 TABLET ORAL DAILY
Qty: 30 TABLET | Refills: 0 | Status: SHIPPED | OUTPATIENT
Start: 2024-08-26

## 2024-08-29 ENCOUNTER — PATIENT MESSAGE (OUTPATIENT)
Dept: RHEUMATOLOGY | Facility: CLINIC | Age: 29
End: 2024-08-29
Payer: COMMERCIAL

## 2024-08-30 ENCOUNTER — PATIENT MESSAGE (OUTPATIENT)
Dept: PSYCHIATRY | Facility: CLINIC | Age: 29
End: 2024-08-30
Payer: COMMERCIAL

## 2024-09-03 ENCOUNTER — OFFICE VISIT (OUTPATIENT)
Dept: PSYCHIATRY | Facility: CLINIC | Age: 29
End: 2024-09-03
Payer: COMMERCIAL

## 2024-09-03 DIAGNOSIS — F41.1 GENERALIZED ANXIETY DISORDER: ICD-10-CM

## 2024-09-03 DIAGNOSIS — F41.1 GAD (GENERALIZED ANXIETY DISORDER): ICD-10-CM

## 2024-09-03 DIAGNOSIS — F90.0 ADHD (ATTENTION DEFICIT HYPERACTIVITY DISORDER), INATTENTIVE TYPE: ICD-10-CM

## 2024-09-03 DIAGNOSIS — F40.10 SOCIAL ANXIETY DISORDER: Primary | ICD-10-CM

## 2024-09-03 DIAGNOSIS — F84.0 AUTISM SPECTRUM DISORDER REQUIRING SUBSTANTIAL SUPPORT (LEVEL 2): ICD-10-CM

## 2024-09-03 PROCEDURE — 1159F MED LIST DOCD IN RCRD: CPT | Mod: CPTII,95,, | Performed by: PSYCHOLOGIST

## 2024-09-03 PROCEDURE — 99214 OFFICE O/P EST MOD 30 MIN: CPT | Mod: 95,,, | Performed by: PSYCHOLOGIST

## 2024-09-03 PROCEDURE — 3044F HG A1C LEVEL LT 7.0%: CPT | Mod: CPTII,95,, | Performed by: PSYCHOLOGIST

## 2024-09-03 PROCEDURE — G2211 COMPLEX E/M VISIT ADD ON: HCPCS | Mod: 95,,, | Performed by: PSYCHOLOGIST

## 2024-09-03 RX ORDER — FLUOXETINE HYDROCHLORIDE 40 MG/1
40 CAPSULE ORAL DAILY
Qty: 90 CAPSULE | Refills: 1 | Status: SHIPPED | OUTPATIENT
Start: 2024-09-03

## 2024-09-03 RX ORDER — DEXTROAMPHETAMINE SACCHARATE, AMPHETAMINE ASPARTATE, DEXTROAMPHETAMINE SULFATE AND AMPHETAMINE SULFATE 3.75; 3.75; 3.75; 3.75 MG/1; MG/1; MG/1; MG/1
15 TABLET ORAL DAILY
Qty: 30 TABLET | Refills: 0 | Status: SHIPPED | OUTPATIENT
Start: 2024-09-03

## 2024-09-03 NOTE — PROGRESS NOTES
"The patient location is:  [place], [place]  The patient location [place] is: [place]  The patient phone number is: [phone]  Visit type: Virtual visit with synchronous audio and video  Each patient to whom he or she provides medical services by telemedicine is:  (1) informed of the relationship between the physician and patient and the respective role of any other health care provider with respect to management of the patient; and (2) notified that he or she may decline to receive medical services by telemedicine and may withdraw from such care at any time.     Outpatient Psychiatry Follow-Up Visit    Clinical Status of Patient: Outpatient (Ambulatory)  09/03/2024     Chief Complaint: 26 y/o male presenting with his mother today for a follow-up.       Interval History and Content of Current Session:  Interim Events/Subjective Report/Content of Current Session:  follow-up appointment.    Pt is a 26 y/o male with past psychiatric hx of anxiety, ADHD, ASD who presents for follow-up treatment. Pt reported that his PCP increased dose of bp medication. Stated that it increases fatigue. Went to [place] last month and enjoyed the experience. Pt reported that he received the letter indicating his charges have been exonerated. Pt reported that he has had some difficulty concentrating and gets easily distracted. Tries to break down complicated tasks into multiple steps that are manageable.     Past Psychiatric hx: adderall ("robot child"), focalin ("zombie"), strattera (angry), zoloft (as a child- ineffective), lexapro (ineffective), topomax ("migraines"), celexa (overly sedating but effective), remeron (ineffective- wgt gain), atarax, elavil (ineffective for pain), ritalin (inc'd bp)     Past Medical hx:   Past Medical History:   Diagnosis Date    ADD (attention deficit disorder)     Allergy     Anxiety disorder     Asperger's disorder     (AUTISM)    Deformity of both feet     Dyscalculia     Dysgraphia     " Dyslexia     Eczema     Elevated LFTs     Fib4 score 0.58 3/24    Fatty liver     GERD (gastroesophageal reflux disease)     History of migraine headaches     Hypertension     Interstitial cystitis (chronic)     Irritable bowel syndrome     Nephrolithiasis     2014    PONV (postoperative nausea and vomiting)     Psoriasis     Psoriatic arthritis     Refusal of blood transfusion for reasons of conscience     Seasonal allergies     Sleep apnea     does not like CPAP    Special needs assessment         Interim hx:  Medication changes last visit: None  Anxiety: mild  Depression: stable     Denies suicidal/homicidal ideations.  Denies hopelessness/worthlessness.    Denies auditory/visual hallucinations      Alcohol: Pt denied  Drug: Pt denied  Caffeine: not assessed  Tobacco: Pt denied      Review of Systems   PSYCHIATRIC: Pertinent items are noted in the narrative.        CONSTITUTIONAL: weight stable    Past Medical, Family and Social History: The patient's past medical, family and social history have been reviewed and updated as appropriate within the electronic medical record. See encounter notes.     Current Psychiatric Medication:  Prozac 40mg po qam, Adderall 10 mg pt qam (PRN about once a month), alprazolam 0.25 mg PRN (cut in half - very infrequently).     Compliance: yes      Side effects: Pt denies     Risk Parameters:  Patient reports no suicidal ideation  Patient reports no homicidal ideation  Patient reports no self-injurious behavior  Patient reports no violent behavior     Exam (detailed: at least 9 elements; comprehensive: all 15 elements)   Constitutional  Vitals:  Most recent vital signs, dated less than 90 days prior to this appointment, were reviewed. BP: ()/()   Arterial Line BP: ()/()       General:  unremarkable, age appropriate, casual attire, good eye contact, good rapport       Musculoskeletal  Muscle Strength/Tone:  no flaccidity, no tremor    Gait & Station:  normal      Psychiatric            "            Speech:  normal tone, normal rate, rhythm, and volume   Mood & Affect:   stable         Thought Process:   Goal directed; Linear    Associations:   intact   Thought Content:   No SI/HI, delusions, or paranoia, no AV/VH   Insight & Judgement:   Good, adequate to circumstances   Orientation:   grossly intact; alert and oriented x 4    Memory:  intact for content of interview    Language:  grossly intact, can repeat    Attention Span  : Grossly intact for content of interview   Fund of Knowledge:   intact and appropriate to age and level of education        Assessment and Diagnosis   Status/Progress: Based on the examination today, the patient's problem(s) is/are adequately controlled.  New problems have not been presented today. Co-morbidities are not complicating management of the primary condition. There are no active rule-out diagnoses for this patient at this time.      Impression: Pt's mood and anxiety continue to be relatively stable; however, pt is experiencing more ADHD symptoms. Will increase dose of Adderall to 15 mg and continue with medication plan.    Diagnosis:   1) Social Anxiety Disorder  2) Generalized Anxiety Disorder  3) Attention Deficit Hyperactivity Disorder  4) Autism Spectrum Disorder (Level "requiring substantial support")  Intervention/Counseling/Treatment Plan   Medication Management:      1. Prozac 40mg po qam    2. Increase dose of Adderall to 15 mg po qam    3. alprazolam 0.25 mg PRN (cut in half - very infrequently).     4. Call to report any worsening of symptoms or problems with the medication. Pt instructed to go to ER with thoughts of harming self, others     5. Patient given contact # for psychotherapists at [facility], [place], operated by [facility] and also instructed to check with insurance for list of providers.     Psychotherapy:   Target symptoms: grief  Why chosen therapy is appropriate versus another modality: CBT used; relevant to diagnosis, patient responds to this modality  Outcome " monitoring methods: self-report, observation  Therapeutic intervention type: Supportive Therapy  Topics discussed/themes: building skills sets for symptom management, symptom recognition, nutrition, exercise  The patient's response to the intervention is accepting  Patient's response to treatment is: good.   The patient's progress toward treatment goals: improving     Return to clinic: 1 month    -Cognitive-Behavioral/Supportive therapy and psychoeducation provided  -R/B/SE's of medications discussed with the pt who expresses understanding and chooses to take medications as prescribed.   -Pt instructed to call clinic, 911 or go to nearest emergency room if sxs worsen or pt is in   crisis. The pt expresses understanding.    Jp Hines, PhD, MP     Antidepressant/Antianxiety Medication Initiation:  Patient informed of risks, benefits, and potential side effects of medication and accepts informed consent.  Common side effects include nausea, fatigue, headache, insomnia., Specifically discussed the possibility of new or worsening suicidal thoughts/depression.  Patient instructed to stop the medication immediately and seek urgent treatment if this occurs. Patient instructed not to abruptly discontinue medication without physician guidance except in cases of sudden onset or worsening of SI.       Stimulant Medication Initiation:  Patient advised of risks, benefits, and side effects of medication and accepts informed consent.  Common side effects include insomnia, irritability, jittery feeling, dry mouth, and agitation/hostility., Patient advised of potential addictive nature of medication and controlled substance classification.  Instructed to safeguard medication as no early refills can be given for lost or stolen medications.       Benzodiazepine Initiation:  Patient advised of the risks, benefits, and common side effects of medication and has accepted informed consent.  Common side effects include drowsiness,  impaired coordination, possible memory loss., Patient advised NOT to operate a vehicle or machinery untiil they are sure how the medication will affec them.  Client also advised of danger of mixing this medication with alcohol., Patient advised of potential addictive nature of medication and need to safeguard medication as no early refills for lost or stolen medications can be authorized.

## 2024-09-17 ENCOUNTER — PATIENT MESSAGE (OUTPATIENT)
Dept: UROLOGY | Facility: CLINIC | Age: 29
End: 2024-09-17
Payer: COMMERCIAL

## 2024-09-17 DIAGNOSIS — N39.41 URGENCY INCONTINENCE: Primary | ICD-10-CM

## 2024-09-18 ENCOUNTER — TELEPHONE (OUTPATIENT)
Dept: UROLOGY | Facility: CLINIC | Age: 29
End: 2024-09-18
Payer: COMMERCIAL

## 2024-09-18 DIAGNOSIS — N39.41 URGENCY INCONTINENCE: ICD-10-CM

## 2024-09-18 DIAGNOSIS — N32.81 OAB (OVERACTIVE BLADDER): Primary | ICD-10-CM

## 2024-09-18 NOTE — TELEPHONE ENCOUNTER
9/18/24 spoke to patients mother to schedule patient in for surgical procedure I was able to get a good date for the patient and got the patient scheduled in

## 2024-09-23 ENCOUNTER — LAB VISIT (OUTPATIENT)
Dept: LAB | Facility: HOSPITAL | Age: 29
End: 2024-09-23
Attending: UROLOGY
Payer: COMMERCIAL

## 2024-09-23 DIAGNOSIS — N39.41 URGENCY INCONTINENCE: ICD-10-CM

## 2024-09-23 PROCEDURE — 87086 URINE CULTURE/COLONY COUNT: CPT | Performed by: UROLOGY

## 2024-09-23 PROCEDURE — 87088 URINE BACTERIA CULTURE: CPT | Performed by: UROLOGY

## 2024-09-24 RX ORDER — DEXTROAMPHETAMINE SACCHARATE, AMPHETAMINE ASPARTATE, DEXTROAMPHETAMINE SULFATE AND AMPHETAMINE SULFATE 3.75; 3.75; 3.75; 3.75 MG/1; MG/1; MG/1; MG/1
15 TABLET ORAL DAILY
Qty: 30 TABLET | Refills: 0 | Status: SHIPPED | OUTPATIENT
Start: 2024-09-24

## 2024-09-24 NOTE — TELEPHONE ENCOUNTER
Care Due:                  Date            Visit Type   Department     Provider  --------------------------------------------------------------------------------                                ESTABLISHED                              PATIENT -    [facility]    China Estrella Gabby  Last Visit: 08-      VIRTUAL      MEDICINE       Anger                              EP -                              PRIMARY      [facility]    China Delores Gabby  Next Visit: 02-      CARE ([facility])   MEDICINE       Anger                                                            Last  Test          Frequency    Reason                     Performed    Due Date  --------------------------------------------------------------------------------    Vitamin D...  12 months..  VITAMIN..................  Not Found    Overdue    [employer] Embedded Care Due Messages. Reference number: [id number].   9/23/2024 8:52:12 PM CDT

## 2024-09-24 NOTE — TELEPHONE ENCOUNTER
Refill Routing Note   Medication(s) are not appropriate for processing by [facility] for the following reason(s):        Clarification of medication (Rx) details    [facility] action(s):  Defer     Requires labs : Yes      Medication Therapy Plan: FOVS; Reported by Patient on 8/20/2024:  Take 50 mg by mouth.; DEFER TO YOU FOR REVIEW      Appointments  past 12m or future 3m with PCP    Date Provider   Last Visit   8/15/2024 China Pierre MD   Next Visit   2/17/2025 China Pierre MD   ED visits in past 90 days: 0        Note composed:11:49 AM 09/24/2024

## 2024-09-25 ENCOUNTER — PATIENT MESSAGE (OUTPATIENT)
Dept: UROLOGY | Facility: CLINIC | Age: 29
End: 2024-09-25
Payer: COMMERCIAL

## 2024-09-25 LAB — BACTERIA UR CULT: ABNORMAL

## 2024-09-25 RX ORDER — CLINDAMYCIN HYDROCHLORIDE 300 MG/1
300 CAPSULE ORAL EVERY 6 HOURS
Qty: 12 CAPSULE | Refills: 0 | Status: SHIPPED | OUTPATIENT
Start: 2024-09-25 | End: 2024-09-28

## 2024-09-26 RX ORDER — ATORVASTATIN CALCIUM 20 MG/1
20 TABLET, FILM COATED ORAL DAILY
Qty: 90 TABLET | Refills: 2 | Status: SHIPPED | OUTPATIENT
Start: 2024-09-26

## 2024-10-03 NOTE — PRE-PROCEDURE INSTRUCTIONS
Unable to reach patient via phone. Left message with pre procedure instructions and arrival time. The following was sent to pt portal.    Dear Jm ,     You are scheduled for a procedure with Dr. Cain on 10/4/2024. Your scheduled arrival time is 9: 30 am.  This arrival time is roughly 2 hours before your anticipated procedure time to allow sufficient time for pre-op.  Please wear comfortable clothing  This procedure will take place at the [facility] at the corner of [facility].  It [facility] to [facility]. The address is:     [address] [place] [address].  [place] [id number]     After entering the building, proceed to the second floor and check in with registration.      Your fasting instructions are as follows:     Nothing to eat after Midnight the evening before your surgery.   You may drink clear liquids up until 2 hours prior to your arrival time.      You MUST have a responsible adult to bring you home. Your surgery will be cancelled if you do not have a ride. (UBER or TAXI WILL NOT BE ACCEPTED AS A RESPONSIBLE RIDE)     Please hold the following medications the morning of your procedure:  -Aspirin and Aspirin-containing products (Goody's powder, Excedrin)  -NSAIDs (Advil, Ibuprofen, Aleve, Diclofenac)  -Vitamins/Supplements   -Herbal remedies/Teas  -Stimulants (Adderall, Vyvanse, Adipex)  -Diabetic medication   -Prescription creams/gels/lotions        *May take Tylenol if needed         The evening before and morning of your procedure, take a shower using antibacterial soap (ex: Hibiclens or Dial antibacterial soap). DO NOT apply deodorant, lotion, cologne, or anything else to the skin. Do not wear jewelry or bring any valuables with you.  .     Please do not wear contact lenses the day of your procedure.   Bring any inhalers that you may need.     If you have any procedure-specific questions, please call your surgeon's office. Any other  questions, don't hesitate to call at [phone]     Thanks,  Pre-Admit Testing  Anesthesia Dept OC

## 2024-10-04 ENCOUNTER — HOSPITAL ENCOUNTER (OUTPATIENT)
Facility: HOSPITAL | Age: 29
Discharge: HOME | End: 2024-10-04
Attending: UROLOGY | Admitting: UROLOGY
Payer: COMMERCIAL

## 2024-10-04 ENCOUNTER — ANESTHESIA EVENT (OUTPATIENT)
Dept: SURGERY | Facility: HOSPITAL | Age: 29
End: 2024-10-04
Payer: COMMERCIAL

## 2024-10-04 ENCOUNTER — ANESTHESIA (OUTPATIENT)
Dept: SURGERY | Facility: HOSPITAL | Age: 29
End: 2024-10-04
Payer: COMMERCIAL

## 2024-10-04 VITALS
OXYGEN SATURATION: 95 % | WEIGHT: 233.94 LBS | BODY MASS INDEX: 31 KG/M2 | HEART RATE: 72 BPM | RESPIRATION RATE: 16 BRPM | SYSTOLIC BLOOD PRESSURE: 122 MMHG | TEMPERATURE: 98 F | HEIGHT: 73 IN | DIASTOLIC BLOOD PRESSURE: 76 MMHG

## 2024-10-04 DIAGNOSIS — N39.41 URGE INCONTINENCE: Primary | ICD-10-CM

## 2024-10-04 DIAGNOSIS — N39.41 URGENCY INCONTINENCE: ICD-10-CM

## 2024-10-04 PROBLEM — N32.81 OAB (OVERACTIVE BLADDER): Status: ACTIVE | Noted: 2024-10-04

## 2024-10-04 PROCEDURE — 71000033 HC RECOVERY, INTIAL HOUR: Performed by: UROLOGY

## 2024-10-04 PROCEDURE — 52287 CYSTOSCOPY CHEMODENERVATION: CPT | Mod: ,,, | Performed by: UROLOGY

## 2024-10-04 PROCEDURE — 63600175 PHARM REV CODE 636 W HCPCS: Performed by: NURSE ANESTHETIST, CERTIFIED REGISTERED

## 2024-10-04 PROCEDURE — 63600175 PHARM REV CODE 636 W HCPCS: Performed by: ANESTHESIOLOGY

## 2024-10-04 PROCEDURE — 63600175 PHARM REV CODE 636 W HCPCS: Mod: JG | Performed by: UROLOGY

## 2024-10-04 PROCEDURE — 36000707: Performed by: UROLOGY

## 2024-10-04 PROCEDURE — 25000003 PHARM REV CODE 250: Performed by: NURSE ANESTHETIST, CERTIFIED REGISTERED

## 2024-10-04 PROCEDURE — 63600175 PHARM REV CODE 636 W HCPCS: Mod: JZ,JG | Performed by: UROLOGY

## 2024-10-04 PROCEDURE — 25000003 PHARM REV CODE 250: Performed by: UROLOGY

## 2024-10-04 PROCEDURE — 87086 URINE CULTURE/COLONY COUNT: CPT | Performed by: UROLOGY

## 2024-10-04 PROCEDURE — 71000015 HC POSTOP RECOV 1ST HR: Performed by: UROLOGY

## 2024-10-04 PROCEDURE — 37000008 HC ANESTHESIA 1ST 15 MINUTES: Performed by: UROLOGY

## 2024-10-04 PROCEDURE — D9220A PRA ANESTHESIA: Mod: ,,, | Performed by: NURSE ANESTHETIST, CERTIFIED REGISTERED

## 2024-10-04 PROCEDURE — 37000009 HC ANESTHESIA EA ADD 15 MINS: Performed by: UROLOGY

## 2024-10-04 PROCEDURE — 99900035 HC TECH TIME PER 15 MIN (STAT)

## 2024-10-04 PROCEDURE — 36000706: Performed by: UROLOGY

## 2024-10-04 PROCEDURE — 94761 N-INVAS EAR/PLS OXIMETRY MLT: CPT

## 2024-10-04 RX ORDER — DEXAMETHASONE SODIUM PHOSPHATE 4 MG/ML
INJECTION, SOLUTION INTRA-ARTICULAR; INTRALESIONAL; INTRAMUSCULAR; INTRAVENOUS; SOFT TISSUE
Status: DISCONTINUED | OUTPATIENT
Start: 2024-10-04 | End: 2024-10-07

## 2024-10-04 RX ORDER — GLUCAGON 1 MG
1 KIT INJECTION
Status: DISCONTINUED | OUTPATIENT
Start: 2024-10-04 | End: 2024-10-07 | Stop reason: HOSPADM

## 2024-10-04 RX ORDER — CLINDAMYCIN PHOSPHATE 900 MG/50ML
900 INJECTION, SOLUTION INTRAVENOUS ONCE
Status: COMPLETED | OUTPATIENT
Start: 2024-10-04 | End: 2024-10-04

## 2024-10-04 RX ORDER — MIDAZOLAM HYDROCHLORIDE 1 MG/ML
INJECTION INTRAMUSCULAR; INTRAVENOUS
Status: DISCONTINUED | OUTPATIENT
Start: 2024-10-04 | End: 2024-10-07

## 2024-10-04 RX ORDER — ONDANSETRON HYDROCHLORIDE 2 MG/ML
4 INJECTION, SOLUTION INTRAVENOUS DAILY PRN
Status: DISCONTINUED | OUTPATIENT
Start: 2024-10-04 | End: 2024-10-07 | Stop reason: HOSPADM

## 2024-10-04 RX ORDER — PROCHLORPERAZINE EDISYLATE 5 MG/ML
5 INJECTION INTRAMUSCULAR; INTRAVENOUS EVERY 30 MIN PRN
Status: DISCONTINUED | OUTPATIENT
Start: 2024-10-04 | End: 2024-10-07 | Stop reason: HOSPADM

## 2024-10-04 RX ORDER — FENTANYL CITRATE 50 UG/ML
25 INJECTION, SOLUTION INTRAMUSCULAR; INTRAVENOUS EVERY 5 MIN PRN
Status: DISCONTINUED | OUTPATIENT
Start: 2024-10-04 | End: 2024-10-07 | Stop reason: HOSPADM

## 2024-10-04 RX ORDER — PROPOFOL 10 MG/ML
VIAL (ML) INTRAVENOUS CONTINUOUS PRN
Status: DISCONTINUED | OUTPATIENT
Start: 2024-10-04 | End: 2024-10-07

## 2024-10-04 RX ORDER — PROPOFOL 10 MG/ML
VIAL (ML) INTRAVENOUS
Status: DISCONTINUED | OUTPATIENT
Start: 2024-10-04 | End: 2024-10-07

## 2024-10-04 RX ORDER — KETAMINE HCL IN 0.9 % NACL 50 MG/5 ML
SYRINGE (ML) INTRAVENOUS
Status: DISCONTINUED | OUTPATIENT
Start: 2024-10-04 | End: 2024-10-07

## 2024-10-04 RX ORDER — HYDROMORPHONE HYDROCHLORIDE 1 MG/ML
0.2 INJECTION, SOLUTION INTRAMUSCULAR; INTRAVENOUS; SUBCUTANEOUS EVERY 5 MIN PRN
Status: DISCONTINUED | OUTPATIENT
Start: 2024-10-04 | End: 2024-10-07 | Stop reason: HOSPADM

## 2024-10-04 RX ORDER — LIDOCAINE HYDROCHLORIDE 20 MG/ML
INJECTION INTRAVENOUS
Status: DISCONTINUED | OUTPATIENT
Start: 2024-10-04 | End: 2024-10-07

## 2024-10-04 RX ORDER — SODIUM CHLORIDE 9 MG/ML
INJECTION, SOLUTION INTRAVENOUS CONTINUOUS
Status: DISCONTINUED | OUTPATIENT
Start: 2024-10-04 | End: 2024-10-07 | Stop reason: HOSPADM

## 2024-10-04 RX ORDER — SULFAMETHOXAZOLE AND TRIMETHOPRIM 800; 160 MG/1; MG/1
1 TABLET ORAL 2 TIMES DAILY
Qty: 6 TABLET | Refills: 0 | Status: SHIPPED | OUTPATIENT
Start: 2024-10-04 | End: 2024-10-07

## 2024-10-04 RX ORDER — ONDANSETRON HYDROCHLORIDE 2 MG/ML
INJECTION, SOLUTION INTRAVENOUS
Status: DISCONTINUED | OUTPATIENT
Start: 2024-10-04 | End: 2024-10-07

## 2024-10-04 RX ORDER — OXYCODONE AND ACETAMINOPHEN 5; 325 MG/1; MG/1
1 TABLET ORAL
Status: DISCONTINUED | OUTPATIENT
Start: 2024-10-04 | End: 2024-10-07 | Stop reason: HOSPADM

## 2024-10-04 RX ORDER — FENTANYL CITRATE 50 UG/ML
INJECTION, SOLUTION INTRAMUSCULAR; INTRAVENOUS
Status: DISCONTINUED | OUTPATIENT
Start: 2024-10-04 | End: 2024-10-07

## 2024-10-04 RX ADMIN — ONDANSETRON 4 MG: 2 INJECTION INTRAMUSCULAR; INTRAVENOUS at 12:10

## 2024-10-04 RX ADMIN — DEXAMETHASONE SODIUM PHOSPHATE 4 MG: 4 INJECTION INTRA-ARTICULAR; INTRALESIONAL; INTRAMUSCULAR; INTRAVENOUS; SOFT TISSUE at 12:10

## 2024-10-04 RX ADMIN — CLINDAMYCIN IN 5 PERCENT DEXTROSE 900 MG: 18 INJECTION, SOLUTION INTRAVENOUS at 11:10

## 2024-10-04 RX ADMIN — SODIUM CHLORIDE: 9 INJECTION, SOLUTION INTRAVENOUS at 10:10

## 2024-10-04 RX ADMIN — LIDOCAINE HYDROCHLORIDE 100 MG: 20 INJECTION INTRAVENOUS at 11:10

## 2024-10-04 RX ADMIN — MIDAZOLAM HYDROCHLORIDE 2 MG: 1 INJECTION, SOLUTION INTRAMUSCULAR; INTRAVENOUS at 11:10

## 2024-10-04 RX ADMIN — PROPOFOL 150 MCG/KG/MIN: 10 INJECTION, EMULSION INTRAVENOUS at 11:10

## 2024-10-04 RX ADMIN — Medication 20 MG: at 11:10

## 2024-10-04 RX ADMIN — PROPOFOL 80 MG: 10 INJECTION, EMULSION INTRAVENOUS at 11:10

## 2024-10-04 RX ADMIN — FENTANYL CITRATE 25 MCG: 50 INJECTION, SOLUTION INTRAMUSCULAR; INTRAVENOUS at 12:10

## 2024-10-04 RX ADMIN — PROPOFOL 20 MG: 10 INJECTION, EMULSION INTRAVENOUS at 12:10

## 2024-10-04 RX ADMIN — FENTANYL CITRATE 25 MCG: 50 INJECTION, SOLUTION INTRAMUSCULAR; INTRAVENOUS at 11:10

## 2024-10-04 RX ADMIN — SODIUM CHLORIDE: 0.9 INJECTION, SOLUTION INTRAVENOUS at 10:10

## 2024-10-04 NOTE — PLAN OF CARE
Pt in preop bay 33, VSS, and IV inserted. Pt denies any open wounds on body or the use of any weight loss injections. Pt needs procedural consents signed, anesthesia consents signed, updated H&P, and admit order, otherwise ready to roll.

## 2024-10-04 NOTE — DISCHARGE SUMMARY
[facility]  Discharge Note  Short Stay    Procedure(s) (LRB):  CYSTOSCOPY,WITH BOTULINUM TOXIN INJECTION (N/A)      OUTCOME: Patient tolerated treatment/procedure well without complication and is now ready for discharge.    DISPOSITION: Home or Self Care    FINAL DIAGNOSIS: Overactive Bladder    FOLLOWUP: In clinic    DISCHARGE INSTRUCTIONS:  No discharge procedures on file.     TIME SPENT ON DISCHARGE: 15 minutes

## 2024-10-04 NOTE — ANESTHESIA PREPROCEDURE EVALUATION
10/04/2024  Jm Chaparro is a 29 y.o., male for CYSTOSCOPY,WITH BOTULINUM TOXIN INJECTION   Pre-op Assessment       I have reviewed the Medications.     Review of Systems  Anesthesia Hx:             Denies Family Hx of Anesthesia complications.     Cardiovascular:     Hypertension                                        Pulmonary:        Sleep Apnea                Renal/:  Chronic Renal Disease                Hepatic/GI:     GERD Liver Disease,            Psych:  Psychiatric History                  Physical Exam  General: Well nourished    Airway:  Mallampati: II   TM Distance: Normal  Neck ROM: Normal ROM    Dental:  Intact    Chest/Lungs:  Clear to auscultation    Heart:  Rate: Normal  Rhythm: Regular Rhythm        Anesthesia Plan  Type of Anesthesia, risks & benefits discussed:    Anesthesia Type: Gen Natural Airway  Intra-op Monitoring Plan: Standard ASA Monitors  Post Op Pain Control Plan: multimodal analgesia  Informed Consent: Informed consent signed with the Patient and all parties understand the risks and agree with anesthesia plan.  All questions answered.   ASA Score: 2    Ready For Surgery From Anesthesia Perspective.     .

## 2024-10-04 NOTE — TRANSFER OF CARE
"Anesthesia Transfer of Care Note    Patient: Jm Chaparro    Procedure(s) Performed: Procedure(s) (LRB):  CYSTOSCOPY,WITH BOTULINUM TOXIN INJECTION (N/A)    Patient location: PACU    Anesthesia Type: general    Transport from OR: Transported from OR on 6-10 L/min O2 by face mask with adequate spontaneous ventilation    Post pain: adequate analgesia    Post assessment: no apparent anesthetic complications    Post vital signs: stable    Level of consciousness: sedated    Nausea/Vomiting: no nausea/vomiting    Complications: none    Transfer of care protocol was followed    Last vitals: Visit Vitals  /75 (BP Location: Right arm, Patient Position: Lying)   Pulse (!) 56   Temp 36.4 °C (97.6 °F) (Temporal)   Resp 16   Ht 6' 1" (1.854 m)   Wt 106.1 kg (233 lb 14.5 oz)   SpO2 95%   BMI 30.86 kg/m²     "

## 2024-10-04 NOTE — OP NOTE
Cystoscopy Operative Note  10/4/2024    Preoperative Diagnosis:   Refractory Overactive Bladder  Urgency Incontinence    Postoperative Diagnosis:  Refractory Overactive Bladder  Urgency Incontinence    Procedure:  Cystoscopy with injection for chemodenervation of the bladder with Botox (150 units) (47118)    Attending Surgeon: Trino Cain MD    Anesthesia: Monitored Anesthesia Care    EBL: <5 mL    Complications: None    Findings: Normal bladder and urethra    Drains: None    Reason for procedure: Jm Chaparro is a very pleasant 29 y.o. male who presented with a history of  refractory overactive bladder  and was scheduled for cystoscopy for evaluation and management.  Improved frequency though considerable voiding difficulty with 200 units injected. He is emptying well enough to avoid catheterization but we decreased to 150 for his injection last year. Will repeat 150 unit injection.     Procedure in Detail:  Informed consent was obtained by explaining all risks and benefits of the procedure to the patient in detail.  After informed consent, the patient was brought to the suite where Monitored Anesthesia Carewas administered prior to initiation of the invasive procedure. Appropriate perioperative antibiotics were given within 30 minutes of beginning the procedure. A formal timeout was performed prior to the procedure. The patient was gently placed in lithotomy position with all pressure points padded. Bilateral sequential compression devices were applied and activated. The patient was prepped and draped in standard fashion.    A 19-Nepali rigid cystoscope was passed per urethra into the bladder. Inspection of the bladder demonstrated a normal bladder. We then injected 150 units of onabotulinum toxin A (Botox) using a dedicated needle ([employer]) set to a depth of 2 mm. A total of 30 injections were used throughout the bladder including the bladder base and trigone.     He tolerated the procedure  well and was transferred in stable condition to the recovery room.     We will plan to see him back in 6 months for continued evaluation.

## 2024-10-05 LAB — BACTERIA UR CULT: NO GROWTH

## 2024-10-07 NOTE — ANESTHESIA POSTPROCEDURE EVALUATION
Anesthesia Post Evaluation    Patient: Jm Chaparro    Procedure(s) Performed: Procedure(s) (LRB):  CYSTOSCOPY,WITH BOTULINUM TOXIN INJECTION (N/A)    Final Anesthesia Type: general      Patient location during evaluation: PACU  Patient participation: Yes- Able to Participate  Level of consciousness: awake and alert  Post-procedure vital signs: reviewed and stable  Pain management: adequate  Airway patency: patent    PONV status at discharge: No PONV  Anesthetic complications: no      Cardiovascular status: stable  Respiratory status: spontaneous ventilation  Hydration status: euvolemic  Follow-up not needed.              Vitals Value Taken Time   /76 10/04/24 1247   Temp 36.4 °C (97.6 °F) 10/04/24 1222   Pulse 69 10/04/24 1300   Resp 16 10/04/24 1300   SpO2 90 % 10/04/24 1300   Vitals shown include unfiled device data.      Event Time   Out of Recovery 12:50:00         Pain/Antonio Score: No data recorded

## 2024-10-08 ENCOUNTER — OFFICE VISIT (OUTPATIENT)
Dept: RHEUMATOLOGY | Facility: CLINIC | Age: 29
End: 2024-10-08
Payer: COMMERCIAL

## 2024-10-08 DIAGNOSIS — L40.50 PSORIATIC ARTHRITIS: Primary | ICD-10-CM

## 2024-10-08 DIAGNOSIS — Z79.899 DRUG-INDUCED IMMUNODEFICIENCY: ICD-10-CM

## 2024-10-08 DIAGNOSIS — L40.9 PSORIASIS: ICD-10-CM

## 2024-10-08 DIAGNOSIS — D84.821 DRUG-INDUCED IMMUNODEFICIENCY: ICD-10-CM

## 2024-10-08 DIAGNOSIS — E66.01 MORBID OBESITY: ICD-10-CM

## 2024-10-08 PROCEDURE — 99214 OFFICE O/P EST MOD 30 MIN: CPT | Mod: 95,,, | Performed by: STUDENT IN AN ORGANIZED HEALTH CARE EDUCATION/TRAINING PROGRAM

## 2024-10-08 PROCEDURE — 3044F HG A1C LEVEL LT 7.0%: CPT | Mod: CPTII,95,, | Performed by: STUDENT IN AN ORGANIZED HEALTH CARE EDUCATION/TRAINING PROGRAM

## 2024-10-08 RX ORDER — DEXTROAMPHETAMINE SACCHARATE, AMPHETAMINE ASPARTATE, DEXTROAMPHETAMINE SULFATE AND AMPHETAMINE SULFATE 3.75; 3.75; 3.75; 3.75 MG/1; MG/1; MG/1; MG/1
15 TABLET ORAL DAILY
Qty: 30 TABLET | Refills: 0 | Status: SHIPPED | OUTPATIENT
Start: 2024-10-08

## 2024-10-08 NOTE — PROGRESS NOTES
RHEUMATOLOGY OUTPATIENT CLINIC NOTE    10/8/2024    Attending Rheumatologist: Samantha Pace  Primary Care Provider: China Pierre MD   Physician Requesting Consultation: No referring provider defined for this encounter.  Chief Complaint/Reason For Consultation:  No chief complaint on file.    The patient location is: home   The chief complaint leading to consultation is: PSA and psoriasis   Visit type: Virtual visit with synchronous audio and video  Total time spent with patient: 7 min    Each patient to whom he or she provides medical services by telemedicine is:  (1) informed of the relationship between the physician and patient and the respective role of any other health care provider with respect to management of the patient; and (2) notified that he or she may decline to receive medical services by telemedicine and may withdraw from such care at any time.     Subjective:       HPI  Jm Chaparro is a 29 y.o. White male with IBS, anxiety, autism, who comes to transfer care for management of PsA.     Interim history   -Last visit on 7/2024  -Changed in person bienvenido to virtual as mom is sick. He had stomach bug a couple of days ago and now improving.   -He is on otezla QD w/o side effects   -doing overall very good.     Disease history    He has diagnosis of psoriasis since age 13, then started having inflammatory joint pain in his 20s.  Reports that he has tried Humira in the past but did not tolerate due to upset stomach and worsened IBS.  Does not recall any other biologics.  Never been on methotrexate due to history of fatty liver.  Has been on Otezla for the past 3 years.  It appears that he did not tolerate 1 tablet BID due worsened IBS so he has been taking it only once a day per his previous rheumatologist.     He has been doing overall well with his regimen. Notes mild flaring of psoriasis in scalp at times. He uses multiple OTC shampoos which control it overall. Has dry skin  especially during winter time.     Review of Systems   Constitutional:  Negative for fever and unexpected weight change.   HENT:  Negative for mouth sores and trouble swallowing.    Eyes:  Negative for redness.   Respiratory:  Negative for cough and shortness of breath.    Cardiovascular:  Negative for chest pain.   Gastrointestinal:  Positive for constipation and diarrhea.   Genitourinary:  Negative for genital sores.   Integumentary:  Positive for rash.   Neurological:  Positive for headaches.   Hematological:  Does not bruise/bleed easily.        Chronic comorbid conditions affecting medical decision making today:  Past Medical History:   Diagnosis Date    ADD (attention deficit disorder)     Allergy     Anxiety disorder     Asperger's disorder     (AUTISM)    Deformity of both feet     Dyscalculia     Dysgraphia     Dyslexia     Eczema     Elevated LFTs     Fib4 score 0.58 3/24    Fatty liver     GERD (gastroesophageal reflux disease)     History of migraine headaches     Hypertension     Interstitial cystitis (chronic)     Irritable bowel syndrome     Nephrolithiasis     2014    PONV (postoperative nausea and vomiting)     Psoriasis     Psoriatic arthritis     Refusal of blood transfusion for reasons of conscience     Seasonal allergies     Sleep apnea     does not like CPAP    Special needs assessment      Past Surgical History:   Procedure Laterality Date    BIOPSY OF BLADDER  1/3/2022    Procedure: BIOPSY, BLADDER;  Surgeon: Derek Dickson MD;  Location: [facility] OR;  Service: Urology;;    COLONOSCOPY  ~2015    [facility] ER; told IBS    COLONOSCOPY  08/10/2015    Dr. Shaver; sent for scanning: unremarkable findings, no specimens collected    COLONOSCOPY N/A 2/18/2019    Procedure: COLONOSCOPY;  Surgeon: Puma Preston Jr., MD;  Location: [facility];  Service: Endoscopy;  Laterality: N/A;    CYSTOSCOPY  1/11/2023    Procedure: CYSTOSCOPY;  Surgeon: Trino Cain MD;  Location: [address] FLR;   Service: Urology;;    CYSTOSCOPY,WITH BOTULINUM TOXIN INJECTION N/A 2/9/2023    Procedure: CYSTOSCOPY,WITH BOTULINUM TOXIN INJECTION;  Surgeon: Trino Cain MD;  Location: [facility] OR;  Service: Urology;  Laterality: N/A;    CYSTOSCOPY,WITH BOTULINUM TOXIN INJECTION N/A 5/3/2023    Procedure: CYSTOSCOPY,WITH BOTULINUM TOXIN INJECTION;  Surgeon: Trino Cain MD;  Location: [facility] OR;  Service: Urology;  Laterality: N/A;  30 min    CYSTOSCOPY,WITH BOTULINUM TOXIN INJECTION N/A 12/20/2023    Procedure: CYSTOSCOPY,WITH BOTULINUM TOXIN INJECTION;  Surgeon: Trino Cain MD;  Location: [facility] OR;  Service: Urology;  Laterality: N/A;  40 min   150 units    CYSTOSCOPY,WITH BOTULINUM TOXIN INJECTION N/A 10/4/2024    Procedure: CYSTOSCOPY,WITH BOTULINUM TOXIN INJECTION;  Surgeon: Trino Cain MD;  Location: [facility] OR;  Service: Urology;  Laterality: N/A;  150 units Botox    INSERTION, NEUROSTIMULATOR, TEMPORARY, SACRAL N/A 1/24/2023    Procedure: INSERTION, NEUROSTIMULATOR, TEMPORARY, SACRAL;  Surgeon: Trino Cain MD;  Location: [address];  Service: Urology;  Laterality: N/A;  1 hr 45 mins    REMOVAL OF ELECTRODE LEAD OF SACRAL NERVE STIMULATOR N/A 2/9/2023    Procedure: REMOVAL, ELECTRODE LEAD, SACRAL NERVE STIMULATOR;  Surgeon: Trino Cain MD;  Location: [facility];  Service: Urology;  Laterality: N/A;  1 hr    UPPER GASTROINTESTINAL ENDOSCOPY  05/14/2012    Dr. Preston    UPPER GASTROINTESTINAL ENDOSCOPY  07/17/2015    Dr. Shaver, sent for scanning: normal esophagus- dilated & biopsied, findings WNL, biopsies taken from z-line, stomach and duodenum; biopsy: duodenum WNL, antrum reactive gastropathy, negative for h pylori or int. metaplasia, GEJ WNL, negative for EOE & cotton's esophagus    ureteral stone extraction      basket extraction     Family History   Problem Relation Name Age of Onset    Lupus Mother      Interstitial cystitis Mother      Diabetes Maternal Grandmother       Lupus Maternal Grandmother      Clotting disorder Maternal Grandmother      Interstitial cystitis Maternal Grandmother      Diabetes Maternal Grandfather      Nephrolithiasis Maternal Grandfather          staghorn    Anesthesia problems Maternal Aunt      Interstitial cystitis Maternal Aunt      Colon cancer Neg Hx      Crohn's disease Neg Hx      Ulcerative colitis Neg Hx      Celiac disease Neg Hx      Esophageal cancer Neg Hx      Stomach cancer Neg Hx      Allergic rhinitis Neg Hx      Allergies Neg Hx      Angioedema Neg Hx      Asthma Neg Hx      Atopy Neg Hx      Eczema Neg Hx      Immunodeficiency Neg Hx      Rhinitis Neg Hx      Urticaria Neg Hx       Social History     Substance and Sexual Activity   Alcohol Use No     Social History     Tobacco Use   Smoking Status Never   Smokeless Tobacco Never     Social History     Substance and Sexual Activity   Drug Use No       Current Outpatient Medications:     ALPRAZolam (XANAX) 0.25 MG tablet, Take 1 tablet (0.25 mg total) by mouth daily as needed for Anxiety., Disp: 15 tablet, Rfl: 0    apremilast (OTEZLA) 30 mg Tab, Take 1 tablet (30 mg total) by mouth 2 (two) times a day., Disp: 180 tablet, Rfl: 2    atorvastatin (LIPITOR) 20 MG tablet, Take 1 tablet (20 mg total) by mouth once daily., Disp: 90 tablet, Rfl: 2    clobetasoL (TEMOVATE) 0.05 % external solution, Apply topically 2 (two) times daily as needed (scalp psoriasis)., Disp: 25 mL, Rfl: 2    dextroamphetamine-amphetamine (ADDERALL) 15 mg tablet, Take 1 tablet (15 mg total) by mouth once daily., Disp: 30 tablet, Rfl: 0    FLUoxetine 40 MG capsule, Take 1 capsule (40 mg total) by mouth once daily., Disp: 90 capsule, Rfl: 1    gabapentin (NEURONTIN) 300 MG capsule, Take 1 capsule (300 mg total) by mouth 3 (three) times daily., Disp: 180 capsule, Rfl: 2    metoprolol succinate (TOPROL-XL) 50 MG 24 hr tablet, Take 1 tablet (50 mg total) by mouth 2 (two) times daily., Disp: 60 tablet, Rfl: 1    PREVIDENT 5000  BOOSTER PLUS 1.1 % Pste, , Disp: , Rfl:     rizatriptan (MAXALT-MLT) 10 MG disintegrating tablet, Take 10 mg by mouth daily as needed., Disp: , Rfl:     traMADoL (ULTRAM) 50 mg tablet, Take 1 tablet (50 mg total) by mouth every 12 (twelve) hours as needed for Pain. Medically necessary for more than 7 days, ICD 10: G89.4, Disp: 55 tablet, Rfl: 2    ubidecarenone (CO Q-10 ORAL), Take by mouth once daily., Disp: , Rfl:     UROGESIC-BLUE 81.6-40.8-0.12 mg Tab, TAKE 1 TABLET BY MOUTH THREE TIMES DAILY AS NEEDED FOR DYSURIA, Disp: 60 tablet, Rfl: 4    VITAMIN D2 1,250 mcg (50,000 unit) capsule, Take 1 capsule (50,000 Units total) by mouth every 7 days., Disp: 12 capsule, Rfl: 2  No current facility-administered medications for this visit.    Facility-Administered Medications Ordered in Other Visits:     electrolyte-S (ISOLYTE), , Intravenous, Continuous, Dale Petit MD     Objective:         There were no vitals filed for this visit.    Physical Exam   Constitutional: He is oriented to person, place, and time. He appears well-developed.   HENT:   Head: Normocephalic.   Pulmonary/Chest: Effort normal.   Musculoskeletal:      Cervical back: Neck supple.      Comments: No synovitis on examination    Lymphadenopathy:     He has no cervical adenopathy.   Neurological: He is alert and oriented to person, place, and time.   Skin: No rash noted.   Dry skin   Mild flaky areas in the scalp with no overt plaque     Vitals reviewed.    Virtual visit 10/8: no rashes in face.     Reviewed old and all outside pertinent medical records available.    All lab results personally reviewed and interpreted by me.  Lab Results   Component Value Date    WBC 10.07 03/23/2024    HGB 15.8 03/23/2024    HCT 47.9 03/23/2024    MCV 96 03/23/2024    MCH 31.7 (H) 03/23/2024    MCHC 33.0 03/23/2024    RDW 13.0 03/23/2024     03/23/2024    MPV 12.3 03/23/2024       Lab Results   Component Value Date     03/23/2024    K 4.2 03/23/2024     " 03/23/2024    CO2 26 03/23/2024    GLU 92 03/23/2024    BUN 12 03/23/2024    CALCIUM 9.9 03/23/2024    PROT 7.1 05/27/2024    ALBUMIN 3.9 05/27/2024    BILITOT 0.5 05/27/2024    AST 27 05/27/2024    ALKPHOS 83 05/27/2024    ALT 45 (H) 05/27/2024       Lab Results   Component Value Date    COLORU Yellow 10/25/2022    APPEARANCEUA Cloudy (A) 01/16/2022    SPECGRAV >=1.030 10/25/2022    PHUR 5.5 10/25/2022    PROTEINUA 1+ (A) 01/16/2022    GLUCOSEU NEGATIVE 12/06/2021    KETONESU Negative 10/25/2022    LEUKOCYTESUR Trace (A) 01/16/2022    NITRITE Negative 10/25/2022    UROBILINOGEN 0.2 10/25/2022       Lab Results   Component Value Date    CRP 1.4 06/13/2020       Lab Results   Component Value Date    RF <10.0 07/09/2016    SEDRATE 10 06/13/2020    CCPANTIBODIE <0.5 07/09/2016       No components found for: "25OHVITDTOT", "29XNDANH7", "76KQZABL7", "METHODNOTE"    Lab Results   Component Value Date    URICACID 5.8 10/10/2017       Lab Results   Component Value Date    HEPCAB Negative 10/10/2017       Imaging:  All imaging reviewed and independently interpreted by me.     ASSESSMENT / PLAN:     Jm Chaparro is a 29 y.o. White male with  IBS, anxiety, autism, who comes to transfer care for management of PsA    1. Psoriatic arthritis   -Stable  -No synovitis on examination during last visit. He reports doing well  -Continue otezla 30 mg QD for now. May consider trying to increase to BID and assess response  -Can consider try another TNF in the future. Has IBS so will need to be careful with IL17 but not total contraindication     2. Psoriasis   -Overall stable with minor flares on the scalp  -Continue using clobetasol topical lotion in the scalp as needed  -advised him on daily moisturizers and avoid hot showers especially during the winter time  -advised him on the use of ketoconazole shampoo and H&S cooling mist.     3. Drug induced immunodeficiency  - Labs from 3/2024 were stable  - vaccines per " guidelines   - immunosuppression/infectious precautions reinforced    4.Obesity  - would benefit from decreasing at least 10% of body weight.  - recommended goal of losing 1 lb per week.  - consider nutritionist evaluation.  - would consider screening for MARIJA per PMD.    No follow-ups on file.    Method of contact with patient concerns: [facility]    Disclaimer:  This note is prepared using voice recognition software and as such is likely to have errors and has not been proof read. Please contact me for questions.     Time spent: 10 minutes in face to face discussion concerning diagnosis, prognosis, review of lab and test results, benefits of treatment as well as management of disease, counseling of patient and coordination of care between various health care providers.  Greater than half the time spent was used for coordination of care and counseling of patient.    Samantha Pace M.D.  Rheumatology  [facility]

## 2024-10-09 DIAGNOSIS — F40.10 SOCIAL ANXIETY DISORDER: ICD-10-CM

## 2024-10-09 RX ORDER — ALPRAZOLAM 0.25 MG/1
0.25 TABLET ORAL DAILY PRN
Qty: 15 TABLET | Refills: 0 | Status: SHIPPED | OUTPATIENT
Start: 2024-10-09 | End: 2024-11-08

## 2024-11-20 ENCOUNTER — OFFICE VISIT (OUTPATIENT)
Dept: PAIN MEDICINE | Facility: CLINIC | Age: 29
End: 2024-11-20
Payer: COMMERCIAL

## 2024-11-20 VITALS
BODY MASS INDEX: 30.7 KG/M2 | HEART RATE: 73 BPM | WEIGHT: 226.63 LBS | DIASTOLIC BLOOD PRESSURE: 71 MMHG | SYSTOLIC BLOOD PRESSURE: 132 MMHG | HEIGHT: 72 IN

## 2024-11-20 DIAGNOSIS — M79.641 BILATERAL HAND PAIN: ICD-10-CM

## 2024-11-20 DIAGNOSIS — N30.10 IC (INTERSTITIAL CYSTITIS): ICD-10-CM

## 2024-11-20 DIAGNOSIS — M06.9 RHEUMATOID ARTHRITIS INVOLVING MULTIPLE SITES, UNSPECIFIED WHETHER RHEUMATOID FACTOR PRESENT: ICD-10-CM

## 2024-11-20 DIAGNOSIS — Z79.891 OPIOID CONTRACT EXISTS: Primary | ICD-10-CM

## 2024-11-20 DIAGNOSIS — R39.89 BLADDER PAIN: ICD-10-CM

## 2024-11-20 DIAGNOSIS — M79.642 BILATERAL HAND PAIN: ICD-10-CM

## 2024-11-20 DIAGNOSIS — R30.0 DYSURIA: ICD-10-CM

## 2024-11-20 DIAGNOSIS — R29.898 MUSCULAR DECONDITIONING: ICD-10-CM

## 2024-11-20 DIAGNOSIS — L40.50 PSORIATIC ARTHRITIS: ICD-10-CM

## 2024-11-20 PROCEDURE — 3078F DIAST BP <80 MM HG: CPT | Mod: CPTII,S$GLB,,

## 2024-11-20 PROCEDURE — 3075F SYST BP GE 130 - 139MM HG: CPT | Mod: CPTII,S$GLB,,

## 2024-11-20 PROCEDURE — 1159F MED LIST DOCD IN RCRD: CPT | Mod: CPTII,S$GLB,,

## 2024-11-20 PROCEDURE — 99999 PR PBB SHADOW E&M-EST. PATIENT-LVL III: CPT | Mod: PBBFAC,,,

## 2024-11-20 PROCEDURE — 3008F BODY MASS INDEX DOCD: CPT | Mod: CPTII,S$GLB,,

## 2024-11-20 PROCEDURE — 99214 OFFICE O/P EST MOD 30 MIN: CPT | Mod: S$GLB,,,

## 2024-11-20 PROCEDURE — 3044F HG A1C LEVEL LT 7.0%: CPT | Mod: CPTII,S$GLB,,

## 2024-11-20 RX ORDER — TRAMADOL HYDROCHLORIDE 50 MG/1
50 TABLET ORAL EVERY 12 HOURS PRN
Qty: 55 TABLET | Refills: 2 | Status: SHIPPED | OUTPATIENT
Start: 2024-12-06

## 2024-11-20 NOTE — PROGRESS NOTES
This note was completed with dictation software and grammatical errors may exist.    Chief Complaint   Patient presents with    Follow-up        HPI: Jm Chaparro is a 29 y.o. year old male patient who has a past medical history of ADD (attention deficit disorder), Allergy, Anxiety disorder, Asperger's disorder, Deformity of both feet, Dyscalculia, Dysgraphia, Dyslexia, Eczema, Elevated LFTs, Fatty liver, GERD (gastroesophageal reflux disease), History of migraine headaches, Hypertension, Interstitial cystitis (chronic), Irritable bowel syndrome, Nephrolithiasis, PONV (postoperative nausea and vomiting), Psoriasis, Psoriatic arthritis, Refusal of blood transfusion for reasons of conscience, Seasonal allergies, Sleep apnea, and Special needs assessment. He presents in referral from No ref. provider found for abdominal and bladder pain.  Returns for follow-up with his normal chronic abdominal pain, and generalized arthritic pain.  His arthritic pains are worsening with the changes in weather but no acute or significant changes.  He recently had Botox in bladder with some benefit.  He continues to take gabapentin 900 mg b.i.d..  He also continues to take tramadol daily, 1/2 tablet in the morning and 1 tablet nightly with added benefit and no ill side effects or adverse events.    Previous history:  The patient's mother was present with him at the visit today who also helps with most of the history.  She reports that in March, 2015 she had gone out of town on a business trip and her son accompanying her.  Unfortunately he came down with cold and virus symptoms at the time and began developing severe abdominal pain.  She went to the emergency department at that time in [place], they were told he was having viral symptoms.  She returned to the [facility] in  and had followed up with several other physicians that year.  She reports that initially he was having intermittent pain on and off, would have pain for a  week and then it would disappear without cause.  He then developed diarrhea and constipation at times, he was seen at [facility] in [place] in 2015 and diagnosed with IBS with constipation and diarrhea.  They recommend an antidepressant that he tried, no relief with this.  Shortly after he began having renal calculi as well and needed to present to the emergency department.  He had seen neurology, Dr. Larry and reports that he required stone removal, does not sound like he has had lithotripsy.  He has seen Dr. Dickson, and now Nephrology, Dr. Enriquez.  He has seen Gastroenterology, Dr. Shaver and had undergone colonoscopy with no significant findings.  He has been tried on multiple medications over time as discussed below without any benefit.  He continues to have frequent abdominal pain that is often worse with caffeine or other foods, worse with activity.  He reports that he gets some relief with a bowel movement.  He still has been passing small sand like renal calculi, they report that his urine turns dark when he is about to pass a stone and he often has pain throughout the bladder and urethra after passing a stone.    He has a history of rheumatoid arthritis, has been seeing a rheumatologist, reports pain throughout his entire spine, bilateral hips, shoulders, knees, feet.  He does well with being in a bath with hot water.    Pain intervention history:  No injections    Spine surgeries:  None    Antineuropathics: Gabapentin 300mg three times daily, was given this for inflammatory joint pain??  NSAIDs: Mobic 15, no benefit  Physical therapy:  Has done some physical therapy, water therapy in the past with some improvement but things worsened when he did land-based therapy and stretching  Antidepressants: Fluoxetine 40mg  Muscle relaxers:  Opioids:  The patient reports having benefit from tramadol in the past  Antiplatelets/Anticoagulants:  From Dr. Vyas note:  Prozac 40mg po qam, adderall xr 20mg po qam  "(very rare use), lunesta 2mg po qhs PRN insomnia  Past Psych Meds: adderall ("robot child"), focalin ("zombie"), strattera (angry), zoloft (as a child- ineffective), lexapro (ineffective) topomax ("migraines"), celexa (overly sedating but effective), remeron (ineffective- wgt gain), atarax, elavil (ineffective for pain), ritalin (inc'd bp)     ROS:  He reports fatigue, weight gain, itching, color change, headaches head trauma, ear pain, constipation, diarrhea, stomach pain, nausea, flank pain, urinary urgency and frequency, painful urination, joint stiffness, joint swelling, back pain, difficulty sleeping and anxiety.  Balance of review of systems is negative.    Lab Results   Component Value Date    HGBA1C 5.5 03/23/2024       Lab Results   Component Value Date    WBC 10.07 03/23/2024    HGB 15.8 03/23/2024    HCT 47.9 03/23/2024    MCV 96 03/23/2024     03/23/2024       Past Medical History:   Diagnosis Date    ADD (attention deficit disorder)     Allergy     Anxiety disorder     Asperger's disorder     (AUTISM)    Deformity of both feet     Dyscalculia     Dysgraphia     Dyslexia     Eczema     Elevated LFTs     Fib4 score 0.58 3/24    Fatty liver     GERD (gastroesophageal reflux disease)     History of migraine headaches     Hypertension     Interstitial cystitis (chronic)     Irritable bowel syndrome     Nephrolithiasis     2014    PONV (postoperative nausea and vomiting)     Psoriasis     Psoriatic arthritis     Refusal of blood transfusion for reasons of conscience     Seasonal allergies     Sleep apnea     does not like CPAP    Special needs assessment        Past Surgical History:   Procedure Laterality Date    BIOPSY OF BLADDER  1/3/2022    Procedure: BIOPSY, BLADDER;  Surgeon: Derek Dickson MD;  Location: [facility] OR;  Service: Urology;;    COLONOSCOPY  ~2015    [facility]; told IBS    COLONOSCOPY  08/10/2015    Dr. Shaver; sent for scanning: unremarkable findings, no specimens collected    " COLONOSCOPY N/A 2/18/2019    Procedure: COLONOSCOPY;  Surgeon: Puma Preston Jr., MD;  Location: [facility] ENDO;  Service: Endoscopy;  Laterality: N/A;    CYSTOSCOPY  1/11/2023    Procedure: CYSTOSCOPY;  Surgeon: Trino Cain MD;  Location: [facility] OR 1ST FLR;  Service: Urology;;    CYSTOSCOPY,WITH BOTULINUM TOXIN INJECTION N/A 2/9/2023    Procedure: CYSTOSCOPY,WITH BOTULINUM TOXIN INJECTION;  Surgeon: Trino Cain MD;  Location: [facility] OR;  Service: Urology;  Laterality: N/A;    CYSTOSCOPY,WITH BOTULINUM TOXIN INJECTION N/A 5/3/2023    Procedure: CYSTOSCOPY,WITH BOTULINUM TOXIN INJECTION;  Surgeon: Trino Cain MD;  Location: [facility];  Service: Urology;  Laterality: N/A;  30 min    CYSTOSCOPY,WITH BOTULINUM TOXIN INJECTION N/A 12/20/2023    Procedure: CYSTOSCOPY,WITH BOTULINUM TOXIN INJECTION;  Surgeon: Trino Cain MD;  Location: [facility];  Service: Urology;  Laterality: N/A;  40 min   150 units    CYSTOSCOPY,WITH BOTULINUM TOXIN INJECTION N/A 10/4/2024    Procedure: CYSTOSCOPY,WITH BOTULINUM TOXIN INJECTION;  Surgeon: Trino Cain MD;  Location: [facility];  Service: Urology;  Laterality: N/A;  150 units Botox    INSERTION, NEUROSTIMULATOR, TEMPORARY, SACRAL N/A 1/24/2023    Procedure: INSERTION, NEUROSTIMULATOR, TEMPORARY, SACRAL;  Surgeon: Trino Cain MD;  Location: [facility] OR 2ND FLR;  Service: Urology;  Laterality: N/A;  1 hr 45 mins    REMOVAL OF ELECTRODE LEAD OF SACRAL NERVE STIMULATOR N/A 2/9/2023    Procedure: REMOVAL, ELECTRODE LEAD, SACRAL NERVE STIMULATOR;  Surgeon: Trino Cain MD;  Location: [facility];  Service: Urology;  Laterality: N/A;  1 hr    UPPER GASTROINTESTINAL ENDOSCOPY  05/14/2012    Dr. Preston    UPPER GASTROINTESTINAL ENDOSCOPY  07/17/2015    Dr. Shaver, sent for scanning: normal esophagus- dilated & biopsied, findings WNL, biopsies taken from z-line, stomach and duodenum; biopsy: duodenum WNL, antrum reactive gastropathy, negative for h pylori  or int. metaplasia, GEJ WNL, negative for EOE & cotton's esophagus    ureteral stone extraction      basket extraction       Social History     Socioeconomic History    Marital status: Single   Tobacco Use    Smoking status: Never    Smokeless tobacco: Never   Substance and Sexual Activity    Alcohol use: No    Drug use: No    Sexual activity: Never     Social Drivers of Health     Financial Resource Strain: Low Risk  (3/15/2024)    Overall Financial Resource Strain (CARDIA)     Difficulty of Paying Living Expenses: Not very hard   Food Insecurity: No Food Insecurity (3/15/2024)    Hunger Vital Sign     Worried About Running Out of Food in the Last Year: Never true     Ran Out of Food in the Last Year: Never true   Transportation Needs: No Transportation Needs (3/15/2024)    PRAPARE - Transportation     Lack of Transportation (Medical): No     Lack of Transportation (Non-Medical): No   Physical Activity: Insufficiently Active (3/15/2024)    Exercise Vital Sign     Days of Exercise per Week: 1 day     Minutes of Exercise per Session: 20 min   Stress: Stress Concern Present (3/15/2024)    [employer] [place] [employer] - Occupational Stress Questionnaire     Feeling of Stress : To some extent   Housing Stability: Low Risk  (3/15/2024)    Housing Stability Vital Sign     Unable to Pay for Housing in the Last Year: No     Number of Places Lived in the Last Year: 1     Unstable Housing in the Last Year: No       Medications/Allergies: See med card    Vitals:    11/20/24 1312   BP: 132/71   Pulse: 73   Weight: 102.8 kg (226 lb 10.1 oz)   Height: 6' (1.829 m)   PainSc:   7           Body mass index is 30.74 kg/m².    Physical exam:  Gen: A and O x3, pleasant, well-groomed  Skin: No rashes or obvious lesions  HEENT: PERRLA, no obvious deformities on ears or in canals. Trachea midline.  CVS: Regular rate and rhythm, normal palpable pulses.  Resp:No increased work of breathing, symmetrical chest rise.  Abdomen:  Soft, NT/ND.  Musculoskeletal:  Moving all extremities equally    Upper extremity strength:  5/5 bilaterally  Lower extremity strength:  5/5 bilaterally      Imagin22 CT renal protocol:  Liver normal enhancement with no focal lesion.  Gallbladder, pancreas, stomach, spleen, adrenal glands normal.  Kidneys normal size and contour with no nephrolithiasis, hydronephrosis, or ureteral obstruction.  Aorta tapers normally.  No bowel obstruction, ascites, inflammatory change.  Appendix normal.  Bladder and rectum normal.  No significant bladder wall thickening evident.  Bones are intact.  Lung bases clear.    Assessment:  Jm Chaparro is a 28 y.o. year old male patient who has a past medical history of Abnormal thyroid function test, ADD (attention deficit disorder), Anxiety disorder, Asperger's disorder, Deformity of both feet, Dyscalculia, Dysgraphia, Dyslexia, Eczema, Fatty liver, GERD (gastroesophageal reflux disease), History of migraine headaches, Hypertension, Interstitial cystitis (chronic), Irritable bowel syndrome, Nephrolithiasis, PONV (postoperative nausea and vomiting), Psoriasis, Psoriatic arthritis, Refusal of blood transfusion for reasons of conscience, Seasonal allergies, and Special needs assessment. He presents in referral from Dr. Derek Dickson for bladder pain.    1. Opioid contract exists        2. Psoriatic arthritis        3. Bilateral hand pain        4. Bladder pain        5. IC (interstitial cystitis)        6. Rheumatoid arthritis involving multiple sites, unspecified whether rheumatoid factor present        7. Muscular deconditioning        8. Dysuria                  Plan:  Overall, he is doing well with no significant acute changes to his chronic pain.  We will continue to maximize conservative management.  Refills for tramadol 50 mg sent to Dr. Gonzalez today for approval. I have reviewed the [place] Board of Pharmacy website and there are no abberancies.     Follow up in 3 months or sooner if needed

## 2024-11-21 DIAGNOSIS — F90.0 ADHD (ATTENTION DEFICIT HYPERACTIVITY DISORDER), INATTENTIVE TYPE: Primary | ICD-10-CM

## 2024-11-22 RX ORDER — DEXTROAMPHETAMINE SACCHARATE, AMPHETAMINE ASPARTATE, DEXTROAMPHETAMINE SULFATE AND AMPHETAMINE SULFATE 3.75; 3.75; 3.75; 3.75 MG/1; MG/1; MG/1; MG/1
15 TABLET ORAL DAILY
Qty: 30 TABLET | Refills: 0 | Status: SHIPPED | OUTPATIENT
Start: 2024-11-22

## 2024-12-09 NOTE — PROGRESS NOTES
"The patient location is:  [place], [place]  The patient location [place] is: [place]  The patient phone number is: [phone]  Visit type: Virtual visit with synchronous audio and video  Each patient to whom he or she provides medical services by telemedicine is:  (1) informed of the relationship between the physician and patient and the respective role of any other health care provider with respect to management of the patient; and (2) notified that he or she may decline to receive medical services by telemedicine and may withdraw from such care at any time.     Outpatient Psychiatry Follow-Up Visit    Clinical Status of Patient: Outpatient (Ambulatory)  12/10/2024     Chief Complaint: 26 y/o male presenting with his mother today for a follow-up.       Interval History and Content of Current Session:  Interim Events/Subjective Report/Content of Current Session:  follow-up appointment.    Pt is a 26 y/o male with past psychiatric hx of anxiety, ADHD, ASD who presents for follow-up treatment. Pt and mother reported that ADHD symptoms are managed well increased dose Adderall. Pt 's mother noted that mood appears stable. Some decrease in appetite with the increased dose of Adderall. Having difficulty with sleep and has been using OTC diphenhydramine for sleep. Pt's mother reported that this has been occurring for quite awhile and is not a side effect of the increased dose of Adderall. Pt has not used alprazolam since last appointment.    Past Psychiatric hx: adderall ("robot child"), focalin ("zombie"), strattera (angry), zoloft (as a child- ineffective), lexapro (ineffective), topomax ("migraines"), celexa (overly sedating but effective), remeron (ineffective- wgt gain), atarax, elavil (ineffective for pain), ritalin (inc'd bp)     Past Medical hx:   Past Medical History:   Diagnosis Date    ADD (attention deficit disorder)     Allergy     Anxiety disorder     Asperger's disorder     (AUTISM)    Deformity of both " feet     Dyscalculia     Dysgraphia     Dyslexia     Eczema     Elevated LFTs     Fib4 score 0.58 3/24    Fatty liver     GERD (gastroesophageal reflux disease)     History of migraine headaches     Hypertension     Interstitial cystitis (chronic)     Irritable bowel syndrome     Nephrolithiasis     2014    PONV (postoperative nausea and vomiting)     Psoriasis     Psoriatic arthritis     Refusal of blood transfusion for reasons of conscience     Seasonal allergies     Sleep apnea     does not like CPAP    Special needs assessment         Interim hx:  Medication changes last visit: Increase dose of Adderall to 15 mg po qam  Anxiety: mild  Depression: stable     Denies suicidal/homicidal ideations.  Denies hopelessness/worthlessness.    Denies auditory/visual hallucinations      Alcohol: Pt denied  Drug: Pt denied  Caffeine: not assessed  Tobacco: Pt denied      Review of Systems   PSYCHIATRIC: Pertinent items are noted in the narrative.        CONSTITUTIONAL: weight stable    Past Medical, Family and Social History: The patient's past medical, family and social history have been reviewed and updated as appropriate within the electronic medical record. See encounter notes.     Current Psychiatric Medication:  Prozac 40mg po qam, Adderall 15 mg po qam, alprazolam 0.25 mg PRN (cut in half - very infrequently).     Compliance: yes      Side effects: Pt denies     Risk Parameters:  Patient reports no suicidal ideation  Patient reports no homicidal ideation  Patient reports no self-injurious behavior  Patient reports no violent behavior     Exam (detailed: at least 9 elements; comprehensive: all 15 elements)   Constitutional  Vitals:  Most recent vital signs, dated less than 90 days prior to this appointment, were reviewed. BP: ()/()   Arterial Line BP: ()/()       General:  unremarkable, age appropriate, casual attire, good eye contact, good rapport       Musculoskeletal  Muscle Strength/Tone:  no flaccidity, no tremor   "  Gait & Station:  normal      Psychiatric                       Speech:  normal tone, normal rate, rhythm, and volume   Mood & Affect:   stable         Thought Process:   Goal directed; Linear    Associations:   intact   Thought Content:   No SI/HI, delusions, or paranoia, no AV/VH   Insight & Judgement:   Good, adequate to circumstances   Orientation:   grossly intact; alert and oriented x 4    Memory:  intact for content of interview    Language:  grossly intact, can repeat    Attention Span  : Grossly intact for content of interview   Fund of Knowledge:   intact and appropriate to age and level of education        Assessment and Diagnosis   Status/Progress: Based on the examination today, the patient's problem(s) is/are adequately controlled.  New problems have not been presented today. Co-morbidities are not complicating management of the primary condition. There are no active rule-out diagnoses for this patient at this time.      Impression: Pt's mood and anxiety continue to be relatively stable and ADHD symptoms managed more effectively at current dose of Adderall. Will start a trial of trazodone for sleep and continue with medication plan.    Diagnosis:   1) Social Anxiety Disorder  2) Generalized Anxiety Disorder  3) Attention Deficit Hyperactivity Disorder  4) Autism Spectrum Disorder (Level "requiring substantial support")  Intervention/Counseling/Treatment Plan   Medication Management:      1. Prozac 40mg po qam    2. Adderall 15 mg po qam    3. alprazolam 0.25 mg PRN (cut in half - very infrequently).    4. Start a trial of trazodone 50 mg      5. Call to report any worsening of symptoms or problems with the medication. Pt instructed to go to ER with thoughts of harming self, others     6. Patient given contact # for psychotherapists at [facility] and also instructed to check with insurance for list of providers.     Psychotherapy: none  Target symptoms: grief  Why chosen therapy is appropriate " versus another modality: CBT used; relevant to diagnosis, patient responds to this modality  Outcome monitoring methods: self-report, observation  Therapeutic intervention type: Supportive Therapy  Topics discussed/themes: building skills sets for symptom management, symptom recognition, nutrition, exercise  The patient's response to the intervention is accepting  Patient's response to treatment is: good.   The patient's progress toward treatment goals: improving     Return to clinic: 1 month    -Cognitive-Behavioral/Supportive therapy and psychoeducation provided  -R/B/SE's of medications discussed with the pt who expresses understanding and chooses to take medications as prescribed.   -Pt instructed to call clinic, 911 or go to nearest emergency room if sxs worsen or pt is in   crisis. The pt expresses understanding.    Jp Hines, PhD, MP     Antidepressant/Antianxiety Medication Initiation:  Patient informed of risks, benefits, and potential side effects of medication and accepts informed consent.  Common side effects include nausea, fatigue, headache, insomnia., Specifically discussed the possibility of new or worsening suicidal thoughts/depression.  Patient instructed to stop the medication immediately and seek urgent treatment if this occurs. Patient instructed not to abruptly discontinue medication without physician guidance except in cases of sudden onset or worsening of SI.       Stimulant Medication Initiation:  Patient advised of risks, benefits, and side effects of medication and accepts informed consent.  Common side effects include insomnia, irritability, jittery feeling, dry mouth, and agitation/hostility., Patient advised of potential addictive nature of medication and controlled substance classification.  Instructed to safeguard medication as no early refills can be given for lost or stolen medications.       Benzodiazepine Initiation:  Patient advised of the risks, benefits, and common side  effects of medication and has accepted informed consent.  Common side effects include drowsiness, impaired coordination, possible memory loss., Patient advised NOT to operate a vehicle or machinery untiil they are sure how the medication will affec them.  Client also advised of danger of mixing this medication with alcohol., Patient advised of potential addictive nature of medication and need to safeguard medication as no early refills for lost or stolen medications can be authorized.

## 2024-12-10 ENCOUNTER — OFFICE VISIT (OUTPATIENT)
Dept: PSYCHIATRY | Facility: CLINIC | Age: 29
End: 2024-12-10
Payer: COMMERCIAL

## 2024-12-10 DIAGNOSIS — F41.1 GENERALIZED ANXIETY DISORDER: ICD-10-CM

## 2024-12-10 DIAGNOSIS — F90.0 ADHD (ATTENTION DEFICIT HYPERACTIVITY DISORDER), INATTENTIVE TYPE: ICD-10-CM

## 2024-12-10 DIAGNOSIS — F84.0 AUTISM SPECTRUM DISORDER REQUIRING SUBSTANTIAL SUPPORT (LEVEL 2): ICD-10-CM

## 2024-12-10 DIAGNOSIS — F40.10 SOCIAL ANXIETY DISORDER: Primary | ICD-10-CM

## 2024-12-10 PROCEDURE — 3044F HG A1C LEVEL LT 7.0%: CPT | Mod: CPTII,95,, | Performed by: PSYCHOLOGIST

## 2024-12-10 PROCEDURE — 1159F MED LIST DOCD IN RCRD: CPT | Mod: CPTII,95,, | Performed by: PSYCHOLOGIST

## 2024-12-10 PROCEDURE — G2211 COMPLEX E/M VISIT ADD ON: HCPCS | Mod: 95,,, | Performed by: PSYCHOLOGIST

## 2024-12-10 PROCEDURE — 99214 OFFICE O/P EST MOD 30 MIN: CPT | Mod: 95,,, | Performed by: PSYCHOLOGIST

## 2024-12-10 RX ORDER — TRAZODONE HYDROCHLORIDE 50 MG/1
50 TABLET ORAL NIGHTLY
Qty: 30 TABLET | Refills: 1 | Status: SHIPPED | OUTPATIENT
Start: 2024-12-10 | End: 2025-12-10

## 2024-12-19 DIAGNOSIS — N32.89 BLADDER SPASM: ICD-10-CM

## 2024-12-19 DIAGNOSIS — R39.89 BLADDER PAIN: ICD-10-CM

## 2024-12-20 RX ORDER — GABAPENTIN 300 MG/1
300 CAPSULE ORAL 3 TIMES DAILY
Qty: 180 CAPSULE | Refills: 2 | Status: SHIPPED | OUTPATIENT
Start: 2024-12-20 | End: 2025-06-18

## 2024-12-22 ENCOUNTER — PATIENT MESSAGE (OUTPATIENT)
Dept: RHEUMATOLOGY | Facility: CLINIC | Age: 29
End: 2024-12-22
Payer: COMMERCIAL

## 2024-12-25 DIAGNOSIS — F90.0 ADHD (ATTENTION DEFICIT HYPERACTIVITY DISORDER), INATTENTIVE TYPE: ICD-10-CM

## 2024-12-27 ENCOUNTER — OFFICE VISIT (OUTPATIENT)
Dept: FAMILY MEDICINE | Facility: CLINIC | Age: 29
End: 2024-12-27
Payer: COMMERCIAL

## 2024-12-27 DIAGNOSIS — J01.80 OTHER ACUTE SINUSITIS, RECURRENCE NOT SPECIFIED: Primary | ICD-10-CM

## 2024-12-27 DIAGNOSIS — L40.50 PSORIATIC ARTHRITIS: ICD-10-CM

## 2024-12-27 DIAGNOSIS — F84.0 AUTISM SPECTRUM DISORDER REQUIRING SUBSTANTIAL SUPPORT (LEVEL 2): ICD-10-CM

## 2024-12-27 PROCEDURE — 99204 OFFICE O/P NEW MOD 45 MIN: CPT | Mod: 95,,, | Performed by: STUDENT IN AN ORGANIZED HEALTH CARE EDUCATION/TRAINING PROGRAM

## 2024-12-27 PROCEDURE — 1159F MED LIST DOCD IN RCRD: CPT | Mod: CPTII,95,, | Performed by: STUDENT IN AN ORGANIZED HEALTH CARE EDUCATION/TRAINING PROGRAM

## 2024-12-27 PROCEDURE — 1160F RVW MEDS BY RX/DR IN RCRD: CPT | Mod: CPTII,95,, | Performed by: STUDENT IN AN ORGANIZED HEALTH CARE EDUCATION/TRAINING PROGRAM

## 2024-12-27 RX ORDER — DOXYCYCLINE 100 MG/1
100 CAPSULE ORAL EVERY 12 HOURS
Qty: 10 CAPSULE | Refills: 0 | Status: SHIPPED | OUTPATIENT
Start: 2024-12-27

## 2024-12-27 RX ORDER — DEXTROAMPHETAMINE SACCHARATE, AMPHETAMINE ASPARTATE, DEXTROAMPHETAMINE SULFATE AND AMPHETAMINE SULFATE 3.75; 3.75; 3.75; 3.75 MG/1; MG/1; MG/1; MG/1
15 TABLET ORAL DAILY
Qty: 30 TABLET | Refills: 0 | Status: SHIPPED | OUTPATIENT
Start: 2024-12-27

## 2024-12-27 NOTE — PROGRESS NOTES
The patient location is: [place]  The chief complaint leading to consultation is: cold    Visit type: audiovisual    Face to Face time with patient: 15  15 minutes of total time spent on the encounter, which includes face to face time and non-face to face time preparing to see the patient (eg, review of tests), Obtaining and/or reviewing separately obtained history, Documenting clinical information in the electronic or other health record, Independently interpreting results (not separately reported) and communicating results to the patient/family/caregiver, or Care coordination (not separately reported).     Each patient to whom he or she provides medical services by telemedicine is:  (1) informed of the relationship between the physician and patient and the respective role of any other health care provider with respect to management of the patient; and (2) notified that he or she may decline to receive medical services by telemedicine and may withdraw from such care at any time.    Subjective:       Patient ID: Jm Chaparro is a 29 y.o. male.    Chief Complaint: No chief complaint on file.    HPI    29 year old male presents for audiovisual virtual visit. He is new to me and new to this clinic.  He is accompanied by his mother who contributes to the history. He follows with Dr. Pierre for primary care. He has history of Asperger's and psoriatic arthritis on Otezla. There is new sore throat and ear discomfort since yesterday. There is history of anaphylaxis with penicillin and rash with ciprofloxacin. Mother is wearing a mask and has a chronic illness. The patient has not had Influenza vaccine. He took paxlovid in June for COVID-19. He has had sinusitis before and there is evidence mauro sinus disease on X-Ray of the sinuses in October 2023. On a virtual visit I am not able to offer swabs for COVID-19 or Influenza, however in this patient with new upper respiratory symptoms on immunomodulator Otezla, we  will try empiric doxycycline - an antibiotic he has taken before and tolerated. There should be some improvement within 24-48 hours of starting an antibiotic. I will copy his primary care, Dr. Pierre, on this note. Reach out through the portal with questions, or if urgent concerns develop, always call.    Past Medical History:   Diagnosis Date    ADD (attention deficit disorder)     Allergy     Anxiety disorder     Asperger's disorder     (AUTISM)    Deformity of both feet     Dyscalculia     Dysgraphia     Dyslexia     Eczema     Elevated LFTs     Fib4 score 0.58 3/24    Fatty liver     GERD (gastroesophageal reflux disease)     History of migraine headaches     Hypertension     Interstitial cystitis (chronic)     Irritable bowel syndrome     Nephrolithiasis     2014    PONV (postoperative nausea and vomiting)     Psoriasis     Psoriatic arthritis     Refusal of blood transfusion for reasons of conscience     Seasonal allergies     Sleep apnea     does not like CPAP    Special needs assessment        Past Surgical History:   Procedure Laterality Date    BIOPSY OF BLADDER  1/3/2022    Procedure: BIOPSY, BLADDER;  Surgeon: Derek Dickson MD;  Location: [facility] OR;  Service: Urology;;    COLONOSCOPY  ~2015    [facility]; told IBS    COLONOSCOPY  08/10/2015    Dr. Shaver; sent for scanning: unremarkable findings, no specimens collected    COLONOSCOPY N/A 2/18/2019    Procedure: COLONOSCOPY;  Surgeon: Puma Preston Jr., MD;  Location: [facility];  Service: Endoscopy;  Laterality: N/A;    CYSTOSCOPY  1/11/2023    Procedure: CYSTOSCOPY;  Surgeon: Trino Cain MD;  Location: [address];  Service: Urology;;    CYSTOSCOPY,WITH BOTULINUM TOXIN INJECTION N/A 2/9/2023    Procedure: CYSTOSCOPY,WITH BOTULINUM TOXIN INJECTION;  Surgeon: Trino Cain MD;  Location: [facility] OR;  Service: Urology;  Laterality: N/A;    CYSTOSCOPY,WITH BOTULINUM TOXIN INJECTION N/A 5/3/2023    Procedure: CYSTOSCOPY,WITH BOTULINUM  TOXIN INJECTION;  Surgeon: Trino Cain MD;  Location: [facility] OR;  Service: Urology;  Laterality: N/A;  30 min    CYSTOSCOPY,WITH BOTULINUM TOXIN INJECTION N/A 12/20/2023    Procedure: CYSTOSCOPY,WITH BOTULINUM TOXIN INJECTION;  Surgeon: Trino Cain MD;  Location: [facility] OR;  Service: Urology;  Laterality: N/A;  40 min   150 units    CYSTOSCOPY,WITH BOTULINUM TOXIN INJECTION N/A 10/4/2024    Procedure: CYSTOSCOPY,WITH BOTULINUM TOXIN INJECTION;  Surgeon: Trino Cain MD;  Location: [facility] OR;  Service: Urology;  Laterality: N/A;  150 units Botox    INSERTION, NEUROSTIMULATOR, TEMPORARY, SACRAL N/A 1/24/2023    Procedure: INSERTION, NEUROSTIMULATOR, TEMPORARY, SACRAL;  Surgeon: Trino Cain MD;  Location: [facility] OR [address] [place] [address];  Service: Urology;  Laterality: N/A;  1 hr 45 mins    REMOVAL OF ELECTRODE LEAD OF SACRAL NERVE STIMULATOR N/A 2/9/2023    Procedure: REMOVAL, ELECTRODE LEAD, SACRAL NERVE STIMULATOR;  Surgeon: Trino Cain MD;  Location: [facility] OR;  Service: Urology;  Laterality: N/A;  1 hr    UPPER GASTROINTESTINAL ENDOSCOPY  05/14/2012    Dr. Preston    UPPER GASTROINTESTINAL ENDOSCOPY  07/17/2015    Dr. Shaver, sent for scanning: normal esophagus- dilated & biopsied, findings WNL, biopsies taken from z-line, stomach and duodenum; biopsy: duodenum WNL, antrum reactive gastropathy, negative for h pylori or int. metaplasia, GEJ WNL, negative for EOE & cotton's esophagus    ureteral stone extraction      basket extraction       Review of patient's allergies indicates:   Allergen Reactions    Penicillins Anaphylaxis    Caffeine      Rapid heart beat    Ciprofloxacin Itching and Rash       Social History     Socioeconomic History    Marital status: Single   Tobacco Use    Smoking status: Never    Smokeless tobacco: Never   Substance and Sexual Activity    Alcohol use: No    Drug use: No    Sexual activity: Never     Social Drivers of Health     Financial Resource Strain: Low Risk   (3/15/2024)    Overall Financial Resource Strain (CARDIA)     Difficulty of Paying Living Expenses: Not very hard   Food Insecurity: No Food Insecurity (3/15/2024)    Hunger Vital Sign     Worried About Running Out of Food in the Last Year: Never true     Ran Out of Food in the Last Year: Never true   Transportation Needs: No Transportation Needs (3/15/2024)    PRAPARE - Transportation     Lack of Transportation (Medical): No     Lack of Transportation (Non-Medical): No   Physical Activity: Insufficiently Active (3/15/2024)    Exercise Vital Sign     Days of Exercise per Week: 1 day     Minutes of Exercise per Session: 20 min   Stress: Stress Concern Present (3/15/2024)    [employer] - Occupational Stress Questionnaire     Feeling of Stress : To some extent   Housing Stability: Low Risk  (3/15/2024)    Housing Stability Vital Sign     Unable to Pay for Housing in the Last Year: No     Number of Places Lived in the Last Year: 1     Unstable Housing in the Last Year: No       Current Outpatient Medications on File Prior to Visit   Medication Sig Dispense Refill    ALPRAZolam (XANAX) 0.25 MG tablet Take 1 tablet (0.25 mg total) by mouth daily as needed for Anxiety. 15 tablet 0    apremilast (OTEZLA) 30 mg Tab Take 1 tablet (30 mg total) by mouth 2 (two) times a day. 180 tablet 2    atorvastatin (LIPITOR) 20 MG tablet Take 1 tablet (20 mg total) by mouth once daily. 90 tablet 2    clobetasoL (TEMOVATE) 0.05 % external solution Apply topically 2 (two) times daily as needed (scalp psoriasis). 25 mL 2    dextroamphetamine-amphetamine (ADDERALL) 15 mg tablet Take 1 tablet (15 mg total) by mouth once daily. 30 tablet 0    FLUoxetine 40 MG capsule Take 1 capsule (40 mg total) by mouth once daily. 90 capsule 1    gabapentin (NEURONTIN) 300 MG capsule Take 1 capsule (300 mg total) by mouth 3 (three) times daily. 180 capsule 2    metoprolol succinate (TOPROL-XL) 50 MG 24 hr tablet Take 1 tablet (50  mg total) by mouth 2 (two) times daily. 60 tablet 1    PREVIDENT 5000 BOOSTER PLUS 1.1 % Pste       rizatriptan (MAXALT-MLT) 10 MG disintegrating tablet Take 10 mg by mouth daily as needed.      traMADoL (ULTRAM) 50 mg tablet Take 1 tablet (50 mg total) by mouth every 12 (twelve) hours as needed for Pain. Medically necessary for more than 7 days, ICD 10: G89.4 55 tablet 2    traZODone (DESYREL) 50 MG tablet Take 1 tablet (50 mg total) by mouth every evening. 30 tablet 1    ubidecarenone (CO Q-10 ORAL) Take by mouth once daily.      UROGESIC-BLUE 81.6-40.8-0.12 mg Tab TAKE 1 TABLET BY MOUTH THREE TIMES DAILY AS NEEDED FOR DYSURIA (Patient not taking: Reported on 12/27/2024) 60 tablet 4    VITAMIN D2 1,250 mcg (50,000 unit) capsule Take 1 capsule (50,000 Units total) by mouth every 7 days. 12 capsule 2     Current Facility-Administered Medications on File Prior to Visit   Medication Dose Route Frequency Provider Last Rate Last Admin    electrolyte-S (ISOLYTE)   Intravenous Continuous Dale Petit MD           Family History   Problem Relation Name Age of Onset    Lupus Mother      Interstitial cystitis Mother      Diabetes Maternal Grandmother      Lupus Maternal Grandmother      Clotting disorder Maternal Grandmother      Interstitial cystitis Maternal Grandmother      Diabetes Maternal Grandfather      Nephrolithiasis Maternal Grandfather          stag[place]    Anesthesia problems Maternal Aunt      Interstitial cystitis Maternal Aunt      Colon cancer Neg Hx      Crohn's disease Neg Hx      Ulcerative colitis Neg Hx      Celiac disease Neg Hx      Esophageal cancer Neg Hx      Stomach cancer Neg Hx      Allergic rhinitis Neg Hx      Allergies Neg Hx      Angioedema Neg Hx      Asthma Neg Hx      Atopy Neg Hx      Eczema Neg Hx      Immunodeficiency Neg Hx      Rhinitis Neg Hx      Urticaria Neg Hx         Review of Systems    Objective:      When asked questions, the patient frequently relies on his mother to  answer, in a manner that seems younger than his age. No acute distress noted. No increased work of breathing.    Assessment:       1. Other acute sinusitis, recurrence not specified    2. Psoriatic arthritis    3. Autism spectrum disorder requiring substantial support (level 2)        Plan:       Other acute sinusitis, recurrence not specified  -     doxycycline (MONODOX) 100 MG capsule; Take 1 capsule (100 mg total) by mouth every 12 (twelve) hours.  Dispense: 10 capsule; Refill: 0  - History of sinusitis in the past and notable disease on Xray in October 2023. Has history of anaphylaxis to penicillin, rash reaction to ciprofloxacin. Has taken and tolerated doxycycline previously.  - At increased risk of infection on immunosuppressant.  - There should be some improvement within 24-48 hours of starting antibiotic.  - If acute worsening or persistent symptoms, these would be reasons to call/return for re-evaluation/go to the ER.    Psoriatic arthritis  - On immunosuppressant medication.    Autism spectrum disorder requiring substantial support (level 2)  - Patient is accompanied by his mother who contributes to the history.      I will copy Dr. Pierre on this message.    Answers submitted by the patient for this visit:  Review of Systems Questionnaire (Submitted on 12/27/2024)  activity change: No  unexpected weight change: No  neck pain: No  hearing loss: No  rhinorrhea: No  trouble swallowing: No  eye discharge: No  visual disturbance: No  chest tightness: No  wheezing: No  chest pain: No  palpitations: No  blood in stool: No  constipation: No  vomiting: No  diarrhea: No  polydipsia: No  polyuria: No  difficulty urinating: No  urgency: No  hematuria: No  joint swelling: No  arthralgias: No  headaches: Yes  weakness: Yes  confusion: No  dysphoric mood: No

## 2025-01-13 NOTE — PROGRESS NOTES
"The patient location is:  [place], [place]  The patient location [place] is: [place]  The patient phone number is: [phone]  Visit type: Virtual visit with synchronous audio and video  Each patient to whom he or she provides medical services by telemedicine is:  (1) informed of the relationship between the physician and patient and the respective role of any other health care provider with respect to management of the patient; and (2) notified that he or she may decline to receive medical services by telemedicine and may withdraw from such care at any time.     Outpatient Psychiatry Follow-Up Visit    Clinical Status of Patient: Outpatient (Ambulatory)  01/14/2025     Chief Complaint: 28 y/o male presenting with his mother today for a follow-up.       Interval History and Content of Current Session:  Interim Events/Subjective Report/Content of Current Session:  follow-up appointment.    Pt is a 28 y/o male with past psychiatric hx of anxiety, ADHD, ASD who presents for follow-up treatment. Pt reported that trazodone has helped with sleep. Pt reported mood and anxiety are managed well. ADHD symptoms managed with Adderall. Pt reported that he has a caretaker now. Pt reported that he is also an individual with high functioning ASD. Stated that he helps with socialization and motivation to complete tasks. Is spending about 30 hours per week with his caretaker     Past Psychiatric hx: adderall ("robot child"), focalin ("zombie"), strattera (angry), zoloft (as a child- ineffective), lexapro (ineffective), topomax ("migraines"), celexa (overly sedating but effective), remeron (ineffective- wgt gain), atarax, elavil (ineffective for pain), ritalin (inc'd bp)     Past Medical hx:   Past Medical History:   Diagnosis Date    ADD (attention deficit disorder)     Allergy     Anxiety disorder     Asperger's disorder     (AUTISM)    Deformity of both feet     Dyscalculia     Dysgraphia     Dyslexia     Eczema     Elevated " LFTs     Fib4 score 0.58 3/24    Fatty liver     GERD (gastroesophageal reflux disease)     History of migraine headaches     Hypertension     Interstitial cystitis (chronic)     Irritable bowel syndrome     Nephrolithiasis     2014    PONV (postoperative nausea and vomiting)     Psoriasis     Psoriatic arthritis     Refusal of blood transfusion for reasons of conscience     Seasonal allergies     Sleep apnea     does not like CPAP    Special needs assessment         Interim hx:  Medication changes last visit: Start a trial of trazodone 50 mg   Anxiety: mild  Depression: stable     Denies suicidal/homicidal ideations.  Denies hopelessness/worthlessness.    Denies auditory/visual hallucinations      Alcohol: Pt denied  Drug: Pt denied  Caffeine: not assessed  Tobacco: Pt denied      Review of Systems   PSYCHIATRIC: Pertinent items are noted in the narrative.        CONSTITUTIONAL: weight stable    Past Medical, Family and Social History: The patient's past medical, family and social history have been reviewed and updated as appropriate within the electronic medical record. See encounter notes.     Current Psychiatric Medication:  Prozac 40mg po qam, Adderall 15 mg po qam, alprazolam 0.25 mg PRN (cut in half - very infrequently), trazodone 50 mg      Compliance: yes      Side effects: Pt denies     Risk Parameters:  Patient reports no suicidal ideation  Patient reports no homicidal ideation  Patient reports no self-injurious behavior  Patient reports no violent behavior     Exam (detailed: at least 9 elements; comprehensive: all 15 elements)   Constitutional  Vitals:  Most recent vital signs, dated less than 90 days prior to this appointment, were reviewed. BP: ()/()   Arterial Line BP: ()/()       General:  unremarkable, age appropriate, casual attire, good eye contact, good rapport       Musculoskeletal  Muscle Strength/Tone:  no flaccidity, no tremor    Gait & Station:  normal      Psychiatric                        "Speech:  normal tone, normal rate, rhythm, and volume   Mood & Affect:   stable         Thought Process:   Goal directed; Linear    Associations:   intact   Thought Content:   No SI/HI, delusions, or paranoia, no AV/VH   Insight & Judgement:   Good, adequate to circumstances   Orientation:   grossly intact; alert and oriented x 4    Memory:  intact for content of interview    Language:  grossly intact, can repeat    Attention Span  : Grossly intact for content of interview   Fund of Knowledge:   intact and appropriate to age and level of education        Assessment and Diagnosis   Status/Progress: Based on the examination today, the patient's problem(s) is/are adequately controlled.  New problems have not been presented today. Co-morbidities are not complicating management of the primary condition. There are no active rule-out diagnoses for this patient at this time.      Impression: Pt's mood and anxiety continue to be relatively stable and ADHD symptoms managed more effectively at current dose of Adderall. Sleep appears to have improved. Will continue with this medication plan and monitor moving forward. [place]     Diagnosis:   1) Social Anxiety Disorder  2) Generalized Anxiety Disorder  3) Attention Deficit Hyperactivity Disorder  4) Autism Spectrum Disorder (Level "requiring substantial support")  Intervention/Counseling/Treatment Plan   Medication Management:      1. Prozac 40mg po qam    2. Adderall 15 mg po qam    3. alprazolam 0.25 mg PRN (cut in half - very infrequently).    4. trazodone 50 mg      5. Call to report any worsening of symptoms or problems with the medication. Pt instructed to go to ER with thoughts of harming self, others     6. Patient given contact # for psychotherapists at [facility] and also instructed to check with insurance for list of providers.     Psychotherapy: none  Target symptoms: grief  Why chosen therapy is appropriate versus another modality: CBT used; relevant to " diagnosis, patient responds to this modality  Outcome monitoring methods: self-report, observation  Therapeutic intervention type: Supportive Therapy  Topics discussed/themes: building skills sets for symptom management, symptom recognition, nutrition, exercise  The patient's response to the intervention is accepting  Patient's response to treatment is: good.   The patient's progress toward treatment goals: improving     Return to clinic: 3 months    -Cognitive-Behavioral/Supportive therapy and psychoeducation provided  -R/B/SE's of medications discussed with the pt who expresses understanding and chooses to take medications as prescribed.   -Pt instructed to call clinic, 911 or go to nearest emergency room if sxs worsen or pt is in   crisis. The pt expresses understanding.    Jp Hines, PhD, MP    Visit today included increased complexity associated with the care of the episodic problem anxiety, ADHD addressed and managing the longitudinal care of the patient due to the serious and/or complex managed problem(s) anxiety, ADHD.      Antidepressant/Antianxiety Medication Initiation:  Patient informed of risks, benefits, and potential side effects of medication and accepts informed consent.  Common side effects include nausea, fatigue, headache, insomnia., Specifically discussed the possibility of new or worsening suicidal thoughts/depression.  Patient instructed to stop the medication immediately and seek urgent treatment if this occurs. Patient instructed not to abruptly discontinue medication without physician guidance except in cases of sudden onset or worsening of SI.       Stimulant Medication Initiation:  Patient advised of risks, benefits, and side effects of medication and accepts informed consent.  Common side effects include insomnia, irritability, jittery feeling, dry mouth, and agitation/hostility., Patient advised of potential addictive nature of medication and controlled substance classification.   Instructed to safeguard medication as no early refills can be given for lost or stolen medications.       Benzodiazepine Initiation:  Patient advised of the risks, benefits, and common side effects of medication and has accepted informed consent.  Common side effects include drowsiness, impaired coordination, possible memory loss., Patient advised NOT to operate a vehicle or machinery untiil they are sure how the medication will affec them.  Client also advised of danger of mixing this medication with alcohol., Patient advised of potential addictive nature of medication and need to safeguard medication as no early refills for lost or stolen medications can be authorized.

## 2025-01-14 ENCOUNTER — OFFICE VISIT (OUTPATIENT)
Dept: PSYCHIATRY | Facility: CLINIC | Age: 30
End: 2025-01-14
Payer: COMMERCIAL

## 2025-01-14 DIAGNOSIS — F41.1 GENERALIZED ANXIETY DISORDER: ICD-10-CM

## 2025-01-14 DIAGNOSIS — F90.0 ADHD (ATTENTION DEFICIT HYPERACTIVITY DISORDER), INATTENTIVE TYPE: ICD-10-CM

## 2025-01-14 DIAGNOSIS — F40.10 SOCIAL ANXIETY DISORDER: Primary | ICD-10-CM

## 2025-01-17 ENCOUNTER — OFFICE VISIT (OUTPATIENT)
Dept: RHEUMATOLOGY | Facility: CLINIC | Age: 30
End: 2025-01-17
Payer: COMMERCIAL

## 2025-01-17 VITALS
OXYGEN SATURATION: 95 % | HEART RATE: 74 BPM | SYSTOLIC BLOOD PRESSURE: 109 MMHG | WEIGHT: 232.38 LBS | BODY MASS INDEX: 31.51 KG/M2 | DIASTOLIC BLOOD PRESSURE: 76 MMHG

## 2025-01-17 DIAGNOSIS — L40.9 PSORIASIS: Primary | ICD-10-CM

## 2025-01-17 DIAGNOSIS — Z79.899 DRUG-INDUCED IMMUNODEFICIENCY: ICD-10-CM

## 2025-01-17 DIAGNOSIS — L40.50 PSORIATIC ARTHRITIS: ICD-10-CM

## 2025-01-17 DIAGNOSIS — E66.01 MORBID OBESITY: ICD-10-CM

## 2025-01-17 DIAGNOSIS — N32.89 BLADDER SPASM: ICD-10-CM

## 2025-01-17 DIAGNOSIS — D84.821 DRUG-INDUCED IMMUNODEFICIENCY: ICD-10-CM

## 2025-01-17 PROCEDURE — 3078F DIAST BP <80 MM HG: CPT | Mod: CPTII,S$GLB,, | Performed by: STUDENT IN AN ORGANIZED HEALTH CARE EDUCATION/TRAINING PROGRAM

## 2025-01-17 PROCEDURE — 99999 PR PBB SHADOW E&M-EST. PATIENT-LVL III: CPT | Mod: PBBFAC,,, | Performed by: STUDENT IN AN ORGANIZED HEALTH CARE EDUCATION/TRAINING PROGRAM

## 2025-01-17 PROCEDURE — 3074F SYST BP LT 130 MM HG: CPT | Mod: CPTII,S$GLB,, | Performed by: STUDENT IN AN ORGANIZED HEALTH CARE EDUCATION/TRAINING PROGRAM

## 2025-01-17 PROCEDURE — 99214 OFFICE O/P EST MOD 30 MIN: CPT | Mod: S$GLB,,, | Performed by: STUDENT IN AN ORGANIZED HEALTH CARE EDUCATION/TRAINING PROGRAM

## 2025-01-17 PROCEDURE — 1159F MED LIST DOCD IN RCRD: CPT | Mod: CPTII,S$GLB,, | Performed by: STUDENT IN AN ORGANIZED HEALTH CARE EDUCATION/TRAINING PROGRAM

## 2025-01-17 PROCEDURE — 3008F BODY MASS INDEX DOCD: CPT | Mod: CPTII,S$GLB,, | Performed by: STUDENT IN AN ORGANIZED HEALTH CARE EDUCATION/TRAINING PROGRAM

## 2025-01-17 NOTE — PROGRESS NOTES
RHEUMATOLOGY OUTPATIENT CLINIC NOTE    1/17/2025    Attending Rheumatologist: Samantha Pace  Primary Care Provider: China Pierre MD   Physician Requesting Consultation: No referring provider defined for this encounter.  Chief Complaint/Reason For Consultation:  No chief complaint on file.    Subjective:       HPI  Jm Chaparro is a 29 y.o. White male with IBS, anxiety, autism, who comes to transfer care for management of PsA.     Interim history   -Last visit on 10/2024.   -He is on otezla QD w/o side effects. Psoriasis has been stable and joint pain as well. He does not feel that he needs to increase dose  -mother and him report that he has been hypermobile his whole life. Nancie wonders about EDS.   -has been more thirsty lately. Passed a kidney stone recently. He has been increasing his water intake     Disease history    He has diagnosis of psoriasis since age 13, then started having inflammatory joint pain in his 20s.  Reports that he has tried Humira in the past but did not tolerate due to upset stomach and worsened IBS.  Does not recall any other biologics.  Never been on methotrexate due to history of fatty liver.  Has been on Otezla for the past 3 years.  It appears that he did not tolerate 1 tablet BID due worsened IBS so he has been taking it only once a day per his previous rheumatologist.     He has been doing overall well with his regimen. Notes mild flaring of psoriasis in scalp at times. He uses multiple OTC shampoos which control it overall. Has dry skin especially during winter time.     Review of Systems   Constitutional:  Negative for fever and unexpected weight change.   HENT:  Negative for mouth sores and trouble swallowing.    Eyes:  Negative for redness.   Respiratory:  Negative for cough and shortness of breath.    Cardiovascular:  Negative for chest pain.   Gastrointestinal:  Positive for constipation and diarrhea.   Genitourinary:  Negative for genital  sores.   Integumentary:  Positive for rash.   Neurological:  Positive for headaches.   Hematological:  Does not bruise/bleed easily.        Chronic comorbid conditions affecting medical decision making today:  Past Medical History:   Diagnosis Date    ADD (attention deficit disorder)     Allergy     Anxiety disorder     Asperger's disorder     (AUTISM)    Deformity of both feet     Dyscalculia     Dysgraphia     Dyslexia     Eczema     Elevated LFTs     Fib4 score 0.58 3/24    Fatty liver     GERD (gastroesophageal reflux disease)     History of migraine headaches     Hypertension     Interstitial cystitis (chronic)     Irritable bowel syndrome     Nephrolithiasis     2014    PONV (postoperative nausea and vomiting)     Psoriasis     Psoriatic arthritis     Refusal of blood transfusion for reasons of conscience     Seasonal allergies     Sleep apnea     does not like CPAP    Special needs assessment      Past Surgical History:   Procedure Laterality Date    BIOPSY OF BLADDER  1/3/2022    Procedure: BIOPSY, BLADDER;  Surgeon: Derek Dickson MD;  Location: [facility] OR;  Service: Urology;;    COLONOSCOPY  ~2015    [facility]; told IBS    COLONOSCOPY  08/10/2015    Dr. Shaver; sent for scanning: unremarkable findings, no specimens collected    COLONOSCOPY N/A 2/18/2019    Procedure: COLONOSCOPY;  Surgeon: Puma Preston Jr., MD;  Location: [facility];  Service: Endoscopy;  Laterality: N/A;    CYSTOSCOPY  1/11/2023    Procedure: CYSTOSCOPY;  Surgeon: Trino Cain MD;  Location: [address];  Service: Urology;;    CYSTOSCOPY,WITH BOTULINUM TOXIN INJECTION N/A 2/9/2023    Procedure: CYSTOSCOPY,WITH BOTULINUM TOXIN INJECTION;  Surgeon: Trino Cain MD;  Location: [facility] OR;  Service: Urology;  Laterality: N/A;    CYSTOSCOPY,WITH BOTULINUM TOXIN INJECTION N/A 5/3/2023    Procedure: CYSTOSCOPY,WITH BOTULINUM TOXIN INJECTION;  Surgeon: Trino Cain MD;  Location: [facility] OR;  Service: Urology;   Laterality: N/A;  30 min    CYSTOSCOPY,WITH BOTULINUM TOXIN INJECTION N/A 12/20/2023    Procedure: CYSTOSCOPY,WITH BOTULINUM TOXIN INJECTION;  Surgeon: Trino Cain MD;  Location: [facility] OR;  Service: Urology;  Laterality: N/A;  40 min   150 units    CYSTOSCOPY,WITH BOTULINUM TOXIN INJECTION N/A 10/4/2024    Procedure: CYSTOSCOPY,WITH BOTULINUM TOXIN INJECTION;  Surgeon: Trino Cain MD;  Location: [facility] OR;  Service: Urology;  Laterality: N/A;  150 units Botox    INSERTION, NEUROSTIMULATOR, TEMPORARY, SACRAL N/A 1/24/2023    Procedure: INSERTION, NEUROSTIMULATOR, TEMPORARY, SACRAL;  Surgeon: Trino Cain MD;  Location: [facility] OR [address] [place] [address];  Service: Urology;  Laterality: N/A;  1 hr 45 mins    REMOVAL OF ELECTRODE LEAD OF SACRAL NERVE STIMULATOR N/A 2/9/2023    Procedure: REMOVAL, ELECTRODE LEAD, SACRAL NERVE STIMULATOR;  Surgeon: Trino Cain MD;  Location: [facility] OR;  Service: Urology;  Laterality: N/A;  1 hr    UPPER GASTROINTESTINAL ENDOSCOPY  05/14/2012    Dr. Preston    UPPER GASTROINTESTINAL ENDOSCOPY  07/17/2015    Dr. Shaver, sent for scanning: normal esophagus- dilated & biopsied, findings WNL, biopsies taken from z-line, stomach and duodenum; biopsy: duodenum WNL, antrum reactive gastropathy, negative for h pylori or int. metaplasia, GEJ WNL, negative for EOE & cotton's esophagus    ureteral stone extraction      basket extraction     Family History   Problem Relation Name Age of Onset    Lupus Mother      Interstitial cystitis Mother      Diabetes Maternal Grandmother      Lupus Maternal Grandmother      Clotting disorder Maternal Grandmother      Interstitial cystitis Maternal Grandmother      Diabetes Maternal Grandfather      Nephrolithiasis Maternal Grandfather          staghorn    Anesthesia problems Maternal Aunt      Interstitial cystitis Maternal Aunt      Colon cancer Neg Hx      Crohn's disease Neg Hx      Ulcerative colitis Neg Hx      Celiac disease Neg Hx       Esophageal cancer Neg Hx      Stomach cancer Neg Hx      Allergic rhinitis Neg Hx      Allergies Neg Hx      Angioedema Neg Hx      Asthma Neg Hx      Atopy Neg Hx      Eczema Neg Hx      Immunodeficiency Neg Hx      Rhinitis Neg Hx      Urticaria Neg Hx       Social History     Substance and Sexual Activity   Alcohol Use No     Social History     Tobacco Use   Smoking Status Never   Smokeless Tobacco Never     Social History     Substance and Sexual Activity   Drug Use No       Current Outpatient Medications:     apremilast (OTEZLA) 30 mg Tab, Take 1 tablet (30 mg total) by mouth 2 (two) times a day., Disp: 180 tablet, Rfl: 2    atorvastatin (LIPITOR) 20 MG tablet, Take 1 tablet (20 mg total) by mouth once daily., Disp: 90 tablet, Rfl: 2    clobetasoL (TEMOVATE) 0.05 % external solution, Apply topically 2 (two) times daily as needed (scalp psoriasis)., Disp: 25 mL, Rfl: 2    dextroamphetamine-amphetamine (ADDERALL) 15 mg tablet, Take 1 tablet (15 mg total) by mouth once daily., Disp: 30 tablet, Rfl: 0    doxycycline (MONODOX) 100 MG capsule, Take 1 capsule (100 mg total) by mouth every 12 (twelve) hours., Disp: 10 capsule, Rfl: 0    FLUoxetine 40 MG capsule, Take 1 capsule (40 mg total) by mouth once daily., Disp: 90 capsule, Rfl: 1    gabapentin (NEURONTIN) 300 MG capsule, Take 1 capsule (300 mg total) by mouth 3 (three) times daily., Disp: 180 capsule, Rfl: 2    metoprolol succinate (TOPROL-XL) 50 MG 24 hr tablet, Take 1 tablet (50 mg total) by mouth 2 (two) times daily., Disp: 60 tablet, Rfl: 1    PREVIDENT 5000 BOOSTER PLUS 1.1 % Pste, , Disp: , Rfl:     rizatriptan (MAXALT-MLT) 10 MG disintegrating tablet, Take 10 mg by mouth daily as needed., Disp: , Rfl:     traMADoL (ULTRAM) 50 mg tablet, Take 1 tablet (50 mg total) by mouth every 12 (twelve) hours as needed for Pain. Medically necessary for more than 7 days, ICD 10: G89.4, Disp: 55 tablet, Rfl: 2    traZODone (DESYREL) 50 MG tablet, Take 1 tablet (50 mg  total) by mouth every evening., Disp: 30 tablet, Rfl: 1    ubidecarenone (CO Q-10 ORAL), Take by mouth once daily., Disp: , Rfl:     UROGESIC-BLUE 81.6-40.8-0.12 mg Tab, TAKE 1 TABLET BY MOUTH THREE TIMES DAILY AS NEEDED FOR DYSURIA, Disp: 60 tablet, Rfl: 4    VITAMIN D2 1,250 mcg (50,000 unit) capsule, Take 1 capsule (50,000 Units total) by mouth every 7 days., Disp: 12 capsule, Rfl: 2    ALPRAZolam (XANAX) 0.25 MG tablet, Take 1 tablet (0.25 mg total) by mouth daily as needed for Anxiety., Disp: 15 tablet, Rfl: 0  No current facility-administered medications for this visit.    Facility-Administered Medications Ordered in Other Visits:     electrolyte-S (ISOLYTE), , Intravenous, Continuous, Dale Petit MD     Objective:         Vitals:    01/17/25 1358   BP: 109/76   Pulse: 74       Physical Exam   Constitutional: He is oriented to person, place, and time. He appears well-developed.   HENT:   Head: Normocephalic.   Pulmonary/Chest: Effort normal.   Musculoskeletal:      Cervical back: Neck supple.      Comments: No synovitis on examination    Lymphadenopathy:     He has no cervical adenopathy.   Neurological: He is alert and oriented to person, place, and time.   Skin: No rash noted.   Dry skin   Mild flaky areas in the scalp with no overt plaque     Vitals reviewed.    Virtual visit 10/8: no rashes in face.     Reviewed old and all outside pertinent medical records available.    All lab results personally reviewed and interpreted by me.  Lab Results   Component Value Date    WBC 10.07 03/23/2024    HGB 15.8 03/23/2024    HCT 47.9 03/23/2024    MCV 96 03/23/2024    MCH 31.7 (H) 03/23/2024    MCHC 33.0 03/23/2024    RDW 13.0 03/23/2024     03/23/2024    MPV 12.3 03/23/2024       Lab Results   Component Value Date     03/23/2024    K 4.2 03/23/2024     03/23/2024    CO2 26 03/23/2024    GLU 92 03/23/2024    BUN 12 03/23/2024    CALCIUM 9.9 03/23/2024    PROT 7.1 05/27/2024    ALBUMIN 3.9  "05/27/2024    BILITOT 0.5 05/27/2024    AST 27 05/27/2024    ALKPHOS 83 05/27/2024    ALT 45 (H) 05/27/2024       Lab Results   Component Value Date    COLORU Yellow 10/25/2022    APPEARANCEUA Cloudy (A) 01/16/2022    SPECGRAV >=1.030 10/25/2022    PHUR 5.5 10/25/2022    PROTEINUA 1+ (A) 01/16/2022    GLUCOSEU NEGATIVE 12/06/2021    KETONESU Negative 10/25/2022    LEUKOCYTESUR Trace (A) 01/16/2022    NITRITE Negative 10/25/2022    UROBILINOGEN 0.2 10/25/2022       Lab Results   Component Value Date    CRP 1.4 06/13/2020       Lab Results   Component Value Date    RF <10.0 07/09/2016    SEDRATE 10 06/13/2020    CCPANTIBODIE <0.5 07/09/2016       No components found for: "25OHVITDTOT", "76ZEKXUJ6", "14BIDCFF0", "METHODNOTE"    Lab Results   Component Value Date    URICACID 5.8 10/10/2017       Lab Results   Component Value Date    HEPCAB Negative 10/10/2017       Imaging:  All imaging reviewed and independently interpreted by me.     ASSESSMENT / PLAN:     Jm Chaparro is a 29 y.o. White male with  IBS, anxiety, autism, who comes to transfer care for management of PsA    1. Psoriatic arthritis   -Stable  -No synovitis on examination during last visit. He reports doing well  -Continue otezla 30 mg QD for now. May consider trying to increase to BID and assess response. Does not need refills now   -Can consider try another TNF in the future. Has IBS so will need to be careful with IL17 but not total contraindication     2. Psoriasis   -Overall stable with minor flares on the scalp  -Continue using clobetasol topical lotion in the scalp as needed  -advised him on daily moisturizers and avoid hot showers especially during the winter time  -advised him on the use of ketoconazole shampoo and H&S cooling mist.     3. Drug induced immunodeficiency  - we will check CBC, CMP now   - vaccines per guidelines   - immunosuppression/infectious precautions reinforced    4.Obesity  - would benefit from decreasing at least " 10% of body weight.  - recommended goal of losing 1 lb per week.  - consider nutritionist evaluation.  - would consider screening for MARIJA per PMD.  - check A1c    5. Bladder spasms  -he has chronic bladder spasms that have been controlled with gabapentin 300 mg TID. Does not need refill now     Follow up in about 3 months (around 4/17/2025) for Virtual Visit.    Method of contact with patient concerns: [facility]    Disclaimer:  This note is prepared using voice recognition software and as such is likely to have errors and has not been proof read. Please contact me for questions.     Samantha Pace M.D.  Rheumatology  [facility]

## 2025-01-20 ENCOUNTER — PATIENT MESSAGE (OUTPATIENT)
Dept: FAMILY MEDICINE | Facility: CLINIC | Age: 30
End: 2025-01-20
Payer: COMMERCIAL

## 2025-01-28 ENCOUNTER — PATIENT MESSAGE (OUTPATIENT)
Dept: UROLOGY | Facility: CLINIC | Age: 30
End: 2025-01-28
Payer: COMMERCIAL

## 2025-01-28 ENCOUNTER — LAB VISIT (OUTPATIENT)
Dept: LAB | Facility: HOSPITAL | Age: 30
End: 2025-01-28
Attending: FAMILY MEDICINE
Payer: COMMERCIAL

## 2025-01-28 ENCOUNTER — PATIENT MESSAGE (OUTPATIENT)
Dept: FAMILY MEDICINE | Facility: CLINIC | Age: 30
End: 2025-01-28
Payer: COMMERCIAL

## 2025-01-28 ENCOUNTER — ON-DEMAND VIRTUAL (OUTPATIENT)
Dept: URGENT CARE | Facility: CLINIC | Age: 30
End: 2025-01-28
Payer: COMMERCIAL

## 2025-01-28 ENCOUNTER — PATIENT MESSAGE (OUTPATIENT)
Dept: RHEUMATOLOGY | Facility: CLINIC | Age: 30
End: 2025-01-28
Payer: COMMERCIAL

## 2025-01-28 DIAGNOSIS — N32.89 BLADDER SPASM: ICD-10-CM

## 2025-01-28 DIAGNOSIS — R39.9 UTI SYMPTOMS: Primary | ICD-10-CM

## 2025-01-28 DIAGNOSIS — R39.89 BLADDER PAIN: ICD-10-CM

## 2025-01-28 DIAGNOSIS — N30.00 ACUTE CYSTITIS WITHOUT HEMATURIA: Primary | ICD-10-CM

## 2025-01-28 DIAGNOSIS — R39.9 UTI SYMPTOMS: ICD-10-CM

## 2025-01-28 LAB
BILIRUB UR QL STRIP: NEGATIVE
CLARITY UR REFRACT.AUTO: CLEAR
COLOR UR AUTO: YELLOW
GLUCOSE UR QL STRIP: NEGATIVE
HGB UR QL STRIP: NEGATIVE
KETONES UR QL STRIP: NEGATIVE
LEUKOCYTE ESTERASE UR QL STRIP: NEGATIVE
NITRITE UR QL STRIP: NEGATIVE
PH UR STRIP: 7 [PH] (ref 5–8)
PROT UR QL STRIP: NEGATIVE
SP GR UR STRIP: 1.02 (ref 1–1.03)
URN SPEC COLLECT METH UR: NORMAL

## 2025-01-28 PROCEDURE — 87086 URINE CULTURE/COLONY COUNT: CPT | Performed by: FAMILY MEDICINE

## 2025-01-28 PROCEDURE — 81003 URINALYSIS AUTO W/O SCOPE: CPT | Performed by: FAMILY MEDICINE

## 2025-01-28 RX ORDER — NITROFURANTOIN 25; 75 MG/1; MG/1
100 CAPSULE ORAL 2 TIMES DAILY
Qty: 10 CAPSULE | Refills: 0 | Status: SHIPPED | OUTPATIENT
Start: 2025-01-28 | End: 2025-02-02

## 2025-01-28 NOTE — Clinical Note
HI,  Can you please order ua and urine culture, pt mom will stop by the lab to have collected, prior to starting abx.   Thanks,  OZIEL Hernandez

## 2025-01-28 NOTE — PROGRESS NOTES
Subjective:      Patient ID: Jm Chaparro is a 29 y.o. male.    Vitals:  vitals were not taken for this visit.     Chief Complaint: Urinary Tract Infection (X1 day)      Visit Type: TELE AUDIOVISUAL    Patient Location: Home     Present with the patient at the time of consultation: TELEMED PRESENT WITH PATIENT: family member ,mom [place]     Past Medical History:   Diagnosis Date    ADD (attention deficit disorder)     Allergy     Anxiety disorder     Asperger's disorder     (AUTISM)    Deformity of both feet     Dyscalculia     Dysgraphia     Dyslexia     Eczema     Elevated LFTs     Fib4 score 0.58 3/24    Fatty liver     GERD (gastroesophageal reflux disease)     History of migraine headaches     Hypertension     Interstitial cystitis (chronic)     Irritable bowel syndrome     Nephrolithiasis     2014    PONV (postoperative nausea and vomiting)     Psoriasis     Psoriatic arthritis     Refusal of blood transfusion for reasons of conscience     Seasonal allergies     Sleep apnea     does not like CPAP    Special needs assessment      Past Surgical History:   Procedure Laterality Date    BIOPSY OF BLADDER  1/3/2022    Procedure: BIOPSY, BLADDER;  Surgeon: Derek Dickson MD;  Location: [facility];  Service: Urology;;    COLONOSCOPY  ~2015    [facility]; told IBS    COLONOSCOPY  08/10/2015    Dr. Shaver; sent for scanning: unremarkable findings, no specimens collected    COLONOSCOPY N/A 2/18/2019    Procedure: COLONOSCOPY;  Surgeon: Puma Preston Jr., MD;  Location: [facility];  Service: Endoscopy;  Laterality: N/A;    CYSTOSCOPY  1/11/2023    Procedure: CYSTOSCOPY;  Surgeon: Trino Cain MD;  Location: [address];  Service: Urology;;    CYSTOSCOPY,WITH BOTULINUM TOXIN INJECTION N/A 2/9/2023    Procedure: CYSTOSCOPY,WITH BOTULINUM TOXIN INJECTION;  Surgeon: Trino Cain MD;  Location: [facility];  Service: Urology;  Laterality: N/A;    CYSTOSCOPY,WITH BOTULINUM TOXIN  INJECTION N/A 5/3/2023    Procedure: CYSTOSCOPY,WITH BOTULINUM TOXIN INJECTION;  Surgeon: Trino Cain MD;  Location: [facility] OR;  Service: Urology;  Laterality: N/A;  30 min    CYSTOSCOPY,WITH BOTULINUM TOXIN INJECTION N/A 12/20/2023    Procedure: CYSTOSCOPY,WITH BOTULINUM TOXIN INJECTION;  Surgeon: Trino Cain MD;  Location: [facility] OR;  Service: Urology;  Laterality: N/A;  40 min   150 units    CYSTOSCOPY,WITH BOTULINUM TOXIN INJECTION N/A 10/4/2024    Procedure: CYSTOSCOPY,WITH BOTULINUM TOXIN INJECTION;  Surgeon: Trino Cain MD;  Location: [facility] OR;  Service: Urology;  Laterality: N/A;  150 units Botox    INSERTION, NEUROSTIMULATOR, TEMPORARY, SACRAL N/A 1/24/2023    Procedure: INSERTION, NEUROSTIMULATOR, TEMPORARY, SACRAL;  Surgeon: Trino Cain MD;  Location: [facility] OR [address] [place] [address];  Service: Urology;  Laterality: N/A;  1 hr 45 mins    REMOVAL OF ELECTRODE LEAD OF SACRAL NERVE STIMULATOR N/A 2/9/2023    Procedure: REMOVAL, ELECTRODE LEAD, SACRAL NERVE STIMULATOR;  Surgeon: Trino Cain MD;  Location: [facility] OR;  Service: Urology;  Laterality: N/A;  1 hr    UPPER GASTROINTESTINAL ENDOSCOPY  05/14/2012    Dr. Preston    UPPER GASTROINTESTINAL ENDOSCOPY  07/17/2015    Dr. Shaver, sent for scanning: normal esophagus- dilated & biopsied, findings WNL, biopsies taken from z-line, stomach and duodenum; biopsy: duodenum WNL, antrum reactive gastropathy, negative for h pylori or int. metaplasia, GEJ WNL, negative for EOE & cotton's esophagus    ureteral stone extraction      basket extraction     Review of patient's allergies indicates:   Allergen Reactions    Penicillins Anaphylaxis    Caffeine      Rapid heart beat    Ciprofloxacin Itching and Rash     Current Outpatient Medications on File Prior to Visit   Medication Sig Dispense Refill    ALPRAZolam (XANAX) 0.25 MG tablet Take 1 tablet (0.25 mg total) by mouth daily as needed for Anxiety. 15 tablet 0    apremilast (OTEZLA) 30 mg Tab Take  1 tablet (30 mg total) by mouth 2 (two) times a day. 180 tablet 2    atorvastatin (LIPITOR) 20 MG tablet Take 1 tablet (20 mg total) by mouth once daily. 90 tablet 2    clobetasoL (TEMOVATE) 0.05 % external solution Apply topically 2 (two) times daily as needed (scalp psoriasis). 25 mL 2    dextroamphetamine-amphetamine (ADDERALL) 15 mg tablet Take 1 tablet (15 mg total) by mouth once daily. 30 tablet 0    doxycycline (MONODOX) 100 MG capsule Take 1 capsule (100 mg total) by mouth every 12 (twelve) hours. 10 capsule 0    FLUoxetine 40 MG capsule Take 1 capsule (40 mg total) by mouth once daily. 90 capsule 1    gabapentin (NEURONTIN) 300 MG capsule Take 1 capsule (300 mg total) by mouth 3 (three) times daily. 180 capsule 2    metoprolol succinate (TOPROL-XL) 50 MG 24 hr tablet Take 1 tablet (50 mg total) by mouth 2 (two) times daily. 60 tablet 1    PREVIDENT 5000 BOOSTER PLUS 1.1 % Pste       rizatriptan (MAXALT-MLT) 10 MG disintegrating tablet Take 10 mg by mouth daily as needed.      traMADoL (ULTRAM) 50 mg tablet Take 1 tablet (50 mg total) by mouth every 12 (twelve) hours as needed for Pain. Medically necessary for more than 7 days, ICD 10: G89.4 55 tablet 2    traZODone (DESYREL) 50 MG tablet Take 1 tablet (50 mg total) by mouth every evening. 30 tablet 1    ubidecarenone (CO Q-10 ORAL) Take by mouth once daily.      UROGESIC-BLUE 81.6-40.8-0.12 mg Tab TAKE 1 TABLET BY MOUTH THREE TIMES DAILY AS NEEDED FOR DYSURIA 60 tablet 4    VITAMIN D2 1,250 mcg (50,000 unit) capsule Take 1 capsule (50,000 Units total) by mouth every 7 days. 12 capsule 2     Current Facility-Administered Medications on File Prior to Visit   Medication Dose Route Frequency Provider Last Rate Last Admin    electrolyte-S (ISOLYTE)   Intravenous Continuous Dale Petit MD         Family History   Problem Relation Name Age of Onset    Lupus Mother      Interstitial cystitis Mother      Diabetes Maternal Grandmother      Lupus Maternal  Grandmother      Clotting disorder Maternal Grandmother      Interstitial cystitis Maternal Grandmother      Diabetes Maternal Grandfather      Nephrolithiasis Maternal Grandfather          staghorn    Anesthesia problems Maternal Aunt      Interstitial cystitis Maternal Aunt      Colon cancer Neg Hx      Crohn's disease Neg Hx      Ulcerative colitis Neg Hx      Celiac disease Neg Hx      Esophageal cancer Neg Hx      Stomach cancer Neg Hx      Allergic rhinitis Neg Hx      Allergies Neg Hx      Angioedema Neg Hx      Asthma Neg Hx      Atopy Neg Hx      Eczema Neg Hx      Immunodeficiency Neg Hx      Rhinitis Neg Hx      Urticaria Neg Hx         Medications Ordered                [address]   [address] [facility] [address]    Telephone: [phone]   Fax: [phone]   Hours: Not open 24 hours                         E-Prescribed (1 of 1)              nitrofurantoin, macrocrystal-monohydrate, (MACROBID) 100 MG capsule    Sig: Take 1 capsule (100 mg total) by mouth 2 (two) times daily. for 5 days       Start: 1/28/25     Quantity: 10 capsule Refills: 0                           Ohs Peq Odvv Intake    1/28/2025 12:32 PM CST - Filed by Nancie Chaparro (Proxy)   What is your current physical address in the event of a medical emergency? [address] [place] [address]. [phone]   Are you able to take your vital signs? Yes   Systolic Blood Pressure: 121   Diastolic Blood Pressure: 82   Weight: 226   Height: 73   Pulse: 62   Temperature: 97.7   Respiration rate:    Pulse Oxygen: 94   Please attach any relevant images or files    Is your employer contracted with [facility]? No         Mother presents with pt with c/o UTI symptoms x 1 day, reports dysuria, urgency, and frequency, hx of UTI. Endorses symptoms not like the stone.  Denies any fever, blood in urine, back pain.         Constitution: Negative for fever.   Respiratory:   Negative for shortness of breath.    Genitourinary:  Positive for dysuria, frequency and urgency. Negative for flank pain and hematuria.   Musculoskeletal:  Negative for back pain.   Neurological:  Negative for dizziness.        Objective:   The physical exam was conducted virtually.  Physical Exam   Constitutional: He is oriented to person, place, and time. No distress.   HENT:   Head: Normocephalic.   Ears:   Right Ear: External ear normal.   Left Ear: External ear normal.   Neck: Neck supple.   Pulmonary/Chest: Effort normal. No respiratory distress.   Neurological: He is alert and oriented to person, place, and time.       Assessment:     1. Acute cystitis without hematuria        Plan:   Please reach out to PCP for urine lab order prior to starting antibiotic    Take meds as directed, If symptoms worsening or not improved f/u in local Urgent Care or with Urology.        Acute cystitis without hematuria  -     nitrofurantoin, macrocrystal-monohydrate, (MACROBID) 100 MG capsule; Take 1 capsule (100 mg total) by mouth 2 (two) times daily. for 5 days  Dispense: 10 capsule; Refill: 0      We appreciate you trusting us with your medical care. We hope you feel better soon. We will be happy to take care of you for all of your future medical needs.     You must understand that you've received Virtual treatment only and that you may be released before all your medical problems are known or treated. You, the patient, will arrange for follow up care as instructed.     Follow up with your PCP or specialty clinic as directed in the next 3-5 days weeks if not improved or as needed. You can call [phone] to schedule an appointment with the appropriate provider.     If your condition worsens we recommend that you receive another evaluation in person, with your primary care provider, urgent care or at the emergency room immediately or contact your primary medical clinics after hours call service to discuss your  concerns.

## 2025-01-29 RX ORDER — GABAPENTIN 300 MG/1
900 CAPSULE ORAL 2 TIMES DAILY
Qty: 180 CAPSULE | Refills: 5 | Status: ACTIVE | OUTPATIENT
Start: 2025-01-29 | End: 2025-07-28

## 2025-01-30 ENCOUNTER — PATIENT MESSAGE (OUTPATIENT)
Dept: FAMILY MEDICINE | Facility: CLINIC | Age: 30
End: 2025-01-30
Payer: COMMERCIAL

## 2025-01-30 LAB — BACTERIA UR CULT: NO GROWTH

## 2025-02-16 RX ORDER — METOPROLOL SUCCINATE 50 MG/1
50 TABLET, EXTENDED RELEASE ORAL 2 TIMES DAILY
Qty: 180 TABLET | Refills: 1 | Status: SHIPPED | OUTPATIENT
Start: 2025-02-16

## 2025-02-17 ENCOUNTER — OFFICE VISIT (OUTPATIENT)
Dept: FAMILY MEDICINE | Facility: CLINIC | Age: 30
End: 2025-02-17
Payer: COMMERCIAL

## 2025-02-17 VITALS
RESPIRATION RATE: 16 BRPM | TEMPERATURE: 98 F | DIASTOLIC BLOOD PRESSURE: 72 MMHG | SYSTOLIC BLOOD PRESSURE: 100 MMHG | OXYGEN SATURATION: 95 % | HEIGHT: 74 IN | HEART RATE: 62 BPM | BODY MASS INDEX: 29.09 KG/M2 | WEIGHT: 226.63 LBS

## 2025-02-17 DIAGNOSIS — E78.5 HYPERLIPIDEMIA, UNSPECIFIED HYPERLIPIDEMIA TYPE: ICD-10-CM

## 2025-02-17 DIAGNOSIS — I10 HYPERTENSION, ESSENTIAL: Primary | ICD-10-CM

## 2025-02-17 DIAGNOSIS — L40.50 PSORIATIC ARTHRITIS: ICD-10-CM

## 2025-02-17 DIAGNOSIS — Z23 IMMUNIZATION DUE: ICD-10-CM

## 2025-02-17 NOTE — PROGRESS NOTES
Subjective:       Patient ID: Jm Chaparro is a 29 y.o. male.    Chief Complaint: Follow-up    History of Present Illness    CHIEF COMPLAINT:  Patient presents today for follow up.  Overall, he is doing fairly well.  He now has a caregiver with him 3 days a week with up to 30 hours a week as needed.  He is enjoying spending time with his male caregiver.  They are getting out and about and are going to be starting going to the gym.    MUSCULOSKELETAL AND DERMATOLOGIC:  He reports worsening joint pain, particularly during weather changes, describing a sensation of joints being pulled apart due to his connective tissue disorders. He continues rheumatology follow-up and maintains on Otezla for management. He also experiences patchy skin lesions related to psoriasis.  They are going to consider increasing the Otezla to twice a day to see if this helps more    BLOOD PRESSURE:  Today's blood pressure is 100/72 He reports home blood pressure readings around 100 or occasionally lower. He continues toprolol 50 mg twice daily for management.    WEIGHT MANAGEMENT:  He reports a 4-pound weight loss since last visit, currently weighing 226 lbs. He expresses satisfaction with his progress and plans to continue gym exercises.         Review of Systems   Constitutional:  Negative for appetite change, chills, diaphoresis, fatigue, fever and unexpected weight change.   HENT:  Negative for congestion, ear pain, postnasal drip, rhinorrhea, sinus pressure, sneezing, sore throat and trouble swallowing.    Eyes:  Negative for pain, discharge and visual disturbance.   Respiratory:  Negative for cough, chest tightness, shortness of breath and wheezing.    Cardiovascular:  Negative for chest pain, palpitations and leg swelling.   Gastrointestinal:  Negative for abdominal distention, abdominal pain, blood in stool, constipation, diarrhea, nausea and vomiting.   Skin:  Negative for rash.         Objective:      Physical  Exam  Constitutional:       General: He is not in acute distress.     Appearance: Normal appearance. He is well-developed.   HENT:      Head: Normocephalic and atraumatic.      Right Ear: Hearing, tympanic membrane, ear canal and external ear normal.      Left Ear: Hearing, tympanic membrane, ear canal and external ear normal.      Nose: Nose normal.      Mouth/Throat:      Mouth: No oral lesions.      Pharynx: No oropharyngeal exudate or posterior oropharyngeal erythema.   Eyes:      General: Lids are normal. No scleral icterus.     Extraocular Movements: Extraocular movements intact.      Conjunctiva/sclera: Conjunctivae normal.      Pupils: Pupils are equal, round, and reactive to light.   Neck:      Thyroid: No thyroid mass or thyromegaly.      Vascular: No carotid bruit.   Cardiovascular:      Rate and Rhythm: Normal rate and regular rhythm. No extrasystoles are present.     Chest Wall: PMI is not displaced.      Heart sounds: Normal heart sounds. No murmur heard.     No gallop.   Pulmonary:      Effort: Pulmonary effort is normal. No accessory muscle usage or respiratory distress.      Breath sounds: Normal breath sounds.   Abdominal:      General: Bowel sounds are normal. There is no abdominal bruit.      Palpations: Abdomen is soft.      Tenderness: There is no abdominal tenderness. There is no rebound.   Musculoskeletal:      Cervical back: Normal range of motion and neck supple.   Lymphadenopathy:      Head:      Right side of head: No submental or submandibular adenopathy.      Left side of head: No submental or submandibular adenopathy.      Cervical:      Right cervical: No superficial, deep or posterior cervical adenopathy.     Left cervical: No superficial, deep or posterior cervical adenopathy.      Upper Body:      Right upper body: No supraclavicular adenopathy.      Left upper body: No supraclavicular adenopathy.   Skin:     General: Skin is warm and dry.   Neurological:      Mental Status: He is  "alert and oriented to person, place, and time.       Blood pressure 100/72, pulse 62, temperature 97.7 °F (36.5 °C), temperature source Temporal, resp. rate 16, height 6' 2" (1.88 m), weight 102.8 kg (226 lb 10.1 oz), SpO2 95%.Body mass index is 29.1 kg/m².          Assessment & Plan    1. Adjusted blood pressure management due to improved stability and weight loss  2. Considered increasing Otezla dose to address persistent joint pain and psoriasis symptoms  3. Evaluated need for routine lab work, including diabetes screening and lipid panel    PSORIASIS AND PSORIATIC ARTHRITIS:  - Consider increasing Otezla to twice a day dosing per Rheumatology  - Monitor the patient's psoriasis and arthritis.  - Evaluate the patient's patchy skin related to psoriasis.  - Consider increasing the dose of Otezla to potentially help with joint pain.  - Prescribe arm spray for topical treatment.  - Encourage the patient to continue seeing a rheumatologist for management.    HYPERTENSION:  - Decrease toprolol from 50 mg twice daily to 25 mg twice daily.  - Advise the patient to contact the office regarding blood pressures that are high or low with new medication regimen.    OVERWEIGHT WITH A BMI OF 29:  - Monitor the patient's weight, noting a 4-pound loss since the last visit.  - Record current weight as 226 lbs.  - Encourage continued physical activity for further weight loss.  - Note that the patient is starting to engage in exercise and activities with their caregiver.  - Patient to continue with exercise.    HYPERLIPIDEMIA:  - check FLP soon  - continue with lipitor    ELEVATED LFTs WITH FATTY LIVER DISEASE:  - continue with weight loss   - recheck LFTs soon    LABS:  - Schedule follow-up for lab work with me and Dr. Camacho, potentially later this week.  - CBC, chemistry panel, diabetes screening, and lipid panel ordered as routine labs.    OTHER INSTRUCTIONS:  - Encourage the patient to continue activities with sitter.  - Note " that the patient receives assistance 30 hours a week, which has been slightly reduced.  - Recognize that the patient receives care through a state waiver program, with assistance coming on Monday, Wednesday, Friday, and as needed.   - Flu shot given today        This note was generated with the assistance of ambient listening technology. Verbal consent was obtained by the patient and accompanying visitor(s) for the recording of patient appointment to facilitate this note. I attest to having reviewed and edited the generated note for accuracy, though some syntax or spelling errors may persist. Please contact the author of this note for any clarification.

## 2025-02-17 NOTE — TELEPHONE ENCOUNTER
Provider Staff:  Action required for this patient     Please see care gap opportunities below in Care Due Message.    Thanks!  [facility]     Appointments      Date Provider   Last Visit   8/15/2024 China Pierre MD   Next Visit   2/17/2025 China Pierre MD      Refill Decision Note   Jm Chaparro  is requesting a refill authorization.  Brief Assessment and Rationale for Refill:  Approve     Medication Therapy Plan:         Comments:     Note composed:10:23 PM 02/16/2025           
Care Due:                  Date            Visit Type   Department     Provider  --------------------------------------------------------------------------------                                ESTABLISHED                              PATIENT -    [facility]    China Pierre  Last Visit: 08-      VIRTUAL      MEDICINE       Anger  Next Visit: None Scheduled  None         None Found                                                            Last  Test          Frequency    Reason                     Performed    Due Date  --------------------------------------------------------------------------------    Ca..........  12 months..  VITAMIN..................  03- 03-    Lipid Panel.  12 months..  atorvastatin.............  03- 03-    Vitamin D...  12 months..  VITAMIN..................  Not Found    Overdue    [employer] Embedded Care Due Messages. Reference number: [id number].   2/16/2025 10:02:44 AM CST  
Additional Notes: Patient consent was obtained to proceed with the visit and recommended plan of care after discussion of all risks and benefits, including the risks of COVID-19 exposure.
Render Risk Assessment In Note?: no
Detail Level: Simple

## 2025-02-19 DIAGNOSIS — F41.1 GAD (GENERALIZED ANXIETY DISORDER): ICD-10-CM

## 2025-02-19 RX ORDER — FLUOXETINE HYDROCHLORIDE 40 MG/1
40 CAPSULE ORAL
Qty: 90 CAPSULE | Refills: 0 | Status: SHIPPED | OUTPATIENT
Start: 2025-02-19

## 2025-02-19 RX ORDER — TRAZODONE HYDROCHLORIDE 50 MG/1
50 TABLET ORAL NIGHTLY
Qty: 30 TABLET | Refills: 0 | Status: SHIPPED | OUTPATIENT
Start: 2025-02-19

## 2025-02-20 ENCOUNTER — LAB VISIT (OUTPATIENT)
Dept: LAB | Facility: HOSPITAL | Age: 30
End: 2025-02-20
Attending: FAMILY MEDICINE
Payer: COMMERCIAL

## 2025-02-20 ENCOUNTER — OFFICE VISIT (OUTPATIENT)
Dept: PAIN MEDICINE | Facility: CLINIC | Age: 30
End: 2025-02-20
Payer: COMMERCIAL

## 2025-02-20 VITALS
HEART RATE: 60 BPM | WEIGHT: 227.38 LBS | DIASTOLIC BLOOD PRESSURE: 69 MMHG | SYSTOLIC BLOOD PRESSURE: 108 MMHG | HEIGHT: 74 IN | BODY MASS INDEX: 29.18 KG/M2

## 2025-02-20 DIAGNOSIS — L40.9 PSORIASIS: ICD-10-CM

## 2025-02-20 DIAGNOSIS — E78.5 HYPERLIPIDEMIA, UNSPECIFIED HYPERLIPIDEMIA TYPE: ICD-10-CM

## 2025-02-20 DIAGNOSIS — K58.2 IRRITABLE BOWEL SYNDROME WITH BOTH CONSTIPATION AND DIARRHEA: ICD-10-CM

## 2025-02-20 DIAGNOSIS — Z79.891 OPIOID CONTRACT EXISTS: Primary | ICD-10-CM

## 2025-02-20 DIAGNOSIS — R29.898 MUSCULAR DECONDITIONING: ICD-10-CM

## 2025-02-20 DIAGNOSIS — M79.642 BILATERAL HAND PAIN: ICD-10-CM

## 2025-02-20 DIAGNOSIS — M79.641 BILATERAL HAND PAIN: ICD-10-CM

## 2025-02-20 DIAGNOSIS — L40.50 PSORIATIC ARTHRITIS: ICD-10-CM

## 2025-02-20 DIAGNOSIS — E66.01 MORBID OBESITY: ICD-10-CM

## 2025-02-20 DIAGNOSIS — N32.89 BLADDER SPASM: ICD-10-CM

## 2025-02-20 DIAGNOSIS — M06.9 RHEUMATOID ARTHRITIS INVOLVING MULTIPLE SITES, UNSPECIFIED WHETHER RHEUMATOID FACTOR PRESENT: ICD-10-CM

## 2025-02-20 DIAGNOSIS — R39.89 BLADDER PAIN: ICD-10-CM

## 2025-02-20 LAB
ALBUMIN SERPL BCP-MCNC: 3.6 G/DL (ref 3.5–5.2)
ALP SERPL-CCNC: 87 U/L (ref 40–150)
ALT SERPL W/O P-5'-P-CCNC: 46 U/L (ref 10–44)
ANION GAP SERPL CALC-SCNC: 8 MMOL/L (ref 8–16)
AST SERPL-CCNC: 45 U/L (ref 10–40)
BASOPHILS # BLD AUTO: 0.06 K/UL (ref 0–0.2)
BASOPHILS NFR BLD: 0.8 % (ref 0–1.9)
BILIRUB SERPL-MCNC: 0.3 MG/DL (ref 0.1–1)
BUN SERPL-MCNC: 8 MG/DL (ref 6–20)
CALCIUM SERPL-MCNC: 9.8 MG/DL (ref 8.7–10.5)
CHLORIDE SERPL-SCNC: 105 MMOL/L (ref 95–110)
CHOLEST SERPL-MCNC: 149 MG/DL (ref 120–199)
CHOLEST/HDLC SERPL: 4.4 {RATIO} (ref 2–5)
CO2 SERPL-SCNC: 27 MMOL/L (ref 23–29)
CREAT SERPL-MCNC: 1 MG/DL (ref 0.5–1.4)
DIFFERENTIAL METHOD BLD: ABNORMAL
EOSINOPHIL # BLD AUTO: 0.2 K/UL (ref 0–0.5)
EOSINOPHIL NFR BLD: 3.2 % (ref 0–8)
ERYTHROCYTE [DISTWIDTH] IN BLOOD BY AUTOMATED COUNT: 13.1 % (ref 11.5–14.5)
EST. GFR  (NO RACE VARIABLE): >60 ML/MIN/1.73 M^2
ESTIMATED AVG GLUCOSE: 111 MG/DL (ref 68–131)
GLUCOSE SERPL-MCNC: 102 MG/DL (ref 70–110)
HBA1C MFR BLD: 5.5 % (ref 4–5.6)
HCT VFR BLD AUTO: 45.2 % (ref 40–54)
HDLC SERPL-MCNC: 34 MG/DL (ref 40–75)
HDLC SERPL: 22.8 % (ref 20–50)
HGB BLD-MCNC: 15.1 G/DL (ref 14–18)
IMM GRANULOCYTES # BLD AUTO: 0.02 K/UL (ref 0–0.04)
IMM GRANULOCYTES NFR BLD AUTO: 0.3 % (ref 0–0.5)
LDLC SERPL CALC-MCNC: 63.8 MG/DL (ref 63–159)
LYMPHOCYTES # BLD AUTO: 2.9 K/UL (ref 1–4.8)
LYMPHOCYTES NFR BLD: 39.4 % (ref 18–48)
MCH RBC QN AUTO: 32.1 PG (ref 27–31)
MCHC RBC AUTO-ENTMCNC: 33.4 G/DL (ref 32–36)
MCV RBC AUTO: 96 FL (ref 82–98)
MONOCYTES # BLD AUTO: 0.5 K/UL (ref 0.3–1)
MONOCYTES NFR BLD: 6.7 % (ref 4–15)
NEUTROPHILS # BLD AUTO: 3.7 K/UL (ref 1.8–7.7)
NEUTROPHILS NFR BLD: 49.6 % (ref 38–73)
NONHDLC SERPL-MCNC: 115 MG/DL
NRBC BLD-RTO: 0 /100 WBC
PLATELET # BLD AUTO: 290 K/UL (ref 150–450)
PMV BLD AUTO: 11.6 FL (ref 9.2–12.9)
POTASSIUM SERPL-SCNC: 4 MMOL/L (ref 3.5–5.1)
PROT SERPL-MCNC: 6.7 G/DL (ref 6–8.4)
RBC # BLD AUTO: 4.71 M/UL (ref 4.6–6.2)
SODIUM SERPL-SCNC: 140 MMOL/L (ref 136–145)
TRIGL SERPL-MCNC: 256 MG/DL (ref 30–150)
WBC # BLD AUTO: 7.46 K/UL (ref 3.9–12.7)

## 2025-02-20 PROCEDURE — 80053 COMPREHEN METABOLIC PANEL: CPT | Performed by: STUDENT IN AN ORGANIZED HEALTH CARE EDUCATION/TRAINING PROGRAM

## 2025-02-20 PROCEDURE — 36415 COLL VENOUS BLD VENIPUNCTURE: CPT | Mod: PO | Performed by: STUDENT IN AN ORGANIZED HEALTH CARE EDUCATION/TRAINING PROGRAM

## 2025-02-20 PROCEDURE — 80061 LIPID PANEL: CPT | Performed by: FAMILY MEDICINE

## 2025-02-20 PROCEDURE — 83036 HEMOGLOBIN GLYCOSYLATED A1C: CPT | Performed by: STUDENT IN AN ORGANIZED HEALTH CARE EDUCATION/TRAINING PROGRAM

## 2025-02-20 PROCEDURE — 85025 COMPLETE CBC W/AUTO DIFF WBC: CPT | Performed by: STUDENT IN AN ORGANIZED HEALTH CARE EDUCATION/TRAINING PROGRAM

## 2025-02-20 PROCEDURE — 99999 PR PBB SHADOW E&M-EST. PATIENT-LVL IV: CPT | Mod: PBBFAC,,,

## 2025-02-20 RX ORDER — TRAMADOL HYDROCHLORIDE 50 MG/1
50 TABLET ORAL EVERY 12 HOURS PRN
Qty: 55 TABLET | Refills: 2 | Status: SHIPPED | OUTPATIENT
Start: 2025-03-21

## 2025-02-20 NOTE — PROGRESS NOTES
This note was completed with dictation software and grammatical errors may exist.    Chief Complaint   Patient presents with    Follow-up    Joint Pain        HPI: Jm Chaparro is a 29 y.o. year old male patient who has a past medical history of ADD (attention deficit disorder), Allergy, Anxiety disorder, Asperger's disorder, Deformity of both feet, Dyscalculia, Dysgraphia, Dyslexia, Eczema, Elevated LFTs, Fatty liver, GERD (gastroesophageal reflux disease), History of migraine headaches, Hypertension, Interstitial cystitis (chronic), Irritable bowel syndrome, Nephrolithiasis, PONV (postoperative nausea and vomiting), Psoriasis, Psoriatic arthritis, Refusal of blood transfusion for reasons of conscience, Seasonal allergies, Sleep apnea, and Special needs assessment. He presents in referral from No ref. provider found for abdominal and bladder pain.  He returns with his baseline chronic abdominal pain and generalized arthritic pains.  He also reports falling roughly 10 days ago and is having some worsening tailbone pain.  He denies any new or acutely worsening symptoms though and for the most part is doing well.  He continues to find relief with gabapentin 900 mg b.i.d. in addition to tramadol.  He continues to take the tramadol 1/2 tablet in the morning and 1 tablet nightly with benefit and no ill side effects or adverse events.      Previous history:  The patient's mother was present with him at the visit today who also helps with most of the history.  She reports that in March, 2015 she had gone out of town on a business trip and her son accompanying her.  Unfortunately he came down with cold and virus symptoms at the time and began developing severe abdominal pain.  She went to the emergency department at that time in [place], they were told he was having viral symptoms.  She returned to the [facility] in  and had followed up with several other physicians that year.  She reports that initially he was  having intermittent pain on and off, would have pain for a week and then it would disappear without cause.  He then developed diarrhea and constipation at times, he was seen at [facility] in [place] in 2015 and diagnosed with IBS with constipation and diarrhea.  They recommend an antidepressant that he tried, no relief with this.  Shortly after he began having renal calculi as well and needed to present to the emergency department.  He had seen neurology, Dr. Larry and reports that he required stone removal, does not sound like he has had lithotripsy.  He has seen Dr. Dickson, and now Nephrology, Dr. Enriquez.  He has seen Gastroenterology, Dr. Shaver and had undergone colonoscopy with no significant findings.  He has been tried on multiple medications over time as discussed below without any benefit.  He continues to have frequent abdominal pain that is often worse with caffeine or other foods, worse with activity.  He reports that he gets some relief with a bowel movement.  He still has been passing small sand like renal calculi, they report that his urine turns dark when he is about to pass a stone and he often has pain throughout the bladder and urethra after passing a stone.    He has a history of rheumatoid arthritis, has been seeing a rheumatologist, reports pain throughout his entire spine, bilateral hips, shoulders, knees, feet.  He does well with being in a bath with hot water.    Pain intervention history:  No injections    Spine surgeries:  None    Antineuropathics: Gabapentin 300mg three times daily, was given this for inflammatory joint pain??  NSAIDs: Mobic 15, no benefit  Physical therapy:  Has done some physical therapy, water therapy in the past with some improvement but things worsened when he did land-based therapy and stretching  Antidepressants: Fluoxetine 40mg  Muscle relaxers:  Opioids:  The patient reports having benefit from tramadol in the past  Antiplatelets/Anticoagulants:  From   "[facility] note:  Prozac 40mg po qam, adderall xr 20mg po qam (very rare use), lunesta 2mg po qhs PRN insomnia  Past Psych Meds: adderall ("robot child"), focalin ("zombie"), strattera (angry), zoloft (as a child- ineffective), lexapro (ineffective) topomax ("migraines"), celexa (overly sedating but effective), remeron (ineffective- wgt gain), atarax, elavil (ineffective for pain), ritalin (inc'd bp)     ROS:  He reports fatigue, weight gain, itching, color change, headaches head trauma, ear pain, constipation, diarrhea, stomach pain, nausea, flank pain, urinary urgency and frequency, painful urination, joint stiffness, joint swelling, back pain, difficulty sleeping and anxiety.  Balance of review of systems is negative.    Lab Results   Component Value Date    HGBA1C 5.5 03/23/2024       Lab Results   Component Value Date    WBC 10.07 03/23/2024    HGB 15.8 03/23/2024    HCT 47.9 03/23/2024    MCV 96 03/23/2024     03/23/2024       Past Medical History:   Diagnosis Date    ADD (attention deficit disorder)     Allergy     Anxiety disorder     Asperger's disorder     (AUTISM)    Deformity of both feet     Dyscalculia     Dysgraphia     Dyslexia     Eczema     Elevated LFTs     Fib4 score 0.58 3/24    Fatty liver     GERD (gastroesophageal reflux disease)     History of migraine headaches     Hypertension     Interstitial cystitis (chronic)     Irritable bowel syndrome     Nephrolithiasis     2014    PONV (postoperative nausea and vomiting)     Psoriasis     Psoriatic arthritis     Refusal of blood transfusion for reasons of conscience     Seasonal allergies     Sleep apnea     does not like CPAP    Special needs assessment        Past Surgical History:   Procedure Laterality Date    BIOPSY OF BLADDER  1/3/2022    Procedure: BIOPSY, BLADDER;  Surgeon: Derek Dickson MD;  Location: [facility] OR;  Service: Urology;;    COLONOSCOPY  ~2015    [facility]; told IBS    COLONOSCOPY  08/10/2015    Dr. Shaver; sent for " scanning: unremarkable findings, no specimens collected    COLONOSCOPY N/A 2/18/2019    Procedure: COLONOSCOPY;  Surgeon: Puma Preston Jr., MD;  Location: [facility];  Service: Endoscopy;  Laterality: N/A;    CYSTOSCOPY  1/11/2023    Procedure: CYSTOSCOPY;  Surgeon: Trino Cain MD;  Location: [facility] OR 1ST FLR;  Service: Urology;;    CYSTOSCOPY,WITH BOTULINUM TOXIN INJECTION N/A 2/9/2023    Procedure: CYSTOSCOPY,WITH BOTULINUM TOXIN INJECTION;  Surgeon: Trino Cain MD;  Location: [facility] OR;  Service: Urology;  Laterality: N/A;    CYSTOSCOPY,WITH BOTULINUM TOXIN INJECTION N/A 5/3/2023    Procedure: CYSTOSCOPY,WITH BOTULINUM TOXIN INJECTION;  Surgeon: Trino Cain MD;  Location: [facility];  Service: Urology;  Laterality: N/A;  30 min    CYSTOSCOPY,WITH BOTULINUM TOXIN INJECTION N/A 12/20/2023    Procedure: CYSTOSCOPY,WITH BOTULINUM TOXIN INJECTION;  Surgeon: Trino Cain MD;  Location: [facility] OR;  Service: Urology;  Laterality: N/A;  40 min   150 units    CYSTOSCOPY,WITH BOTULINUM TOXIN INJECTION N/A 10/4/2024    Procedure: CYSTOSCOPY,WITH BOTULINUM TOXIN INJECTION;  Surgeon: Trino Cain MD;  Location: [facility] OR;  Service: Urology;  Laterality: N/A;  150 units Botox    INSERTION, NEUROSTIMULATOR, TEMPORARY, SACRAL N/A 1/24/2023    Procedure: INSERTION, NEUROSTIMULATOR, TEMPORARY, SACRAL;  Surgeon: Trino Cain MD;  Location: [facility] OR 2ND FLR;  Service: Urology;  Laterality: N/A;  1 hr 45 mins    REMOVAL OF ELECTRODE LEAD OF SACRAL NERVE STIMULATOR N/A 2/9/2023    Procedure: REMOVAL, ELECTRODE LEAD, SACRAL NERVE STIMULATOR;  Surgeon: Trino Cain MD;  Location: [facility];  Service: Urology;  Laterality: N/A;  1 hr    UPPER GASTROINTESTINAL ENDOSCOPY  05/14/2012    Dr. Preston    UPPER GASTROINTESTINAL ENDOSCOPY  07/17/2015    Dr. Shaver, sent for scanning: normal esophagus- dilated & biopsied, findings WNL, biopsies taken from z-line, stomach and duodenum; biopsy:  "duodenum WNL, antrum reactive gastropathy, negative for h pylori or int. metaplasia, GEJ WNL, negative for EOE & cotton's esophagus    ureteral stone extraction      basket extraction       Social History     Socioeconomic History    Marital status: Single   Tobacco Use    Smoking status: Never    Smokeless tobacco: Never   Substance and Sexual Activity    Alcohol use: No    Drug use: No    Sexual activity: Never     Social Drivers of Health     Financial Resource Strain: Low Risk  (3/15/2024)    Overall Financial Resource Strain (CARDIA)     Difficulty of Paying Living Expenses: Not very hard   Food Insecurity: No Food Insecurity (3/15/2024)    Hunger Vital Sign     Worried About Running Out of Food in the Last Year: Never true     Ran Out of Food in the Last Year: Never true   Transportation Needs: No Transportation Needs (3/15/2024)    PRAPARE - Transportation     Lack of Transportation (Medical): No     Lack of Transportation (Non-Medical): No   Physical Activity: Insufficiently Active (3/15/2024)    Exercise Vital Sign     Days of Exercise per Week: 1 day     Minutes of Exercise per Session: 20 min   Stress: Stress Concern Present (3/15/2024)    [employer] - Occupational Stress Questionnaire     Feeling of Stress : To some extent   Housing Stability: Low Risk  (3/15/2024)    Housing Stability Vital Sign     Unable to Pay for Housing in the Last Year: No     Number of Places Lived in the Last Year: 1     Unstable Housing in the Last Year: No       Medications/Allergies: See med card    Vitals:    02/20/25 0904   BP: 108/69   Pulse: 60   Weight: 103.1 kg (227 lb 6.5 oz)   Height: 6' 2" (1.88 m)   PainSc:   3   PainLoc: Generalized           Body mass index is 29.2 kg/m².    Physical exam:  Gen: A and O x3, pleasant, well-groomed  Skin: No rashes or obvious lesions  HEENT: PERRLA, no obvious deformities on ears or in canals. Trachea midline.  CVS: Regular rate and rhythm, normal " palpable pulses.  Resp:No increased work of breathing, symmetrical chest rise.  Abdomen: Soft, NT/ND.  Musculoskeletal:  Moving all extremities equally    Upper extremity strength:  5/5 bilaterally  Lower extremity strength:  5/5 bilaterally      Imagin22 CT renal protocol:  Liver normal enhancement with no focal lesion.  Gallbladder, pancreas, stomach, spleen, adrenal glands normal.  Kidneys normal size and contour with no nephrolithiasis, hydronephrosis, or ureteral obstruction.  Aorta tapers normally.  No bowel obstruction, ascites, inflammatory change.  Appendix normal.  Bladder and rectum normal.  No significant bladder wall thickening evident.  Bones are intact.  Lung bases clear.    Assessment:  Jm Chaparro is a 28 y.o. year old male patient who has a past medical history of Abnormal thyroid function test, ADD (attention deficit disorder), Anxiety disorder, Asperger's disorder, Deformity of both feet, Dyscalculia, Dysgraphia, Dyslexia, Eczema, Fatty liver, GERD (gastroesophageal reflux disease), History of migraine headaches, Hypertension, Interstitial cystitis (chronic), Irritable bowel syndrome, Nephrolithiasis, PONV (postoperative nausea and vomiting), Psoriasis, Psoriatic arthritis, Refusal of blood transfusion for reasons of conscience, Seasonal allergies, and Special needs assessment. He presents in referral from Dr. Derek Dickson for bladder pain.    1. Opioid contract exists        2. Psoriatic arthritis        3. Bilateral hand pain        4. Bladder pain        5. Rheumatoid arthritis involving multiple sites, unspecified whether rheumatoid factor present        6. Muscular deconditioning        7. Irritable bowel syndrome with both constipation and diarrhea        8. Bladder spasm                    Plan:  Doing well, no new or worsening symptoms.  Continues to find relief with tramadol.  Refills for tramadol 50 mg sent to Dr. Gonzalez today for approval. I have  reviewed the [place] Board of Pharmacy website and there are no abberancies.    Follow up in 3 months or sooner if needed

## 2025-02-22 ENCOUNTER — PATIENT MESSAGE (OUTPATIENT)
Dept: FAMILY MEDICINE | Facility: CLINIC | Age: 30
End: 2025-02-22
Payer: COMMERCIAL

## 2025-02-24 ENCOUNTER — OFFICE VISIT (OUTPATIENT)
Dept: PSYCHIATRY | Facility: CLINIC | Age: 30
End: 2025-02-24
Payer: COMMERCIAL

## 2025-02-24 ENCOUNTER — RESULTS FOLLOW-UP (OUTPATIENT)
Dept: RHEUMATOLOGY | Facility: CLINIC | Age: 30
End: 2025-02-24

## 2025-02-24 DIAGNOSIS — F90.0 ADHD (ATTENTION DEFICIT HYPERACTIVITY DISORDER), INATTENTIVE TYPE: ICD-10-CM

## 2025-02-24 DIAGNOSIS — F40.10 SOCIAL ANXIETY DISORDER: Primary | ICD-10-CM

## 2025-02-24 DIAGNOSIS — F84.0 AUTISM SPECTRUM DISORDER REQUIRING SUBSTANTIAL SUPPORT (LEVEL 2): ICD-10-CM

## 2025-02-24 DIAGNOSIS — F41.1 GAD (GENERALIZED ANXIETY DISORDER): ICD-10-CM

## 2025-02-24 PROCEDURE — 1159F MED LIST DOCD IN RCRD: CPT | Mod: CPTII,95,, | Performed by: PSYCHOLOGIST

## 2025-02-24 PROCEDURE — 3044F HG A1C LEVEL LT 7.0%: CPT | Mod: CPTII,95,, | Performed by: PSYCHOLOGIST

## 2025-02-24 PROCEDURE — G2211 COMPLEX E/M VISIT ADD ON: HCPCS | Mod: 95,,, | Performed by: PSYCHOLOGIST

## 2025-02-24 PROCEDURE — 98006 SYNCH AUDIO-VIDEO EST MOD 30: CPT | Mod: 95,,, | Performed by: PSYCHOLOGIST

## 2025-02-24 RX ORDER — DEXTROAMPHETAMINE SACCHARATE, AMPHETAMINE ASPARTATE MONOHYDRATE, DEXTROAMPHETAMINE SULFATE AND AMPHETAMINE SULFATE 3.75; 3.75; 3.75; 3.75 MG/1; MG/1; MG/1; MG/1
15 CAPSULE, EXTENDED RELEASE ORAL EVERY MORNING
Qty: 30 CAPSULE | Refills: 0 | Status: SHIPPED | OUTPATIENT
Start: 2025-02-24
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
## 2025-03-26 RX ORDER — TRAZODONE HYDROCHLORIDE 50 MG/1
50 TABLET ORAL NIGHTLY
Qty: 30 TABLET | Refills: 0 | Status: SHIPPED | OUTPATIENT
Start: 2025-03-26

## 2025-04-01 ENCOUNTER — OFFICE VISIT (OUTPATIENT)
Dept: PSYCHIATRY | Facility: CLINIC | Age: 30
End: 2025-04-01
Payer: COMMERCIAL

## 2025-04-01 DIAGNOSIS — F84.0 AUTISM SPECTRUM DISORDER REQUIRING SUBSTANTIAL SUPPORT (LEVEL 2): ICD-10-CM

## 2025-04-01 DIAGNOSIS — F90.0 ADHD (ATTENTION DEFICIT HYPERACTIVITY DISORDER), INATTENTIVE TYPE: ICD-10-CM

## 2025-04-01 DIAGNOSIS — F41.1 GAD (GENERALIZED ANXIETY DISORDER): ICD-10-CM

## 2025-04-01 DIAGNOSIS — F40.10 SOCIAL ANXIETY DISORDER: Primary | ICD-10-CM

## 2025-04-01 RX ORDER — DEXTROAMPHETAMINE SACCHARATE, AMPHETAMINE ASPARTATE MONOHYDRATE, DEXTROAMPHETAMINE SULFATE AND AMPHETAMINE SULFATE 3.75; 3.75; 3.75; 3.75 MG/1; MG/1; MG/1; MG/1
15 CAPSULE, EXTENDED RELEASE ORAL EVERY MORNING
Qty: 30 CAPSULE | Refills: 0 | Status: SHIPPED | OUTPATIENT
Start: 2025-04-01

## 2025-04-01 RX ORDER — TRAZODONE HYDROCHLORIDE 50 MG/1
50 TABLET ORAL NIGHTLY
Qty: 90 TABLET | Refills: 0 | Status: SHIPPED | OUTPATIENT
Start: 2025-04-01
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
## 2025-04-14 ENCOUNTER — OFFICE VISIT (OUTPATIENT)
Dept: UROLOGY | Facility: CLINIC | Age: 30
End: 2025-04-14
Payer: COMMERCIAL

## 2025-04-14 VITALS — WEIGHT: 223.75 LBS | BODY MASS INDEX: 28.71 KG/M2 | HEIGHT: 74 IN

## 2025-04-14 DIAGNOSIS — R39.89 BLADDER PAIN: ICD-10-CM

## 2025-04-14 DIAGNOSIS — N32.81 OAB (OVERACTIVE BLADDER): Primary | ICD-10-CM

## 2025-04-14 DIAGNOSIS — N39.41 URGENCY INCONTINENCE: ICD-10-CM

## 2025-04-14 PROCEDURE — 99999 PR PBB SHADOW E&M-EST. PATIENT-LVL III: CPT | Mod: PBBFAC,,, | Performed by: UROLOGY

## 2025-04-14 PROCEDURE — 99214 OFFICE O/P EST MOD 30 MIN: CPT | Mod: S$GLB,,, | Performed by: UROLOGY

## 2025-04-14 PROCEDURE — 1159F MED LIST DOCD IN RCRD: CPT | Mod: CPTII,S$GLB,, | Performed by: UROLOGY

## 2025-04-14 PROCEDURE — G2211 COMPLEX E/M VISIT ADD ON: HCPCS | Mod: S$GLB,,, | Performed by: UROLOGY

## 2025-04-14 PROCEDURE — 3008F BODY MASS INDEX DOCD: CPT | Mod: CPTII,S$GLB,, | Performed by: UROLOGY

## 2025-04-14 PROCEDURE — 3044F HG A1C LEVEL LT 7.0%: CPT | Mod: CPTII,S$GLB,, | Performed by: UROLOGY

## 2025-04-14 RX ORDER — METHOCARBAMOL 750 MG/1
750 TABLET, FILM COATED ORAL NIGHTLY PRN
COMMUNITY
Start: 2025-04-07
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
## 2025-04-16 ENCOUNTER — OFFICE VISIT (OUTPATIENT)
Dept: RHEUMATOLOGY | Facility: CLINIC | Age: 30
End: 2025-04-16
Payer: COMMERCIAL

## 2025-04-16 DIAGNOSIS — Z79.899 DRUG-INDUCED IMMUNODEFICIENCY: Primary | ICD-10-CM

## 2025-04-16 DIAGNOSIS — R39.89 BLADDER PAIN: ICD-10-CM

## 2025-04-16 DIAGNOSIS — L40.50 PSORIATIC ARTHRITIS: ICD-10-CM

## 2025-04-16 DIAGNOSIS — N32.89 BLADDER SPASM: ICD-10-CM

## 2025-04-16 DIAGNOSIS — D84.821 DRUG-INDUCED IMMUNODEFICIENCY: Primary | ICD-10-CM

## 2025-04-16 DIAGNOSIS — L40.9 PSORIASIS: ICD-10-CM

## 2025-04-16 DIAGNOSIS — E66.01 MORBID OBESITY: ICD-10-CM

## 2025-04-16 PROCEDURE — G2211 COMPLEX E/M VISIT ADD ON: HCPCS | Mod: 95,,, | Performed by: STUDENT IN AN ORGANIZED HEALTH CARE EDUCATION/TRAINING PROGRAM

## 2025-04-16 PROCEDURE — 98006 SYNCH AUDIO-VIDEO EST MOD 30: CPT | Mod: 95,,, | Performed by: STUDENT IN AN ORGANIZED HEALTH CARE EDUCATION/TRAINING PROGRAM

## 2025-04-16 PROCEDURE — 3044F HG A1C LEVEL LT 7.0%: CPT | Mod: CPTII,95,, | Performed by: STUDENT IN AN ORGANIZED HEALTH CARE EDUCATION/TRAINING PROGRAM

## 2025-04-16 RX ORDER — GABAPENTIN 300 MG/1
900 CAPSULE ORAL 2 TIMES DAILY
Qty: 180 CAPSULE | Refills: 5 | Status: SHIPPED | OUTPATIENT
Start: 2025-04-16 | End: 2025-10-13

## 2025-04-16 RX ORDER — APREMILAST 30 MG/1
30 TABLET, FILM COATED ORAL 2 TIMES DAILY
Qty: 60 TABLET | Refills: 11 | Status: ACTIVE | OUTPATIENT
Start: 2025-04-16
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
## 2025-04-17 ENCOUNTER — LAB VISIT (OUTPATIENT)
Dept: LAB | Facility: HOSPITAL | Age: 30
End: 2025-04-17
Payer: COMMERCIAL

## 2025-04-17 DIAGNOSIS — N32.81 OAB (OVERACTIVE BLADDER): ICD-10-CM

## 2025-04-17 PROCEDURE — 87086 URINE CULTURE/COLONY COUNT: CPT

## 2025-04-19 LAB — BACTERIA UR CULT: NO GROWTH

## 2025-04-21 ENCOUNTER — PATIENT MESSAGE (OUTPATIENT)
Dept: UROLOGY | Facility: CLINIC | Age: 30
End: 2025-04-21
Payer: COMMERCIAL

## 2025-04-21 ENCOUNTER — TELEPHONE (OUTPATIENT)
Dept: UROLOGY | Facility: CLINIC | Age: 30
End: 2025-04-21
Payer: COMMERCIAL

## 2025-04-21 DIAGNOSIS — N39.41 URGENCY INCONTINENCE: Primary | ICD-10-CM

## 2025-04-24 ENCOUNTER — OFFICE VISIT (OUTPATIENT)
Dept: NEUROLOGY | Facility: CLINIC | Age: 30
End: 2025-04-24
Payer: COMMERCIAL

## 2025-04-24 ENCOUNTER — PATIENT MESSAGE (OUTPATIENT)
Dept: PAIN MEDICINE | Facility: CLINIC | Age: 30
End: 2025-04-24
Payer: COMMERCIAL

## 2025-04-24 VITALS
BODY MASS INDEX: 29.36 KG/M2 | WEIGHT: 228.75 LBS | HEIGHT: 74 IN | RESPIRATION RATE: 17 BRPM | DIASTOLIC BLOOD PRESSURE: 79 MMHG | HEART RATE: 60 BPM | SYSTOLIC BLOOD PRESSURE: 121 MMHG

## 2025-04-24 DIAGNOSIS — F90.0 ADHD (ATTENTION DEFICIT HYPERACTIVITY DISORDER), INATTENTIVE TYPE: ICD-10-CM

## 2025-04-24 DIAGNOSIS — M54.2 NECK PAIN: ICD-10-CM

## 2025-04-24 DIAGNOSIS — R09.81 NOSE CONGESTION: ICD-10-CM

## 2025-04-24 DIAGNOSIS — G43.009 MIGRAINE WITHOUT AURA AND WITHOUT STATUS MIGRAINOSUS, NOT INTRACTABLE: Primary | ICD-10-CM

## 2025-04-24 PROCEDURE — 1159F MED LIST DOCD IN RCRD: CPT | Mod: CPTII,S$GLB,, | Performed by: PHYSICIAN ASSISTANT

## 2025-04-24 PROCEDURE — 3074F SYST BP LT 130 MM HG: CPT | Mod: CPTII,S$GLB,, | Performed by: PHYSICIAN ASSISTANT

## 2025-04-24 PROCEDURE — 3044F HG A1C LEVEL LT 7.0%: CPT | Mod: CPTII,S$GLB,, | Performed by: PHYSICIAN ASSISTANT

## 2025-04-24 PROCEDURE — 3078F DIAST BP <80 MM HG: CPT | Mod: CPTII,S$GLB,, | Performed by: PHYSICIAN ASSISTANT

## 2025-04-24 PROCEDURE — 1160F RVW MEDS BY RX/DR IN RCRD: CPT | Mod: CPTII,S$GLB,, | Performed by: PHYSICIAN ASSISTANT

## 2025-04-24 PROCEDURE — 99999 PR PBB SHADOW E&M-EST. PATIENT-LVL V: CPT | Mod: PBBFAC,,, | Performed by: PHYSICIAN ASSISTANT

## 2025-04-24 PROCEDURE — 3008F BODY MASS INDEX DOCD: CPT | Mod: CPTII,S$GLB,, | Performed by: PHYSICIAN ASSISTANT

## 2025-04-24 PROCEDURE — 99205 OFFICE O/P NEW HI 60 MIN: CPT | Mod: S$GLB,,, | Performed by: PHYSICIAN ASSISTANT

## 2025-04-24 RX ORDER — TRAZODONE HYDROCHLORIDE 50 MG/1
50 TABLET ORAL NIGHTLY
Qty: 90 TABLET | Refills: 0 | Status: SHIPPED | OUTPATIENT
Start: 2025-04-24

## 2025-04-24 RX ORDER — DEXTROAMPHETAMINE SACCHARATE, AMPHETAMINE ASPARTATE MONOHYDRATE, DEXTROAMPHETAMINE SULFATE AND AMPHETAMINE SULFATE 3.75; 3.75; 3.75; 3.75 MG/1; MG/1; MG/1; MG/1
15 CAPSULE, EXTENDED RELEASE ORAL EVERY MORNING
Qty: 30 CAPSULE | Refills: 0 | Status: SHIPPED | OUTPATIENT
Start: 2025-04-24

## 2025-04-24 RX ORDER — RIZATRIPTAN BENZOATE 10 MG/1
TABLET, ORALLY DISINTEGRATING ORAL
Qty: 8 TABLET | Refills: 6 | Status: SHIPPED | OUTPATIENT
Start: 2025-04-24
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
## 2025-04-24 NOTE — PATIENT INSTRUCTIONS
Consider tracking your headaches, migraine kathie free bienvenido for android    Emgality ordered:  First month 2 shots, every 28 days, just one shot  Keep refrigerated   [email or web address]

## 2025-04-28 ENCOUNTER — PATIENT MESSAGE (OUTPATIENT)
Dept: PSYCHIATRY | Facility: CLINIC | Age: 30
End: 2025-04-28
Payer: COMMERCIAL

## 2025-05-05 DIAGNOSIS — F90.0 ADHD (ATTENTION DEFICIT HYPERACTIVITY DISORDER), INATTENTIVE TYPE: ICD-10-CM

## 2025-05-05 RX ORDER — DEXTROAMPHETAMINE SACCHARATE, AMPHETAMINE ASPARTATE MONOHYDRATE, DEXTROAMPHETAMINE SULFATE AND AMPHETAMINE SULFATE 3.75; 3.75; 3.75; 3.75 MG/1; MG/1; MG/1; MG/1
15 CAPSULE, EXTENDED RELEASE ORAL EVERY MORNING
Qty: 30 CAPSULE | Refills: 0 | Status: SHIPPED | OUTPATIENT
Start: 2025-05-05

## 2025-05-05 NOTE — PROGRESS NOTES
Otolaryngology Clinic Note    Subjective:       Patient ID: Jm Chaparro is a 29 y.o. male.    Chief Complaint: Sinus Problem, Sinusitis, and Headache      History of Present Illness: Jm Chaparro is a 29 y.o. male presenting with sinus headache    Mr. Chaparro was referred by neurology for nasal congestion. He is being managed by neurology for migraines. He did have a MCV in Feb 2025. He has tried nasal rinses in the past. He has been followed by ENT sine 2014; he last saw Dr. Crum in 2023. He has known allergies . He has tried antihistamine as well as nasal sprays in the past but does not tolerate them well. He has had allergy testing in the past which was negative.     He has PND, watery eyes, itchy nose. He has occ nasal congestion. His headaches come and go. His headache are worse with weather change. He occ gets sinus pressure when the air pressure changes. It hurts in the front of his face by his sinus cavitis and in the back of his head.  He occ gets sinus infections. He denies any itching palate, runny nose, swallowing concerns, ear concerns, or cough.     He is here today with his dad. History is collected from both patient and the Dad. His Mom is normally the one who is with him at his appointments but she is getting an FNA today on her thyroid so she was not able to be here today.     Past Surgical History:   Procedure Laterality Date    BIOPSY OF BLADDER  1/3/2022    Procedure: BIOPSY, BLADDER;  Surgeon: Derek Dickson MD;  Location: [facility];  Service: Urology;;    COLONOSCOPY  ~2015    [facility]; told IBS    COLONOSCOPY  08/10/2015    Dr. Shaver; sent for scanning: unremarkable findings, no specimens collected    COLONOSCOPY N/A 2/18/2019    Procedure: COLONOSCOPY;  Surgeon: Puma Preston Jr., MD;  Location: [facility];  Service: Endoscopy;  Laterality: N/A;    CYSTOSCOPY  1/11/2023    Procedure: CYSTOSCOPY;  Surgeon: Trino Cain MD;  Location: [facility] OR  1ST FLR;  Service: Urology;;    CYSTOSCOPY,WITH BOTULINUM TOXIN INJECTION N/A 2/9/2023    Procedure: CYSTOSCOPY,WITH BOTULINUM TOXIN INJECTION;  Surgeon: Trino Cain MD;  Location: [facility] OR;  Service: Urology;  Laterality: N/A;    CYSTOSCOPY,WITH BOTULINUM TOXIN INJECTION N/A 5/3/2023    Procedure: CYSTOSCOPY,WITH BOTULINUM TOXIN INJECTION;  Surgeon: Trino Cain MD;  Location: [facility] OR;  Service: Urology;  Laterality: N/A;  30 min    CYSTOSCOPY,WITH BOTULINUM TOXIN INJECTION N/A 12/20/2023    Procedure: CYSTOSCOPY,WITH BOTULINUM TOXIN INJECTION;  Surgeon: Trino Cain MD;  Location: [facility] OR;  Service: Urology;  Laterality: N/A;  40 min   150 units    CYSTOSCOPY,WITH BOTULINUM TOXIN INJECTION N/A 10/4/2024    Procedure: CYSTOSCOPY,WITH BOTULINUM TOXIN INJECTION;  Surgeon: Trino Cain MD;  Location: [facility] OR;  Service: Urology;  Laterality: N/A;  150 units Botox    INSERTION, NEUROSTIMULATOR, TEMPORARY, SACRAL N/A 1/24/2023    Procedure: INSERTION, NEUROSTIMULATOR, TEMPORARY, SACRAL;  Surgeon: Trino Cain MD;  Location: [facility] OR 2ND FLR;  Service: Urology;  Laterality: N/A;  1 hr 45 mins    REMOVAL OF ELECTRODE LEAD OF SACRAL NERVE STIMULATOR N/A 2/9/2023    Procedure: REMOVAL, ELECTRODE LEAD, SACRAL NERVE STIMULATOR;  Surgeon: Trino Cain MD;  Location: [facility];  Service: Urology;  Laterality: N/A;  1 hr    UPPER GASTROINTESTINAL ENDOSCOPY  05/14/2012    Dr. Preston    UPPER GASTROINTESTINAL ENDOSCOPY  07/17/2015    Dr. Shaver, sent for scanning: normal esophagus- dilated & biopsied, findings WNL, biopsies taken from z-line, stomach and duodenum; biopsy: duodenum WNL, antrum reactive gastropathy, negative for h pylori or int. metaplasia, GEJ WNL, negative for EOE & cotton's esophagus    ureteral stone extraction      basket extraction     Past Medical History:   Diagnosis Date    ADD (attention deficit disorder)     Allergy     Anxiety disorder     Asperger's disorder      (AUTISM)    Deformity of both feet     Dyscalculia     Dysgraphia     Dyslexia     Eczema     Elevated LFTs     Fib4 score 0.58 3/24    Fatty liver     GERD (gastroesophageal reflux disease)     History of migraine headaches     Hypertension     Interstitial cystitis (chronic)     Irritable bowel syndrome     Nephrolithiasis     2014    PONV (postoperative nausea and vomiting)     Psoriasis     Psoriatic arthritis     Refusal of blood transfusion for reasons of conscience     Seasonal allergies     Sleep apnea     does not like CPAP    Special needs assessment      Social Drivers of Health     Tobacco Use: Low Risk  (5/6/2025)    Patient History     Smoking Tobacco Use: Never     Smokeless Tobacco Use: Never     Passive Exposure: Not on file   Alcohol Use: Not At Risk (3/28/2025)    AUDIT-C     Frequency of Alcohol Consumption: Never     Average Number of Drinks: Patient does not drink     Frequency of Binge Drinking: Never   Financial Resource Strain: High Risk (3/28/2025)    Overall Financial Resource Strain (CARDIA)     Difficulty of Paying Living Expenses: Hard   Food Insecurity: Patient Declined (3/28/2025)    Hunger Vital Sign     Worried About Running Out of Food in the Last Year: Patient declined     Ran Out of Food in the Last Year: Patient declined   Transportation Needs: No Transportation Needs (3/28/2025)    PRAPARE - Transportation     Lack of Transportation (Medical): No     Lack of Transportation (Non-Medical): No   Physical Activity: Inactive (3/28/2025)    Exercise Vital Sign     Days of Exercise per Week: 0 days     Minutes of Exercise per Session: 0 min   Stress: Stress Concern Present (3/28/2025)    [employer] - Occupational Stress Questionnaire     Feeling of Stress : To some extent   Housing Stability: Low Risk  (3/28/2025)    Housing Stability Vital Sign     Unable to Pay for Housing in the Last Year: No     Number of Times Moved in the Last Year: Not on file      Homeless in the Last Year: No   Depression: Medium Risk (2/17/2025)    Depression     Last PHQ-4: Flowsheet Data: 3   Utilities: Not At Risk (3/28/2025)    [facility] Utilities     Threatened with loss of utilities: No   Health Literacy: Inadequate Health Literacy (3/28/2025)     Health Literacy     Frequency of need for help with medical instructions: Often   Social Isolation: Not on file     Review of patient's allergies indicates:   Allergen Reactions    Penicillins Anaphylaxis    Caffeine      Rapid heart beat    Ciprofloxacin Itching and Rash     Current Outpatient Medications   Medication Instructions    ALPRAZolam (XANAX) 0.25 mg, Oral, Daily PRN    atorvastatin (LIPITOR) 20 mg, Oral, Daily    azelastine (ASTELIN) 137 mcg, Nasal, 2 times daily    clobetasoL (TEMOVATE) 0.05 % external solution Topical (Top), 2 times daily PRN    dextroamphetamine-amphetamine (ADDERALL XR) 15 MG 24 hr capsule 15 mg, Oral, Every morning    FLUoxetine 40 mg, Oral    fluticasone propionate (FLONASE) 50 mcg, Each Nostril, 2 times daily    gabapentin (NEURONTIN) 900 mg, Oral, 2 times daily    galcanezumab-gnlm 120 mg, Subcutaneous, Every 28 days, maintenance dose    methocarbamoL (ROBAXIN) 750 mg, Nightly PRN    metoprolol succinate (TOPROL-XL) 50 mg, Oral, 2 times daily    OTEZLA 30 mg, Oral, 2 times daily    PREVIDENT 5000 BOOSTER PLUS 1.1 % Pste     rizatriptan (MAXALT-MLT) 10 MG disintegrating tablet 1 melt at onset headache, second melt 2 hours later if needed, no more than 2 melts day/3 days use in week    rizatriptan (MAXALT-MLT) 10 mg, Daily PRN    sulfacetamide sodium 10% (BLEPH-10) 10 % ophthalmic solution 4 drops to left ear twice daily for 5 days    traMADoL (ULTRAM) 50 mg, Oral, Every 12 hours PRN, Medically necessary for more than 7 days, ICD 10: G89.4    traZODone (DESYREL) 50 mg, Oral, Nightly    ubidecarenone (CO Q-10 ORAL) Daily    VITAMIN D2 50,000 Units, Oral, Every 7 days         ENT ROS negative except as  stated above.     Patient answers are not available for this visit.            Objective:      There were no vitals filed for this visit.    General: NAD, well appearing  Eyes: Normal conjunctiva and lids  Face: symmetric, nerve intact  Nose: The nose is without any evidence of any deformity. The nasal mucosa is moist. The septum is midline. There is no evidence of septal hematoma. The turbinates are with hypertrophy and clear rhinorrhea noted bilaterally.   Ears: The ears are with normal-appearing pinna. Examination of the canals is normal appearing bilaterally. There is no drainage or erythema noted. The tympanic membranes are normal appearing with pearly color, normal-appearing landmarks and normal light reflex. Hearing is grossly intact.  Mouth: No obvious abnormalities to the lips. The teeth are unremarkable. The gingivae are without any obvious evidence of infection or lesion. The oral mucosa is moist and pink. There are no obvious masses to the hard or soft palate.   Oropharynx: The uvula is midline, papilloma noted to the tip.  The tongue is midline. The posterior pharynx is without erythema or exudate. The tonsils are normal appearing.  Salivary glands: The salivary glands are symmetric and not enlarged, no masses  Neck: No lymphadenopathy, trachea midline, thryoid not enlarged.  Psych: Normal mood and affect.   Neuro: Grossly intact  Speech: fluent    Test Review: I personally reviewed sinus x-ray below as well as neuro and ENT notes    EXAMINATION:  XR SINUSES MIN 3 VIEWS     CLINICAL HISTORY:  Headache, unspecified     TECHNIQUE:  AP, lateral, and Landa views of the paranasal sinuses were performed     COMPARISON:  None.     FINDINGS:  Paranasal sinuses appear grossly unopacified.  No air-fluid levels demonstrated.  Regional osseous structures appear grossly intact.     Impression:     No convincing evidence of paranasal sinus disease.  CT of the sinuses would be a more sensitive assessment, if  clinically indicated.    Assessment and Plan:       1. Non-allergic rhinitis    2. Nose congestion    3. PND (post-nasal drip)        - Start daily oral antihistamine  - Start saline rinse twice daily  - Flonase and Astelin twice daily after the rinse is he can tolerate it. He has tried it in the past but does not like to use nasal sprays.  - Continue to follow-up with neurology for headache  - Neuro doesn't seem to think headaches are related to sinuses so will hold off on any imaging at this time. His sinuses x-ray from 2023 did not show any sinus disease     RTC: in 8 weeks    Plan of care was discussed in detail with the patient, who agreed with the plan as above. All questions were answered in detail. 30 minutes spent in direct patient care, chart review and documentation     MICHAEL CourtneyP-C  Otolaryngology

## 2025-05-06 ENCOUNTER — OFFICE VISIT (OUTPATIENT)
Dept: OTOLARYNGOLOGY | Facility: CLINIC | Age: 30
End: 2025-05-06
Payer: COMMERCIAL

## 2025-05-06 VITALS — BODY MASS INDEX: 28.8 KG/M2 | WEIGHT: 224.44 LBS | HEIGHT: 74 IN

## 2025-05-06 DIAGNOSIS — R09.81 NOSE CONGESTION: ICD-10-CM

## 2025-05-06 DIAGNOSIS — J31.0 NON-ALLERGIC RHINITIS: Primary | ICD-10-CM

## 2025-05-06 DIAGNOSIS — R09.82 PND (POST-NASAL DRIP): ICD-10-CM

## 2025-05-06 PROCEDURE — 3044F HG A1C LEVEL LT 7.0%: CPT | Mod: CPTII,S$GLB,, | Performed by: NURSE PRACTITIONER

## 2025-05-06 PROCEDURE — 99214 OFFICE O/P EST MOD 30 MIN: CPT | Mod: S$GLB,,, | Performed by: NURSE PRACTITIONER

## 2025-05-06 PROCEDURE — 3008F BODY MASS INDEX DOCD: CPT | Mod: CPTII,S$GLB,, | Performed by: NURSE PRACTITIONER

## 2025-05-06 PROCEDURE — 99999 PR PBB SHADOW E&M-EST. PATIENT-LVL III: CPT | Mod: PBBFAC,,, | Performed by: NURSE PRACTITIONER

## 2025-05-06 PROCEDURE — 1159F MED LIST DOCD IN RCRD: CPT | Mod: CPTII,S$GLB,, | Performed by: NURSE PRACTITIONER

## 2025-05-06 RX ORDER — AZELASTINE 1 MG/ML
1 SPRAY, METERED NASAL 2 TIMES DAILY
Qty: 30 ML | Refills: 11 | Status: SHIPPED | OUTPATIENT
Start: 2025-05-06 | End: 2025-08-24

## 2025-05-06 RX ORDER — FLUTICASONE PROPIONATE 50 MCG
1 SPRAY, SUSPENSION (ML) NASAL 2 TIMES DAILY
Qty: 16 G | Refills: 11 | Status: SHIPPED | OUTPATIENT
Start: 2025-05-06

## 2025-05-06 NOTE — PATIENT INSTRUCTIONS
"Start daily oral antihistamine    ENT SINUS REGIMEN:    "ENT-APPROVED" NASAL SPRAYS:  Flonase / fluticasone / Nasacort / Rhinocort (steroid spray) is best for stuffy, pressure, fullness. Available over-the-counter without a prescription.   Astepro / azelastine (antihistamine spray) is best for itchy, drippy, sneezy. Available over-the-counter without a prescription.       Use as directed, spraying 1 times in each nostril twice each day. It may take 2-3 days to 2-3 weeks to begin seeing improvement. This medication needs to be taken consistently to see results. Overall, this is a well-tolerated medication with low side effects. The benefit of nasal steroids as opposed to oral steroids is that the nasal steroid spray works primarily in the nose. Common side effects can include: headache, nasal dryness, minor nose bleed.  Rare side effects may include:  septal perforation, elevation in eye pressure, dry eyes, change in smell, allergic reaction.  Notify your provider if you have any concerns or experience these symptoms.     Nasal spray instructions:  Blow nose first gently to clean. Keep chin level with the floor (do not tilt head forward or back). Using the opposite hand (example: right hand for left nostril, left hand for right nostril) insert nasal spray taking caution to direct it AWAY from the middle wall inside the nose (septum) to avoid irritating nasal septum which could cause nosebleed.  Do not tilt spray up but rather flat and out along the roof of your mouth to spray. Angle the tip of the spray out slightly toward the direction of the ears; then spray. Do not take quick vigorous sniff but rather slow gentle inhalation while waiting for medication to absorb into nasal passages. Then administer second spray in same way.     Nasal saline rinse kit (use Neti pot or uiu sinus rinse kit) -- Rinse your sinuses once to twice daily to reduce the allergen burden in your nose. Use sterile water (boiled tap water " which has cooled) or distilled bottled water. Add 1/4 teaspoon sea salt and a pinch of baking soda or a mixture packet from the maker of your sinus rinse kit.  Rinse through both sides of nose to cleanse sinus and nasal passages, bending forward with head tilted down. Keep your mouth open, without holding your breath. Squeeze bottle gently until solution starts draining from the opposite nasal passage. After bottle is empty, blow nose very gently, without pinching nose completely, to avoid pressure on eardrums.  There are useful YouTube videos that show demonstration of how to do these properly.     Ponaris Nasal Emollient is used for the relief of: nasal congestion due to colds, nasal irritation, allergy exacerbations, nasal crusting. Specifically prepared iodized organic oils of pine, eucalyptus, peppermint, cajeput, and cottonseed. To order Ponaris: ask your pharmacist to order it for you or we carry it in our pharmacy downstairs on the first floor.      PLEASE PERFORM SINUS RINSES 3-4 TIMES DAILY UNTIL YOUR NEXT VISIT.          DIRECTIONS FOR SINUS SALINE RINSE To see demonstration: Enter [email or web address]/watch?v=[email or web address] into the browser address box, or go to You tube, and under the search box, enter sinus rinse. Click on [employer] Sinus Rinse Video    Step 1    Step 1 Please wash your hands. Fill the clean bottle with the designated volume of warm distilled water, filtered water or previously boiled water. You may warm the water in a microwave but we recommend that you warm it in increments of five seconds. This is to avoid excessive heating of the water and damage to the device or scalding your nasal passage.    Step 2    Step 2 Cut the SINUS RINSE mixture packet at the corner and pour its contents into the bottle. Tighten the cap & tube on the bottle securely, place one finger over the tip of the cap and shake the bottle gently to dissolve the mixture.      Step 3    Step 3 Standing in front of  a sink, bend forward to your comfort level and tilt your head down. Keeping your mouth open without holding your breath, place cap snugly against your nasal passage and SQUEEZE BOTTLE GENTLY until the solution starts draining from the OPPOSITE nasal passage or from your mouth. Keep squeezing the bottle GENTLY until at least 1/4 to 1/2 (60 to 120 mL) of the bottle is used for a proper rinse. Do not swallow the solution.    Step 4    Blow your nose gently, without pinching your nose completely because this will apply pressure on the eardrums. If tolerable, sniff in any residual solution remaining in the nasal passage once or twice prior to blowing your nose as this may clean out the posterior nasopharyngeal area (the area at the back of your nasal passage). Some solution will reach the back of the throat, so please spit it out. To help improve drainage of any residual solution, blow your nose gently while tilting your head to the opposite side of the nasal passage that you just rinsed.    Step 5    Now repeat steps 3 & 4 for your other nasal passage.    Step 6     Air dry the Sinus Rinse bottle, cap, and tube on a clean paper towel, a glass plate to store the bottle cap and tube. If there is any solution leftover, please discard it. We recommend you make a fresh solution each time you rinse. Rinse 5 times each day, OR as directed by your physician. Warnings: DO NOT RINSE IF NASAL PASSAGE IS COMPLETELY BLOCKED OR IF YOU HAVE AN EAR INFECTION OR BLOCKED EARS. If you have had ear surgery, please contact your physician prior to irrigation. If you experience any pressure in your ears, stop the rinse and get further directions from your physician or contact our office during regular business hours. To avoid any ear discomfort: Heat the solution to lukewarm, do not use hot, boiling or cold water. Keep your mouth open. Do not hold your breath and if possible make the sound MARCELO....MARCELO... Make sure to take the position as  shown. Gently squeeze 1/4 of the bottle at a time (60mL / 2 ounces). Stop the rinse if you feel any solution sensation near the ears. You may rinse with a partially blocked nasal passage. Please do not use for any other purposes. Please rinse at least ONE HOUR PRIOR to bedtime, in order to avoid any residual solution dripping in the throat.    >> Before using the SINUS RINSE kit, please inspect the cap, tube and bottle carefully for wear and tear. If any of the components appear discolored or cracked, please contact [employer] to obtain a replacement. You must follow the cleaning instructions provided in this brochure or cleaning instruction card prior to each use.    >> The SINUS RINSE bottle and mixture are to be used only for nasalirrigation. Do not use for any other purposes.    >> We recommend that you use the rinse ONE HOUR PRIOR to bedtime in order to avoid any residual solution dripping in the throat.    Tips to avoid ear discomfort while rinsing    If you have had ear surgery, please contact your physician prior to irrigation. Do not use if you have an ear infection or blocked ears. Rinse with lukewarm water. Keep your mouth open. Do not hold your breath while rinsing. While rinsing, make sure to tilt your. Gently squeeze the bottle while rinsing; do not squeeze the bottle very forcefully. Stop the rinse if you feel a sensation of fluid near your ears.    Tips to avoid unexpected drainage after rinsing    In rare situations, especially if you have had sinus surgery, the saline solution can pool in the sinus cavities and nasal passages and then drip from your nostrils hours after rinsing. To avoid this harmless but annoying inconvenience, take one extra step after rinsing: lean forward, tilt your head sideways and gently blow your nose. Then, tilt your head to the other side and blow again. You may need to repeat this several times. This will help rid your nasal passages of any excess mucus and remaining  saline solution. If you find yourself experiencing delayed drainage often, do not rinse right before leaving your house or going to bed.

## 2025-05-07 NOTE — PRE-PROCEDURE INSTRUCTIONS
Spoke with patients mother, Nancie. Pre-procedure instructions and arrival time given.      Nancie CONFIRMED ARRIVAL TIME OF 0745.    Patient denies taking any nsaids or aspirin products for 7 days.    Patient denies taking GLP-1 injection for 7 days.

## 2025-05-08 ENCOUNTER — ANESTHESIA EVENT (OUTPATIENT)
Dept: SURGERY | Facility: HOSPITAL | Age: 30
End: 2025-05-08
Payer: COMMERCIAL

## 2025-05-08 ENCOUNTER — ANESTHESIA (OUTPATIENT)
Dept: SURGERY | Facility: HOSPITAL | Age: 30
End: 2025-05-08
Payer: COMMERCIAL

## 2025-05-08 ENCOUNTER — HOSPITAL ENCOUNTER (OUTPATIENT)
Facility: HOSPITAL | Age: 30
Discharge: HOME | End: 2025-05-08
Attending: UROLOGY | Admitting: UROLOGY
Payer: COMMERCIAL

## 2025-05-08 VITALS
DIASTOLIC BLOOD PRESSURE: 65 MMHG | TEMPERATURE: 98 F | OXYGEN SATURATION: 96 % | RESPIRATION RATE: 20 BRPM | SYSTOLIC BLOOD PRESSURE: 98 MMHG | HEART RATE: 64 BPM

## 2025-05-08 DIAGNOSIS — N39.41 URGENCY INCONTINENCE: ICD-10-CM

## 2025-05-08 DIAGNOSIS — N32.81 OAB (OVERACTIVE BLADDER): Primary | ICD-10-CM

## 2025-05-08 LAB
BILIRUB UR QL STRIP.AUTO: NEGATIVE
CLARITY UR: CLEAR
COLOR UR AUTO: YELLOW
GLUCOSE UR QL STRIP: NEGATIVE
HGB UR QL STRIP: NEGATIVE
HOLD SPECIMEN: NORMAL
KETONES UR QL STRIP: NEGATIVE
LEUKOCYTE ESTERASE UR QL STRIP: NEGATIVE
NITRITE UR QL STRIP: NEGATIVE
PH UR STRIP: 6 [PH]
PROT UR QL STRIP: NEGATIVE
SP GR UR STRIP: 1.02
UROBILINOGEN UR STRIP-ACNC: NEGATIVE EU/DL

## 2025-05-08 PROCEDURE — 63600175 PHARM REV CODE 636 W HCPCS: Performed by: UROLOGY

## 2025-05-08 PROCEDURE — 71000015 HC POSTOP RECOV 1ST HR: Performed by: UROLOGY

## 2025-05-08 PROCEDURE — 36000706: Performed by: UROLOGY

## 2025-05-08 PROCEDURE — 99900035 HC TECH TIME PER 15 MIN (STAT)

## 2025-05-08 PROCEDURE — 37000009 HC ANESTHESIA EA ADD 15 MINS: Performed by: UROLOGY

## 2025-05-08 PROCEDURE — 52287 CYSTOSCOPY CHEMODENERVATION: CPT | Mod: ,,, | Performed by: UROLOGY

## 2025-05-08 PROCEDURE — 63600175 PHARM REV CODE 636 W HCPCS: Performed by: NURSE ANESTHETIST, CERTIFIED REGISTERED

## 2025-05-08 PROCEDURE — 81003 URINALYSIS AUTO W/O SCOPE: CPT | Performed by: UROLOGY

## 2025-05-08 PROCEDURE — 71000033 HC RECOVERY, INTIAL HOUR: Performed by: UROLOGY

## 2025-05-08 PROCEDURE — 63600175 PHARM REV CODE 636 W HCPCS: Mod: JZ,TB | Performed by: UROLOGY

## 2025-05-08 PROCEDURE — 37000008 HC ANESTHESIA 1ST 15 MINUTES: Performed by: UROLOGY

## 2025-05-08 PROCEDURE — 36000707: Performed by: UROLOGY

## 2025-05-08 RX ORDER — KETOROLAC TROMETHAMINE 30 MG/ML
15 INJECTION, SOLUTION INTRAMUSCULAR; INTRAVENOUS EVERY 8 HOURS PRN
Status: DISCONTINUED | OUTPATIENT
Start: 2025-05-08 | End: 2025-05-08 | Stop reason: HOSPADM

## 2025-05-08 RX ORDER — SODIUM CHLORIDE 9 MG/ML
INJECTION, SOLUTION INTRAVENOUS CONTINUOUS
Status: DISCONTINUED | OUTPATIENT
Start: 2025-05-08 | End: 2025-05-08 | Stop reason: HOSPADM

## 2025-05-08 RX ORDER — OXYCODONE HYDROCHLORIDE 5 MG/1
5 TABLET ORAL
Status: DISCONTINUED | OUTPATIENT
Start: 2025-05-08 | End: 2025-05-08 | Stop reason: HOSPADM

## 2025-05-08 RX ORDER — ONDANSETRON HYDROCHLORIDE 2 MG/ML
4 INJECTION, SOLUTION INTRAVENOUS ONCE AS NEEDED
Status: DISCONTINUED | OUTPATIENT
Start: 2025-05-08 | End: 2025-05-08 | Stop reason: HOSPADM

## 2025-05-08 RX ORDER — LIDOCAINE HYDROCHLORIDE 20 MG/ML
INJECTION INTRAVENOUS
Status: DISCONTINUED | OUTPATIENT
Start: 2025-05-08 | End: 2025-05-08

## 2025-05-08 RX ORDER — PROPOFOL 10 MG/ML
VIAL (ML) INTRAVENOUS
Status: DISCONTINUED | OUTPATIENT
Start: 2025-05-08 | End: 2025-05-08

## 2025-05-08 RX ORDER — GLUCAGON 1 MG
1 KIT INJECTION
Status: DISCONTINUED | OUTPATIENT
Start: 2025-05-08 | End: 2025-05-08 | Stop reason: HOSPADM

## 2025-05-08 RX ORDER — SULFAMETHOXAZOLE AND TRIMETHOPRIM 800; 160 MG/1; MG/1
1 TABLET ORAL 2 TIMES DAILY
Qty: 6 TABLET | Refills: 0 | Status: SHIPPED | OUTPATIENT
Start: 2025-05-08 | End: 2025-05-11

## 2025-05-08 RX ORDER — PROCHLORPERAZINE EDISYLATE 5 MG/ML
5 INJECTION INTRAMUSCULAR; INTRAVENOUS EVERY 30 MIN PRN
Status: DISCONTINUED | OUTPATIENT
Start: 2025-05-08 | End: 2025-05-08 | Stop reason: HOSPADM

## 2025-05-08 RX ORDER — DEXAMETHASONE SODIUM PHOSPHATE 4 MG/ML
INJECTION, SOLUTION INTRA-ARTICULAR; INTRALESIONAL; INTRAMUSCULAR; INTRAVENOUS; SOFT TISSUE
Status: DISCONTINUED | OUTPATIENT
Start: 2025-05-08 | End: 2025-05-08

## 2025-05-08 RX ORDER — CLINDAMYCIN PHOSPHATE 600 MG/50ML
600 INJECTION, SOLUTION INTRAVENOUS ONCE
Status: COMPLETED | OUTPATIENT
Start: 2025-05-08 | End: 2025-05-08

## 2025-05-08 RX ORDER — ONDANSETRON HYDROCHLORIDE 2 MG/ML
INJECTION, SOLUTION INTRAVENOUS
Status: DISCONTINUED | OUTPATIENT
Start: 2025-05-08 | End: 2025-05-08

## 2025-05-08 RX ORDER — PROPOFOL 10 MG/ML
VIAL (ML) INTRAVENOUS CONTINUOUS PRN
Status: DISCONTINUED | OUTPATIENT
Start: 2025-05-08 | End: 2025-05-08

## 2025-05-08 RX ORDER — ACETAMINOPHEN 10 MG/ML
INJECTION, SOLUTION INTRAVENOUS
Status: DISCONTINUED | OUTPATIENT
Start: 2025-05-08 | End: 2025-05-08

## 2025-05-08 RX ORDER — MIDAZOLAM HYDROCHLORIDE 1 MG/ML
INJECTION INTRAMUSCULAR; INTRAVENOUS
Status: DISCONTINUED | OUTPATIENT
Start: 2025-05-08 | End: 2025-05-08

## 2025-05-08 RX ORDER — FENTANYL CITRATE 50 UG/ML
INJECTION, SOLUTION INTRAMUSCULAR; INTRAVENOUS
Status: DISCONTINUED | OUTPATIENT
Start: 2025-05-08 | End: 2025-05-08

## 2025-05-08 RX ADMIN — PROPOFOL 175 MCG/KG/MIN: 10 INJECTION, EMULSION INTRAVENOUS at 10:05

## 2025-05-08 RX ADMIN — MIDAZOLAM HYDROCHLORIDE 2 MG: 1 INJECTION, SOLUTION INTRAMUSCULAR; INTRAVENOUS at 09:05

## 2025-05-08 RX ADMIN — CLINDAMYCIN IN 5 PERCENT DEXTROSE 900 MG: 12 INJECTION, SOLUTION INTRAVENOUS at 10:05

## 2025-05-08 RX ADMIN — FENTANYL CITRATE 50 MCG: 50 INJECTION, SOLUTION INTRAMUSCULAR; INTRAVENOUS at 10:05

## 2025-05-08 RX ADMIN — PROPOFOL 10 MG: 10 INJECTION, EMULSION INTRAVENOUS at 10:05

## 2025-05-08 RX ADMIN — DEXAMETHASONE SODIUM PHOSPHATE 12 MG: 4 INJECTION INTRA-ARTICULAR; INTRALESIONAL; INTRAMUSCULAR; INTRAVENOUS; SOFT TISSUE at 10:05

## 2025-05-08 RX ADMIN — PROPOFOL 20 MG: 10 INJECTION, EMULSION INTRAVENOUS at 10:05

## 2025-05-08 RX ADMIN — ONDANSETRON 4 MG: 2 INJECTION INTRAMUSCULAR; INTRAVENOUS at 09:05

## 2025-05-08 RX ADMIN — ACETAMINOPHEN 1000 MG: 10 INJECTION INTRAVENOUS at 10:05

## 2025-05-08 RX ADMIN — LIDOCAINE HYDROCHLORIDE 100 MG: 20 INJECTION INTRAVENOUS at 10:05

## 2025-05-08 NOTE — ANESTHESIA POSTPROCEDURE EVALUATION
Anesthesia Post Evaluation    Patient: Jm Chaparro    Procedure(s) Performed: Procedure(s) (LRB):  CYSTOSCOPY,WITH BOTULINUM TOXIN INJECTION (N/A)    Final Anesthesia Type: general      Patient location during evaluation: PACU  Patient participation: Yes- Able to Participate  Level of consciousness: awake and alert  Post-procedure vital signs: reviewed and stable  Pain management: adequate  Airway patency: patent    PONV status at discharge: No PONV  Anesthetic complications: no      Cardiovascular status: blood pressure returned to baseline  Respiratory status: unassisted  Hydration status: euvolemic  Follow-up not needed.          Vitals Value Taken Time   BP 98/65 05/08/25 11:01   Temp 36.6 °C (97.9 °F) 05/08/25 10:44   Pulse 66 05/08/25 11:13   Resp 13 05/08/25 11:12   SpO2 96 % 05/08/25 11:13   Vitals shown include unfiled device data.      Event Time   Out of Recovery 11:00:00         Pain/Antonio Score: Antonio Score: 9 (5/8/2025 11:00 AM)

## 2025-05-08 NOTE — OP NOTE
Cystoscopy Operative Note  5/8/2025    Preoperative Diagnosis:   Refractory Overactive Bladder  Urinary Frequency      Postoperative Diagnosis:  Refractory Overactive Bladder  Urinary Frequency      Procedure:  Cystoscopy with injection for chemodenervation of the bladder with Botox (150 units) (30328)    Attending Surgeon: Trino Cain MD    Anesthesia: Monitored Anesthesia Care    EBL: <5 mL    Complications: None    Findings: Normal bladder and urethra    Drains: None    Reason for procedure: Patient is a 29-year-old male with a history of overactive bladder spanning several years. Initially, he had urinary frequency of 10 to 12 times during the day and 3-4 times at night, with urgency that occasionally resulted in urgency incontinence. He did not require daily pad use. After attempting to decrease fluid intake and a failed sacral neuromodulation test, he began Botox treatments. The dosage was initially titrated up to 200 units, which resulted in significant voiding difficulty. Most recently, about 6 months ago in October 2024, he received an injection of 150 units of Botox.     Procedure in Detail:  Informed consent was obtained by explaining all risks and benefits of the procedure to the patient in detail.  After informed consent, the patient was brought to the suite where Monitored Anesthesia Carewas administered prior to initiation of the invasive procedure. Appropriate perioperative antibiotics were given within 30 minutes of beginning the procedure. A formal timeout was performed prior to the procedure. The patient was gently placed in lithotomy position with all pressure points padded. Bilateral sequential compression devices were applied and activated. The patient was prepped and draped in standard fashion.    A 20-Telugu rigid cystoscope was passed per urethra into the bladder. Inspection of the bladder demonstrated a normal bladder. We then injected 150 units of onabotulinum toxin A (Botox) using a  dedicated needle (Laborie) set to a depth of 2 mm. A total of 30 injections were used throughout the bladder including the bladder base and trigone.     He tolerated the procedure well and was transferred in stable condition to the recovery room.     We will plan to see him back in 3 months for continued evaluation.

## 2025-05-08 NOTE — DISCHARGE INSTRUCTIONS
Post Cystoscopy Instructions  Do not strain to have a bowel movement  No strenuous exercise x 7 days  No driving while you are on narcotic pain medications or if your vieira  catheter is in place     You can expect:  To pass stone fragments if you had a stone procedure  Have pain when you void from your stent if you have a stent in place  See blood in your urine if you have a stent in place     If you have a catheter, please return to the ER if your catheter stops draining or you are having abdominal pain.     Call the doctor if:  Temperature is greater than 101F  Persistent vomiting and inability to keep food down  Inability to void if you do not have a catheter      What to Expect After a Cystoscopy  For the next 24-48 hours, you may feel a mild burning when you urinate. This burning is normal and expected.   Drink plenty of water to dilute the urine to help relieve the burning sensation.   You may also see a small amount of blood in your urine after the procedure.   Do not strain to have a bowel movement.   No strenuous exercise x 7 days.   No driving while you are on narcotic pain medications or if your vieira catheter is in place.     Unless you are already taking antibiotics, you may be given an antibiotic after the test to prevent infection.     Signs and Symptoms to Report  Call the [facility] at [phone] if you develop any of the following:  Fever of 101 degrees or higher  Chills or persistent bleeding  Inability to urinate

## 2025-05-08 NOTE — TRANSFER OF CARE
Anesthesia Transfer of Care Note    Patient: Jm Chaparro    Procedure(s) Performed: Procedure(s) (LRB):  CYSTOSCOPY,WITH BOTULINUM TOXIN INJECTION (N/A)    Patient location: PACU    Transport from OR: Transported from OR on room air with adequate spontaneous ventilation    Post pain: adequate analgesia    Post assessment: no apparent anesthetic complications and tolerated procedure well    Post vital signs: stable    Level of consciousness: awake, alert and oriented    Nausea/Vomiting: no nausea/vomiting    Complications: none    Transfer of care protocol was followed    Last vitals: Visit Vitals  /74 (BP Location: Right arm, Patient Position: Lying)   Pulse (!) 53   Temp 36.3 °C (97.3 °F) (Temporal)   Resp 17   SpO2 100%

## 2025-05-08 NOTE — ANESTHESIA PREPROCEDURE EVALUATION
05/08/2025  Jm Chaparro is a 29 y.o., male.    Past Medical History:   Diagnosis Date    ADD (attention deficit disorder)     Allergy     Anxiety disorder     Asperger's disorder     (AUTISM)    Deformity of both feet     Dyscalculia     Dysgraphia     Dyslexia     Eczema     Elevated LFTs     Fib4 score 0.58 3/24    Fatty liver     GERD (gastroesophageal reflux disease)     History of migraine headaches     Hypertension     Interstitial cystitis (chronic)     Irritable bowel syndrome     Nephrolithiasis     2014    PONV (postoperative nausea and vomiting)     Psoriasis     Psoriatic arthritis     Refusal of blood transfusion for reasons of conscience     Seasonal allergies     Sleep apnea     does not like CPAP    Special needs assessment      Past Surgical History:   Procedure Laterality Date    BIOPSY OF BLADDER  1/3/2022    Procedure: BIOPSY, BLADDER;  Surgeon: Derek Dickson MD;  Location: [facility] OR;  Service: Urology;;    COLONOSCOPY  ~2015    [facility]; told IBS    COLONOSCOPY  08/10/2015    Dr. Shaver; sent for scanning: unremarkable findings, no specimens collected    COLONOSCOPY N/A 2/18/2019    Procedure: COLONOSCOPY;  Surgeon: Puma Preston Jr., MD;  Location: [facility];  Service: Endoscopy;  Laterality: N/A;    CYSTOSCOPY  1/11/2023    Procedure: CYSTOSCOPY;  Surgeon: Trino Cain MD;  Location: [address];  Service: Urology;;    CYSTOSCOPY,WITH BOTULINUM TOXIN INJECTION N/A 2/9/2023    Procedure: CYSTOSCOPY,WITH BOTULINUM TOXIN INJECTION;  Surgeon: Trino Cain MD;  Location: [facility] OR;  Service: Urology;  Laterality: N/A;    CYSTOSCOPY,WITH BOTULINUM TOXIN INJECTION N/A 5/3/2023    Procedure: CYSTOSCOPY,WITH BOTULINUM TOXIN INJECTION;  Surgeon: Trino Cain MD;  Location: [facility] OR;  Service: Urology;  Laterality: N/A;  30 min    CYSTOSCOPY,WITH  BOTULINUM TOXIN INJECTION N/A 12/20/2023    Procedure: CYSTOSCOPY,WITH BOTULINUM TOXIN INJECTION;  Surgeon: Trino Cain MD;  Location: [facility] OR;  Service: Urology;  Laterality: N/A;  40 min   150 units    CYSTOSCOPY,WITH BOTULINUM TOXIN INJECTION N/A 10/4/2024    Procedure: CYSTOSCOPY,WITH BOTULINUM TOXIN INJECTION;  Surgeon: Trino Cain MD;  Location: [facility] OR;  Service: Urology;  Laterality: N/A;  150 units Botox    INSERTION, NEUROSTIMULATOR, TEMPORARY, SACRAL N/A 1/24/2023    Procedure: INSERTION, NEUROSTIMULATOR, TEMPORARY, SACRAL;  Surgeon: Trino Cain MD;  Location: [facility] OR [address] [place] [address];  Service: Urology;  Laterality: N/A;  1 hr 45 mins    REMOVAL OF ELECTRODE LEAD OF SACRAL NERVE STIMULATOR N/A 2/9/2023    Procedure: REMOVAL, ELECTRODE LEAD, SACRAL NERVE STIMULATOR;  Surgeon: Trino Cain MD;  Location: [facility] OR;  Service: Urology;  Laterality: N/A;  1 hr    UPPER GASTROINTESTINAL ENDOSCOPY  05/14/2012    Dr. Preston    UPPER GASTROINTESTINAL ENDOSCOPY  07/17/2015    Dr. Shaver, sent for scanning: normal esophagus- dilated & biopsied, findings WNL, biopsies taken from z-line, stomach and duodenum; biopsy: duodenum WNL, antrum reactive gastropathy, negative for h pylori or int. metaplasia, GEJ WNL, negative for EOE & cotton's esophagus    ureteral stone extraction      basket extraction     Pre-op Assessment       I have reviewed the Medications.     Review of Systems  Anesthesia Hx:             Denies Family Hx of Anesthesia complications.     Cardiovascular:     Hypertension                                          Pulmonary:        Sleep Apnea                Renal/:   Denies Chronic Renal Disease.                Hepatic/GI:     GERD Liver Disease,               Psych:  Psychiatric History                  Physical Exam  General: Well nourished    Airway:  Mallampati: II   TM Distance: Normal  Neck ROM: Normal ROM    Dental:  Intact    Chest/Lungs:  Clear to  auscultation    Heart:  Rate: Normal  Rhythm: Regular Rhythm        Anesthesia Plan  Type of Anesthesia, risks & benefits discussed:    Anesthesia Type: Gen Natural Airway  Intra-op Monitoring Plan: Standard ASA Monitors  Post Op Pain Control Plan: multimodal analgesia  Induction:  IV  Informed Consent: Informed consent signed with the Patient and all parties understand the risks and agree with anesthesia plan.  All questions answered.   ASA Score: 2    Ready For Surgery From Anesthesia Perspective.     .

## 2025-05-08 NOTE — PLAN OF CARE
Pt in preop bay 42, VSS, and IV inserted. Pt denies any open wounds on body or the use of any weight loss injections. Pt needs admit order, procedural consents signed, anesthesia consents signed, updated H&P, otherwise ready to roll.

## 2025-05-08 NOTE — PLAN OF CARE
Pt arrived to recovery dosc via stretcher per OR team. Bedside report received. Pt attached to bedside monitor. VSS. Pt _slightly drowsy, responds to voice__ post procedure. Pt on _2___L of oxygen via _nc__; oxygen sats _95___%. Pt IV access infusing NS. Will continue to monitor.

## 2025-05-08 NOTE — PLAN OF CARE
Discharge instructions given to patient and Mother__ with verbalization of understanding all instructions. Prescription medications delivered to bedside.  VSS, denies n/v and tolerating PO, rates pain level tolerable, IV removed, and family available for patient discharge home.

## 2025-05-08 NOTE — DISCHARGE SUMMARY
[facility]  Discharge Note  Short Stay    Procedure(s) (LRB):  CYSTOSCOPY,WITH BOTULINUM TOXIN INJECTION (N/A)      OUTCOME: Patient tolerated treatment/procedure well without complication and is now ready for discharge.    DISPOSITION: Home or Self Care    FINAL DIAGNOSIS:  <principal problem not specified>    FOLLOWUP: In clinic    DISCHARGE INSTRUCTIONS:  No discharge procedures on file.     TIME SPENT ON DISCHARGE: 15 minutes

## 2025-05-14 ENCOUNTER — PATIENT MESSAGE (OUTPATIENT)
Dept: FAMILY MEDICINE | Facility: CLINIC | Age: 30
End: 2025-05-14
Payer: COMMERCIAL

## 2025-05-14 DIAGNOSIS — R53.83 FATIGUE, UNSPECIFIED TYPE: Primary | ICD-10-CM

## 2025-05-20 ENCOUNTER — OFFICE VISIT (OUTPATIENT)
Dept: PAIN MEDICINE | Facility: CLINIC | Age: 30
End: 2025-05-20
Payer: COMMERCIAL

## 2025-05-20 VITALS
SYSTOLIC BLOOD PRESSURE: 112 MMHG | BODY MASS INDEX: 29.23 KG/M2 | HEART RATE: 67 BPM | DIASTOLIC BLOOD PRESSURE: 69 MMHG | WEIGHT: 227.63 LBS

## 2025-05-20 DIAGNOSIS — Z79.891 OPIOID CONTRACT EXISTS: ICD-10-CM

## 2025-05-20 DIAGNOSIS — R29.898 MUSCULAR DECONDITIONING: ICD-10-CM

## 2025-05-20 DIAGNOSIS — M06.9 RHEUMATOID ARTHRITIS INVOLVING MULTIPLE SITES, UNSPECIFIED WHETHER RHEUMATOID FACTOR PRESENT: ICD-10-CM

## 2025-05-20 DIAGNOSIS — M79.641 BILATERAL HAND PAIN: ICD-10-CM

## 2025-05-20 DIAGNOSIS — L40.50 PSORIATIC ARTHRITIS: ICD-10-CM

## 2025-05-20 DIAGNOSIS — M79.642 BILATERAL HAND PAIN: ICD-10-CM

## 2025-05-20 DIAGNOSIS — R39.89 BLADDER PAIN: ICD-10-CM

## 2025-05-20 DIAGNOSIS — K58.2 IRRITABLE BOWEL SYNDROME WITH BOTH CONSTIPATION AND DIARRHEA: ICD-10-CM

## 2025-05-20 DIAGNOSIS — Z79.891 OPIOID CONTRACT EXISTS: Primary | ICD-10-CM

## 2025-05-20 PROCEDURE — 3008F BODY MASS INDEX DOCD: CPT | Mod: CPTII,S$GLB,,

## 2025-05-20 PROCEDURE — 3044F HG A1C LEVEL LT 7.0%: CPT | Mod: CPTII,S$GLB,,

## 2025-05-20 PROCEDURE — 99214 OFFICE O/P EST MOD 30 MIN: CPT | Mod: S$GLB,,,

## 2025-05-20 PROCEDURE — 1159F MED LIST DOCD IN RCRD: CPT | Mod: CPTII,S$GLB,,

## 2025-05-20 PROCEDURE — 99999 PR PBB SHADOW E&M-EST. PATIENT-LVL III: CPT | Mod: PBBFAC,,,

## 2025-05-20 PROCEDURE — 3074F SYST BP LT 130 MM HG: CPT | Mod: CPTII,S$GLB,,

## 2025-05-20 PROCEDURE — 3078F DIAST BP <80 MM HG: CPT | Mod: CPTII,S$GLB,,

## 2025-05-20 RX ORDER — TRAMADOL HYDROCHLORIDE 50 MG/1
50 TABLET, FILM COATED ORAL EVERY 12 HOURS PRN
Qty: 55 TABLET | Refills: 2 | Status: SHIPPED | OUTPATIENT
Start: 2025-05-24 | End: 2025-08-22

## 2025-05-20 NOTE — PROGRESS NOTES
This note was completed with dictation software and grammatical errors may exist.    Chief Complaint   Patient presents with    Psoriatic Arthritis        HPI: Jm Chaparro is a 29 y.o. year old male patient who has a past medical history of ADD (attention deficit disorder), Allergy, Anxiety disorder, Asperger's disorder, Deformity of both feet, Dyscalculia, Dysgraphia, Dyslexia, Eczema, Elevated LFTs, Fatty liver, GERD (gastroesophageal reflux disease), History of migraine headaches, Hypertension, Interstitial cystitis (chronic), Irritable bowel syndrome, Nephrolithiasis, PONV (postoperative nausea and vomiting), Psoriasis, Psoriatic arthritis, Refusal of blood transfusion for reasons of conscience, Seasonal allergies, Sleep apnea, and Special needs assessment. He presents in referral from No ref. provider found for abdominal and bladder pain.  He returns for follow-up with some continued generalized pain, low back pain, hand pain.  He is reporting some worsening jaw pain following his bladder Botox procedure.  Ultimately, at baseline with no new or acute worsening symptoms.   He continues to find relief with gabapentin 900 mg b.i.d. in addition to tramadol.  He continues to take the tramadol 1/2 tablet in the morning and 1 tablet nightly with benefit and no ill side effects or adverse events.      Previous history:  The patient's mother was present with him at the visit today who also helps with most of the history.  She reports that in March, 2015 she had gone out of town on a business trip and her son accompanying her.  Unfortunately he came down with cold and virus symptoms at the time and began developing severe abdominal pain.  She went to the emergency department at that time in [place], they were told he was having viral symptoms.  She returned to the [facility] and had followed up with several other physicians that year.  She reports that initially he was having intermittent pain on and  off, would have pain for a week and then it would disappear without cause.  He then developed diarrhea and constipation at times, he was seen at [facility] in [place] in 2015 and diagnosed with IBS with constipation and diarrhea.  They recommend an antidepressant that he tried, no relief with this.  Shortly after he began having renal calculi as well and needed to present to the emergency department.  He had seen neurology, Dr. Larry and reports that he required stone removal, does not sound like he has had lithotripsy.  He has seen Dr. Dickson, and now Nephrology, Dr. Enriquez.  He has seen Gastroenterology, Dr. Shaver and had undergone colonoscopy with no significant findings.  He has been tried on multiple medications over time as discussed below without any benefit.  He continues to have frequent abdominal pain that is often worse with caffeine or other foods, worse with activity.  He reports that he gets some relief with a bowel movement.  He still has been passing small sand like renal calculi, they report that his urine turns dark when he is about to pass a stone and he often has pain throughout the bladder and urethra after passing a stone.    He has a history of rheumatoid arthritis, has been seeing a rheumatologist, reports pain throughout his entire spine, bilateral hips, shoulders, knees, feet.  He does well with being in a bath with hot water.    Pain intervention history:  No injections    Spine surgeries:  None    Antineuropathics: Gabapentin 300mg three times daily, was given this for inflammatory joint pain??  NSAIDs: Mobic 15, no benefit  Physical therapy:  Has done some physical therapy, water therapy in the past with some improvement but things worsened when he did land-based therapy and stretching  Antidepressants: Fluoxetine 40mg  Muscle relaxers:  Opioids:  The patient reports having benefit from tramadol in the past  Antiplatelets/Anticoagulants:  From Dr. Vyas note:  Prozac 40mg po  "qam, adderall xr 20mg po qam (very rare use), lunesta 2mg po qhs PRN insomnia  Past Psych Meds: adderall ("robot child"), focalin ("zombie"), strattera (angry), zoloft (as a child- ineffective), lexapro (ineffective) topomax ("migraines"), celexa (overly sedating but effective), remeron (ineffective- wgt gain), atarax, elavil (ineffective for pain), ritalin (inc'd bp)     ROS:  He reports fatigue, weight gain, itching, color change, headaches head trauma, ear pain, constipation, diarrhea, stomach pain, nausea, flank pain, urinary urgency and frequency, painful urination, joint stiffness, joint swelling, back pain, difficulty sleeping and anxiety.  Balance of review of systems is negative.    Lab Results   Component Value Date    HGBA1C 5.5 02/20/2025       Lab Results   Component Value Date    WBC 7.46 02/20/2025    HGB 15.1 02/20/2025    HCT 45.2 02/20/2025    MCV 96 02/20/2025     02/20/2025       Past Medical History:   Diagnosis Date    ADD (attention deficit disorder)     Allergy     Anxiety disorder     Asperger's disorder     (AUTISM)    Deformity of both feet     Dyscalculia     Dysgraphia     Dyslexia     Eczema     Elevated LFTs     Fib4 score 0.58 3/24    Fatty liver     GERD (gastroesophageal reflux disease)     History of migraine headaches     Hypertension     Interstitial cystitis (chronic)     Irritable bowel syndrome     Nephrolithiasis     2014    PONV (postoperative nausea and vomiting)     Psoriasis     Psoriatic arthritis     Refusal of blood transfusion for reasons of conscience     Seasonal allergies     Sleep apnea     does not like CPAP    Special needs assessment        Past Surgical History:   Procedure Laterality Date    BIOPSY OF BLADDER  1/3/2022    Procedure: BIOPSY, BLADDER;  Surgeon: Derek Dickson MD;  Location: [facility] OR;  Service: Urology;;    COLONOSCOPY  ~2015    [facility]; told IBS    COLONOSCOPY  08/10/2015    Dr. Shaver; sent for scanning: unremarkable findings, " no specimens collected    COLONOSCOPY N/A 2/18/2019    Procedure: COLONOSCOPY;  Surgeon: Puma Preston Jr., MD;  Location: [facility];  Service: Endoscopy;  Laterality: N/A;    CYSTOSCOPY  1/11/2023    Procedure: CYSTOSCOPY;  Surgeon: Trino Cain MD;  Location: [facility] OR 1ST FLR;  Service: Urology;;    CYSTOSCOPY,WITH BOTULINUM TOXIN INJECTION N/A 2/9/2023    Procedure: CYSTOSCOPY,WITH BOTULINUM TOXIN INJECTION;  Surgeon: Trino Cain MD;  Location: [facility] OR;  Service: Urology;  Laterality: N/A;    CYSTOSCOPY,WITH BOTULINUM TOXIN INJECTION N/A 5/3/2023    Procedure: CYSTOSCOPY,WITH BOTULINUM TOXIN INJECTION;  Surgeon: Trino Cain MD;  Location: [facility] OR;  Service: Urology;  Laterality: N/A;  30 min    CYSTOSCOPY,WITH BOTULINUM TOXIN INJECTION N/A 12/20/2023    Procedure: CYSTOSCOPY,WITH BOTULINUM TOXIN INJECTION;  Surgeon: Trino Cain MD;  Location: [facility] OR;  Service: Urology;  Laterality: N/A;  40 min   150 units    CYSTOSCOPY,WITH BOTULINUM TOXIN INJECTION N/A 10/4/2024    Procedure: CYSTOSCOPY,WITH BOTULINUM TOXIN INJECTION;  Surgeon: Trino Cain MD;  Location: [facility] OR;  Service: Urology;  Laterality: N/A;  150 units Botox    CYSTOSCOPY,WITH BOTULINUM TOXIN INJECTION N/A 5/8/2025    Procedure: CYSTOSCOPY,WITH BOTULINUM TOXIN INJECTION;  Surgeon: Trino Cain MD;  Location: [facility] OR;  Service: Urology;  Laterality: N/A;  Botox 150 units    INSERTION, NEUROSTIMULATOR, TEMPORARY, SACRAL N/A 1/24/2023    Procedure: INSERTION, NEUROSTIMULATOR, TEMPORARY, SACRAL;  Surgeon: Trino Cain MD;  Location: [facility] OR 2ND FLR;  Service: Urology;  Laterality: N/A;  1 hr 45 mins    REMOVAL OF ELECTRODE LEAD OF SACRAL NERVE STIMULATOR N/A 2/9/2023    Procedure: REMOVAL, ELECTRODE LEAD, SACRAL NERVE STIMULATOR;  Surgeon: Trino Cain MD;  Location: [facility] OR;  Service: Urology;  Laterality: N/A;  1 hr    UPPER GASTROINTESTINAL ENDOSCOPY  05/14/2012    Dr. Preston     UPPER GASTROINTESTINAL ENDOSCOPY  07/17/2015    Dr. Shaver, sent for scanning: normal esophagus- dilated & biopsied, findings WNL, biopsies taken from z-line, stomach and duodenum; biopsy: duodenum WNL, antrum reactive gastropathy, negative for h pylori or int. metaplasia, GEJ WNL, negative for EOE & cotton's esophagus    ureteral stone extraction      basket extraction       Social History     Socioeconomic History    Marital status: Single   Tobacco Use    Smoking status: Never    Smokeless tobacco: Never   Substance and Sexual Activity    Alcohol use: No    Drug use: No    Sexual activity: Never     Social Drivers of Health     Financial Resource Strain: High Risk (3/28/2025)    Overall Financial Resource Strain (CARDIA)     Difficulty of Paying Living Expenses: Hard   Food Insecurity: Patient Declined (3/28/2025)    Hunger Vital Sign     Worried About Running Out of Food in the Last Year: Patient declined     Ran Out of Food in the Last Year: Patient declined   Transportation Needs: No Transportation Needs (3/28/2025)    PRAPARE - Transportation     Lack of Transportation (Medical): No     Lack of Transportation (Non-Medical): No   Physical Activity: Inactive (3/28/2025)    Exercise Vital Sign     Days of Exercise per Week: 0 days     Minutes of Exercise per Session: 0 min   Stress: Stress Concern Present (3/28/2025)    [employer] [employer] - Occupational Stress Questionnaire     Feeling of Stress : To some extent   Housing Stability: Low Risk  (3/28/2025)    Housing Stability Vital Sign     Unable to Pay for Housing in the Last Year: No     Homeless in the Last Year: No       Medications/Allergies: See med card    Vitals:    05/20/25 0920   BP: 112/69   Pulse: 67   Weight: 103.3 kg (227 lb 10 oz)   PainSc:   5   PainLoc: Back           Body mass index is 29.23 kg/m².    Physical exam:  Gen: A and O x3, pleasant, well-groomed  Skin: No rashes or obvious lesions  HEENT: PERRLA, no obvious  deformities on ears or in canals. Trachea midline.  CVS: Regular rate and rhythm, normal palpable pulses.  Resp:No increased work of breathing, symmetrical chest rise.  Abdomen: Soft, NT/ND.  Musculoskeletal:  Moving all extremities equally    Upper extremity strength:  5/5 bilaterally  Lower extremity strength:  5/5 bilaterally      Imagin22 CT renal protocol:  Liver normal enhancement with no focal lesion.  Gallbladder, pancreas, stomach, spleen, adrenal glands normal.  Kidneys normal size and contour with no nephrolithiasis, hydronephrosis, or ureteral obstruction.  Aorta tapers normally.  No bowel obstruction, ascites, inflammatory change.  Appendix normal.  Bladder and rectum normal.  No significant bladder wall thickening evident.  Bones are intact.  Lung bases clear.    Assessment:  Jm Chaparro is a 28 y.o. year old male patient who has a past medical history of Abnormal thyroid function test, ADD (attention deficit disorder), Anxiety disorder, Asperger's disorder, Deformity of both feet, Dyscalculia, Dysgraphia, Dyslexia, Eczema, Fatty liver, GERD (gastroesophageal reflux disease), History of migraine headaches, Hypertension, Interstitial cystitis (chronic), Irritable bowel syndrome, Nephrolithiasis, PONV (postoperative nausea and vomiting), Psoriasis, Psoriatic arthritis, Refusal of blood transfusion for reasons of conscience, Seasonal allergies, and Special needs assessment. He presents in referral from Dr. Derek Dickson for bladder pain.    1. Opioid contract exists        2. Psoriatic arthritis        3. Bilateral hand pain        4. Bladder pain        5. Rheumatoid arthritis involving multiple sites, unspecified whether rheumatoid factor present        6. Muscular deconditioning        7. Irritable bowel syndrome with both constipation and diarrhea                Plan:    He is at his baseline with no new or significantly worsening symptoms.  Refills for tramadol 50 mg sent  to Dr. Gonzalez today for approval. I have reviewed the [place] Board of Pharmacy website and there are no abberancies.    Follow up in 3 months or sooner if needed

## 2025-05-24 ENCOUNTER — LAB VISIT (OUTPATIENT)
Dept: LAB | Facility: HOSPITAL | Age: 30
End: 2025-05-24
Attending: FAMILY MEDICINE
Payer: COMMERCIAL

## 2025-05-24 DIAGNOSIS — R53.83 FATIGUE, UNSPECIFIED TYPE: ICD-10-CM

## 2025-05-24 DIAGNOSIS — D84.821 DRUG-INDUCED IMMUNODEFICIENCY: ICD-10-CM

## 2025-05-24 DIAGNOSIS — Z79.899 DRUG-INDUCED IMMUNODEFICIENCY: ICD-10-CM

## 2025-05-24 LAB
25(OH)D3+25(OH)D2 SERPL-MCNC: 29 NG/ML (ref 30–96)
ABSOLUTE EOSINOPHIL (OHS): 0.29 K/UL
ABSOLUTE MONOCYTE (OHS): 0.52 K/UL (ref 0.3–1)
ABSOLUTE NEUTROPHIL COUNT (OHS): 5.68 K/UL (ref 1.8–7.7)
ALBUMIN SERPL BCP-MCNC: 3.8 G/DL (ref 3.5–5.2)
ALP SERPL-CCNC: 84 UNIT/L (ref 40–150)
ALT SERPL W/O P-5'-P-CCNC: 56 UNIT/L (ref 10–44)
ANION GAP (OHS): 7 MMOL/L (ref 8–16)
AST SERPL-CCNC: 34 UNIT/L (ref 11–45)
BASOPHILS # BLD AUTO: 0.07 K/UL
BASOPHILS NFR BLD AUTO: 0.8 %
BILIRUB SERPL-MCNC: 0.3 MG/DL (ref 0.1–1)
BILIRUB UR QL STRIP.AUTO: NEGATIVE
BUN SERPL-MCNC: 9 MG/DL (ref 6–20)
CALCIUM SERPL-MCNC: 9.2 MG/DL (ref 8.7–10.5)
CHLORIDE SERPL-SCNC: 106 MMOL/L (ref 95–110)
CLARITY UR: CLEAR
CO2 SERPL-SCNC: 28 MMOL/L (ref 23–29)
COLOR UR AUTO: YELLOW
CREAT SERPL-MCNC: 0.9 MG/DL (ref 0.5–1.4)
ERYTHROCYTE [DISTWIDTH] IN BLOOD BY AUTOMATED COUNT: 13.2 % (ref 11.5–14.5)
GFR SERPLBLD CREATININE-BSD FMLA CKD-EPI: >60 ML/MIN/1.73/M2
GLUCOSE SERPL-MCNC: 101 MG/DL (ref 70–110)
GLUCOSE UR QL STRIP: NEGATIVE
HCT VFR BLD AUTO: 46.2 % (ref 40–54)
HGB BLD-MCNC: 15 GM/DL (ref 14–18)
HGB UR QL STRIP: NEGATIVE
IMM GRANULOCYTES # BLD AUTO: 0.02 K/UL (ref 0–0.04)
IMM GRANULOCYTES NFR BLD AUTO: 0.2 % (ref 0–0.5)
KETONES UR QL STRIP: NEGATIVE
LEUKOCYTE ESTERASE UR QL STRIP: NEGATIVE
LYMPHOCYTES # BLD AUTO: 2.4 K/UL (ref 1–4.8)
MCH RBC QN AUTO: 31.3 PG (ref 27–31)
MCHC RBC AUTO-ENTMCNC: 32.5 G/DL (ref 32–36)
MCV RBC AUTO: 96 FL (ref 82–98)
NITRITE UR QL STRIP: NEGATIVE
NUCLEATED RBC (/100WBC) (OHS): 0 /100 WBC
PH UR STRIP: 7 [PH]
PLATELET # BLD AUTO: 288 K/UL (ref 150–450)
PMV BLD AUTO: 11.8 FL (ref 9.2–12.9)
POTASSIUM SERPL-SCNC: 4.1 MMOL/L (ref 3.5–5.1)
PROT SERPL-MCNC: 7.1 GM/DL (ref 6–8.4)
PROT UR QL STRIP: NEGATIVE
RBC # BLD AUTO: 4.8 M/UL (ref 4.6–6.2)
RELATIVE EOSINOPHIL (OHS): 3.2 %
RELATIVE LYMPHOCYTE (OHS): 26.7 % (ref 18–48)
RELATIVE MONOCYTE (OHS): 5.8 % (ref 4–15)
RELATIVE NEUTROPHIL (OHS): 63.3 % (ref 38–73)
SODIUM SERPL-SCNC: 141 MMOL/L (ref 136–145)
SP GR UR STRIP: 1.02
WBC # BLD AUTO: 8.98 K/UL (ref 3.9–12.7)

## 2025-05-24 PROCEDURE — 84520 ASSAY OF UREA NITROGEN: CPT

## 2025-05-24 PROCEDURE — 81003 URINALYSIS AUTO W/O SCOPE: CPT | Mod: PO

## 2025-05-24 PROCEDURE — 36415 COLL VENOUS BLD VENIPUNCTURE: CPT | Mod: PO

## 2025-05-24 PROCEDURE — 87086 URINE CULTURE/COLONY COUNT: CPT

## 2025-05-24 PROCEDURE — 85025 COMPLETE CBC W/AUTO DIFF WBC: CPT

## 2025-05-24 PROCEDURE — 82306 VITAMIN D 25 HYDROXY: CPT

## 2025-05-26 LAB — BACTERIA UR CULT: NORMAL

## 2025-06-02 DIAGNOSIS — F90.0 ADHD (ATTENTION DEFICIT HYPERACTIVITY DISORDER), INATTENTIVE TYPE: ICD-10-CM

## 2025-06-03 RX ORDER — DEXTROAMPHETAMINE SACCHARATE, AMPHETAMINE ASPARTATE MONOHYDRATE, DEXTROAMPHETAMINE SULFATE AND AMPHETAMINE SULFATE 3.75; 3.75; 3.75; 3.75 MG/1; MG/1; MG/1; MG/1
15 CAPSULE, EXTENDED RELEASE ORAL EVERY MORNING
Qty: 30 CAPSULE | Refills: 0 | Status: SHIPPED | OUTPATIENT
Start: 2025-06-03

## 2025-06-15 ENCOUNTER — PATIENT MESSAGE (OUTPATIENT)
Dept: UROLOGY | Facility: CLINIC | Age: 30
End: 2025-06-15
Payer: COMMERCIAL

## 2025-06-16 NOTE — TELEPHONE ENCOUNTER
Informed parent to bring him to urgent care as we do not perform nose swabs and Dr Cain being booked for the day already

## 2025-06-17 ENCOUNTER — OFFICE VISIT (OUTPATIENT)
Dept: URGENT CARE | Facility: CLINIC | Age: 30
End: 2025-06-17
Payer: COMMERCIAL

## 2025-06-17 VITALS
RESPIRATION RATE: 16 BRPM | DIASTOLIC BLOOD PRESSURE: 81 MMHG | TEMPERATURE: 98 F | OXYGEN SATURATION: 99 % | WEIGHT: 227 LBS | SYSTOLIC BLOOD PRESSURE: 119 MMHG | HEIGHT: 74 IN | BODY MASS INDEX: 29.13 KG/M2 | HEART RATE: 85 BPM

## 2025-06-17 DIAGNOSIS — N39.0 CHRONIC UTI: Primary | ICD-10-CM

## 2025-06-17 DIAGNOSIS — R39.15 URINARY URGENCY: ICD-10-CM

## 2025-06-17 DIAGNOSIS — R30.0 DYSURIA: ICD-10-CM

## 2025-06-17 LAB
BILIRUBIN, UA POC OHS: NEGATIVE
BLOOD, UA POC OHS: NEGATIVE
CLARITY, UA POC OHS: CLEAR
COLOR, UA POC OHS: YELLOW
GLUCOSE, UA POC OHS: NEGATIVE
KETONES, UA POC OHS: NEGATIVE
LEUKOCYTES, UA POC OHS: NEGATIVE
NITRITE, UA POC OHS: NEGATIVE
PH, UA POC OHS: 5.5
PROTEIN, UA POC OHS: NEGATIVE
SPECIFIC GRAVITY, UA POC OHS: >=1.03
UROBILINOGEN, UA POC OHS: 0.2

## 2025-06-17 PROCEDURE — 81003 URINALYSIS AUTO W/O SCOPE: CPT | Mod: QW,S$GLB,, | Performed by: NURSE PRACTITIONER

## 2025-06-17 PROCEDURE — 99204 OFFICE O/P NEW MOD 45 MIN: CPT | Mod: S$GLB,,, | Performed by: NURSE PRACTITIONER

## 2025-06-17 PROCEDURE — 87086 URINE CULTURE/COLONY COUNT: CPT | Performed by: NURSE PRACTITIONER

## 2025-06-17 NOTE — PATIENT INSTRUCTIONS
Urine culture will result in 3 days.  This will populate to your MyChart.  Please follow closely with your Urologist.    You are welcome to return here with any other questions or concerns.    Thank you for trusting us with your care.

## 2025-06-17 NOTE — PROGRESS NOTES
"Subjective:      Patient ID: Jm Chaparro is a 29 y.o. male.    Vitals:  height is 6' 2" (1.88 m) and weight is 103 kg (227 lb). His oral temperature is 98.1 °F (36.7 °C). His blood pressure is 119/81 and his pulse is 85. His respiration is 16 and oxygen saturation is 99%.     Chief Complaint: Abdominal Pain    Pt states this past Sunday with lower abd pain and states when he urinates it is green and painful too. Denies fever Pt family states has Bladder issues and May 8th had Botox injection into the bladder the color green urine is what is new     Provider note begins below:    Patient brought to the clinic today by his mother who assists with health history.  Patient has 2 days of pain at end of urination/dysuria and urinary frequency.  Denies fever or chills.  No sam hematuria.  No new onset of back or pelvic pain.  No new onset of nausea or vomiting.  No new onset of diarrhea.  Patient has significant comorbidities and clinical history of recurrent UTI, back pain and IBS mixed.    Mother states no recent changes to diet or medication.  Patient noted symptoms occurred shortly after his regular Botox injection to his bladder but is unsure if related.    Mother endorses having been asked by Urology to go to urgent care for sample collection for UA and culture.    Abdominal Pain  This is a new problem. The current episode started in the past 7 days. The onset quality is undetermined. The problem occurs constantly. The problem has been rapidly worsening. The pain is located in the generalized abdominal region and periumbilical region. The pain is at a severity of 5/10. The pain is moderate. The quality of the pain is sharp and aching. The abdominal pain does not radiate. Associated symptoms include frequency. Nothing aggravates the pain. The pain is relieved by Nothing. He has tried nothing for the symptoms. The treatment provided no relief.       Gastrointestinal:  Positive for abdominal pain. "   Genitourinary:  Positive for frequency.      Objective:     Physical Exam   Constitutional: He is oriented to person, place, and time. He appears well-developed. He does not appear ill. No distress.      Comments:Appears uncomfortable     HENT:   Head: Normocephalic and atraumatic.   Ears:   Right Ear: Hearing and external ear normal.   Left Ear: Hearing and external ear normal.   Nose: Nose normal. No nasal deformity. No epistaxis.   Mouth/Throat: Mucous membranes are normal.   Eyes: Conjunctivae and lids are normal.   Neck: Trachea normal and phonation normal. Neck supple.   Cardiovascular: Normal rate, regular rhythm and normal heart sounds.   Pulmonary/Chest: Effort normal and breath sounds normal.   Abdominal: Normal appearance and bowel sounds are normal. He exhibits no distension. Soft. There is abdominal tenderness in the suprapubic area. There is no rebound, no guarding, no left CVA tenderness and no right CVA tenderness.      Comments: Mild suprapubic discomfort on palpation.  No sharp pain.   Musculoskeletal: Normal range of motion.         General: Normal range of motion.   Neurological: He is alert and oriented to person, place, and time. He has normal reflexes.   Skin: Skin is warm, dry, intact and not diaphoretic.   Psychiatric: His speech is normal and behavior is normal.   Nursing note and vitals reviewed.      Results for orders placed or performed in visit on 06/17/25   POCT Urinalysis(Instrument)    Collection Time: 06/17/25  1:45 PM   Result Value Ref Range    Color, POC UA Yellow Yellow, Straw, Colorless    Clarity, POC UA Clear Clear    Glucose, POC UA Negative Negative    Bilirubin, POC UA Negative Negative    Ketones, POC UA Negative Negative    Spec Grav POC UA >=1.030 1.005 - 1.030    Blood, POC UA Negative Negative    pH, POC UA 5.5 5.0 - 8.0    Protein, POC UA Negative Negative    Urobilinogen, POC UA 0.2 <=1.0    Nitrite, POC UA Negative Negative    WBC, POC UA Negative Negative      *Note: Due to a large number of results and/or encounters for the requested time period, some results have not been displayed. A complete set of results can be found in Results Review.       Assessment:     1. Chronic UTI    2. Urinary urgency    3. Dysuria        Plan:   Independent chart check by this provider with significant history of chronic UTI.  Last positive culture positive for strep viridans.  Previous culture positive for Klebsiella and staph/2023 resistant to Macrobid.  Negative urinalysis in office today.  Discussed at length with patient and patient's mother regarding close follow-up with Urology pending urine culture results.    Patient will continue medication for stable anxiety control and back pain.    Chronic UTI  -     Urine Culture High Risk ($$)    Urinary urgency  -     POCT Urinalysis(Instrument)  -     Urine Culture High Risk ($$)    Dysuria  -     Urine Culture High Risk ($$)

## 2025-06-20 ENCOUNTER — RESULTS FOLLOW-UP (OUTPATIENT)
Dept: URGENT CARE | Facility: CLINIC | Age: 30
End: 2025-06-20

## 2025-06-20 LAB — BACTERIA UR CULT: NO GROWTH

## 2025-06-21 ENCOUNTER — TELEPHONE (OUTPATIENT)
Dept: URGENT CARE | Facility: CLINIC | Age: 30
End: 2025-06-21
Payer: COMMERCIAL

## 2025-06-21 NOTE — TELEPHONE ENCOUNTER
Spoke to the patients mother. Patient was sleeping. He is feeling slightly better. Informed her of negative results.    AK

## 2025-06-21 NOTE — TELEPHONE ENCOUNTER
----- Message from CHASIDY Seaman sent at 6/20/2025  6:45 PM CDT -----  Please contact the patient and let them know that their results were fine and do not require any change in treatment. There was no growth on urine culture  ----- Message -----  From: Ling Trammell MA  Sent: 6/17/2025   1:46 PM CDT  To: [facility] Health Results Pool

## 2025-06-26 ENCOUNTER — OFFICE VISIT (OUTPATIENT)
Dept: NEUROLOGY | Facility: CLINIC | Age: 30
End: 2025-06-26
Payer: COMMERCIAL

## 2025-06-26 VITALS
DIASTOLIC BLOOD PRESSURE: 90 MMHG | HEIGHT: 74 IN | TEMPERATURE: 97 F | RESPIRATION RATE: 16 BRPM | BODY MASS INDEX: 28.75 KG/M2 | WEIGHT: 224 LBS | SYSTOLIC BLOOD PRESSURE: 131 MMHG | HEART RATE: 56 BPM

## 2025-06-26 DIAGNOSIS — G43.009 MIGRAINE WITHOUT AURA AND WITHOUT STATUS MIGRAINOSUS, NOT INTRACTABLE: Primary | ICD-10-CM

## 2025-06-26 PROCEDURE — 3075F SYST BP GE 130 - 139MM HG: CPT | Mod: CPTII,S$GLB,, | Performed by: PHYSICIAN ASSISTANT

## 2025-06-26 PROCEDURE — 3008F BODY MASS INDEX DOCD: CPT | Mod: CPTII,S$GLB,, | Performed by: PHYSICIAN ASSISTANT

## 2025-06-26 PROCEDURE — 3044F HG A1C LEVEL LT 7.0%: CPT | Mod: CPTII,S$GLB,, | Performed by: PHYSICIAN ASSISTANT

## 2025-06-26 PROCEDURE — 99213 OFFICE O/P EST LOW 20 MIN: CPT | Mod: S$GLB,,, | Performed by: PHYSICIAN ASSISTANT

## 2025-06-26 PROCEDURE — 99999 PR PBB SHADOW E&M-EST. PATIENT-LVL V: CPT | Mod: PBBFAC,,, | Performed by: PHYSICIAN ASSISTANT

## 2025-06-26 PROCEDURE — 1160F RVW MEDS BY RX/DR IN RCRD: CPT | Mod: CPTII,S$GLB,, | Performed by: PHYSICIAN ASSISTANT

## 2025-06-26 PROCEDURE — 3080F DIAST BP >= 90 MM HG: CPT | Mod: CPTII,S$GLB,, | Performed by: PHYSICIAN ASSISTANT

## 2025-06-26 PROCEDURE — 1159F MED LIST DOCD IN RCRD: CPT | Mod: CPTII,S$GLB,, | Performed by: PHYSICIAN ASSISTANT
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
## 2025-06-29 NOTE — PROGRESS NOTES
"Outpatient Psychiatry Follow-Up Visit    Clinical Status of Patient: Outpatient (Ambulatory)  06/30/2025     Chief Complaint: 28 y/o male presenting with his mother today for a follow-up.       Interval History and Content of Current Session:  Interim Events/Subjective Report/Content of Current Session:  follow-up appointment.    Pt is a 28 y/o male with past psychiatric hx of anxiety, ADHD, ASD who presents for follow-up treatment. Patient reports ongoing sleep problems, including difficulty staying asleep throughout the night and occasional inability to fall asleep. He describes racing thoughts when trying to sleep, stating, "My brain races and does not let me sleep."     Patient engages in various activities, including building model kits, playing video games, and spending time with family. He expresses enthusiasm about upcoming video game releases in October. He has been involved in family activities, including trips to [place], visiting the aquarium, and shark fishing. Patient has a new home healthcare helper named Barbara who visits twice weekly. He also mentions recent family visits and upcoming trips to [place] and [place]. Patient denies feeling depressed or down.    Past Psychiatric hx: adderall ("robot child"), focalin ("zombie"), strattera (angry), zoloft (as a child- ineffective), lexapro (ineffective), topomax ("migraines"), celexa (overly sedating but effective), remeron (ineffective- wgt gain), atarax, elavil (ineffective for pain), ritalin (inc'd bp)     Past Medical hx:   Past Medical History:   Diagnosis Date    ADD (attention deficit disorder)     Allergy     Anxiety disorder     Asperger's disorder     (AUTISM)    Deformity of both feet     Dyscalculia     Dysgraphia     Dyslexia     Eczema     Elevated LFTs     Fib4 score 0.58 3/24    Fatty liver     GERD (gastroesophageal reflux disease)     History of migraine headaches     Hypertension     Interstitial cystitis (chronic)     " Irritable bowel syndrome     Nephrolithiasis     2014    PONV (postoperative nausea and vomiting)     Psoriasis     Psoriatic arthritis     Refusal of blood transfusion for reasons of conscience     Seasonal allergies     Sleep apnea     does not like CPAP    Special needs assessment         Interim hx:  Medication changes last visit: none     Denies suicidal/homicidal ideations.  Denies hopelessness/worthlessness.    Denies auditory/visual hallucinations      Alcohol: Pt denied  Drug: Pt denied  Caffeine: not assessed  Tobacco: Pt denied      Review of Systems   PSYCHIATRIC: Pertinent items are noted in the narrative.        CONSTITUTIONAL: weight stable    Past Medical, Family and Social History: The patient's past medical, family and social history have been reviewed and updated as appropriate within the electronic medical record. See encounter notes.     Current Psychiatric Medication:  Prozac 40mg po qam, Adderall XR 15 mg po qam, alprazolam 0.25 mg PRN (cut in half - very infrequently), trazodone 50 mg      Compliance: yes      Side effects: Pt denies     Risk Parameters:  Patient reports no suicidal ideation  Patient reports no homicidal ideation  Patient reports no self-injurious behavior  Patient reports no violent behavior     Exam (detailed: at least 9 elements; comprehensive: all 15 elements)   Constitutional  Vitals:  Most recent vital signs, dated less than 90 days prior to this appointment, were reviewed. Pulse:  [64]   BP: (118)/(85)       General:  unremarkable, age appropriate, casual attire, good eye contact, good rapport       Musculoskeletal  Muscle Strength/Tone:  no flaccidity, no tremor    Gait & Station:  normal      Psychiatric                       Speech:  normal tone, normal rate, rhythm, and volume   Mood & Affect:   stable         Thought Process:   Goal directed; Linear    Associations:   intact   Thought Content:   No SI/HI, delusions, or paranoia, no AV/VH   Insight & Judgement:    "Good, adequate to circumstances   Orientation:   grossly intact; alert and oriented x 4    Memory:  intact for content of interview    Language:  grossly intact, can repeat    Attention Span  : Grossly intact for content of interview   Fund of Knowledge:   intact and appropriate to age and level of education        Assessment and Diagnosis   Status/Progress/Impression:     Assessment & Plan    IMPRESSION:  - Sleep issues: insomnia a few times per month with difficulty staying asleep.  - Reviewed mood and engagement in activities to monitor mental health status.    PLAN SUMMARY:  - Continue Fluoxetine (Prozac) in the morning  - Continue Adderall 15 mg daily, taken after breakfast ([place]  Checked)  - Increase Trazodone from 50 mg to 100 mg at bedtime  - Stay hydrated, especially during hot weather and outdoor activities  - Follow up in 3 months  - Contact office if questions arise or need to be seen before next appointment    Diagnosis:   1) Social Anxiety Disorder  2) Generalized Anxiety Disorder  3) Attention Deficit Hyperactivity Disorder  4) Autism Spectrum Disorder (Level "requiring substantial support")  Intervention/Counseling/Treatment Plan   Medication Management:      1. Prozac 40mg po qam    2. Adderall XR 15 mg po qam    3. alprazolam 0.25 mg PRN (cut in half - very infrequently).    4. Increase trazodone 100 mg      5. Call to report any worsening of symptoms or problems with the medication. Pt instructed to go to ER with thoughts of harming self, others     6. Patient given contact # for psychotherapists at [facility] and also instructed to check with insurance for list of providers.     Psychotherapy: none  Target symptoms: grief  Why chosen therapy is appropriate versus another modality: CBT used; relevant to diagnosis, patient responds to this modality  Outcome monitoring methods: self-report, observation  Therapeutic intervention type: Supportive Therapy  Topics discussed/themes: building " skills sets for symptom management, symptom recognition, nutrition, exercise  The patient's response to the intervention is accepting  Patient's response to treatment is: good.   The patient's progress toward treatment goals: improving     Return to clinic: 3 months    -Cognitive-Behavioral/Supportive therapy and psychoeducation provided  -R/B/SE's of medications discussed with the pt who expresses understanding and chooses to take medications as prescribed.   -Pt instructed to call clinic, 911 or go to nearest emergency room if sxs worsen or pt is in   crisis. The pt expresses understanding.    Jp Hines, PhD, MP    Visit today included increased complexity associated with the care of the episodic problem anxiety, ADHD addressed and managing the longitudinal care of the patient due to the serious and/or complex managed problem(s) anxiety, ADHD.      This note was generated with the assistance of ambient listening technology. Verbal consent was obtained by the patient and accompanying visitor(s) for the recording of patient appointment to facilitate this note. I attest to having reviewed and edited the generated note for accuracy, though some syntax or spelling errors may persist. Please contact the author of this note for any clarification.     Antidepressant/Antianxiety Medication Initiation:  Patient informed of risks, benefits, and potential side effects of medication and accepts informed consent.  Common side effects include nausea, fatigue, headache, insomnia., Specifically discussed the possibility of new or worsening suicidal thoughts/depression.  Patient instructed to stop the medication immediately and seek urgent treatment if this occurs. Patient instructed not to abruptly discontinue medication without physician guidance except in cases of sudden onset or worsening of SI.       Stimulant Medication Initiation:  Patient advised of risks, benefits, and side effects of medication and accepts informed  consent.  Common side effects include insomnia, irritability, jittery feeling, dry mouth, and agitation/hostility., Patient advised of potential addictive nature of medication and controlled substance classification.  Instructed to safeguard medication as no early refills can be given for lost or stolen medications.       Benzodiazepine Initiation:  Patient advised of the risks, benefits, and common side effects of medication and has accepted informed consent.  Common side effects include drowsiness, impaired coordination, possible memory loss., Patient advised NOT to operate a vehicle or machinery untiil they are sure how the medication will affec them.  Client also advised of danger of mixing this medication with alcohol., Patient advised of potential addictive nature of medication and need to safeguard medication as no early refills for lost or stolen medications can be authorized.

## 2025-06-30 ENCOUNTER — OFFICE VISIT (OUTPATIENT)
Dept: PSYCHIATRY | Facility: CLINIC | Age: 30
End: 2025-06-30
Payer: COMMERCIAL

## 2025-06-30 VITALS
HEIGHT: 74 IN | WEIGHT: 222.75 LBS | HEART RATE: 64 BPM | DIASTOLIC BLOOD PRESSURE: 85 MMHG | SYSTOLIC BLOOD PRESSURE: 118 MMHG | BODY MASS INDEX: 28.59 KG/M2

## 2025-06-30 DIAGNOSIS — F90.0 ADHD (ATTENTION DEFICIT HYPERACTIVITY DISORDER), INATTENTIVE TYPE: Primary | ICD-10-CM

## 2025-06-30 DIAGNOSIS — F84.0 AUTISM SPECTRUM DISORDER REQUIRING SUBSTANTIAL SUPPORT (LEVEL 2): ICD-10-CM

## 2025-06-30 DIAGNOSIS — F40.10 SOCIAL ANXIETY DISORDER: ICD-10-CM

## 2025-06-30 DIAGNOSIS — F41.1 GAD (GENERALIZED ANXIETY DISORDER): ICD-10-CM

## 2025-06-30 PROCEDURE — 99999 PR PBB SHADOW E&M-EST. PATIENT-LVL IV: CPT | Mod: PBBFAC,,, | Performed by: PSYCHOLOGIST

## 2025-06-30 PROCEDURE — 1159F MED LIST DOCD IN RCRD: CPT | Mod: CPTII,S$GLB,, | Performed by: PSYCHOLOGIST

## 2025-06-30 PROCEDURE — 3044F HG A1C LEVEL LT 7.0%: CPT | Mod: CPTII,S$GLB,, | Performed by: PSYCHOLOGIST

## 2025-06-30 PROCEDURE — 99214 OFFICE O/P EST MOD 30 MIN: CPT | Mod: S$GLB,,, | Performed by: PSYCHOLOGIST

## 2025-06-30 PROCEDURE — 3079F DIAST BP 80-89 MM HG: CPT | Mod: CPTII,S$GLB,, | Performed by: PSYCHOLOGIST

## 2025-06-30 PROCEDURE — 3074F SYST BP LT 130 MM HG: CPT | Mod: CPTII,S$GLB,, | Performed by: PSYCHOLOGIST

## 2025-06-30 PROCEDURE — 3008F BODY MASS INDEX DOCD: CPT | Mod: CPTII,S$GLB,, | Performed by: PSYCHOLOGIST

## 2025-06-30 PROCEDURE — G2211 COMPLEX E/M VISIT ADD ON: HCPCS | Mod: S$GLB,,, | Performed by: PSYCHOLOGIST

## 2025-06-30 RX ORDER — DEXTROAMPHETAMINE SACCHARATE, AMPHETAMINE ASPARTATE MONOHYDRATE, DEXTROAMPHETAMINE SULFATE AND AMPHETAMINE SULFATE 3.75; 3.75; 3.75; 3.75 MG/1; MG/1; MG/1; MG/1
15 CAPSULE, EXTENDED RELEASE ORAL EVERY MORNING
Qty: 30 CAPSULE | Refills: 0 | Status: SHIPPED | OUTPATIENT
Start: 2025-06-30

## 2025-06-30 RX ORDER — FLUOXETINE HYDROCHLORIDE 40 MG/1
40 CAPSULE ORAL DAILY
Qty: 90 CAPSULE | Refills: 0 | Status: SHIPPED | OUTPATIENT
Start: 2025-06-30

## 2025-06-30 RX ORDER — TRAZODONE HYDROCHLORIDE 100 MG/1
100 TABLET ORAL NIGHTLY
Qty: 30 TABLET | Refills: 2 | Status: SHIPPED | OUTPATIENT
Start: 2025-06-30

## 2025-07-01 ENCOUNTER — OFFICE VISIT (OUTPATIENT)
Dept: OTOLARYNGOLOGY | Facility: CLINIC | Age: 30
End: 2025-07-01
Payer: COMMERCIAL

## 2025-07-01 VITALS — BODY MASS INDEX: 28.6 KG/M2 | WEIGHT: 222.88 LBS | HEIGHT: 74 IN

## 2025-07-01 DIAGNOSIS — K11.7 XEROSTOMIA: ICD-10-CM

## 2025-07-01 DIAGNOSIS — R09.82 PND (POST-NASAL DRIP): ICD-10-CM

## 2025-07-01 DIAGNOSIS — R09.81 NOSE CONGESTION: Primary | ICD-10-CM

## 2025-07-01 DIAGNOSIS — F84.0 AUTISM SPECTRUM DISORDER REQUIRING SUBSTANTIAL SUPPORT (LEVEL 2): ICD-10-CM

## 2025-07-01 DIAGNOSIS — J31.0 NON-ALLERGIC RHINITIS: ICD-10-CM

## 2025-07-01 PROCEDURE — 3044F HG A1C LEVEL LT 7.0%: CPT | Mod: CPTII,S$GLB,, | Performed by: NURSE PRACTITIONER

## 2025-07-01 PROCEDURE — 1159F MED LIST DOCD IN RCRD: CPT | Mod: CPTII,S$GLB,, | Performed by: NURSE PRACTITIONER

## 2025-07-01 PROCEDURE — 99214 OFFICE O/P EST MOD 30 MIN: CPT | Mod: S$GLB,,, | Performed by: NURSE PRACTITIONER

## 2025-07-01 PROCEDURE — 99999 PR PBB SHADOW E&M-EST. PATIENT-LVL III: CPT | Mod: PBBFAC,,, | Performed by: NURSE PRACTITIONER

## 2025-07-01 PROCEDURE — 3008F BODY MASS INDEX DOCD: CPT | Mod: CPTII,S$GLB,, | Performed by: NURSE PRACTITIONER

## 2025-07-01 NOTE — PROGRESS NOTES
"Otolaryngology Clinic Note    Subjective:       Patient ID: Jm Chaparro is a 29 y.o. male.    Chief Complaint: Follow-up      History of Present Illness: Jm Chaparro is a 29 y.o. male presenting with sinus headache    Mr. Chaparro was referred by neurology for nasal congestion. He is being managed by neurology for migraines. He did have a MCV in Feb 2025. He has tried nasal rinses in the past. He has been followed by ENT since 2014; he last saw Dr. Crum in 2023. He has known allergies . He has tried antihistamine as well as nasal sprays in the past but does not tolerate them well. He has had allergy testing in the past which was negative.     He has PND, watery eyes, itchy nose. He has occ nasal congestion. His headaches come and go. His headache are worse with weather change. He occ gets sinus pressure when the air pressure changes. It hurts in the front of his face by his sinus cavitis and in the back of his head.  He occ gets sinus infections. He denies any itching palate, runny nose, swallowing concerns, ear concerns, or cough.     He is here today with his dad. History is collected from both patient and the Dad. His Mom is normally the one who is with him at his appointments but she is getting an FNA today on her thyroid so she was not able to be here today.     7/1/25: History of Present Illness    Mr. Chaparro presents today for follow up of nasal congestion. History collected from both patient and his Mom today. He reports ongoing seasonal allergies affecting his family. He experiences nasal congestion and sinus headaches that are significantly weather-related. He denies any concerns wiht nasal congestion right now. He has a deviated septum with a midline lump that impacts nasal airflow, contributing to increased congestion on the affected side. His nasal symptoms are currently "okay" and intermittent, fluctuating with environmental changes and allergy season. He reports " long-standing ear symptoms including intermittent ear fullness and currently experiences heightened auditory sensitivity. He produces thick, yellow earwax but denies using any earwax removal treatments. No significant hearing loss is reported. He reports persistent dry mouth despite consuming large amounts of fluid daily. He continues oral allergy medication, trazodone, fluoxetine, ADHD medication, gabapentin, and Robaxin which all have a side effect of dry mouth.       ROS:  ROS as indicated in HPI.          Past Surgical History:   Procedure Laterality Date    BIOPSY OF BLADDER  1/3/2022    Procedure: BIOPSY, BLADDER;  Surgeon: Derek Dickson MD;  Location: [facility] OR;  Service: Urology;;    COLONOSCOPY  ~2015    [facility]; told IBS    COLONOSCOPY  08/10/2015    Dr. Shaver; sent for scanning: unremarkable findings, no specimens collected    COLONOSCOPY N/A 2/18/2019    Procedure: COLONOSCOPY;  Surgeon: Puma Preston Jr., MD;  Location: [facility];  Service: Endoscopy;  Laterality: N/A;    CYSTOSCOPY  1/11/2023    Procedure: CYSTOSCOPY;  Surgeon: Trino Cain MD;  Location: [address];  Service: Urology;;    CYSTOSCOPY,WITH BOTULINUM TOXIN INJECTION N/A 2/9/2023    Procedure: CYSTOSCOPY,WITH BOTULINUM TOXIN INJECTION;  Surgeon: Trino Cain MD;  Location: [facility] OR;  Service: Urology;  Laterality: N/A;    CYSTOSCOPY,WITH BOTULINUM TOXIN INJECTION N/A 5/3/2023    Procedure: CYSTOSCOPY,WITH BOTULINUM TOXIN INJECTION;  Surgeon: Trino Cain MD;  Location: [facility] OR;  Service: Urology;  Laterality: N/A;  30 min    CYSTOSCOPY,WITH BOTULINUM TOXIN INJECTION N/A 12/20/2023    Procedure: CYSTOSCOPY,WITH BOTULINUM TOXIN INJECTION;  Surgeon: Trino Cain MD;  Location: [facility] OR;  Service: Urology;  Laterality: N/A;  40 min   150 units    CYSTOSCOPY,WITH BOTULINUM TOXIN INJECTION N/A 10/4/2024    Procedure: CYSTOSCOPY,WITH BOTULINUM TOXIN INJECTION;  Surgeon: Trino Cain MD;   Location: [facility] OR;  Service: Urology;  Laterality: N/A;  150 units Botox    CYSTOSCOPY,WITH BOTULINUM TOXIN INJECTION N/A 5/8/2025    Procedure: CYSTOSCOPY,WITH BOTULINUM TOXIN INJECTION;  Surgeon: Trino Cain MD;  Location: [facility] OR;  Service: Urology;  Laterality: N/A;  Botox 150 units    INSERTION, NEUROSTIMULATOR, TEMPORARY, SACRAL N/A 1/24/2023    Procedure: INSERTION, NEUROSTIMULATOR, TEMPORARY, SACRAL;  Surgeon: Trino Cain MD;  [facility];  Service: Urology;  Laterality: N/A;  1 hr 45 mins    REMOVAL OF ELECTRODE LEAD OF SACRAL NERVE STIMULATOR N/A 2/9/2023    Procedure: REMOVAL, ELECTRODE LEAD, SACRAL NERVE STIMULATOR;  Surgeon: Trino Cain MD;  Location: [facility] OR;  Service: Urology;  Laterality: N/A;  1 hr    UPPER GASTROINTESTINAL ENDOSCOPY  05/14/2012    Dr. Preston    UPPER GASTROINTESTINAL ENDOSCOPY  07/17/2015    Dr. Shaver, sent for scanning: normal esophagus- dilated & biopsied, findings WNL, biopsies taken from z-line, stomach and duodenum; biopsy: duodenum WNL, antrum reactive gastropathy, negative for h pylori or int. metaplasia, GEJ WNL, negative for EOE & cotton's esophagus    ureteral stone extraction      basket extraction     Past Medical History:   Diagnosis Date    ADD (attention deficit disorder)     Allergy     Anxiety disorder     Asperger's disorder     (AUTISM)    Deformity of both feet     Dyscalculia     Dysgraphia     Dyslexia     Eczema     Elevated LFTs     Fib4 score 0.58 3/24    Fatty liver     GERD (gastroesophageal reflux disease)     History of migraine headaches     Hypertension     Interstitial cystitis (chronic)     Irritable bowel syndrome     Nephrolithiasis     2014    PONV (postoperative nausea and vomiting)     Psoriasis     Psoriatic arthritis     Refusal of blood transfusion for reasons of conscience     Seasonal allergies     Sleep apnea     does not like CPAP    Special needs assessment      Social Drivers of Health      Tobacco Use: Low Risk  (6/30/2025)    Patient History     Smoking Tobacco Use: Never     Smokeless Tobacco Use: Never     Passive Exposure: Not on file   Alcohol Use: Not At Risk (3/28/2025)    AUDIT-C     Frequency of Alcohol Consumption: Never     Average Number of Drinks: Patient does not drink     Frequency of Binge Drinking: Never   Financial Resource Strain: High Risk (3/28/2025)    Overall Financial Resource Strain (CARDIA)     Difficulty of Paying Living Expenses: Hard   Food Insecurity: Patient Declined (3/28/2025)    Hunger Vital Sign     Worried About Running Out of Food in the Last Year: Patient declined     Ran Out of Food in the Last Year: Patient declined   Transportation Needs: No Transportation Needs (3/28/2025)    PRAPARE - Transportation     Lack of Transportation (Medical): No     Lack of Transportation (Non-Medical): No   Physical Activity: Inactive (3/28/2025)    Exercise Vital Sign     Days of Exercise per Week: 0 days     Minutes of Exercise per Session: 0 min   Stress: Stress Concern Present (3/28/2025)    [employer] - Occupational Stress Questionnaire     Feeling of Stress : To some extent   Housing Stability: Low Risk  (3/28/2025)    Housing Stability Vital Sign     Unable to Pay for Housing in the Last Year: No     Number of Times Moved in the Last Year: Not on file     Homeless in the Last Year: No   Depression: Medium Risk (2/17/2025)    Depression     Last PHQ-4: Flowsheet Data: 3   Utilities: Not At Risk (3/28/2025)    [facility] Utilities     Threatened with loss of utilities: No   Health Literacy: Inadequate Health Literacy (3/28/2025)     Health Literacy     Frequency of need for help with medical instructions: Often   Social Isolation: Not on file     Review of patient's allergies indicates:   Allergen Reactions    Penicillins Anaphylaxis    Caffeine      Rapid heart beat    Ciprofloxacin Itching and Rash     Current Outpatient Medications    Medication Instructions    ALPRAZolam (XANAX) 0.25 mg, Oral, Daily PRN    atorvastatin (LIPITOR) 20 mg, Oral, Daily    azelastine (ASTELIN) 137 mcg, Nasal, 2 times daily    clobetasoL (TEMOVATE) 0.05 % external solution Topical (Top), 2 times daily PRN    dextroamphetamine-amphetamine (ADDERALL XR) 15 MG 24 hr capsule 15 mg, Oral, Every morning    EMGALITY  mg, Subcutaneous, Every 28 days, maintenance dose    FLUoxetine 40 mg, Oral, Daily    fluticasone propionate (FLONASE) 50 mcg, Each Nostril, 2 times daily    gabapentin (NEURONTIN) 900 mg, Oral, 2 times daily    methocarbamoL (ROBAXIN) 750 mg, Nightly PRN    metoprolol succinate (TOPROL-XL) 50 mg, Oral, 2 times daily    OTEZLA 30 mg, Oral, 2 times daily    PREVIDENT 5000 BOOSTER PLUS 1.1 % Pste     rizatriptan (MAXALT-MLT) 10 MG disintegrating tablet 1 melt at onset headache, second melt 2 hours later if needed, no more than 2 melts day/3 days use in week    rizatriptan (MAXALT-MLT) 10 mg, Daily PRN    sulfacetamide sodium 10% (BLEPH-10) 10 % ophthalmic solution 4 drops to left ear twice daily for 5 days    traMADoL (ULTRAM) 50 mg, Oral, Every 12 hours PRN, Medically necessary for more than 7 days, ICD 10: G89.4    traZODone (DESYREL) 100 mg, Oral, Nightly    ubidecarenone (CO Q-10 ORAL) Daily    VITAMIN D2 50,000 Units, Oral, Every 7 days         ENT ROS negative except as stated above.     Patient answers are not available for this visit.            Objective:      There were no vitals filed for this visit.    General: NAD, well appearing  Eyes: Normal conjunctiva and lids  Face: symmetric, nerve intact  Nose: The nose is without any evidence of any deformity. The nasal mucosa is moist. The septum is midline. There is no evidence of septal hematoma. The turbinates are with hypertrophy and clear rhinorrhea noted bilaterally.   Ears: The ears are with normal-appearing pinna. Examination of the canals is normal appearing bilaterally. There is no drainage  or erythema noted. The tympanic membranes are normal appearing with pearly color, normal-appearing landmarks and normal light reflex. Hearing is grossly intact.  Mouth: No obvious abnormalities to the lips. The teeth are unremarkable. The gingivae are without any obvious evidence of infection or lesion. The oral mucosa is dry and pink. There are no obvious masses to the hard or soft palate.   Oropharynx: The uvula is midline, papilloma noted to the tip.  The tongue is midline. The posterior pharynx is without erythema or exudate. The tonsils are normal appearing.  Salivary glands: The salivary glands are symmetric and not enlarged, no masses  Neck: No lymphadenopathy, trachea midline, thryoid not enlarged.  Psych: Normal mood and affect.   Neuro: Grossly intact  Speech: fluent    Test Review: I personally reviewed sinus x-ray below as well as neuro and ENT notes    EXAMINATION:  XR SINUSES MIN 3 VIEWS     CLINICAL HISTORY:  Headache, unspecified     TECHNIQUE:  AP, lateral, and Landa views of the paranasal sinuses were performed     COMPARISON:  None.     FINDINGS:  Paranasal sinuses appear grossly unopacified.  No air-fluid levels demonstrated.  Regional osseous structures appear grossly intact.     Impression:     No convincing evidence of paranasal sinus disease.  CT of the sinuses would be a more sensitive assessment, if clinically indicated.    Assessment and Plan:       1. Nose congestion    2. Non-allergic rhinitis    3. PND (post-nasal drip)    4. Autism spectrum disorder requiring substantial support (level 2)    5. Xerostomia          - Continue daily oral antihistamine  - Start saline rinse twice daily  - Flonase and Astelin twice daily after the rinse is he can tolerate it. He has tried it in the past but does not like to use nasal sprays.  - Continue to follow-up with neurology for headache  - Neuro doesn't seem to think headaches are related to sinuses so will hold off on any imaging at this time. His  sinuses x-ray from 2023 did not show any sinus disease  - Patient and mom request to hold off on any nasal endoscopy or CT today. Will notify me if he would like to come back in for the scope.   - Dry mouth likely related to multiple medication with dry mouth side effect  - Debrox for cerumen as needed, none today on exam      RTC: prn or any ENT concern    Plan of care was discussed in detail with the patient, who agreed with the plan as above. All questions were answered in detail. 30 minutes spent in direct patient care, chart review and documentation     Crystal Keenan, FNP-C  Otolaryngology

## 2025-07-17 DIAGNOSIS — F90.0 ADHD (ATTENTION DEFICIT HYPERACTIVITY DISORDER), INATTENTIVE TYPE: ICD-10-CM

## 2025-07-17 RX ORDER — DEXTROAMPHETAMINE SACCHARATE, AMPHETAMINE ASPARTATE MONOHYDRATE, DEXTROAMPHETAMINE SULFATE AND AMPHETAMINE SULFATE 3.75; 3.75; 3.75; 3.75 MG/1; MG/1; MG/1; MG/1
15 CAPSULE, EXTENDED RELEASE ORAL EVERY MORNING
Qty: 30 CAPSULE | Refills: 0 | Status: SHIPPED | OUTPATIENT
Start: 2025-07-17

## 2025-07-21 ENCOUNTER — OFFICE VISIT (OUTPATIENT)
Dept: RHEUMATOLOGY | Facility: CLINIC | Age: 30
End: 2025-07-21
Payer: COMMERCIAL

## 2025-07-21 VITALS
SYSTOLIC BLOOD PRESSURE: 90 MMHG | RESPIRATION RATE: 19 BRPM | OXYGEN SATURATION: 96 % | WEIGHT: 223 LBS | HEART RATE: 66 BPM | HEIGHT: 74 IN | DIASTOLIC BLOOD PRESSURE: 64 MMHG | BODY MASS INDEX: 28.62 KG/M2

## 2025-07-21 DIAGNOSIS — D84.821 DRUG-INDUCED IMMUNODEFICIENCY: ICD-10-CM

## 2025-07-21 DIAGNOSIS — Z79.899 DRUG-INDUCED IMMUNODEFICIENCY: ICD-10-CM

## 2025-07-21 DIAGNOSIS — L40.9 PSORIASIS: ICD-10-CM

## 2025-07-21 DIAGNOSIS — L40.50 PSORIATIC ARTHRITIS: Primary | ICD-10-CM

## 2025-07-21 DIAGNOSIS — N32.89 BLADDER SPASM: ICD-10-CM

## 2025-07-21 PROCEDURE — 99999 PR PBB SHADOW E&M-EST. PATIENT-LVL V: CPT | Mod: PBBFAC,,, | Performed by: STUDENT IN AN ORGANIZED HEALTH CARE EDUCATION/TRAINING PROGRAM

## 2025-07-21 PROCEDURE — 3078F DIAST BP <80 MM HG: CPT | Mod: CPTII,S$GLB,, | Performed by: STUDENT IN AN ORGANIZED HEALTH CARE EDUCATION/TRAINING PROGRAM

## 2025-07-21 PROCEDURE — 3008F BODY MASS INDEX DOCD: CPT | Mod: CPTII,S$GLB,, | Performed by: STUDENT IN AN ORGANIZED HEALTH CARE EDUCATION/TRAINING PROGRAM

## 2025-07-21 PROCEDURE — G2211 COMPLEX E/M VISIT ADD ON: HCPCS | Mod: S$GLB,,, | Performed by: STUDENT IN AN ORGANIZED HEALTH CARE EDUCATION/TRAINING PROGRAM

## 2025-07-21 PROCEDURE — 3074F SYST BP LT 130 MM HG: CPT | Mod: CPTII,S$GLB,, | Performed by: STUDENT IN AN ORGANIZED HEALTH CARE EDUCATION/TRAINING PROGRAM

## 2025-07-21 PROCEDURE — 99215 OFFICE O/P EST HI 40 MIN: CPT | Mod: S$GLB,,, | Performed by: STUDENT IN AN ORGANIZED HEALTH CARE EDUCATION/TRAINING PROGRAM

## 2025-07-21 PROCEDURE — 3044F HG A1C LEVEL LT 7.0%: CPT | Mod: CPTII,S$GLB,, | Performed by: STUDENT IN AN ORGANIZED HEALTH CARE EDUCATION/TRAINING PROGRAM

## 2025-07-21 PROCEDURE — 1159F MED LIST DOCD IN RCRD: CPT | Mod: CPTII,S$GLB,, | Performed by: STUDENT IN AN ORGANIZED HEALTH CARE EDUCATION/TRAINING PROGRAM

## 2025-07-21 RX ORDER — PREDNISONE 5 MG/1
TABLET ORAL
Qty: 30 TABLET | Refills: 0 | Status: SHIPPED | OUTPATIENT
Start: 2025-07-21 | End: 2025-08-05

## 2025-07-21 NOTE — PROGRESS NOTES
RHEUMATOLOGY OUTPATIENT CLINIC NOTE    7/21/2025    Attending Rheumatologist: Samantha Pace  Primary Care Provider: China Pierre MD   Physician Requesting Consultation: No referring provider defined for this encounter.  Chief Complaint/Reason For Consultation:  Follow-up    Subjective:       HPI  Nimajodie Chaparro is a 29 y.o. White male with IBS, anxiety, autism, who comes to transfer care for management of PsA.     Interim history   -Last visit on 4/2025   -we increase Otezla to twice a day last visit.  He is tolerating it fine without side effects  -he has been noticing worsening diffuse pain.  Worse in his hands, wrists.  Prolonged morning stiffness Psoriasis has been stable  -he has been having trouble sleeping, his blood pressure has been running low and has lost some weight  -labs May 25 improvement on LFTs.  CBC stable    Disease history    He has diagnosis of psoriasis since age 13, then started having inflammatory joint pain in his 20s.  Reports that he has tried Humira in the past but did not tolerate due to upset stomach and worsened IBS.  Does not recall any other biologics.  Never been on methotrexate due to history of fatty liver.  Has been on Otezla for the past 3 years.  It appears that he did not tolerate 1 tablet BID due worsened IBS so he has been taking it only once a day per his previous rheumatologist.     He has been doing overall well with his regimen. Notes mild flaring of psoriasis in scalp at times. He uses multiple OTC shampoos which control it overall. Has dry skin especially during winter time.     Review of Systems   Constitutional:  Negative for fever and unexpected weight change.   HENT:  Negative for mouth sores and trouble swallowing.    Eyes:  Negative for redness.   Respiratory:  Negative for cough and shortness of breath.    Cardiovascular:  Negative for chest pain.   Gastrointestinal:  Positive for constipation and diarrhea.   Genitourinary:   Negative for genital sores.   Integumentary:  Positive for rash.   Neurological:  Positive for headaches.   Hematological:  Does not bruise/bleed easily.        Chronic comorbid conditions affecting medical decision making today:  Past Medical History:   Diagnosis Date    ADD (attention deficit disorder)     Allergy     Anxiety disorder     Asperger's disorder     (AUTISM)    Deformity of both feet     Dyscalculia     Dysgraphia     Dyslexia     Eczema     Elevated LFTs     Fib4 score 0.58 3/24    Fatty liver     GERD (gastroesophageal reflux disease)     History of migraine headaches     Hypertension     Interstitial cystitis (chronic)     Irritable bowel syndrome     Nephrolithiasis     2014    PONV (postoperative nausea and vomiting)     Psoriasis     Psoriatic arthritis     Refusal of blood transfusion for reasons of conscience     Seasonal allergies     Sleep apnea     does not like CPAP    Special needs assessment      Past Surgical History:   Procedure Laterality Date    BIOPSY OF BLADDER  1/3/2022    Procedure: BIOPSY, BLADDER;  Surgeon: Derek Dickson MD;  Location: [facility] OR;  Service: Urology;;    COLONOSCOPY  ~2015    [facility]; told IBS    COLONOSCOPY  08/10/2015    Dr. Shaver; sent for scanning: unremarkable findings, no specimens collected    COLONOSCOPY N/A 2/18/2019    Procedure: COLONOSCOPY;  Surgeon: Puma Preston Jr., MD;  Location: [facility];  Service: Endoscopy;  Laterality: N/A;    CYSTOSCOPY  1/11/2023    Procedure: CYSTOSCOPY;  Surgeon: Trino Cain MD;  Location: [address];  Service: Urology;;    CYSTOSCOPY,WITH BOTULINUM TOXIN INJECTION N/A 2/9/2023    Procedure: CYSTOSCOPY,WITH BOTULINUM TOXIN INJECTION;  Surgeon: Trino Cain MD;  Location: [facility] OR;  Service: Urology;  Laterality: N/A;    CYSTOSCOPY,WITH BOTULINUM TOXIN INJECTION N/A 5/3/2023    Procedure: CYSTOSCOPY,WITH BOTULINUM TOXIN INJECTION;  Surgeon: Trino Cain MD;  Location: [facility] OR;   Service: Urology;  Laterality: N/A;  30 min    CYSTOSCOPY,WITH BOTULINUM TOXIN INJECTION N/A 12/20/2023    Procedure: CYSTOSCOPY,WITH BOTULINUM TOXIN INJECTION;  Surgeon: Trino Cain MD;  Location: [facility] OR;  Service: Urology;  Laterality: N/A;  40 min   150 units    CYSTOSCOPY,WITH BOTULINUM TOXIN INJECTION N/A 10/4/2024    Procedure: CYSTOSCOPY,WITH BOTULINUM TOXIN INJECTION;  Surgeon: Trino Cain MD;  Location: [facility] OR;  Service: Urology;  Laterality: N/A;  150 units Botox    CYSTOSCOPY,WITH BOTULINUM TOXIN INJECTION N/A 5/8/2025    Procedure: CYSTOSCOPY,WITH BOTULINUM TOXIN INJECTION;  Surgeon: Trino Cain MD;  Location: [facility] OR;  Service: Urology;  Laterality: N/A;  Botox 150 units    INSERTION, NEUROSTIMULATOR, TEMPORARY, SACRAL N/A 1/24/2023    Procedure: INSERTION, NEUROSTIMULATOR, TEMPORARY, SACRAL;  Surgeon: Trino Cain MD;  Location: [address];  Service: Urology;  Laterality: N/A;  1 hr 45 mins    REMOVAL OF ELECTRODE LEAD OF SACRAL NERVE STIMULATOR N/A 2/9/2023    Procedure: REMOVAL, ELECTRODE LEAD, SACRAL NERVE STIMULATOR;  Surgeon: Trino Cain MD;  Location: [facility];  Service: Urology;  Laterality: N/A;  1 hr    UPPER GASTROINTESTINAL ENDOSCOPY  05/14/2012    Dr. Preston    UPPER GASTROINTESTINAL ENDOSCOPY  07/17/2015    Dr. Shaver, sent for scanning: normal esophagus- dilated & biopsied, findings WNL, biopsies taken from z-line, stomach and duodenum; biopsy: duodenum WNL, antrum reactive gastropathy, negative for h pylori or int. metaplasia, GEJ WNL, negative for EOE & cotton's esophagus    ureteral stone extraction      basket extraction     Family History   Problem Relation Name Age of Onset    Lupus Mother      Interstitial cystitis Mother      Diabetes Maternal Grandmother      Lupus Maternal Grandmother      Clotting disorder Maternal Grandmother      Interstitial cystitis Maternal Grandmother      Diabetes Maternal Grandfather       Nephrolithiasis Maternal Grandfather          staghorn    Anesthesia problems Maternal Aunt      Interstitial cystitis Maternal Aunt      Colon cancer Neg Hx      Crohn's disease Neg Hx      Ulcerative colitis Neg Hx      Celiac disease Neg Hx      Esophageal cancer Neg Hx      Stomach cancer Neg Hx      Allergic rhinitis Neg Hx      Allergies Neg Hx      Angioedema Neg Hx      Asthma Neg Hx      Atopy Neg Hx      Eczema Neg Hx      Immunodeficiency Neg Hx      Rhinitis Neg Hx      Urticaria Neg Hx       Social History     Substance and Sexual Activity   Alcohol Use No     Social History     Tobacco Use   Smoking Status Never   Smokeless Tobacco Never     Social History     Substance and Sexual Activity   Drug Use No       Current Outpatient Medications:     apremilast (OTEZLA) 30 mg Tab, Take 1 tablet (30 mg total) by mouth 2 (two) times a day., Disp: 60 tablet, Rfl: 11    atorvastatin (LIPITOR) 20 MG tablet, Take 1 tablet (20 mg total) by mouth once daily., Disp: 90 tablet, Rfl: 3    azelastine (ASTELIN) 137 mcg (0.1 %) nasal spray, 1 spray (137 mcg total) by Nasal route 2 (two) times daily., Disp: 30 mL, Rfl: 11    clobetasoL (TEMOVATE) 0.05 % external solution, Apply topically 2 (two) times daily as needed (scalp psoriasis)., Disp: 25 mL, Rfl: 2    dextroamphetamine-amphetamine (ADDERALL XR) 15 MG 24 hr capsule, Take 1 capsule (15 mg total) by mouth every morning., Disp: 30 capsule, Rfl: 0    FLUoxetine 40 MG capsule, Take 1 capsule (40 mg total) by mouth once daily., Disp: 90 capsule, Rfl: 0    fluticasone propionate (FLONASE) 50 mcg/actuation nasal spray, 1 spray (50 mcg total) by Each Nostril route 2 (two) times a day., Disp: 16 g, Rfl: 11    gabapentin (NEURONTIN) 300 MG capsule, Take 3 capsules (900 mg total) by mouth 2 (two) times daily., Disp: 180 capsule, Rfl: 5    galcanezumab-gnlm 120 mg/mL PnIj, Inject 1 mL (120 mg total) into the skin every 28 days. maintenance dose, Disp: 1 mL, Rfl: 11    metoprolol  succinate (TOPROL-XL) 50 MG 24 hr tablet, Take 1 tablet by mouth twice daily (Patient taking differently: Take 25 mg by mouth once daily.), Disp: 180 tablet, Rfl: 1    PREVIDENT 5000 BOOSTER PLUS 1.1 % Pste, , Disp: , Rfl:     rizatriptan (MAXALT-MLT) 10 MG disintegrating tablet, Take 10 mg by mouth daily as needed., Disp: , Rfl:     rizatriptan (MAXALT-MLT) 10 MG disintegrating tablet, 1 melt at onset headache, second melt 2 hours later if needed, no more than 2 melts day/3 days use in week, Disp: 8 tablet, Rfl: 6    sulfacetamide sodium 10% (BLEPH-10) 10 % ophthalmic solution, 4 drops to left ear twice daily for 5 days, Disp: 5 mL, Rfl: 0    traMADoL (ULTRAM) 50 mg tablet, Take 1 tablet (50 mg total) by mouth every 12 (twelve) hours as needed for Pain. Medically necessary for more than 7 days, ICD 10: G89.4, Disp: 55 tablet, Rfl: 2    traZODone (DESYREL) 100 MG tablet, Take 1 tablet (100 mg total) by mouth every evening., Disp: 30 tablet, Rfl: 2    ubidecarenone (CO Q-10 ORAL), Take by mouth once daily., Disp: , Rfl:     VITAMIN D2 1,250 mcg (50,000 unit) capsule, Take 1 capsule (50,000 Units total) by mouth every 7 days., Disp: 12 capsule, Rfl: 2    ALPRAZolam (XANAX) 0.25 MG tablet, Take 1 tablet (0.25 mg total) by mouth daily as needed for Anxiety., Disp: 15 tablet, Rfl: 0    predniSONE (DELTASONE) 5 MG tablet, Take 3 tablets (15 mg total) by mouth once daily for 5 days, THEN 2 tablets (10 mg total) once daily for 5 days, THEN 1 tablet (5 mg total) once daily for 5 days., Disp: 30 tablet, Rfl: 0  No current facility-administered medications for this visit.    Facility-Administered Medications Ordered in Other Visits:     electrolyte-S (ISOLYTE), , Intravenous, Continuous, Dale Petit MD     Objective:         Vitals:    07/21/25 0933   BP: 90/64   Pulse: 66   Resp: 19         Physical Exam   Constitutional: He is oriented to person, place, and time. He appears well-developed.   HENT:   Head: Normocephalic.  "  Pulmonary/Chest: Effort normal.   Musculoskeletal:      Cervical back: Neck supple.      Comments: Tenderness in all joints of the hands.  Bilateral 2nd and 3rd finger appear slightly swollen at the PIP   Lymphadenopathy:     He has no cervical adenopathy.   Neurological: He is alert and oriented to person, place, and time.   Skin:   Dry skin   Mild flaky areas in the scalp with no overt plaque     Vitals reviewed.      Reviewed old and all outside pertinent medical records available.    All lab results personally reviewed and interpreted by me.  Lab Results   Component Value Date    WBC 8.98 05/24/2025    HGB 15.0 05/24/2025    HCT 46.2 05/24/2025    MCV 96 05/24/2025    MCH 31.3 (H) 05/24/2025    MCHC 32.5 05/24/2025    RDW 13.2 05/24/2025     05/24/2025    MPV 11.8 05/24/2025       Lab Results   Component Value Date     05/24/2025    K 4.1 05/24/2025     05/24/2025    CO2 28 05/24/2025     05/24/2025    BUN 9 05/24/2025    CALCIUM 9.2 05/24/2025    PROT 7.1 05/24/2025    ALBUMIN 3.8 05/24/2025    BILITOT 0.3 05/24/2025    AST 34 05/24/2025    ALKPHOS 84 05/24/2025    ALT 56 (H) 05/24/2025       Lab Results   Component Value Date    COLORU Yellow 06/17/2025    APPEARANCEUA Clear 05/24/2025    SPECGRAV >=1.030 06/17/2025    PHUR 5.5 06/17/2025    PROTEINUA Negative 05/24/2025    GLUCOSEU NEGATIVE 12/06/2021    KETONESU Negative 06/17/2025    LEUKOCYTESUR Negative 05/24/2025    NITRITE Negative 06/17/2025    UROBILINOGEN 0.2 06/17/2025       Lab Results   Component Value Date    CRP 1.4 06/13/2020       Lab Results   Component Value Date    RF <10.0 07/09/2016    SEDRATE 10 06/13/2020    CCPANTIBODIE <0.5 07/09/2016       No components found for: "25OHVITDTOT", "60ACEPAY3", "92YVVLJD8", "METHODNOTE"    Lab Results   Component Value Date    URICACID 5.8 10/10/2017       Lab Results   Component Value Date    HEPCAB Negative 10/10/2017       Imaging:  All imaging reviewed and independently " interpreted by me.     ASSESSMENT / PLAN:     Jm Fuentes Chaparro is a 29 y.o. White male with  IBS, anxiety, autism, who comes to transfer care for management of PsA.     1. Psoriatic arthritis -severely active and progressing  -he has been having worsening joint pain over the past couple of months.  Did not improve with increased dose of Otezla.  -in the past developed side effects to Humira.  -Can consider try another TNF in the future. Has IBS so will need to be careful with IL17 but not total contraindication   -continue Otezla for now.  -we will obtain ESR, CRP now.  Repeat hepatitis panel and QuantiFERON now.    2. Psoriasis stable  -Overall stable with minor flares on the scalp  -Continue using clobetasol topical lotion in the scalp as needed  -advised him on daily moisturizers and avoid hot showers especially during the winter time  -advised him on the use of ketoconazole shampoo and H&S cooling mist.   -continue Otezla for now    3. Drug induced immunodeficiency  - recent labs reviewed.  Obtain pre edema or labs now  - vaccines per guidelines   - immunosuppression/infectious precautions reinforced    4.Obesity  - would benefit from decreasing at least 10% of body weight.  - recommended goal of losing 1 lb per week.  - consider nutritionist evaluation.  - would consider screening for MARIJA per PMD.    5. Bladder spasms  -he has chronic bladder spasms that have been controlled with gabapentin 300 mg TID. Refill sent.     Follow up in about 4 weeks (around 8/18/2025) for Virtual Visit.  To discuss results and change in therapy.    Method of contact with patient concerns: [facility]    Disclaimer:  This note is prepared using voice recognition software and as such is likely to have errors and has not been proof read. Please contact me for questions.     Samantha Pace M.D.  Rheumatology  [facility]

## 2025-08-12 ENCOUNTER — OFFICE VISIT (OUTPATIENT)
Dept: PAIN MEDICINE | Facility: CLINIC | Age: 30
End: 2025-08-12
Payer: COMMERCIAL

## 2025-08-12 VITALS
HEART RATE: 65 BPM | BODY MASS INDEX: 28.66 KG/M2 | SYSTOLIC BLOOD PRESSURE: 112 MMHG | WEIGHT: 223.19 LBS | DIASTOLIC BLOOD PRESSURE: 74 MMHG

## 2025-08-12 DIAGNOSIS — N20.0 KIDNEY STONE: ICD-10-CM

## 2025-08-12 DIAGNOSIS — R30.0 DYSURIA: ICD-10-CM

## 2025-08-12 DIAGNOSIS — L40.50 PSORIATIC ARTHRITIS: ICD-10-CM

## 2025-08-12 DIAGNOSIS — R29.898 MUSCULAR DECONDITIONING: ICD-10-CM

## 2025-08-12 DIAGNOSIS — M79.641 BILATERAL HAND PAIN: ICD-10-CM

## 2025-08-12 DIAGNOSIS — M79.642 BILATERAL HAND PAIN: ICD-10-CM

## 2025-08-12 DIAGNOSIS — Z79.891 OPIOID CONTRACT EXISTS: Primary | ICD-10-CM

## 2025-08-12 DIAGNOSIS — Z79.891 OPIOID CONTRACT EXISTS: ICD-10-CM

## 2025-08-12 DIAGNOSIS — M06.9 RHEUMATOID ARTHRITIS INVOLVING MULTIPLE SITES, UNSPECIFIED WHETHER RHEUMATOID FACTOR PRESENT: ICD-10-CM

## 2025-08-12 PROCEDURE — 3008F BODY MASS INDEX DOCD: CPT | Mod: CPTII,S$GLB,,

## 2025-08-12 PROCEDURE — 99214 OFFICE O/P EST MOD 30 MIN: CPT | Mod: S$GLB,,,

## 2025-08-12 PROCEDURE — 3044F HG A1C LEVEL LT 7.0%: CPT | Mod: CPTII,S$GLB,,

## 2025-08-12 PROCEDURE — 1159F MED LIST DOCD IN RCRD: CPT | Mod: CPTII,S$GLB,,

## 2025-08-12 PROCEDURE — 3074F SYST BP LT 130 MM HG: CPT | Mod: CPTII,S$GLB,,

## 2025-08-12 PROCEDURE — 99999 PR PBB SHADOW E&M-EST. PATIENT-LVL III: CPT | Mod: PBBFAC,,,

## 2025-08-12 PROCEDURE — 3078F DIAST BP <80 MM HG: CPT | Mod: CPTII,S$GLB,,

## 2025-08-13 RX ORDER — TRAMADOL HYDROCHLORIDE 50 MG/1
50 TABLET, FILM COATED ORAL EVERY 12 HOURS PRN
Qty: 55 TABLET | Refills: 2 | Status: SHIPPED | OUTPATIENT
Start: 2025-08-18 | End: 2025-11-16

## 2025-08-14 ENCOUNTER — OFFICE VISIT (OUTPATIENT)
Dept: FAMILY MEDICINE | Facility: CLINIC | Age: 30
End: 2025-08-14
Payer: COMMERCIAL

## 2025-08-14 VITALS
OXYGEN SATURATION: 97 % | DIASTOLIC BLOOD PRESSURE: 72 MMHG | TEMPERATURE: 98 F | HEART RATE: 63 BPM | SYSTOLIC BLOOD PRESSURE: 100 MMHG | HEIGHT: 74 IN | BODY MASS INDEX: 28.79 KG/M2 | RESPIRATION RATE: 16 BRPM | WEIGHT: 224.31 LBS

## 2025-08-14 DIAGNOSIS — E78.5 HYPERLIPIDEMIA, UNSPECIFIED HYPERLIPIDEMIA TYPE: ICD-10-CM

## 2025-08-14 DIAGNOSIS — I10 HYPERTENSION, ESSENTIAL: Primary | ICD-10-CM

## 2025-08-14 DIAGNOSIS — K76.0 FATTY LIVER: ICD-10-CM

## 2025-08-14 PROCEDURE — 3078F DIAST BP <80 MM HG: CPT | Mod: CPTII,S$GLB,, | Performed by: FAMILY MEDICINE

## 2025-08-14 PROCEDURE — 3008F BODY MASS INDEX DOCD: CPT | Mod: CPTII,S$GLB,, | Performed by: FAMILY MEDICINE

## 2025-08-14 PROCEDURE — 1160F RVW MEDS BY RX/DR IN RCRD: CPT | Mod: CPTII,S$GLB,, | Performed by: FAMILY MEDICINE

## 2025-08-14 PROCEDURE — G2211 COMPLEX E/M VISIT ADD ON: HCPCS | Mod: S$GLB,,, | Performed by: FAMILY MEDICINE

## 2025-08-14 PROCEDURE — 3044F HG A1C LEVEL LT 7.0%: CPT | Mod: CPTII,S$GLB,, | Performed by: FAMILY MEDICINE

## 2025-08-14 PROCEDURE — 3074F SYST BP LT 130 MM HG: CPT | Mod: CPTII,S$GLB,, | Performed by: FAMILY MEDICINE

## 2025-08-14 PROCEDURE — 99214 OFFICE O/P EST MOD 30 MIN: CPT | Mod: S$GLB,,, | Performed by: FAMILY MEDICINE

## 2025-08-14 PROCEDURE — 1159F MED LIST DOCD IN RCRD: CPT | Mod: CPTII,S$GLB,, | Performed by: FAMILY MEDICINE

## 2025-08-19 DIAGNOSIS — F90.0 ADHD (ATTENTION DEFICIT HYPERACTIVITY DISORDER), INATTENTIVE TYPE: ICD-10-CM

## 2025-08-20 RX ORDER — DEXTROAMPHETAMINE SACCHARATE, AMPHETAMINE ASPARTATE MONOHYDRATE, DEXTROAMPHETAMINE SULFATE AND AMPHETAMINE SULFATE 3.75; 3.75; 3.75; 3.75 MG/1; MG/1; MG/1; MG/1
15 CAPSULE, EXTENDED RELEASE ORAL EVERY MORNING
Qty: 30 CAPSULE | Refills: 0 | Status: SHIPPED | OUTPATIENT
Start: 2025-08-20

## (undated) DEVICE — SYR SALINE FLSH PRFL STRL 10ML

## (undated) DEVICE — DURAPREP SURG SCRUB 26ML

## (undated) DEVICE — NDL INTERSTIM FORAMN 18.5G 5IN

## (undated) DEVICE — SYR B-D DISP CONTROL 10CC100/C

## (undated) DEVICE — SOL IRRI STRL WATER 1000ML

## (undated) DEVICE — PACK CYSTOSCOPY III SIRUS

## (undated) DEVICE — GOWN SURGICAL X-LARGE

## (undated) DEVICE — PACK CYSTO

## (undated) DEVICE — ADHESIVE DERMABOND ADVANCED

## (undated) DEVICE — KIT INTERSTIM II PERC LEAD EXT

## (undated) DEVICE — NDL BLUNT FILL 18G 1.5IN

## (undated) DEVICE — GLOVE BIOGEL SKINSENSE PI 8.0

## (undated) DEVICE — SET TUR BLADDER IRRIG Y TYPE

## (undated) DEVICE — KIT SURGICAL TURNOVER

## (undated) DEVICE — GAUZE SPONGE 4X4 12PLY

## (undated) DEVICE — SET CYSTO IRRIGATION UNIV SPIK

## (undated) DEVICE — TRAY CYSTO BASIN OMC

## (undated) DEVICE — TUBING SUCTION 3/16X10 2 CONN

## (undated) DEVICE — DRESSING TRANS 2X2 TEGADERM

## (undated) DEVICE — TRAY SKIN SCRUB WET PREMIUM

## (undated) DEVICE — SEE MEDLINE ITEM 157117

## (undated) DEVICE — SUT CTD VICRYL VIL BR CT-2

## (undated) DEVICE — KIT PROBE COVER WITH GEL

## (undated) DEVICE — CONTAINER SPECIMEN STRL 4OZ

## (undated) DEVICE — SET Y-TYPE TUR IRRIGATION

## (undated) DEVICE — GOWN X-LG STERILE BACK

## (undated) DEVICE — DRESSING TRANS 4X4 TEGADERM

## (undated) DEVICE — SCREENER ISTIM EXT NEROSTIMLTR

## (undated) DEVICE — NDL INJETAK ADJ TIP 35CM

## (undated) DEVICE — Device

## (undated) DEVICE — BAG URO DRAIN

## (undated) DEVICE — GLOVE SENSICARE PI SURG 8

## (undated) DEVICE — POSITIONER HEAD ADULT

## (undated) DEVICE — DRAPE CESAREAN IOBAN POUCH

## (undated) DEVICE — DRESSING ANTIMICROBIAL 1 INCH

## (undated) DEVICE — BLADE SURG CARBON STEEL SZ11

## (undated) DEVICE — SYS LABEL CORRECT MED

## (undated) DEVICE — SOL WATER STRL IRR 1000ML

## (undated) DEVICE — TOWEL OR NONABSORB ADH 17X26

## (undated) DEVICE — SUT VICRYL CTD 2-0 GI 27 SH

## (undated) DEVICE — SOL IRR WATER STRL 3000 ML

## (undated) DEVICE — SEE MEDLINE ITEM 157128

## (undated) DEVICE — GLOVE PROTEXIS HYDROGEL SZ7.5

## (undated) DEVICE — APPLICATOR CHLORAPREP ORN 26ML

## (undated) DEVICE — TRAY DRY SKIN SCRUB PREP

## (undated) DEVICE — DRAPE C-ARMOR EQUIPMENT COVER

## (undated) DEVICE — SOL IRRIGATION WATER 3000ML

## (undated) DEVICE — NDL HYPO REG 25G X 1 1/2

## (undated) DEVICE — TRAY MINOR GEN SURG OMC

## (undated) DEVICE — SYR SALINE PREFILLED FLSH 10ML

## (undated) DEVICE — UNDERGLOVES BIOGEL PI SIZE 8

## (undated) DEVICE — SOL IRR NACL .9% 3000ML

## (undated) DEVICE — SUPPORT ULNA NERVE PROTECTOR

## (undated) DEVICE — DRESSING ANTIMICROBIAL 3/4 IN

## (undated) DEVICE — SEE MEDLINE ITEM 154981

## (undated) DEVICE — SUT CTD VICRYL VIL BR UR-6

## (undated) DEVICE — MARKER WRITESITE SKIN CHLRAPRP

## (undated) DEVICE — SUT MONOCRYL 3-0 SH U/D

## (undated) DEVICE — COVERS PROBE NR-48 STERILE

## (undated) DEVICE — ADAPTER STOPCOCK FEMALE LL

## (undated) DEVICE — BAG DRAIN UROLOGY AND HOSE

## (undated) DEVICE — ADAPTER HOSE 10FT 8MM

## (undated) DEVICE — ELECTRODE REM PLYHSV RETURN 9

## (undated) DEVICE — GLOVE BIOGEL ECLIPSE SZ 8

## (undated) DEVICE — BOWL STERILE LARGE 32OZ

## (undated) DEVICE — DRESSING TEGADERM 2X2 3/4

## (undated) DEVICE — GLOVE BIOGEL ECLIPSE SZ 7.5

## (undated) DEVICE — DRAPE C-ARM ELAS CLIP 42X120IN